# Patient Record
Sex: MALE | Race: WHITE | NOT HISPANIC OR LATINO | Employment: UNEMPLOYED
[De-identification: names, ages, dates, MRNs, and addresses within clinical notes are randomized per-mention and may not be internally consistent; named-entity substitution may affect disease eponyms.]

---

## 2019-05-24 ENCOUNTER — RECORDS - HEALTHEAST (OUTPATIENT)
Dept: ADMINISTRATIVE | Facility: OTHER | Age: 64
End: 2019-05-24

## 2019-07-23 ENCOUNTER — COMMUNICATION - HEALTHEAST (OUTPATIENT)
Dept: TELEHEALTH | Facility: CLINIC | Age: 64
End: 2019-07-23

## 2019-07-23 ENCOUNTER — OFFICE VISIT - HEALTHEAST (OUTPATIENT)
Dept: FAMILY MEDICINE | Facility: CLINIC | Age: 64
End: 2019-07-23

## 2019-07-23 DIAGNOSIS — Z72.0 TOBACCO ABUSE: ICD-10-CM

## 2019-07-23 DIAGNOSIS — Z12.11 SCREEN FOR COLON CANCER: ICD-10-CM

## 2019-07-23 DIAGNOSIS — Z76.89 ENCOUNTER TO ESTABLISH CARE: ICD-10-CM

## 2019-07-23 DIAGNOSIS — R03.0 ELEVATED BLOOD PRESSURE READING WITHOUT DIAGNOSIS OF HYPERTENSION: ICD-10-CM

## 2019-07-23 DIAGNOSIS — R73.9 ELEVATED BLOOD SUGAR: ICD-10-CM

## 2019-07-23 ASSESSMENT — MIFFLIN-ST. JEOR: SCORE: 1540.34

## 2019-08-12 ENCOUNTER — COMMUNICATION - HEALTHEAST (OUTPATIENT)
Dept: FAMILY MEDICINE | Facility: CLINIC | Age: 64
End: 2019-08-12

## 2019-10-17 ENCOUNTER — COMMUNICATION - HEALTHEAST (OUTPATIENT)
Dept: FAMILY MEDICINE | Facility: CLINIC | Age: 64
End: 2019-10-17

## 2021-05-18 ENCOUNTER — OFFICE VISIT - HEALTHEAST (OUTPATIENT)
Dept: GERIATRICS | Facility: CLINIC | Age: 66
End: 2021-05-18

## 2021-05-18 ENCOUNTER — RECORDS - HEALTHEAST (OUTPATIENT)
Dept: LAB | Facility: CLINIC | Age: 66
End: 2021-05-18

## 2021-05-18 DIAGNOSIS — N18.32 STAGE 3B CHRONIC KIDNEY DISEASE (H): ICD-10-CM

## 2021-05-18 DIAGNOSIS — L08.9 TYPE 2 DIABETES MELLITUS WITH RIGHT DIABETIC FOOT INFECTION (H): ICD-10-CM

## 2021-05-18 DIAGNOSIS — I15.1 HYPERTENSION SECONDARY TO OTHER RENAL DISORDERS: ICD-10-CM

## 2021-05-18 DIAGNOSIS — M86.171 ACUTE OSTEOMYELITIS OF RIGHT FOOT (H): ICD-10-CM

## 2021-05-18 DIAGNOSIS — Z91.199 NON COMPLIANCE WITH MEDICAL TREATMENT: ICD-10-CM

## 2021-05-18 DIAGNOSIS — E11.628 TYPE 2 DIABETES MELLITUS WITH RIGHT DIABETIC FOOT INFECTION (H): ICD-10-CM

## 2021-05-18 DIAGNOSIS — R53.81 PHYSICAL DECONDITIONING: ICD-10-CM

## 2021-05-18 DIAGNOSIS — Z72.0 TOBACCO ABUSE: ICD-10-CM

## 2021-05-18 DIAGNOSIS — E87.1 HYPONATREMIA: ICD-10-CM

## 2021-05-18 DIAGNOSIS — L03.115 CELLULITIS OF RIGHT FOOT: ICD-10-CM

## 2021-05-18 DIAGNOSIS — Z89.431 STATUS POST AMPUTATION OF RIGHT FOOT THROUGH METATARSAL BONE (H): ICD-10-CM

## 2021-05-18 RX ORDER — CEFTRIAXONE 1 G/1
1000 INJECTION, POWDER, FOR SOLUTION INTRAMUSCULAR; INTRAVENOUS DAILY
Status: SHIPPED | COMMUNITY
Start: 2021-05-18 | End: 2023-08-11

## 2021-05-18 RX ORDER — ACETAMINOPHEN 325 MG/1
2 TABLET ORAL EVERY 4 HOURS PRN
Status: SHIPPED | COMMUNITY
Start: 2021-05-18 | End: 2024-03-22

## 2021-05-18 RX ORDER — METRONIDAZOLE 500 MG/1
500 TABLET ORAL 2 TIMES DAILY
Status: SHIPPED | COMMUNITY
Start: 2021-05-17 | End: 2023-08-11

## 2021-05-18 RX ORDER — INSULIN GLARGINE 100 [IU]/ML
20 INJECTION, SOLUTION SUBCUTANEOUS 2 TIMES DAILY
Status: SHIPPED | COMMUNITY
Start: 2021-05-20 | End: 2023-08-11

## 2021-05-19 ENCOUNTER — COMMUNICATION - HEALTHEAST (OUTPATIENT)
Dept: GERIATRICS | Facility: CLINIC | Age: 66
End: 2021-05-19

## 2021-05-19 LAB
ALBUMIN SERPL-MCNC: 2.1 G/DL (ref 3.5–5)
ALP SERPL-CCNC: 555 U/L (ref 45–120)
ALT SERPL W P-5'-P-CCNC: 14 U/L (ref 0–45)
ANION GAP SERPL CALCULATED.3IONS-SCNC: 7 MMOL/L (ref 5–18)
AST SERPL W P-5'-P-CCNC: 15 U/L (ref 0–40)
BILIRUB SERPL-MCNC: 0.3 MG/DL (ref 0–1)
BUN SERPL-MCNC: 14 MG/DL (ref 8–22)
CALCIUM SERPL-MCNC: 9.5 MG/DL (ref 8.5–10.5)
CHLORIDE BLD-SCNC: 98 MMOL/L (ref 98–107)
CO2 SERPL-SCNC: 30 MMOL/L (ref 22–31)
CREAT SERPL-MCNC: 1 MG/DL (ref 0.7–1.3)
ERYTHROCYTE [DISTWIDTH] IN BLOOD BY AUTOMATED COUNT: 12.9 % (ref 11–14.5)
GFR SERPL CREATININE-BSD FRML MDRD: >60 ML/MIN/1.73M2
GLUCOSE BLD-MCNC: 220 MG/DL (ref 70–125)
HCT VFR BLD AUTO: 31.6 % (ref 40–54)
HGB BLD-MCNC: 10.5 G/DL (ref 14–18)
MCH RBC QN AUTO: 29.8 PG (ref 27–34)
MCHC RBC AUTO-ENTMCNC: 33.2 G/DL (ref 32–36)
MCV RBC AUTO: 90 FL (ref 80–100)
PLATELET # BLD AUTO: 783 THOU/UL (ref 140–440)
PMV BLD AUTO: 8.2 FL (ref 8.5–12.5)
POTASSIUM BLD-SCNC: 4.8 MMOL/L (ref 3.5–5)
PROT SERPL-MCNC: 7.1 G/DL (ref 6–8)
RBC # BLD AUTO: 3.52 MILL/UL (ref 4.4–6.2)
SODIUM SERPL-SCNC: 135 MMOL/L (ref 136–145)
WBC: 8.1 THOU/UL (ref 4–11)

## 2021-05-20 ENCOUNTER — COMMUNICATION - HEALTHEAST (OUTPATIENT)
Dept: GERIATRICS | Facility: CLINIC | Age: 66
End: 2021-05-20

## 2021-05-20 ENCOUNTER — RECORDS - HEALTHEAST (OUTPATIENT)
Dept: LAB | Facility: CLINIC | Age: 66
End: 2021-05-20

## 2021-05-20 LAB
SARS-COV-2 PCR COMMENT: NORMAL
SARS-COV-2 RNA SPEC QL NAA+PROBE: NEGATIVE
SARS-COV-2 VIRUS SPECIMEN SOURCE: NORMAL

## 2021-05-21 LAB — PLATELET # BLD AUTO: 722 THOU/UL (ref 140–440)

## 2021-05-24 ENCOUNTER — RECORDS - HEALTHEAST (OUTPATIENT)
Dept: LAB | Facility: CLINIC | Age: 66
End: 2021-05-24

## 2021-05-26 ENCOUNTER — OFFICE VISIT - HEALTHEAST (OUTPATIENT)
Dept: GERIATRICS | Facility: CLINIC | Age: 66
End: 2021-05-26

## 2021-05-26 DIAGNOSIS — N18.30 STAGE 3 CHRONIC KIDNEY DISEASE, UNSPECIFIED WHETHER STAGE 3A OR 3B CKD (H): ICD-10-CM

## 2021-05-26 DIAGNOSIS — Z47.89 AFTERCARE FOLLOWING SURGERY OF THE MUSCULOSKELETAL SYSTEM: ICD-10-CM

## 2021-05-26 DIAGNOSIS — M86.9 OSTEOMYELITIS OF RIGHT FOOT, UNSPECIFIED TYPE (H): ICD-10-CM

## 2021-05-26 DIAGNOSIS — E11.65 UNCONTROLLED TYPE 2 DIABETES MELLITUS WITH HYPERGLYCEMIA (H): ICD-10-CM

## 2021-05-26 DIAGNOSIS — R53.81 PHYSICAL DECONDITIONING: ICD-10-CM

## 2021-05-26 LAB
ALBUMIN SERPL-MCNC: 2.3 G/DL (ref 3.5–5)
ALP SERPL-CCNC: 468 U/L (ref 45–120)
ALT SERPL W P-5'-P-CCNC: 17 U/L (ref 0–45)
ANION GAP SERPL CALCULATED.3IONS-SCNC: 11 MMOL/L (ref 5–18)
AST SERPL W P-5'-P-CCNC: 22 U/L (ref 0–40)
BILIRUB SERPL-MCNC: 0.3 MG/DL (ref 0–1)
BUN SERPL-MCNC: 16 MG/DL (ref 8–22)
CALCIUM SERPL-MCNC: 9 MG/DL (ref 8.5–10.5)
CHLORIDE BLD-SCNC: 102 MMOL/L (ref 98–107)
CO2 SERPL-SCNC: 24 MMOL/L (ref 22–31)
CREAT SERPL-MCNC: 0.76 MG/DL (ref 0.7–1.3)
ERYTHROCYTE [DISTWIDTH] IN BLOOD BY AUTOMATED COUNT: 12.9 % (ref 11–14.5)
GFR SERPL CREATININE-BSD FRML MDRD: >60 ML/MIN/1.73M2
GLUCOSE BLD-MCNC: 127 MG/DL (ref 70–125)
HCT VFR BLD AUTO: 33.3 % (ref 40–54)
HGB BLD-MCNC: 10.7 G/DL (ref 14–18)
MCH RBC QN AUTO: 28.8 PG (ref 27–34)
MCHC RBC AUTO-ENTMCNC: 32.1 G/DL (ref 32–36)
MCV RBC AUTO: 90 FL (ref 80–100)
PLATELET # BLD AUTO: 586 THOU/UL (ref 140–440)
PMV BLD AUTO: 8.7 FL (ref 8.5–12.5)
POTASSIUM BLD-SCNC: 4.1 MMOL/L (ref 3.5–5)
PROT SERPL-MCNC: 6.7 G/DL (ref 6–8)
RBC # BLD AUTO: 3.72 MILL/UL (ref 4.4–6.2)
SODIUM SERPL-SCNC: 137 MMOL/L (ref 136–145)
WBC: 6.7 THOU/UL (ref 4–11)

## 2021-05-30 NOTE — PROGRESS NOTES
Office Visit - New Patient   Dario Benavides   63 y.o.  male    Date of visit: 7/23/2019  Physician: Evelyn Magallon CNP     Assessment and Plan   1. Encounter to establish care  2. Screen for colon cancer  Colonoscopy ordered  - Ambulatory referral for Colonoscopy    3. Tobacco abuse  Lungs are clear of acute disease recommend annual low-dose CT scan screening for high risk individuals (age 55 to 74 years) with 30 pack-year history of smoking and current smoker or quit within past 15 years; recommended by American society of clinical oncology.   - XR Chest 2 Views    4. Elevated blood sugar  Recommend getting new sets of lab and confirm diagnosis of diabetes mellitus. Recommend treatment after reviewing result.   - HM2(CBC w/o Differential); Future  - Comprehensive Metabolic Panel; Future  - Glycosylated Hemoglobin A1c; Future    5. Elevated blood pressure reading without diagnosis of hypertension  Recommend that patient check his blood pressure twice daily and follow up for a recheck in 7 days.   - HM2(CBC w/o Differential); Future  - Lipid Cascade; Future  - Comprehensive Metabolic Panel; Future      Return in about 1 week (around 7/30/2019) for Follow up as discussed, Blood pressure check, Diabetes follow up.     Chief Complaint   Chief Complaint   Patient presents with     Establish Care     Not fasting        Patient Profile   Social History     Social History Narrative     Not on file        Past Medical History   Patient Active Problem List   Diagnosis     Tobacco abuse       Past Surgical History  He has a past surgical history that includes Back surgery (1992) and Neck surgery (2011).     History of Present Illness   This 63 y.o. old male with history tobacco abuse and elevated blood pressure presents to the clinic with his partner to establish care. He reports that he has not seen a health care provider for about 15 years. He reports he has smoked for about 45 years. He also brought with his a  "wellness result that was conducted by his Mandaen. Writer reviewed the result and confirmed mild carotid stenosis but normal LDL and total cholesterol. Result also reviewed elevated blood sugar. Patient had no concerns today. He denied chest pain, shortness of breath fever and syncope.    Review of Systems: A comprehensive review of systems was negative except as noted.     Medications and Allergies   No current outpatient medications on file.     No current facility-administered medications for this visit.      No Known Allergies     Family and Social History   History reviewed. No pertinent family history.     Social History     Tobacco Use     Smoking status: Current Every Day Smoker     Packs/day: 1.00     Years: 45.00     Pack years: 45.00     Types: Cigarettes     Smokeless tobacco: Never Used     Tobacco comment: 8/3/2019   Substance Use Topics     Alcohol use: Not Currently     Frequency: Never     Drug use: Yes     Types: Marijuana        Physical Exam   General Appearance:   Well groomed    /87   Pulse (!) 103   Ht 5' 9\" (1.753 m)   Wt 166 lb 7 oz (75.5 kg)   SpO2 98%   BMI 24.58 kg/m      EYES: Eyelids, conjunctiva, and sclera were normal. Pupils were normal. Cornea, iris, and lens were normal bilaterally.  HEAD, EARS, NOSE, MOUTH, AND THROAT: Head and face were normal. Hearing was normal to voice and the ears were normal to external exam. Nose appearance was normal and there was no discharge. Oropharynx was normal.  NECK: Neck appearance was normal. There were no neck masses and the thyroid was not enlarged.  RESPIRATORY: Breathing pattern was normal and the chest moved symmetrically.  Percussion/auscultatory percussion was normal.  Lung sounds were normal and there were no abnormal sounds.  CARDIOVASCULAR: Heart rate and rhythm were normal.  S1 and S2 were normal and there were no extra sounds or murmurs. .   MUSCULOSKELETAL: Skeletal configuration was normal and muscle mass was normal for " age. Joint appearance was overall normal.  LYMPHATIC: There were no enlarged nodes.  SKIN/HAIR/NAILS: Skin color was normal.  There were no skin lesions.  Hair and nails were normal.  NEUROLOGIC: The patient was alert and oriented to person, place, time, and circumstance. Speech was normal.   PSYCHIATRIC:  Mood and affect were normal and the patient had normal recent and remote memory. The patient's judgment and insight were normal.       Additional Information     Reviewed recent community outreach la result and screening for cardiovascular disease.     Time: total time spent with the patient was 45 minutes of which >50% was spent in counseling and coordination of care     Evelyn Magallon CNP  Family Nurse Practitioner  [facility]  [phone]  [email or web address]

## 2021-06-01 ENCOUNTER — OFFICE VISIT - HEALTHEAST (OUTPATIENT)
Dept: GERIATRICS | Facility: CLINIC | Age: 66
End: 2021-06-01

## 2021-06-01 DIAGNOSIS — M86.171 ACUTE OSTEOMYELITIS OF RIGHT FOOT (H): ICD-10-CM

## 2021-06-01 DIAGNOSIS — L03.115 CELLULITIS OF RIGHT FOOT: ICD-10-CM

## 2021-06-01 DIAGNOSIS — E11.628 TYPE 2 DIABETES MELLITUS WITH RIGHT DIABETIC FOOT INFECTION (H): ICD-10-CM

## 2021-06-01 DIAGNOSIS — E87.1 HYPONATREMIA: ICD-10-CM

## 2021-06-01 DIAGNOSIS — Z72.0 TOBACCO ABUSE: ICD-10-CM

## 2021-06-01 DIAGNOSIS — N18.32 STAGE 3B CHRONIC KIDNEY DISEASE (H): ICD-10-CM

## 2021-06-01 DIAGNOSIS — L08.9 TYPE 2 DIABETES MELLITUS WITH RIGHT DIABETIC FOOT INFECTION (H): ICD-10-CM

## 2021-06-01 DIAGNOSIS — R53.81 PHYSICAL DECONDITIONING: ICD-10-CM

## 2021-06-01 DIAGNOSIS — Z89.431 STATUS POST AMPUTATION OF RIGHT FOOT THROUGH METATARSAL BONE (H): ICD-10-CM

## 2021-06-01 DIAGNOSIS — I15.1 HYPERTENSION SECONDARY TO OTHER RENAL DISORDERS: ICD-10-CM

## 2021-06-01 DIAGNOSIS — Z91.199 NON COMPLIANCE WITH MEDICAL TREATMENT: ICD-10-CM

## 2021-06-01 ASSESSMENT — MIFFLIN-ST. JEOR: SCORE: 1448.54

## 2021-06-03 ENCOUNTER — RECORDS - HEALTHEAST (OUTPATIENT)
Dept: LAB | Facility: CLINIC | Age: 66
End: 2021-06-03

## 2021-06-03 VITALS — HEIGHT: 69 IN | WEIGHT: 166.44 LBS | BODY MASS INDEX: 24.65 KG/M2

## 2021-06-04 LAB — C REACTIVE PROTEIN LHE: 0.6 MG/DL (ref 0–0.8)

## 2021-06-07 ENCOUNTER — AMBULATORY - HEALTHEAST (OUTPATIENT)
Dept: GERIATRICS | Facility: CLINIC | Age: 66
End: 2021-06-07

## 2021-06-09 ENCOUNTER — COMMUNICATION - HEALTHEAST (OUTPATIENT)
Dept: GERIATRICS | Facility: CLINIC | Age: 66
End: 2021-06-09

## 2021-06-16 PROBLEM — Z72.0 TOBACCO ABUSE: Status: ACTIVE | Noted: 2019-07-23

## 2021-06-16 PROBLEM — M86.171 ACUTE OSTEOMYELITIS OF RIGHT FOOT (H): Status: ACTIVE | Noted: 2021-05-04

## 2021-06-16 PROBLEM — L03.115 CELLULITIS OF RIGHT FOOT: Status: ACTIVE | Noted: 2021-05-04

## 2021-06-16 PROBLEM — Z89.431 STATUS POST AMPUTATION OF RIGHT FOOT THROUGH METATARSAL BONE (H): Status: ACTIVE | Noted: 2021-05-08

## 2021-06-17 NOTE — TELEPHONE ENCOUNTER
"[facility] Nurse Triage Telephone Note      Provider: Elham Almonte NP  Facility: [facility]    Facility Type: TCU    Caller: Heidi  Call Back Number:  [phone]    Allergies: Patient has no known allergies.    Reason for call: Nurse called to report that patient's blood glucose reading this morning was reading \"high\" on the meter.  Patient was given his scheduled Lantus 13 units and s/s Novolog 10 units.  Patient is asymptomatic.  According to staff patient had a fish sandwich and Big Mac from [employer] last evening for supper.        Verbal Order/Direction given by Provider: Give an extra 10 units of Novolog now, increase Lantus to 20 units two times a day, and call back in an hour if blood glucose is >350.      Provider giving order: Elham Almonte NP    Verbal order given to: Heidi De La Torre RN      "

## 2021-06-17 NOTE — TELEPHONE ENCOUNTER
Telephone Encounter by Sonia De La Torre RN at 5/19/2021  2:53 PM     Author: Sonia De La Torre, RN Service: -- Author Type: Registered Nurse    Filed: 5/19/2021  2:58 PM Encounter Date: 5/19/2021 Status: Signed    : Sonia De La Torre RN (Registered Nurse)       [facility] Nurse Triage Telephone Note      Provider: Elham Almonte NP  Facility: [facility]    Facility Type: TCU    Caller: Heidi  Call Back Number:  [phone]    Allergies: Patient has no known allergies.    Reason for call: Nurse called to report lab results.           Verbal Order/Direction given by Provider: Recheck platelet level on 5/21 and recheck CBC and CMP on 5/26/.      Provider giving order: Elham Almonte NP    Verbal order given to: Delmar De La Torre RN

## 2021-06-19 NOTE — LETTER
Letter by Evelyn Magallon FNP at      Author: Evelyn Magallon FNP Service: -- Author Type: --    Filed:  Encounter Date: 8/12/2019 Status: (Other)         Dario Benavides  [address] [place] [address]             August 12, 2019         Dear Mr. Benavides,    Our records show that you are due for a colonoscopy.  We do want to inform you that there are a couple of other options besides the traditional colonoscopy that we offer.  If you would like to do the traditional colonoscopy, please contact a [facility] near you according to the card enclosed.  If you would like to do one of the other options available to you, please let you primary doctor know and they will get that ordered.  Enclosed is some information on the other options for you to read over.  If you have had any of these done at another facility, please arrange for us to receive those records so we can update your chart.    Please call with questions or contact us using [employer].    Sincerely,        Electronically signed by WOO Berry

## 2021-06-19 NOTE — LETTER
Letter by vEelyn Magallon FNP at      Author: Evelyn Magallon FNP Service: -- Author Type: --    Filed:  Encounter Date: 10/17/2019 Status: Signed         Dario Benavides  [address] [place] [address]             October 17, 2019         Dear Mr. Benavides,    Our records show that you are due for a colonoscopy.  We do want to inform you that there are a couple of other options besides the traditional colonoscopy that we offer.  If you would like to do the traditional colonoscopy, please contact a [facility] near you according to the card enclosed.  If you would like to do one of the other options available to you, please let you primary doctor know and they will get that ordered.  Enclosed is some information on the other options for you to read over.  If you have had any of these done at another facility, please arrange for us to receive those records so we can update your chart.    Please call with questions or contact us using [employer].    Sincerely,        Electronically signed by WOO Berry        26-Feb-2019

## 2021-06-25 NOTE — PROGRESS NOTES
[facility]  Initial MD Visit  05/26/21      Facility:   ANSWER ROOMING ACTIVITY QUESTION     Code Status: FULL CODE    Chief Complaint   Patient presents with     H & P       HPI:   Dario Benavides is a 65 y.o. male who was seen at [facility] on 05/26/21 for an initial visit. Medical history is notable for DM and CKD. He was hospitalized at [facility] from 5/4/21 to 5/17/21 where he presented due to daughter's concerns for weigh loss, foot infection. He was not taking any medications as home other than a prostate supplement. He was found to have a R foot osteomyelitis and is s/p I&D with partial 5th ray amputation (5/6/21) followed by I&D with transmetatarsal amputation (5/8/21). ID was following and he was discharged to complete a course of ceftriaxone and metronidazole. Also with uncontrolled DM (Hgb A1c 15.1) and he is new on insulin. He was admitted to this facility for medical management and rehab.     Today, Mr. Benavides is seen in his room sitting in a wheelchair. He was headed outside to smoke. Brief conversation today. He did not have any questions or concerns. Wants to get home with his daughter as soon as antibiotics are done. Discussed blood sugars, insulin and needing to get better glucose control. No chest pain or dyspnea. No concerns today per discussion with nursing - labile sugars noted. He is working with therapies.     ALLERGIES:  No Known Allergies    PAST MEDICAL HISTORY:  Past Medical History:   Diagnosis Date     Hypertension                PAST SURGICAL HISTORY:  Past Surgical History:   Procedure Laterality Date     BACK SURGERY  1992     NECK SURGERY  2011       FAMILY HISTORY:  Reviewed and non contributory    SOCIAL HISTORY:  Lives alone     MEDICATIONS:  Post Discharge Medication Reconciliation Status: discharge medications reconciled and changed, per note/orders    Current Outpatient Medications   Medication Sig     acetaminophen (TYLENOL) 325 MG  tablet Take 650 mg by mouth every 4 (four) hours as needed for pain.     arginine/glutamine/calcium bmb (REHAN ORAL) Take 1 packet by mouth 2 (two) times a day.     cefTRIAXone 1 gram SolR Infuse 1,000 mg into a venous catheter daily. Last day 6/9/21     insulin aspart U-100 (NOVOLOG) 100 unit/mL (3 mL) injection pen Inject under the skin. Inject as per sliding scale: if 0 - 69 see hypoglycemia protocol; 70 - 149 = 0; 150 - 199 = 2 units; 200 - 249 = 4 units; 250 - 299 = 6 units; 300 - 349 = 8 units; 350 - 399 = 10 units;     insulin glargine (LANTUS) 100 unit/mL vial Inject 20 Units under the skin 2 (two) times a day.      metroNIDAZOLE (FLAGYL) 500 MG tablet Take 500 mg by mouth 2 (two) times a day.     senna (SENOKOT) 8.6 mg tablet Take 2 tablets by mouth 2 (two) times a day.        ROS:  4 point ROS neg other than the symptoms noted above in the HPI.    PHYSICAL EXAM:  /80   Pulse 94   Temp 96.8  F (36  C)   Resp 16   Wt 144 lb 11.2 oz (65.6 kg)   SpO2 95%   BMI 21.37 kg/m     Gen: sitting in wheelchair, alert, cooperative and in no acute distress  HEENT: normocephalic; oropharynx clear  Card: RRR, S1, S2, no murmurs  Resp: lungs clear to auscultation bilaterally  MSK: decreased muscle tone, no LLE edema; RLE in CAM boot  Neuro: CX II-XII grossly in tact; ROM in all four extremities grossly in tact  Psych: alert and oriented x3; normal affect    LABORATORY/IMAGING DATA:  Reviewed as per Epic    ASSESSMENT/PLAN:    Right Foot Osteomyelitis s/p Transmetatarsal Amputation (5/8/21)  Pain controlled. Tolerating antibiotics.   -- NWB w/ CAM boot  -- wound cares as ordered   -- APAP 650 mg q4h PRN  -- ceftriaxone 1g daily and metronidazole 500 mg two times a day (last day 6/9/21)  -- weeky labs  -- follow up with podiatry as scheduled    DM, Type II - Uncontrolled  Hgb A1c 15.1. Sugars are all over the board at the TCU. 200s-260s (am), 200-400 (noon) and 190s-310s (farhana).   -- glargine 20 units two times a  day plus aspart sliding scale insulin three times a day AC  -- challenging given uneven diet - he's eating non DM type foods  -- also challenge that he needs to be able to maintain regimen when at home  -- recent increase in glargine so going to let be today - considered adding a fixed dose pre-meal aspart, but again he needs to be able to manage at home    CKD, Stage III  By history. Baseline Cr not known. Cr 0.76 today.   -- avoid nephrotoxic meds  -- periodic BMP    Slow Transit Constipation  -- senna 2 tabs BID  -- adjust bowel regimen as needed    Physical Deconditioning  In setting of hospitalization and underlying medical conditions  -- ongoing PT/OT        Electronically signed by: Sophia Dumont MD

## 2021-06-26 NOTE — PROGRESS NOTES
[facility] Patient Outreach:     Discharge Date::  6/5/21      Reason for TCU stay (discharge diagnosis)::  Right foot osteomyelitis with uncontrolled DM      Are you feeling better, the same or worse since your discharge?:  Patient is feeling better          As part of your discharge plan, did they discuss home care with you?: Yes        Have your seen them yet, or are they scheduled to visit?: Yes                Do you have any follow up visits scheduled with your PCP or Specialist?:  Yes, with PCP      (RN) Is it scheduled soon enough (3-5 days)?: Yes

## 2021-07-04 NOTE — LETTER
Letter by Elham Almonte NP at      Author: Elham Almonte NP Service: -- Author Type: --    Filed:  Encounter Date: 6/1/2021 Status: (Other)         [facility]  [address] [place] [address]                                  June 14, 2021    Patient: Dario Benavides   MR Number: [id number]   YOB: 1955   Date of Visit: 6/1/2021     Dear Dr. Pablo:    Thank you for referring Dario Benavides to me for evaluation. Below are the relevant portions of my assessment and plan of care.    If you have questions, please do not hesitate to call me. I look forward to following Dario along with you.    Sincerely,        Elham Almonte NP          CC  No Recipients  Elham Almonte NP  6/14/2021 11:08 PM  Sign when Signing Visit  [facility]    Facility:   [facility] [[id number]]   Code Status: FULL CODE  PCP: Evelyn Magallon FNP   Phone: [phone]   Fax: [phone]      CHIEF COMPLAINT/REASON FOR VISIT:  Chief Complaint   Patient presents with   ? Discharge Summary     6/5 PT & OT.       HISTORY COURSE:  Per admission visit dated 5/18/2021:  Brief Summary of Hospital Course:   Patient hospitalized during above noted dates following presentation to the ED with an infection of a diabetic ulcer of his R foot. Following podiatry assessment her underwent an I&D with partial 5th ray amputation of his R foot on 5/6/21; followed by a repeat I&D with transmetatarsal amputation on 5/8/21. On 5/12/21 he returned to the OR for an additional transmetatarsal amputation with application of integra and a wound vac. ID was consulted and he was started on a regimen of Ceftriaxone, Flagyl and Diflucan with placement of PICC line on 5/17 with plans to discharge on regimen of Flagyl and IV Ceftriaxone for a total on 4 week with follow-up with Dr Valera. Hgb A1c upon admission noted 15.1 - started on regimen of Lantus insulin with labile  BG levels. Admission SHANITA resolved during admission      ICD-10-CM    1. Acute osteomyelitis of right foot (H)   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) M86.171  Z 89.431 Acute - unstable, improving. Continues on regimen of   2. Cellulitis of right foot  L03.115 IV Ceftriaxone and PO Flagyl per ID - plan to discharge home following completion of IV ceftriaxone on 6/4.  Follow-up with infectious disease further recommendations.   3. Type 2 diabetes mellitus with right diabetic foot infection (H)  E11.628 Chronic - unstable. Uncontrolled PTA - labile BG levels inpatient - currently on regimen of Lantus 20u two times a day + sliding scale insulin. Has had elevated BG levels since admission -blood glucose levels improved with increased to 20 units twice daily.  Plan to discharge on this regimen, will require close follow-up by PCP following discharge.    L08.9    4. Non compliance with medical treatment  Z91.19 Acute on chronic - unstable. Leading cause of noted diagnoses and recent surgical procedures - ongoing education required re:importance of adherence to prescribed regimens for patient safety.  Will discharge with home care nursing to promote adherence to regimen   5. Stage 3b chronic kidney disease  N18.32 Chronic - stable.  Last BMP noting BUN of 16, creatinine of 7.76 and a GFR greater than 60.  Ongoing monitoring per PCP   6. Hyponatremia  E87.1 Acute - resolved. Noted inpatient. Last Na+ level stable at 137.  Ongoing monitoring per PCP.   7. Hypertension secondary to other renal disorders  I15.1 Chronic - stable. No active tx regimen.  Variations noted.      N28.89  ongoing monitoring and future interventions per PCP   8. Tobacco abuse  Z72.0 Chronic - unstable. Current every day smoker - no desire for cessation at this time. Ongoing education.  Ongoing interventions per PCP   9. Physical deconditioning  R53.81 Acute -stable. 2/2 above noted diagnoses, recent hospitalization and noted  "procedures. PT/OT completed in TCU to safe baseline to discharge home.  We will continue in-home therapies.     ROS:  10 point ROS of systems including Constitutional, Eyes, Respiratory, Cardiovascular, Gastroenterology, Genitourinary, Integumentary, Musculoskeletal, Psychiatric were all negative except for pertinent positives noted in my HPI.    EXAM:  Vitals:    06/01/21 1309   BP: 122/72   Pulse: 98   Resp: 18   Temp: 98  F (36.7  C)   SpO2: 97%   Weight: 146 lb 3.2 oz (66.3 kg)   Height: 5' 9\" (1.753 m)     GENERAL APPEARANCE:  Alert, in no distress, oriented, thin, cooperative, middle-aged male resting in bed  EYES:  EOM, conjunctivae, lids, pupils and irises normal  RESP:  respiratory effort normal, no respiratory distress  CV:  no edema, +2 pedal pulse LLE  M/S:   Gait and station abnormal - IVONE 2/2 patient being in bed; Digits and nails abnormal - arthritic changes present  Examination of right lower extremity  Inspection, ROM, stability and muscle strength diminished 2/2 above noted diagnoses  SKIN:  Inspection of skin and subcutaneous tissue baseline, Palpation of skin and subcutaneous tissue baseline, wound appearance: IVONE 2/2 dressing in place - surround skin CDI with no s/sx of infection  PSYCH:  oriented X 3, normal insight, judgement and memory, affect and mood normal      MEDICATION LIST:  Current Outpatient Medications   Medication Sig   ? acetaminophen (TYLENOL) 325 MG tablet Take 650 mg by mouth every 4 (four) hours as needed for pain.   ? arginine/glutamine/calcium bmb (REHAN ORAL) Take 1 packet by mouth 2 (two) times a day.   ? cefTRIAXone 1 gram SolR Infuse 1,000 mg into a venous catheter daily. Last day 6/9/21   ? insulin aspart U-100 (NOVOLOG) 100 unit/mL (3 mL) injection pen Inject under the skin. Inject as per sliding scale: if 0 - 69 see hypoglycemia protocol; 70 - 149 = 0; 150 - 199 = 2 units; 200 - 249 = 4 units; 250 - 299 = 6 units; 300 - 349 = 8 units; 350 - 399 = 10 units;   ? insulin " glargine (LANTUS) 100 unit/mL vial Inject 20 Units under the skin 2 (two) times a day.    ? metroNIDAZOLE (FLAGYL) 500 MG tablet Take 500 mg by mouth 2 (two) times a day.   ? senna (SENOKOT) 8.6 mg tablet Take 2 tablets by mouth 2 (two) times a day.       DISCHARGE DIAGNOSIS:    ICD-10-CM    1. Acute osteomyelitis of right foot (H)  M86.171    2. 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 Cellulitis of right foot  L03.115    4. Type 2 diabetes mellitus with right diabetic foot infection (H)  E11.628     L08.9    5. Non compliance with medical treatment  Z91.19    6. Stage 3b chronic kidney disease  N18.32    7. Hyponatremia  E87.1    8. Hypertension secondary to other renal disorders  I15.1     N28.89    9. Tobacco abuse  Z72.0    10. Physical deconditioning  R53.81        MEDICAL EQUIPMENT NEEDS:  None    Documentation of Face-to-Face and Certification for Home Health Services   Patient: Dario Benavides  YOB: 1955  MR Number: [id number]  Today's Date: 06/01/2021    I certify that patient: Dario Benavides is under my care and that I, or a nurse practitioner or physician's assistant working with me, had a face-to-face encounter that meets the physician face-to-face encounter requirements with this patient on: 06/01/2021.    This encounter with the patient was in whole, or in part, for the following medical condition, which is the primary reason for home health care: The primary encounter diagnosis was Acute osteomyelitis of right foot (H). Diagnoses of 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), Cellulitis of right foot, Type 2 diabetes mellitus with right diabetic foot infection (H), Non compliance with medical treatment, Stage 3b chronic kidney disease, Hyponatremia, Hypertension secondary to other renal disorders, Tobacco abuse, and Physical deconditioning were also pertinent to this visit.    I certify that, based on my findings, the  following services are medically necessary home health services: Nursing, Occupational Therapy, and Physical Therapy.    My clinical findings support the need for the above services because: Nurse is needed: For complex aftercare of surgical procedures because the patient needs instruction and cannot perform care on their own due to noncompliance secondary to above diagnoses; to provide assessment and oversight required in the home to assure adherence to the medical plan due to: Medical complexity secondary to above-noted diagnoses; Occupational Therapy Services are needed to assess and treat cognitive ability and address ADL safety due to impairment in physical ability secondary to above-noted diagnoses; and physical Therapy Services are needed to assess and treat the following functional impairments: Physical limitations secondary to above-noted diagnoses.    Further, I certify that my clinical findings support that this patient is homebound (i.e. absences from home require considerable and taxing effort and are for medical reasons or [facility] services or infrequently or of short duration when for other reasons) because: Leaving home is medically contraindicated for the following reason(s): Pressure on open wounds during transport impairs healing process.    Based on the above findings. I certify that this patient is confined to the home and needs intermittent skilled nursing care, physical therapy and/or speech therapy.  The patient is under my care, and I have initiated the establishment of the plan of care.  This patient will be followed by a physician who will periodically review the plan of care.  Physician/Provider to provide follow up care: Evelyn Magallon, FNP    Responsible Medicare certified [place] Physician: Dr. Sophia Dumont MD  Physician Signature: See electronic signature associated with these discharge orders.  Date: 06/01/2021    Schedule follow up visit with primary care provider within 7 days to  reestablish care.      TOTAL DISCHARGE TIME:   Greater than 30 minutes    Electronically signed by:   KASEY Freeman, DNP  [facility]  [facility] Office: [address] [place] [address] [address] [place] [address]   [facility] Cell: [phone]  [facility] Fax: [phone]    [facility] Office: [address] [place] [address]  [facility] Phone: [phone]  [facility] Voicemail: [phone]

## 2021-07-04 NOTE — LETTER
Letter by Sophia Dumont MD at      Author: Sophia Dumont MD Service: -- Author Type: --    Filed:  Encounter Date: 5/26/2021 Status: (Other)         Patient: Dario Benavides   MR Number: [id number]   YOB: 1955   Date of Visit: 5/26/2021     [facility]  Initial MD Visit  05/26/21      Facility:   ANSWER ROOMING ACTIVITY QUESTION     Code Status: FULL CODE    Chief Complaint   Patient presents with   ? H & P       HPI:   Dario Benavides is a 65 y.o. male who was seen at [facility] on 05/26/21 for an initial visit. Medical history is notable for DM and CKD. He was hospitalized at [facility] from 5/4/21 to 5/17/21 where he presented due to daughter's concerns for weigh loss, foot infection. He was not taking any medications as home other than a prostate supplement. He was found to have a R foot osteomyelitis and is s/p I&D with partial 5th ray amputation (5/6/21) followed by I&D with transmetatarsal amputation (5/8/21). ID was following and he was discharged to complete a course of ceftriaxone and metronidazole. Also with uncontrolled DM (Hgb A1c 15.1) and he is new on insulin. He was admitted to this facility for medical management and rehab.     Today, Mr. Benavides is seen in his room sitting in a wheelchair. He was headed outside to smoke. Brief conversation today. He did not have any questions or concerns. Wants to get home with his daughter as soon as antibiotics are done. Discussed blood sugars, insulin and needing to get better glucose control. No chest pain or dyspnea. No concerns today per discussion with nursing - labile sugars noted. He is working with therapies.     ALLERGIES:  No Known Allergies    PAST MEDICAL HISTORY:  Past Medical History:   Diagnosis Date   ? Hypertension                PAST SURGICAL HISTORY:  Past Surgical History:   Procedure Laterality Date   ? BACK SURGERY  1992   ? NECK SURGERY  2011       FAMILY HISTORY:  Reviewed  and non contributory    SOCIAL HISTORY:  Lives alone     MEDICATIONS:  Post Discharge Medication Reconciliation Status: discharge medications reconciled and changed, per note/orders    Current Outpatient Medications   Medication Sig   ? acetaminophen (TYLENOL) 325 MG tablet Take 650 mg by mouth every 4 (four) hours as needed for pain.   ? arginine/glutamine/calcium bmb (REHAN ORAL) Take 1 packet by mouth 2 (two) times a day.   ? cefTRIAXone 1 gram SolR Infuse 1,000 mg into a venous catheter daily. Last day 6/9/21   ? insulin aspart U-100 (NOVOLOG) 100 unit/mL (3 mL) injection pen Inject under the skin. Inject as per sliding scale: if 0 - 69 see hypoglycemia protocol; 70 - 149 = 0; 150 - 199 = 2 units; 200 - 249 = 4 units; 250 - 299 = 6 units; 300 - 349 = 8 units; 350 - 399 = 10 units;   ? insulin glargine (LANTUS) 100 unit/mL vial Inject 20 Units under the skin 2 (two) times a day.    ? metroNIDAZOLE (FLAGYL) 500 MG tablet Take 500 mg by mouth 2 (two) times a day.   ? senna (SENOKOT) 8.6 mg tablet Take 2 tablets by mouth 2 (two) times a day.        ROS:  4 point ROS neg other than the symptoms noted above in the HPI.    PHYSICAL EXAM:  /80   Pulse 94   Temp 96.8  F (36  C)   Resp 16   Wt 144 lb 11.2 oz (65.6 kg)   SpO2 95%   BMI 21.37 kg/m     Gen: sitting in wheelchair, alert, cooperative and in no acute distress  HEENT: normocephalic; oropharynx clear  Card: RRR, S1, S2, no murmurs  Resp: lungs clear to auscultation bilaterally  MSK: decreased muscle tone, no LLE edema; RLE in CAM boot  Neuro: CX II-XII grossly in tact; ROM in all four extremities grossly in tact  Psych: alert and oriented x3; normal affect    LABORATORY/IMAGING DATA:  Reviewed as per Epic    ASSESSMENT/PLAN:    Right Foot Osteomyelitis s/p Transmetatarsal Amputation (5/8/21)  Pain controlled. Tolerating antibiotics.   -- NWB w/ CAM boot  -- wound cares as ordered   -- APAP 650 mg q4h PRN  -- ceftriaxone 1g daily and metronidazole 500  mg two times a day (last day 6/9/21)  -- weeky labs  -- follow up with podiatry as scheduled    DM, Type II - Uncontrolled  Hgb A1c 15.1. Sugars are all over the board at the TCU. 200s-260s (am), 200-400 (noon) and 190s-310s (farhana).   -- glargine 20 units two times a day plus aspart sliding scale insulin three times a day AC  -- challenging given uneven diet - he's eating non DM type foods  -- also challenge that he needs to be able to maintain regimen when at home  -- recent increase in glargine so going to let be today - considered adding a fixed dose pre-meal aspart, but again he needs to be able to manage at home    CKD, Stage III  By history. Baseline Cr not known. Cr 0.76 today.   -- avoid nephrotoxic meds  -- periodic BMP    Slow Transit Constipation  -- senna 2 tabs BID  -- adjust bowel regimen as needed    Physical Deconditioning  In setting of hospitalization and underlying medical conditions  -- ongoing PT/OT        Electronically signed by: Sophia Dumont MD

## 2021-07-04 NOTE — PROGRESS NOTES
Progress Notes by Elham Almonte NP at 6/1/2021 12:35 PM     Author: Elham Almonte NP Service: -- Author Type: Nurse Practitioner    Filed: 6/14/2021 11:08 PM Encounter Date: 6/1/2021 Status: Signed    : Elham Almonte NP (Nurse Practitioner)       [facility]    Facility:   [facility] [[id number]]   Code Status: FULL CODE  PCP: Evelyn Magallon FNP   Phone: [phone]   Fax: [phone]      CHIEF COMPLAINT/REASON FOR VISIT:  Chief Complaint   Patient presents with   ? Discharge Summary     6/5 PT & OT.       HISTORY COURSE:  Per admission visit dated 5/18/2021:  Brief Summary of Hospital Course:   Patient hospitalized during above noted dates following presentation to the ED with an infection of a diabetic ulcer of his R foot. Following podiatry assessment her underwent an I&D with partial 5th ray amputation of his R foot on 5/6/21; followed by a repeat I&D with transmetatarsal amputation on 5/8/21. On 5/12/21 he returned to the OR for an additional transmetatarsal amputation with application of integra and a wound vac. ID was consulted and he was started on a regimen of Ceftriaxone, Flagyl and Diflucan with placement of PICC line on 5/17 with plans to discharge on regimen of Flagyl and IV Ceftriaxone for a total on 4 week with follow-up with Dr Valera. Hgb A1c upon admission noted 15.1 - started on regimen of Lantus insulin with labile BG levels. Admission SHANITA resolved during admission      ICD-10-CM    1. Acute osteomyelitis of right foot (H)   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) M86.171  Z 89.431 Acute - unstable, improving. Continues on regimen of   2. Cellulitis of right foot  L03.115 IV Ceftriaxone and PO Flagyl per ID - plan to discharge home following completion of IV ceftriaxone on 6/4.  Follow-up with infectious disease further recommendations.   3. Type 2 diabetes mellitus with right diabetic foot infection (H)  E11.628 Chronic  "- unstable. Uncontrolled PTA - labile BG levels inpatient - currently on regimen of Lantus 20u two times a day + sliding scale insulin. Has had elevated BG levels since admission -blood glucose levels improved with increased to 20 units twice daily.  Plan to discharge on this regimen, will require close follow-up by PCP following discharge.    L08.9    4. Non compliance with medical treatment  Z91.19 Acute on chronic - unstable. Leading cause of noted diagnoses and recent surgical procedures - ongoing education required re:importance of adherence to prescribed regimens for patient safety.  Will discharge with home care nursing to promote adherence to regimen   5. Stage 3b chronic kidney disease  N18.32 Chronic - stable.  Last BMP noting BUN of 16, creatinine of 7.76 and a GFR greater than 60.  Ongoing monitoring per PCP   6. Hyponatremia  E87.1 Acute - resolved. Noted inpatient. Last Na+ level stable at 137.  Ongoing monitoring per PCP.   7. Hypertension secondary to other renal disorders  I15.1 Chronic - stable. No active tx regimen.  Variations noted.      N28.89  ongoing monitoring and future interventions per PCP   8. Tobacco abuse  Z72.0 Chronic - unstable. Current every day smoker - no desire for cessation at this time. Ongoing education.  Ongoing interventions per PCP   9. Physical deconditioning  R53.81 Acute -stable. 2/2 above noted diagnoses, recent hospitalization and noted procedures. PT/OT completed in TCU to safe baseline to discharge home.  We will continue in-home therapies.     ROS:  10 point ROS of systems including Constitutional, Eyes, Respiratory, Cardiovascular, Gastroenterology, Genitourinary, Integumentary, Musculoskeletal, Psychiatric were all negative except for pertinent positives noted in my HPI.    EXAM:  Vitals:    06/01/21 1309   BP: 122/72   Pulse: 98   Resp: 18   Temp: 98  F (36.7  C)   SpO2: 97%   Weight: 146 lb 3.2 oz (66.3 kg)   Height: 5' 9\" (1.753 m)     GENERAL APPEARANCE:  " Alert, in no distress, oriented, thin, cooperative, middle-aged male resting in bed  EYES:  EOM, conjunctivae, lids, pupils and irises normal  RESP:  respiratory effort normal, no respiratory distress  CV:  no edema, +2 pedal pulse LLE  M/S:   Gait and station abnormal - IVONE 2/2 patient being in bed; Digits and nails abnormal - arthritic changes present  Examination of right lower extremity  Inspection, ROM, stability and muscle strength diminished 2/2 above noted diagnoses  SKIN:  Inspection of skin and subcutaneous tissue baseline, Palpation of skin and subcutaneous tissue baseline, wound appearance: IVONE 2/2 dressing in place - surround skin CDI with no s/sx of infection  PSYCH:  oriented X 3, normal insight, judgement and memory, affect and mood normal      MEDICATION LIST:  Current Outpatient Medications   Medication Sig   ? acetaminophen (TYLENOL) 325 MG tablet Take 650 mg by mouth every 4 (four) hours as needed for pain.   ? arginine/glutamine/calcium bmb (REHAN ORAL) Take 1 packet by mouth 2 (two) times a day.   ? cefTRIAXone 1 gram SolR Infuse 1,000 mg into a venous catheter daily. Last day 6/9/21   ? insulin aspart U-100 (NOVOLOG) 100 unit/mL (3 mL) injection pen Inject under the skin. Inject as per sliding scale: if 0 - 69 see hypoglycemia protocol; 70 - 149 = 0; 150 - 199 = 2 units; 200 - 249 = 4 units; 250 - 299 = 6 units; 300 - 349 = 8 units; 350 - 399 = 10 units;   ? insulin glargine (LANTUS) 100 unit/mL vial Inject 20 Units under the skin 2 (two) times a day.    ? metroNIDAZOLE (FLAGYL) 500 MG tablet Take 500 mg by mouth 2 (two) times a day.   ? senna (SENOKOT) 8.6 mg tablet Take 2 tablets by mouth 2 (two) times a day.       DISCHARGE DIAGNOSIS:    ICD-10-CM    1. Acute osteomyelitis of right foot (H)  M86.171    2. 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 Cellulitis of right foot  L03.115    4. Type 2 diabetes mellitus with right diabetic foot infection (H)   E11.628     L08.9    5. Non compliance with medical treatment  Z91.19    6. Stage 3b chronic kidney disease  N18.32    7. Hyponatremia  E87.1    8. Hypertension secondary to other renal disorders  I15.1     N28.89    9. Tobacco abuse  Z72.0    10. Physical deconditioning  R53.81        MEDICAL EQUIPMENT NEEDS:  None    Documentation of Face-to-Face and Certification for Home Health Services   Patient: Dario Benavides  YOB: 1955  MR Number: [id number]  Today's Date: 06/01/2021    I certify that patient: Dario Benavides is under my care and that I, or a nurse practitioner or physician's assistant working with me, had a face-to-face encounter that meets the physician face-to-face encounter requirements with this patient on: 06/01/2021.    This encounter with the patient was in whole, or in part, for the following medical condition, which is the primary reason for home health care: The primary encounter diagnosis was Acute osteomyelitis of right foot (H). Diagnoses of 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), Cellulitis of right foot, Type 2 diabetes mellitus with right diabetic foot infection (H), Non compliance with medical treatment, Stage 3b chronic kidney disease, Hyponatremia, Hypertension secondary to other renal disorders, Tobacco abuse, and Physical deconditioning were also pertinent to this visit.    I certify that, based on my findings, the following services are medically necessary home health services: Nursing, Occupational Therapy, and Physical Therapy.    My clinical findings support the need for the above services because: Nurse is needed: For complex aftercare of surgical procedures because the patient needs instruction and cannot perform care on their own due to noncompliance secondary to above diagnoses; to provide assessment and oversight required in the home to assure adherence to the medical plan due to: Medical complexity secondary to above-noted  diagnoses; Occupational Therapy Services are needed to assess and treat cognitive ability and address ADL safety due to impairment in physical ability secondary to above-noted diagnoses; and physical Therapy Services are needed to assess and treat the following functional impairments: Physical limitations secondary to above-noted diagnoses.    Further, I certify that my clinical findings support that this patient is homebound (i.e. absences from home require considerable and taxing effort and are for medical reasons or [employer] services or infrequently or of short duration when for other reasons) because: Leaving home is medically contraindicated for the following reason(s): Pressure on open wounds during transport impairs healing process.    Based on the above findings. I certify that this patient is confined to the home and needs intermittent skilled nursing care, physical therapy and/or speech therapy.  The patient is under my care, and I have initiated the establishment of the plan of care.  This patient will be followed by a physician who will periodically review the plan of care.  Physician/Provider to provide follow up care: Evelyn Magallon, FNP    Responsible Medicare certified PECOS Physician: Dr. Sophia Dumont MD  Physician Signature: See electronic signature associated with these discharge orders.  Date: 06/01/2021    Schedule follow up visit with primary care provider within 7 days to reestablish care.      TOTAL DISCHARGE TIME:   Greater than 30 minutes    Electronically signed by:   KASEY Freeman, GUILLERMO  [place] [facility]  [place] Office: [address] [place] [address]   [place] Cell: [phone]  [place] Fax: [phone]    [facility] Office: [address] [place] [address]  [facility] Phone: [phone]  [facility] Voicemail: [phone]

## 2021-07-04 NOTE — ADDENDUM NOTE
Addendum Note by Geremias Martinez MD at 5/26/2021  4:03 PM     Author: Geremias Martinez MD Service: -- Author Type: Physician    Filed: 5/29/2021  6:48 PM Encounter Date: 5/26/2021 Status: Signed    : Geremias Martinez MD (Physician)    Addended by: GEREMIAS MARTINEZ on: 5/29/2021 06:48 PM        Modules accepted: Orders

## 2021-07-04 NOTE — PROGRESS NOTES
Progress Notes by Elham Almonte NP at 5/18/2021 10:49 AM     Author: Elham Almonte NP Service: -- Author Type: Nurse Practitioner    Filed: 6/8/2021 10:25 PM Encounter Date: 5/18/2021 Status: Signed    : Elham Almonte NP (Nurse Practitioner)       PRIMARY CARE PROVIDER AND CLINIC:  [facility], [address] [address]  No chief complaint on file.    [facility] [facility] Record Number:  [id number]  Dario Benavides  is a 65 y.o.  (1955), admitted to the above facility from  [facility]. Hospital stay 5/4/21 through 5/17/21.  Admitted to this facility for rehab, medical management, and nursing care.    HPI:    HPI information obtained from: facility chart records, facility staff, patient report, [facility] chart review, and Care Everywhere [facility] chart review  .   Brief Summary of Hospital Course:   Patient hospitalized during above noted dates following presentation to the ED with an infection of a diabetic ulcer of his R foot. Following podiatry assessment her underwent an I&D with partial 5th ray amputation of his R foot on 5/6/21; followed by a repeat I&D with transmetatarsal amputation on 5/8/21. On 5/12/21 he returned to the OR for an additional transmetatarsal amputation with application of integra and a wound vac. ID was consulted and he was started on a regimen of Ceftriaxone, Flagyl and Diflucan with placement of PICC line on 5/17 with plans to discharge on regimen of Flagyl and IV Ceftriaxone for a total on 4 week with follow-up with Dr Valera. Hgb A1c upon admission noted 15.1 - started on regimen of Lantus insulin with labile BG levels. Admission SHANITA resolved during admission    Updates on Status Since Skilled nursing Admission:   HPI: Dario is a 65 y.o. male  has a past medical history of Hypertension, Non compliance with medical treatment (5/4/2021), Stage 3b chronic kidney disease (5/4/2021), Tobacco abuse (7/23/2019), and Type 2 diabetes  "mellitus with right diabetic foot infection (H) (5/4/2021). Patient seen today for admission visit to TCU. Patient notes he is \"doing great\" - he has spent most of the afternoon in the garden and his roommate gave him a haircut (which is shows off proudly). He denies the presence of pain associated with his wound - notes to being NWB as recommended with use of w/c. Therapies are going well - have done minimal with him given recent admission. Appetite is GREAT. He is sleeping well. Denies CP, palpitations, fatigue, nausea, vomiting, increased SOB/ARREOLA, fever, chills, and/or b/b concerns today.    Past Medical History:   Diagnosis Date   ? Hypertension              No family history on file.  Social History     Socioeconomic History   ? Marital status:      Spouse name: Not on file   ? Number of children: Not on file   ? Years of education: Not on file   ? Highest education level: Not on file   Occupational History   ? Not on file   Social Needs   ? Financial resource strain: Not on file   ? Food insecurity     Worry: Not on file     Inability: Not on file   ? Transportation needs     Medical: Not on file     Non-medical: Not on file   Tobacco Use   ? Smoking status: Current Every Day Smoker     Packs/day: 1.00     Years: 45.00     Pack years: 45.00     Types: Cigarettes   ? Smokeless tobacco: Never Used   ? Tobacco comment: 8/3/2019   Substance and Sexual Activity   ? Alcohol use: Not Currently     Frequency: Never   ? Drug use: Yes     Types: Marijuana   ? Sexual activity: Not Currently   Lifestyle   ? Physical activity     Days per week: Not on file     Minutes per session: Not on file   ? Stress: Not on file   Relationships   ? Social connections     Talks on phone: Not on file     Gets together: Not on file     Attends [employer]odoxy service: Not on file     Active member of club or organization: Not on file     Attends meetings of clubs or organizations: Not on file     Relationship status: Not on file   ? " Intimate partner violence     Fear of current or ex partner: Not on file     Emotionally abused: Not on file     Physically abused: Not on file     Forced sexual activity: Not on file   Other Topics Concern   ? Not on file   Social History Narrative   ? Not on file       REVIEW OF SYSTEM:  A 12 point comprehensive review of systems was negative except as noted.    PHYSICAL EXAM:   GENERAL APPEARANCE:  Alert, in no distress, oriented, thin, cooperative, middle-aged male resting in bed  EYES:  EOM, conjunctivae, lids, pupils and irises normal  RESP:  respiratory effort and palpation of chest normal, lungs clear to auscultation , no respiratory distress  CV:  Palpation and auscultation of heart done , regular rate and rhythm, no murmur, rub, or gallop, no edema, +2 pedal pulses  ABDOMEN:  normal bowel sounds, soft, nontender, no hepatosplenomegaly or other masses  M/S:   Gait and station abnormal - IVONE 2/2 patient being in bed  Digits and nails abnormal - arthritic changes present  Examination of:   right lower extremity  Inspection, ROM, stability and muscle strength diminished 2/2 above noted diagnoses  SKIN:  Inspection of skin and subcutaneous tissue baseline, Palpation of skin and subcutaneous tissue baseline, wound appearance: IVONE 2/2 dressing in place - surround skin CDI with no s/sx of infection  NEURO:   Cranial nerves 2-12 are normal tested and grossly at patient's baseline  PSYCH:  oriented X 3, normal insight, judgement and memory, affect and mood normal      LABS:   Reviewed in EPIC    ASSESSMENT/PLAN:      ICD-10-CM    1. Acute osteomyelitis of right foot (H)  M86.171 Acute - unstable. Continues on regimen of   2. Cellulitis of right foot  L03.115 IV Ceftriaxone and PO Flagyl per ID - plan to follow-up with ID on 6/4/21. CBC and CMP on 5/19/21.   3. Type 2 diabetes mellitus with right diabetic foot infection (H)  E11.628 Chronic - unstable. Uncontrolled PTA - labile BG levels inpatient - currently on  regimen of Lantus 13u two times a day + sliding scale insulin. Has had elevated BG levels since admission - would prefer a better baseline prior to medication adjustments with recent admission. Continue medications as ordered with ongoing diabetic education.    L08.9    4. Non compliance with medical treatment  Z91.19 Acute on chronic - unstable. Leading cause of noted diagnoses and recent surgical procedures - ongoing education required re:importance of adherence to prescribed regimens for patient safety.   5. Stage 3b chronic kidney disease  N18.32 Chronic - stable. Crt PTD 0.81, GFR > 60 - avoid nephrotoxic agents - recheck CMP as noted above.    6. Hyponatremia  E87.1 Acute - resolved. Noted inpatient. Last Na+ level  - up from admission Na+ of 123. CMP as noted above.    7. Hypertension secondary to other renal disorders  I15.1 Chronic - stable. No active tx regimen. Continue VS as ordered; interventions as needed.    N28.89    8. Tobacco abuse  Z72.0 Chronic - unstable. Current every day smoker - no desire for cessation at this time. Ongoing education.    9. Physical deconditioning  R53.81 Acute - unstable. 2/2 above noted diagnoses, recent hospitalization and noted procedures. PT/OT eval/tx - adv per their recommendations. SW to assist with discharge planning.          Electronically signed by:  KASEY Freeman, DNP  [place] [facility]  [place] Office: [address] [place] [address]   [place] Cell: [phone]  [place] Fax: [phone]    [facility] Office: [address] [place] [address]  [facility] Phone: [phone]  [facility] Voicemail: [phone]

## 2021-07-04 NOTE — LETTER
Letter by Elham Almonte NP at      Author: Elham Almonte NP Service: -- Author Type: --    Filed:  Encounter Date: 5/18/2021 Status: (Other)         [facility]  [address] [place] [address]                                  June 8, 2021    Patient: Dario Benavides   MR Number: [id number]   YOB: 1955   Date of Visit: 5/18/2021     Dear Dr. Pablo:    Thank you for referring Dario Benavides to me for evaluation. Below are the relevant portions of my assessment and plan of care.    If you have questions, please do not hesitate to call me. I look forward to following Dario along with you.    Sincerely,        Elham Almonte NP          CC  No Recipients  Elham Almonte NP  6/8/2021 10:25 PM  Incomplete  PRIMARY CARE PROVIDER AND CLINIC:  [facility], [address] [address]  No chief complaint on file.    [facility] Medical Record Number:  [id number]  Dario Benavides  is a 65 y.o.  (1955), admitted to the above facility from  [facility]. Hospital stay 5/4/21 through 5/17/21.  Admitted to this facility for rehab, medical management, and nursing care.    HPI:    HPI information obtained from: facility chart records, facility staff, patient report, [facility] chart review, and Care Everywhere [facility] chart review  .   Brief Summary of Hospital Course:   Patient hospitalized during above noted dates following presentation to the ED with an infection of a diabetic ulcer of his R foot. Following podiatry assessment her underwent an I&D with partial 5th ray amputation of his R foot on 5/6/21; followed by a repeat I&D with transmetatarsal amputation on 5/8/21. On 5/12/21 he returned to the OR for an additional transmetatarsal amputation with application of integra and a wound vac. ID was consulted and he was started on a regimen of Ceftriaxone, Flagyl and Diflucan with placement of PICC line on 5/17 with  "plans to discharge on regimen of Flagyl and IV Ceftriaxone for a total on 4 week with follow-up with Dr Valera. Hgb A1c upon admission noted 15.1 - started on regimen of Lantus insulin with labile BG levels. Admission SHANITA resolved during admission    Updates on Status Since Skilled nursing Admission:   HPI: Dario is a 65 y.o. male  has a past medical history of Hypertension, Non compliance with medical treatment (5/4/2021), Stage 3b chronic kidney disease (5/4/2021), Tobacco abuse (7/23/2019), and Type 2 diabetes mellitus with right diabetic foot infection (H) (5/4/2021). Patient seen today for admission visit to TCU. Patient notes he is \"doing great\" - he has spent most of the afternoon in the garden and his roommate gave him a haircut (which is shows off proudly). He denies the presence of pain associated with his wound - notes to being NWB as recommended with use of w/c. Therapies are going well - have done minimal with him given recent admission. Appetite is GREAT. He is sleeping well. Denies CP, palpitations, fatigue, nausea, vomiting, increased SOB/ARREOLA, fever, chills, and/or b/b concerns today.    Past Medical History:   Diagnosis Date   ? Hypertension              No family history on file.  Social History     Socioeconomic History   ? Marital status:      Spouse name: Not on file   ? Number of children: Not on file   ? Years of education: Not on file   ? Highest education level: Not on file   Occupational History   ? Not on file   Social Needs   ? Financial resource strain: Not on file   ? Food insecurity     Worry: Not on file     Inability: Not on file   ? Transportation needs     Medical: Not on file     Non-medical: Not on file   Tobacco Use   ? Smoking status: Current Every Day Smoker     Packs/day: 1.00     Years: 45.00     Pack years: 45.00     Types: Cigarettes   ? Smokeless tobacco: Never Used   ? Tobacco comment: 8/3/2019   Substance and Sexual Activity   ? Alcohol use: Not Currently     " Frequency: Never   ? Drug use: Yes     Types: Marijuana   ? Sexual activity: Not Currently   Lifestyle   ? Physical activity     Days per week: Not on file     Minutes per session: Not on file   ? Stress: Not on file   Relationships   ? Social connections     Talks on phone: Not on file     Gets together: Not on file     Attends Muslim service: Not on file     Active member of club or organization: Not on file     Attends meetings of clubs or organizations: Not on file     Relationship status: Not on file   ? Intimate partner violence     Fear of current or ex partner: Not on file     Emotionally abused: Not on file     Physically abused: Not on file     Forced sexual activity: Not on file   Other Topics Concern   ? Not on file   Social History Narrative   ? Not on file       REVIEW OF SYSTEM:  A 12 point comprehensive review of systems was negative except as noted.    PHYSICAL EXAM:   GENERAL APPEARANCE:  Alert, in no distress, oriented, thin, cooperative, middle-aged male resting in bed  EYES:  EOM, conjunctivae, lids, pupils and irises normal  RESP:  respiratory effort and palpation of chest normal, lungs clear to auscultation , no respiratory distress  CV:  Palpation and auscultation of heart done , regular rate and rhythm, no murmur, rub, or gallop, no edema, +2 pedal pulses  ABDOMEN:  normal bowel sounds, soft, nontender, no hepatosplenomegaly or other masses  M/S:   Gait and station abnormal - IVONE 2/2 patient being in bed  Digits and nails abnormal - arthritic changes present  Examination of:   right lower extremity  Inspection, ROM, stability and muscle strength diminished 2/2 above noted diagnoses  SKIN:  Inspection of skin and subcutaneous tissue baseline, Palpation of skin and subcutaneous tissue baseline, wound appearance: IVONE 2/2 dressing in place - surround skin CDI with no s/sx of infection  NEURO:   Cranial nerves 2-12 are normal tested and grossly at patient's baseline  PSYCH:  oriented X 3,  normal insight, judgement and memory, affect and mood normal      LABS:   Reviewed in EPIC    ASSESSMENT/PLAN:      ICD-10-CM    1. Acute osteomyelitis of right foot (H)  M86.171 Acute - unstable. Continues on regimen of   2. Cellulitis of right foot  L03.115 IV Ceftriaxone and PO Flagyl per ID - plan to follow-up with ID on 6/4/21. CBC and CMP on 5/19/21.   3. Type 2 diabetes mellitus with right diabetic foot infection (H)  E11.628 Chronic - unstable. Uncontrolled PTA - labile BG levels inpatient - currently on regimen of Lantus 13u two times a day + sliding scale insulin. Has had elevated BG levels since admission - would prefer a better baseline prior to medication adjustments with recent admission. Continue medications as ordered with ongoing diabetic education.    L08.9    4. Non compliance with medical treatment  Z91.19 Acute on chronic - unstable. Leading cause of noted diagnoses and recent surgical procedures - ongoing education required re:importance of adherence to prescribed regimens for patient safety.   5. Stage 3b chronic kidney disease  N18.32 Chronic - stable. Crt PTD 0.81, GFR > 60 - avoid nephrotoxic agents - recheck CMP as noted above.    6. Hyponatremia  E87.1 Acute - resolved. Noted inpatient. Last Na+ level  - up from admission Na+ of 123. CMP as noted above.    7. Hypertension secondary to other renal disorders  I15.1 Chronic - stable. No active tx regimen. Continue VS as ordered; interventions as needed.    N28.89    8. Tobacco abuse  Z72.0 Chronic - unstable. Current every day smoker - no desire for cessation at this time. Ongoing education.    9. Physical deconditioning  R53.81 Acute - unstable. 2/2 above noted diagnoses, recent hospitalization and noted procedures. PT/OT eval/tx - adv per their recommendations. SW to assist with discharge planning.          Electronically signed by:  KASEY Freeman, GUILLERMO  [facility]  [place] Office:  [address] [place] [address]   [place] Cell: [phone]  [place] Fax: [phone]    [facility] Office: [address] [place] [address]  [facility] Phone: [phone]  [facility] Voicemail: [phone]

## 2021-07-06 VITALS
OXYGEN SATURATION: 95 % | HEART RATE: 94 BPM | SYSTOLIC BLOOD PRESSURE: 129 MMHG | BODY MASS INDEX: 21.37 KG/M2 | RESPIRATION RATE: 16 BRPM | TEMPERATURE: 96.8 F | WEIGHT: 144.7 LBS | DIASTOLIC BLOOD PRESSURE: 80 MMHG

## 2021-07-06 VITALS
HEIGHT: 69 IN | HEART RATE: 98 BPM | DIASTOLIC BLOOD PRESSURE: 72 MMHG | SYSTOLIC BLOOD PRESSURE: 122 MMHG | WEIGHT: 146.2 LBS | OXYGEN SATURATION: 97 % | RESPIRATION RATE: 18 BRPM | TEMPERATURE: 98 F | BODY MASS INDEX: 21.66 KG/M2

## 2023-08-11 ENCOUNTER — HOSPITAL ENCOUNTER (INPATIENT)
Facility: HOSPITAL | Age: 68
LOS: 11 days | Discharge: HOME | DRG: 854 | End: 2023-08-22
Attending: EMERGENCY MEDICINE | Admitting: STUDENT IN AN ORGANIZED HEALTH CARE EDUCATION/TRAINING PROGRAM
Payer: COMMERCIAL

## 2023-08-11 ENCOUNTER — APPOINTMENT (OUTPATIENT)
Dept: MRI IMAGING | Facility: HOSPITAL | Age: 68
DRG: 854 | End: 2023-08-11
Attending: EMERGENCY MEDICINE
Payer: COMMERCIAL

## 2023-08-11 DIAGNOSIS — E11.628 TYPE 2 DIABETES MELLITUS WITH RIGHT DIABETIC FOOT INFECTION (H): ICD-10-CM

## 2023-08-11 DIAGNOSIS — B37.2 CANDIDAL INTERTRIGO: ICD-10-CM

## 2023-08-11 DIAGNOSIS — R73.9 HYPERGLYCEMIA: ICD-10-CM

## 2023-08-11 DIAGNOSIS — L08.9 TYPE 2 DIABETES MELLITUS WITH RIGHT DIABETIC FOOT INFECTION (H): ICD-10-CM

## 2023-08-11 DIAGNOSIS — M86.171 ACUTE OSTEOMYELITIS OF RIGHT FOOT (H): Primary | ICD-10-CM

## 2023-08-11 DIAGNOSIS — I10 PRIMARY HYPERTENSION: ICD-10-CM

## 2023-08-11 DIAGNOSIS — L03.115 CELLULITIS OF RIGHT FOOT: ICD-10-CM

## 2023-08-11 DIAGNOSIS — L97.518 DIABETIC ULCER OF RIGHT FOOT ASSOCIATED WITH TYPE 2 DIABETES MELLITUS, WITH OTHER ULCER SEVERITY, UNSPECIFIED PART OF FOOT (H): ICD-10-CM

## 2023-08-11 DIAGNOSIS — E11.621 DIABETIC ULCER OF RIGHT FOOT ASSOCIATED WITH TYPE 2 DIABETES MELLITUS, WITH OTHER ULCER SEVERITY, UNSPECIFIED PART OF FOOT (H): ICD-10-CM

## 2023-08-11 LAB
ALBUMIN SERPL BCG-MCNC: 3.5 G/DL (ref 3.5–5.2)
ALBUMIN UR-MCNC: 100 MG/DL
ALP SERPL-CCNC: 261 U/L (ref 40–129)
ALT SERPL W P-5'-P-CCNC: 28 U/L (ref 0–70)
ANION GAP SERPL CALCULATED.3IONS-SCNC: 13 MMOL/L (ref 7–15)
APPEARANCE UR: ABNORMAL
AST SERPL W P-5'-P-CCNC: 40 U/L (ref 0–45)
BASOPHILS # BLD MANUAL: 0 10E3/UL (ref 0–0.2)
BASOPHILS NFR BLD MANUAL: 0 %
BILIRUB DIRECT SERPL-MCNC: 0.82 MG/DL (ref 0–0.3)
BILIRUB SERPL-MCNC: 1.4 MG/DL
BILIRUB UR QL STRIP: NEGATIVE
BUN SERPL-MCNC: 28.5 MG/DL (ref 8–23)
CALCIUM SERPL-MCNC: 10 MG/DL (ref 8.8–10.2)
CHLORIDE SERPL-SCNC: 90 MMOL/L (ref 98–107)
COLOR UR AUTO: YELLOW
CREAT SERPL-MCNC: 2.09 MG/DL (ref 0.67–1.17)
CRP SERPL-MCNC: 349.7 MG/L
DEPRECATED HCO3 PLAS-SCNC: 24 MMOL/L (ref 22–29)
EOSINOPHIL # BLD MANUAL: 0 10E3/UL (ref 0–0.7)
EOSINOPHIL NFR BLD MANUAL: 0 %
ERYTHROCYTE [DISTWIDTH] IN BLOOD BY AUTOMATED COUNT: 13.6 % (ref 10–15)
ERYTHROCYTE [SEDIMENTATION RATE] IN BLOOD BY WESTERGREN METHOD: 84 MM/HR (ref 0–20)
GFR SERPL CREATININE-BSD FRML MDRD: 34 ML/MIN/1.73M2
GLUCOSE BLDC GLUCOMTR-MCNC: 347 MG/DL (ref 70–99)
GLUCOSE BLDC GLUCOMTR-MCNC: 419 MG/DL (ref 70–99)
GLUCOSE SERPL-MCNC: 358 MG/DL (ref 70–99)
GLUCOSE UR STRIP-MCNC: >1000 MG/DL
HCT VFR BLD AUTO: 32.7 % (ref 40–53)
HGB BLD-MCNC: 11.1 G/DL (ref 13.3–17.7)
HGB UR QL STRIP: ABNORMAL
HYALINE CASTS: 1 /LPF
KETONES UR STRIP-MCNC: NEGATIVE MG/DL
LACTATE SERPL-SCNC: 1.3 MMOL/L (ref 0.7–2)
LACTATE SERPL-SCNC: 2.1 MMOL/L (ref 0.7–2)
LEUKOCYTE ESTERASE UR QL STRIP: NEGATIVE
LIPASE SERPL-CCNC: 18 U/L (ref 13–60)
LYMPHOCYTES # BLD MANUAL: 1.6 10E3/UL (ref 0.8–5.3)
LYMPHOCYTES NFR BLD MANUAL: 7 %
MCH RBC QN AUTO: 28.4 PG (ref 26.5–33)
MCHC RBC AUTO-ENTMCNC: 33.9 G/DL (ref 31.5–36.5)
MCV RBC AUTO: 84 FL (ref 78–100)
METAMYELOCYTES # BLD MANUAL: 0.2 10E3/UL
METAMYELOCYTES NFR BLD MANUAL: 1 %
MONOCYTES # BLD MANUAL: 1.2 10E3/UL (ref 0–1.3)
MONOCYTES NFR BLD MANUAL: 5 %
MUCOUS THREADS #/AREA URNS LPF: PRESENT /LPF
NEUTROPHILS # BLD MANUAL: 20.4 10E3/UL (ref 1.6–8.3)
NEUTROPHILS NFR BLD MANUAL: 87 %
NITRATE UR QL: NEGATIVE
PH UR STRIP: 6 [PH] (ref 5–7)
PLAT MORPH BLD: ABNORMAL
PLATELET # BLD AUTO: 444 10E3/UL (ref 150–450)
POTASSIUM SERPL-SCNC: 4.2 MMOL/L (ref 3.4–5.3)
PROT SERPL-MCNC: 8.4 G/DL (ref 6.4–8.3)
RBC # BLD AUTO: 3.91 10E6/UL (ref 4.4–5.9)
RBC MORPH BLD: ABNORMAL
RBC URINE: 5 /HPF
SODIUM SERPL-SCNC: 127 MMOL/L (ref 136–145)
SODIUM UR-SCNC: <20 MMOL/L
SP GR UR STRIP: 1.03 (ref 1–1.03)
UROBILINOGEN UR STRIP-MCNC: <2 MG/DL
WBC # BLD AUTO: 23.4 10E3/UL (ref 4–11)
WBC URINE: 6 /HPF

## 2023-08-11 PROCEDURE — 81001 URINALYSIS AUTO W/SCOPE: CPT | Performed by: EMERGENCY MEDICINE

## 2023-08-11 PROCEDURE — 96365 THER/PROPH/DIAG IV INF INIT: CPT

## 2023-08-11 PROCEDURE — 83605 ASSAY OF LACTIC ACID: CPT | Performed by: EMERGENCY MEDICINE

## 2023-08-11 PROCEDURE — 83935 ASSAY OF URINE OSMOLALITY: CPT | Performed by: STUDENT IN AN ORGANIZED HEALTH CARE EDUCATION/TRAINING PROGRAM

## 2023-08-11 PROCEDURE — 250N000013 HC RX MED GY IP 250 OP 250 PS 637: Performed by: EMERGENCY MEDICINE

## 2023-08-11 PROCEDURE — 85027 COMPLETE CBC AUTOMATED: CPT | Performed by: EMERGENCY MEDICINE

## 2023-08-11 PROCEDURE — 250N000011 HC RX IP 250 OP 636: Mod: JZ | Performed by: EMERGENCY MEDICINE

## 2023-08-11 PROCEDURE — 83930 ASSAY OF BLOOD OSMOLALITY: CPT | Performed by: STUDENT IN AN ORGANIZED HEALTH CARE EDUCATION/TRAINING PROGRAM

## 2023-08-11 PROCEDURE — 93005 ELECTROCARDIOGRAM TRACING: CPT | Performed by: EMERGENCY MEDICINE

## 2023-08-11 PROCEDURE — 87149 DNA/RNA DIRECT PROBE: CPT | Performed by: EMERGENCY MEDICINE

## 2023-08-11 PROCEDURE — 255N000002 HC RX 255 OP 636: Mod: JZ | Performed by: EMERGENCY MEDICINE

## 2023-08-11 PROCEDURE — 99291 CRITICAL CARE FIRST HOUR: CPT | Mod: 25

## 2023-08-11 PROCEDURE — 258N000003 HC RX IP 258 OP 636: Performed by: EMERGENCY MEDICINE

## 2023-08-11 PROCEDURE — 36415 COLL VENOUS BLD VENIPUNCTURE: CPT | Performed by: EMERGENCY MEDICINE

## 2023-08-11 PROCEDURE — 120N000001 HC R&B MED SURG/OB

## 2023-08-11 PROCEDURE — 83690 ASSAY OF LIPASE: CPT | Performed by: EMERGENCY MEDICINE

## 2023-08-11 PROCEDURE — 82962 GLUCOSE BLOOD TEST: CPT

## 2023-08-11 PROCEDURE — 85007 BL SMEAR W/DIFF WBC COUNT: CPT | Performed by: EMERGENCY MEDICINE

## 2023-08-11 PROCEDURE — 87077 CULTURE AEROBIC IDENTIFY: CPT | Performed by: EMERGENCY MEDICINE

## 2023-08-11 PROCEDURE — 80053 COMPREHEN METABOLIC PANEL: CPT | Performed by: EMERGENCY MEDICINE

## 2023-08-11 PROCEDURE — A9585 GADOBUTROL INJECTION: HCPCS | Mod: JZ | Performed by: EMERGENCY MEDICINE

## 2023-08-11 PROCEDURE — 86140 C-REACTIVE PROTEIN: CPT | Performed by: STUDENT IN AN ORGANIZED HEALTH CARE EDUCATION/TRAINING PROGRAM

## 2023-08-11 PROCEDURE — 73718 MRI LOWER EXTREMITY W/O DYE: CPT | Mod: RT

## 2023-08-11 PROCEDURE — 84300 ASSAY OF URINE SODIUM: CPT | Performed by: STUDENT IN AN ORGANIZED HEALTH CARE EDUCATION/TRAINING PROGRAM

## 2023-08-11 PROCEDURE — 85652 RBC SED RATE AUTOMATED: CPT | Performed by: STUDENT IN AN ORGANIZED HEALTH CARE EDUCATION/TRAINING PROGRAM

## 2023-08-11 PROCEDURE — 99223 1ST HOSP IP/OBS HIGH 75: CPT | Performed by: STUDENT IN AN ORGANIZED HEALTH CARE EDUCATION/TRAINING PROGRAM

## 2023-08-11 PROCEDURE — 82248 BILIRUBIN DIRECT: CPT | Performed by: EMERGENCY MEDICINE

## 2023-08-11 RX ORDER — LORAZEPAM 2 MG/ML
1 INJECTION INTRAMUSCULAR ONCE
Status: COMPLETED | OUTPATIENT
Start: 2023-08-11 | End: 2023-08-11

## 2023-08-11 RX ORDER — PIPERACILLIN SODIUM, TAZOBACTAM SODIUM 3; .375 G/15ML; G/15ML
3.38 INJECTION, POWDER, LYOPHILIZED, FOR SOLUTION INTRAVENOUS EVERY 8 HOURS
Status: DISCONTINUED | OUTPATIENT
Start: 2023-08-12 | End: 2023-08-19

## 2023-08-11 RX ORDER — LIDOCAINE 40 MG/G
CREAM TOPICAL
Status: DISCONTINUED | OUTPATIENT
Start: 2023-08-11 | End: 2023-08-15

## 2023-08-11 RX ORDER — PIPERACILLIN SODIUM, TAZOBACTAM SODIUM 3; .375 G/15ML; G/15ML
3.38 INJECTION, POWDER, LYOPHILIZED, FOR SOLUTION INTRAVENOUS ONCE
Status: DISCONTINUED | OUTPATIENT
Start: 2023-08-11 | End: 2023-08-11

## 2023-08-11 RX ORDER — HEPARIN SODIUM 5000 [USP'U]/.5ML
5000 INJECTION, SOLUTION INTRAVENOUS; SUBCUTANEOUS EVERY 12 HOURS
Status: DISCONTINUED | OUTPATIENT
Start: 2023-08-12 | End: 2023-08-22 | Stop reason: HOSPADM

## 2023-08-11 RX ORDER — PROCHLORPERAZINE MALEATE 5 MG
5 TABLET ORAL EVERY 6 HOURS PRN
Status: DISCONTINUED | OUTPATIENT
Start: 2023-08-11 | End: 2023-08-22 | Stop reason: HOSPADM

## 2023-08-11 RX ORDER — GADOBUTROL 604.72 MG/ML
7 INJECTION INTRAVENOUS ONCE
Status: COMPLETED | OUTPATIENT
Start: 2023-08-11 | End: 2023-08-11

## 2023-08-11 RX ORDER — ACETAMINOPHEN 325 MG/1
650 TABLET ORAL EVERY 4 HOURS PRN
Status: DISCONTINUED | OUTPATIENT
Start: 2023-08-11 | End: 2023-08-22 | Stop reason: HOSPADM

## 2023-08-11 RX ORDER — PIPERACILLIN SODIUM, TAZOBACTAM SODIUM 3; .375 G/15ML; G/15ML
3.38 INJECTION, POWDER, LYOPHILIZED, FOR SOLUTION INTRAVENOUS ONCE
Status: COMPLETED | OUTPATIENT
Start: 2023-08-11 | End: 2023-08-11

## 2023-08-11 RX ORDER — ONDANSETRON 4 MG/1
4 TABLET, ORALLY DISINTEGRATING ORAL EVERY 6 HOURS PRN
Status: DISCONTINUED | OUTPATIENT
Start: 2023-08-11 | End: 2023-08-22 | Stop reason: HOSPADM

## 2023-08-11 RX ORDER — DEXTROSE MONOHYDRATE 25 G/50ML
25-50 INJECTION, SOLUTION INTRAVENOUS
Status: DISCONTINUED | OUTPATIENT
Start: 2023-08-11 | End: 2023-08-22 | Stop reason: HOSPADM

## 2023-08-11 RX ORDER — POLYETHYLENE GLYCOL 3350 17 G/17G
17 POWDER, FOR SOLUTION ORAL DAILY PRN
Status: DISCONTINUED | OUTPATIENT
Start: 2023-08-11 | End: 2023-08-22 | Stop reason: HOSPADM

## 2023-08-11 RX ORDER — ONDANSETRON 2 MG/ML
4 INJECTION INTRAMUSCULAR; INTRAVENOUS EVERY 6 HOURS PRN
Status: DISCONTINUED | OUTPATIENT
Start: 2023-08-11 | End: 2023-08-22 | Stop reason: HOSPADM

## 2023-08-11 RX ORDER — PROCHLORPERAZINE 25 MG
12.5 SUPPOSITORY, RECTAL RECTAL EVERY 12 HOURS PRN
Status: DISCONTINUED | OUTPATIENT
Start: 2023-08-11 | End: 2023-08-22 | Stop reason: HOSPADM

## 2023-08-11 RX ORDER — LIDOCAINE 40 MG/G
CREAM TOPICAL
Status: DISCONTINUED | OUTPATIENT
Start: 2023-08-11 | End: 2023-08-22 | Stop reason: HOSPADM

## 2023-08-11 RX ORDER — HYDROMORPHONE HCL IN WATER/PF 6 MG/30 ML
0.2 PATIENT CONTROLLED ANALGESIA SYRINGE INTRAVENOUS
Status: DISCONTINUED | OUTPATIENT
Start: 2023-08-11 | End: 2023-08-22 | Stop reason: HOSPADM

## 2023-08-11 RX ORDER — ACETAMINOPHEN 325 MG/1
650 TABLET ORAL ONCE
Status: COMPLETED | OUTPATIENT
Start: 2023-08-11 | End: 2023-08-11

## 2023-08-11 RX ORDER — NICOTINE POLACRILEX 4 MG
15-30 LOZENGE BUCCAL
Status: DISCONTINUED | OUTPATIENT
Start: 2023-08-11 | End: 2023-08-22 | Stop reason: HOSPADM

## 2023-08-11 RX ADMIN — SODIUM CHLORIDE 2000 ML: 9 INJECTION, SOLUTION INTRAVENOUS at 18:29

## 2023-08-11 RX ADMIN — PIPERACILLIN AND TAZOBACTAM 3.38 G: 3; .375 INJECTION, POWDER, LYOPHILIZED, FOR SOLUTION INTRAVENOUS at 18:34

## 2023-08-11 RX ADMIN — ACETAMINOPHEN 650 MG: 325 TABLET ORAL at 18:35

## 2023-08-11 RX ADMIN — VANCOMYCIN HYDROCHLORIDE 1500 MG: 5 INJECTION, POWDER, LYOPHILIZED, FOR SOLUTION INTRAVENOUS at 19:34

## 2023-08-11 RX ADMIN — GADOBUTROL 7 ML: 604.72 INJECTION INTRAVENOUS at 20:04

## 2023-08-11 RX ADMIN — LORAZEPAM 1 MG: 2 INJECTION INTRAMUSCULAR; INTRAVENOUS at 21:04

## 2023-08-11 ASSESSMENT — ACTIVITIES OF DAILY LIVING (ADL)
ADLS_ACUITY_SCORE: 37
ADLS_ACUITY_SCORE: 35
ADLS_ACUITY_SCORE: 35
DEPENDENT_IADLS:: INDEPENDENT

## 2023-08-11 NOTE — ED PROVIDER NOTES
EMERGENCY DEPARTMENT ENCOUNTER      NAME: Dario Benavides  AGE: 68 year old male  YOB: 1955  MRN: [id number]  EVALUATION DATE & TIME: 8/11/2023  5:53 PM    PCP: No primary care provider on file.    ED PROVIDER: Sameer Evans M.D.      Chief Complaint   Patient presents with    Fatigue         FINAL IMPRESSION:  Right foot cellulitis/diabetic foot ulcer  SIRS  Hyperglycemia      ED COURSE & MEDICAL DECISION MAKING:    Pertinent Labs & Imaging studies reviewed. (See chart for details)  68 year old male presents to the Emergency Department for evaluation of malaise, elevated blood sugars and increased redness/sores to right foot.  Patient reports not feeling well for the last few days.  Sugar was near 400.  Gave himself additional insulin at home and presented here.  On arrival he is a ill-appearing male in moderate distress.  Temperature 101.6, pulse 121.  Respirations unlabored.  Right lower extremity with erythema starting just below the tibial plateau and extending distally.  Patient with prior transmetatarsal amputation with large 3 cm ulceration along the inferior surface and smaller ulceration with eschar on the superior surface.  Patient with cellulitis and likely osteomyelitis.  Patient initially markedly hyperglycemic also likely stress reaction.  We will proceed with 2 L normal saline bolus.  Antibiotics initiated with vancomycin and Zosyn.  Will obtain MRI of the foot to assess for osteomyelitis.  Baseline blood work and blood cultures being obtained.  Patient will require hospitalization..        6:10 PM  I met with the patient for the initial interview and physical examination. Discussed plan for treatment and workup in the ED.    7:48 PM.  Lipase is normal 18.  Lactic acid minimally elevated at 2.1.  Marked leukocytosis white cell count of 23.4.  Hemoglobin 11.1.  Patient with mild pseudohyponatremia with sodium 127 and glucose of 358.  However bicarb is normal at 24.  No evidence of  "DKA.  Patient with moderate renal insufficiency with creatinine of 2.09 and BUN of 28.  Creatinine on 5/26/2021 0.76.  With evidence of acute dehydration likely related to his infected state and hyperglycemia.  Awaiting MRI.  We will proceed with plans for admission.  8:05 PM Spoke with Dr. Newell, Hospitalist.  Patient will be full admit.  8:28 PM.  MRI being obtained.  An 8:49 PM.  MRI or reports patient is asleep but twitching and making films difficult.  Will provide Ativan to help alleviate hypnagogic twitches  At the conclusion of the encounter I discussed the results of all of the tests and the disposition. The questions were answered and return precautions provided. The patient or family acknowledged understanding and was agreeable with the care plan.         Patient represents a critical care situation approximately 45 minutes spent directly involved in patient's care independent of any procedures.      MEDICATIONS GIVEN IN THE EMERGENCY:  Medications   0.9% sodium chloride BOLUS (2,000 mLs Intravenous $New Bag 8/11/23 1829)   piperacillin-tazobactam (ZOSYN) 3.375 g vial to attach to  mL bag (3.375 g Intravenous $New Bag 8/11/23 1834)   vancomycin (VANCOCIN) 1,500 mg in sodium chloride 0.9 % 250 mL intermittent infusion (has no administration in time range)   acetaminophen (TYLENOL) tablet 650 mg (650 mg Oral $Given 8/11/23 1835)       NEW PRESCRIPTIONS STARTED AT TODAY'S ER VISIT  New Prescriptions    No medications on file          =================================================================    HPI          Dario Benavides is a 68 year old male with a pertient medical history of diabetes mellitus, right foot transmetatarsal resection secondary to diabetic ulcer.  Hypertension.  Who presents to the ED for evaluation of general fatigue, increased redness to right foot and leg with development of new sore to the bottom of the foot.  He attributes the sort of \"wearing that damn boot\".  " "Reports symptoms of been worsening for the last few days.  Did check his sugar at home and it was \"high\".  Gave himself extra insulin and sought evaluation here.      REVIEW OF SYSTEMS   Constitutional:  Denies fever, chills  Respiratory:  Denies productive cough or increased work of breathing  Cardiovascular:  Denies chest pain, palpitations  GI:  Denies abdominal pain, nausea, vomiting, or change in bowel or bladder habits   Musculoskeletal:  Denies any new muscle/joint swelling  Skin:  Denies rash   Neurologic:  Denies focal weakness  All systems negative except as marked.     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) 5/8/2021    Tobacco abuse 7/23/2019    Type 2 diabetes mellitus with right diabetic foot infection (H) 5/4/2021       PAST SURGICAL HISTORY:  Past Surgical History:   Procedure Laterality Date    BACK SURGERY  1992    NECK SURGERY  2011         CURRENT MEDICATIONS:      Current Facility-Administered Medications:     0.9% sodium chloride BOLUS, 2,000 mL, Intravenous, Once, Sameer Evans MD, Last Rate: 500 mL/hr at 08/11/23 1829, 2,000 mL at 08/11/23 1829    piperacillin-tazobactam (ZOSYN) 3.375 g vial to attach to  mL bag, 3.375 g, Intravenous, Once, Sameer Evans MD, 3.375 g at 08/11/23 1834    vancomycin (VANCOCIN) 1,500 mg in sodium chloride 0.9 % 250 mL intermittent infusion, 1,500 mg, Intravenous, Once, Sameer Evans MD    Current Outpatient Medications:     acetaminophen (TYLENOL) 325 MG tablet, [ACETAMINOPHEN (TYLENOL) 325 MG TABLET] Take 650 mg by mouth every 4 (four) hours as needed for pain., Disp: , Rfl:     arginine/glutamine/calcium bmb (REHAN ORAL), [ARGININE/GLUTAMINE/CALCIUM BMB (REHAN ORAL)] Take 1 packet by mouth 2 (two) times a day., Disp: , Rfl:     cefTRIAXone 1 gram " "SolR, [CEFTRIAXONE 1 GRAM SOLR] Infuse 1,000 mg into a venous catheter daily. Last day 6/9/21, Disp: , Rfl:     insulin aspart U-100 (NOVOLOG) 100 unit/mL (3 mL) injection pen, [INSULIN ASPART U-100 (NOVOLOG) 100 UNIT/ML (3 ML) INJECTION PEN] Inject under the skin. Inject as per sliding scale: if 0 - 69 see hypoglycemia protocol; 70 - 149 = 0; 150 - 199 = 2 units; 200 - 249 = 4 units; 250 - 299 = 6 units; 300 - 349 = 8 units; 350 - 399 = 10 units;, Disp: , Rfl:     insulin glargine (LANTUS) 100 unit/mL vial, [INSULIN GLARGINE (LANTUS) 100 UNIT/ML VIAL] Inject 20 Units under the skin 2 (two) times a day. , Disp: , Rfl:     metroNIDAZOLE (FLAGYL) 500 MG tablet, [METRONIDAZOLE (FLAGYL) 500 MG TABLET] Take 500 mg by mouth 2 (two) times a day., Disp: , Rfl:     senna (SENOKOT) 8.6 mg tablet, [SENNA (SENOKOT) 8.6 MG TABLET] Take 2 tablets by mouth 2 (two) times a day., Disp: , Rfl:     ALLERGIES:  No Known Allergies    FAMILY HISTORY:  No family history on file.    SOCIAL HISTORY:   Social History     Socioeconomic History    Marital status:    Tobacco Use    Smoking status: Every Day     Packs/day: 1.00     Years: 45.00     Pack years: 45.00     Types: Cigarettes    Smokeless tobacco: Never    Tobacco comments:     8/3/2019   Substance and Sexual Activity    Alcohol use: Not Currently    Drug use: Yes     Types: Marijuana    Sexual activity: Not Currently       VITALS:  Patient Vitals for the past 24 hrs:   BP Temp Temp src Pulse Resp SpO2 Height Weight   08/11/23 1830 (!) 166/79 -- -- 119 26 98 % -- --   08/11/23 1809 (!) 168/81 -- -- (!) 121 18 98 % 1.803 m (5' 11\") --   08/11/23 1747 124/73 (!) 101.6  F (38.7  C) Temporal (!) 127 16 97 % -- 67 kg (147 lb 11.3 oz)        PHYSICAL EXAM    Constitutional:  Awake, alert, in no apparent distress  HENT:  Normocephalic, Atraumatic. Bilateral external ears normal. Oropharynx moist. Nose normal. Neck- Normal range of motion with no guarding,, Supple, No stridor.   Eyes: "  PERRL, EOMI with no signs of entrapment, Conjunctiva normal, No discharge.   Respiratory:  Normal breath sounds, No respiratory distress, No wheezing.    Cardiovascular: Tachycardia, Normal rhythm, No appreciable rubs or gallops.   GI:  Soft, No tenderness, No distension, No palpable masses  Musculoskeletal:  No edema. Good range of motion in all major joints.   Integument:  Warm, Dry, erythema in stocking distribution on right with significant scale o overlying dorsum of the foot.  Approximately 3 cm deep ulceration to the plantar surface along with more shallow ulcerations superiorly and distally.    Neurologic:  Alert & oriented, Normal motor function, Normal sensory function, No focal deficits noted.  Moderately tender.  Psychiatric:  Affect normal, Judgment normal, Mood normal.       LAB:  All pertinent labs reviewed and interpreted.  Results for orders placed or performed during the hospital encounter of 08/11/23   Comprehensive metabolic panel   Result Value Ref Range    Sodium 127 (L) 136 - 145 mmol/L    Potassium 4.2 3.4 - 5.3 mmol/L    Chloride 90 (L) 98 - 107 mmol/L    Carbon Dioxide (CO2) 24 22 - 29 mmol/L    Anion Gap 13 7 - 15 mmol/L    Urea Nitrogen 28.5 (H) 8.0 - 23.0 mg/dL    Creatinine 2.09 (H) 0.67 - 1.17 mg/dL    Calcium 10.0 8.8 - 10.2 mg/dL    Glucose 358 (H) 70 - 99 mg/dL    Alkaline Phosphatase 261 (H) 40 - 129 U/L    AST 40 0 - 45 U/L    ALT 28 0 - 70 U/L    Protein Total 8.4 (H) 6.4 - 8.3 g/dL    Albumin 3.5 3.5 - 5.2 g/dL    Bilirubin Total 1.4 (H) <=1.2 mg/dL    GFR Estimate 34 (L) >60 mL/min/1.73m2   Result Value Ref Range    Lipase 18 13 - 60 U/L   Result Value Ref Range    Bilirubin Direct 0.82 (H) 0.00 - 0.30 mg/dL   CBC with platelets and differential   Result Value Ref Range    WBC Count 23.4 (H) 4.0 - 11.0 10e3/uL    RBC Count 3.91 (L) 4.40 - 5.90 10e6/uL    Hemoglobin 11.1 (L) 13.3 - 17.7 g/dL    Hematocrit 32.7 (L) 40.0 - 53.0 %    MCV 84 78 - 100 fL    MCH 28.4 26.5 - 33.0 pg     MCHC 33.9 31.5 - 36.5 g/dL    RDW 13.6 10.0 - 15.0 %    Platelet Count 444 150 - 450 10e3/uL   Glucose by meter   Result Value Ref Range    GLUCOSE BY METER POCT 419 (H) 70 - 99 mg/dL   Lactic acid whole blood   Result Value Ref Range    Lactic Acid 2.1 (H) 0.7 - 2.0 mmol/L       RADIOLOGY:  Reviewed all pertinent imaging. Please see official radiology report.  MR Foot Right w/o & w Contrast    (Results Pending)       EKG:    Tachycardia with occasional PAC.  Normal QRS.  Normal ST segments.  No prior tracing.  I have independently reviewed and interpreted the EKG(s) documented above.      I, Tramaine Samayoa, am serving as a scribe to document services personally performed by Sameer Evans MD, based on my observation and the provider's statements to me. I, Sameer Evans MD attest that Tramaine Samayoa is acting in a scribe capacity, has observed my performance of the services and has documented them in accordance with my direction.    Saemer Evans M.D.  Emergency Medicine  [facility] EMERGENCY DEPARTMENT       Sameer Evans MD  08/11/23 2029       Sameer Evans MD  08/11/23 2049

## 2023-08-11 NOTE — ED TRIAGE NOTES
"He is a diabetic and his is more fatigued and not eating. His BS at home read \"high.\" He was given 8units insulin and that brought it down to mid 300. He denies pain at this time. His BG in triage 419        "

## 2023-08-11 NOTE — PHARMACY-ADMISSION MEDICATION HISTORY
Pharmacy Vancomycin Initial Note  Date of Service 2023  Patient's  1955  68 year old, male    Indication: Skin and Soft Tissue Infection    Current estimated CrCl = Estimated Creatinine Clearance: 32.9 mL/min (A) (based on SCr of 2.09 mg/dL (H)).    Creatinine for last 3 days  2023:  6:06 PM Creatinine 2.09 mg/dL    Recent Vancomycin Level(s) for last 3 days  No results found for requested labs within last 3 days.      Vancomycin IV Administrations (past 72 hours)        No vancomycin orders with administrations in past 72 hours.                    Nephrotoxins and other renal medications (From now, onward)      Start     Dose/Rate Route Frequency Ordered Stop    23 1900  vancomycin (VANCOCIN) 1,500 mg in sodium chloride 0.9 % 250 mL intermittent infusion         1,500 mg  over 90 Minutes Intravenous ONCE 23 1827      23 1830  piperacillin-tazobactam (ZOSYN) 3.375 g vial to attach to  mL bag         3.375 g  over 30 Minutes Intravenous ONCE 23 1817              Contrast Orders - past 72 hours (72h ago, onward)      None                Plan:  Start vancomycin 1500 mg IV x 1  Please reconsult pharmacy is vancomycin therapy is continued.     YAMILE PELAEZ [facility]

## 2023-08-12 LAB
ALBUMIN SERPL BCG-MCNC: 2.8 G/DL (ref 3.5–5.2)
ALP SERPL-CCNC: 203 U/L (ref 40–129)
ALT SERPL W P-5'-P-CCNC: 21 U/L (ref 0–70)
ANION GAP SERPL CALCULATED.3IONS-SCNC: 14 MMOL/L (ref 7–15)
AST SERPL W P-5'-P-CCNC: 39 U/L (ref 0–45)
BILIRUB SERPL-MCNC: 1.5 MG/DL
BUN SERPL-MCNC: 30.3 MG/DL (ref 8–23)
CALCIUM SERPL-MCNC: 8.8 MG/DL (ref 8.8–10.2)
CHLORIDE SERPL-SCNC: 96 MMOL/L (ref 98–107)
CREAT SERPL-MCNC: 2.23 MG/DL (ref 0.67–1.17)
CRP SERPL-MCNC: 344.6 MG/L
DEPRECATED HCO3 PLAS-SCNC: 21 MMOL/L (ref 22–29)
ENTEROCOCCUS FAECALIS: NOT DETECTED
ENTEROCOCCUS FAECIUM: NOT DETECTED
ERYTHROCYTE [DISTWIDTH] IN BLOOD BY AUTOMATED COUNT: 13.6 % (ref 10–15)
GFR SERPL CREATININE-BSD FRML MDRD: 32 ML/MIN/1.73M2
GLUCOSE BLDC GLUCOMTR-MCNC: 219 MG/DL (ref 70–99)
GLUCOSE BLDC GLUCOMTR-MCNC: 239 MG/DL (ref 70–99)
GLUCOSE BLDC GLUCOMTR-MCNC: 240 MG/DL (ref 70–99)
GLUCOSE BLDC GLUCOMTR-MCNC: 262 MG/DL (ref 70–99)
GLUCOSE BLDC GLUCOMTR-MCNC: 270 MG/DL (ref 70–99)
GLUCOSE BLDC GLUCOMTR-MCNC: 300 MG/DL (ref 70–99)
GLUCOSE BLDC GLUCOMTR-MCNC: 304 MG/DL (ref 70–99)
GLUCOSE SERPL-MCNC: 292 MG/DL (ref 70–99)
HBA1C MFR BLD: 11.3 %
HCT VFR BLD AUTO: 28.5 % (ref 40–53)
HGB BLD-MCNC: 9.5 G/DL (ref 13.3–17.7)
LISTERIA SPECIES (DETECTED/NOT DETECTED): NOT DETECTED
MCH RBC QN AUTO: 28.2 PG (ref 26.5–33)
MCHC RBC AUTO-ENTMCNC: 33.3 G/DL (ref 31.5–36.5)
MCV RBC AUTO: 85 FL (ref 78–100)
OSMOLALITY SERPL: 285 MMOL/KG (ref 280–301)
OSMOLALITY UR: 549 MMOL/KG (ref 100–1200)
PLATELET # BLD AUTO: 363 10E3/UL (ref 150–450)
POTASSIUM SERPL-SCNC: 3.9 MMOL/L (ref 3.4–5.3)
PROT SERPL-MCNC: 6.8 G/DL (ref 6.4–8.3)
RBC # BLD AUTO: 3.37 10E6/UL (ref 4.4–5.9)
SODIUM SERPL-SCNC: 131 MMOL/L (ref 136–145)
STAPHYLOCOCCUS AUREUS: NOT DETECTED
STAPHYLOCOCCUS EPIDERMIDIS: NOT DETECTED
STAPHYLOCOCCUS LUGDUNENSIS: NOT DETECTED
STAPHYLOCOCCUS SPECIES: NOT DETECTED
STREPTOCOCCUS AGALACTIAE: NOT DETECTED
STREPTOCOCCUS ANGINOSUS GROUP: NOT DETECTED
STREPTOCOCCUS PNEUMONIAE: NOT DETECTED
STREPTOCOCCUS PYOGENES: DETECTED
WBC # BLD AUTO: 20.7 10E3/UL (ref 4–11)

## 2023-08-12 PROCEDURE — 36415 COLL VENOUS BLD VENIPUNCTURE: CPT | Performed by: INTERNAL MEDICINE

## 2023-08-12 PROCEDURE — 80053 COMPREHEN METABOLIC PANEL: CPT | Performed by: STUDENT IN AN ORGANIZED HEALTH CARE EDUCATION/TRAINING PROGRAM

## 2023-08-12 PROCEDURE — 250N000011 HC RX IP 250 OP 636: Mod: JZ | Performed by: INTERNAL MEDICINE

## 2023-08-12 PROCEDURE — 85027 COMPLETE CBC AUTOMATED: CPT | Performed by: STUDENT IN AN ORGANIZED HEALTH CARE EDUCATION/TRAINING PROGRAM

## 2023-08-12 PROCEDURE — 36415 COLL VENOUS BLD VENIPUNCTURE: CPT | Performed by: STUDENT IN AN ORGANIZED HEALTH CARE EDUCATION/TRAINING PROGRAM

## 2023-08-12 PROCEDURE — 258N000003 HC RX IP 258 OP 636: Performed by: INTERNAL MEDICINE

## 2023-08-12 PROCEDURE — 250N000012 HC RX MED GY IP 250 OP 636 PS 637: Performed by: STUDENT IN AN ORGANIZED HEALTH CARE EDUCATION/TRAINING PROGRAM

## 2023-08-12 PROCEDURE — 99233 SBSQ HOSP IP/OBS HIGH 50: CPT | Performed by: INTERNAL MEDICINE

## 2023-08-12 PROCEDURE — 99223 1ST HOSP IP/OBS HIGH 75: CPT | Performed by: INTERNAL MEDICINE

## 2023-08-12 PROCEDURE — 250N000013 HC RX MED GY IP 250 OP 250 PS 637: Performed by: STUDENT IN AN ORGANIZED HEALTH CARE EDUCATION/TRAINING PROGRAM

## 2023-08-12 PROCEDURE — 85048 AUTOMATED LEUKOCYTE COUNT: CPT | Performed by: STUDENT IN AN ORGANIZED HEALTH CARE EDUCATION/TRAINING PROGRAM

## 2023-08-12 PROCEDURE — 86140 C-REACTIVE PROTEIN: CPT | Performed by: INTERNAL MEDICINE

## 2023-08-12 PROCEDURE — 250N000012 HC RX MED GY IP 250 OP 636 PS 637: Performed by: INTERNAL MEDICINE

## 2023-08-12 PROCEDURE — 83036 HEMOGLOBIN GLYCOSYLATED A1C: CPT | Performed by: INTERNAL MEDICINE

## 2023-08-12 PROCEDURE — 250N000011 HC RX IP 250 OP 636: Mod: JZ | Performed by: STUDENT IN AN ORGANIZED HEALTH CARE EDUCATION/TRAINING PROGRAM

## 2023-08-12 PROCEDURE — 87040 BLOOD CULTURE FOR BACTERIA: CPT | Performed by: INTERNAL MEDICINE

## 2023-08-12 PROCEDURE — 120N000001 HC R&B MED SURG/OB

## 2023-08-12 RX ORDER — NALOXONE HYDROCHLORIDE 0.4 MG/ML
0.4 INJECTION, SOLUTION INTRAMUSCULAR; INTRAVENOUS; SUBCUTANEOUS
Status: DISCONTINUED | OUTPATIENT
Start: 2023-08-12 | End: 2023-08-22 | Stop reason: HOSPADM

## 2023-08-12 RX ORDER — SODIUM CHLORIDE 9 MG/ML
INJECTION, SOLUTION INTRAVENOUS CONTINUOUS
Status: DISCONTINUED | OUTPATIENT
Start: 2023-08-12 | End: 2023-08-15

## 2023-08-12 RX ORDER — NALOXONE HYDROCHLORIDE 0.4 MG/ML
0.2 INJECTION, SOLUTION INTRAMUSCULAR; INTRAVENOUS; SUBCUTANEOUS
Status: DISCONTINUED | OUTPATIENT
Start: 2023-08-12 | End: 2023-08-22 | Stop reason: HOSPADM

## 2023-08-12 RX ADMIN — INSULIN ASPART 2 UNITS: 100 INJECTION, SOLUTION INTRAVENOUS; SUBCUTANEOUS at 11:57

## 2023-08-12 RX ADMIN — INSULIN ASPART 2 UNITS: 100 INJECTION, SOLUTION INTRAVENOUS; SUBCUTANEOUS at 08:35

## 2023-08-12 RX ADMIN — DEXTROSE MONOHYDRATE 900 MG: 50 INJECTION, SOLUTION INTRAVENOUS at 16:55

## 2023-08-12 RX ADMIN — INSULIN ASPART 1 UNITS: 100 INJECTION, SOLUTION INTRAVENOUS; SUBCUTANEOUS at 19:22

## 2023-08-12 RX ADMIN — SODIUM CHLORIDE: 9 INJECTION, SOLUTION INTRAVENOUS at 09:33

## 2023-08-12 RX ADMIN — ACETAMINOPHEN 650 MG: 325 TABLET ORAL at 08:44

## 2023-08-12 RX ADMIN — INSULIN ASPART 2 UNITS: 100 INJECTION, SOLUTION INTRAVENOUS; SUBCUTANEOUS at 05:03

## 2023-08-12 RX ADMIN — INSULIN ASPART 2 UNITS: 100 INJECTION, SOLUTION INTRAVENOUS; SUBCUTANEOUS at 00:25

## 2023-08-12 RX ADMIN — SODIUM CHLORIDE: 9 INJECTION, SOLUTION INTRAVENOUS at 20:21

## 2023-08-12 RX ADMIN — INSULIN ASPART 2 UNITS: 100 INJECTION, SOLUTION INTRAVENOUS; SUBCUTANEOUS at 16:31

## 2023-08-12 RX ADMIN — INSULIN GLARGINE 15 UNITS: 100 INJECTION, SOLUTION SUBCUTANEOUS at 11:57

## 2023-08-12 RX ADMIN — PIPERACILLIN AND TAZOBACTAM 3.38 G: 3; .375 INJECTION, POWDER, LYOPHILIZED, FOR SOLUTION INTRAVENOUS at 08:35

## 2023-08-12 RX ADMIN — PIPERACILLIN AND TAZOBACTAM 3.38 G: 3; .375 INJECTION, POWDER, LYOPHILIZED, FOR SOLUTION INTRAVENOUS at 16:18

## 2023-08-12 RX ADMIN — PIPERACILLIN AND TAZOBACTAM 3.38 G: 3; .375 INJECTION, POWDER, LYOPHILIZED, FOR SOLUTION INTRAVENOUS at 00:42

## 2023-08-12 RX ADMIN — ACETAMINOPHEN 650 MG: 325 TABLET ORAL at 19:31

## 2023-08-12 RX ADMIN — ACETAMINOPHEN 650 MG: 325 TABLET ORAL at 00:46

## 2023-08-12 RX ADMIN — HEPARIN SODIUM 5000 UNITS: 10000 INJECTION, SOLUTION INTRAVENOUS; SUBCUTANEOUS at 19:23

## 2023-08-12 ASSESSMENT — ACTIVITIES OF DAILY LIVING (ADL)
ADLS_ACUITY_SCORE: 35
ADLS_ACUITY_SCORE: 35
ADLS_ACUITY_SCORE: 43
DRESSING/BATHING_DIFFICULTY: NO
WEAR_GLASSES_OR_BLIND: NO
WALKING_OR_CLIMBING_STAIRS_DIFFICULTY: NO
ADLS_ACUITY_SCORE: 35
TOILETING_ISSUES: NO
CONCENTRATING,_REMEMBERING_OR_MAKING_DECISIONS_DIFFICULTY: YES
ADLS_ACUITY_SCORE: 31
ADLS_ACUITY_SCORE: 43
DOING_ERRANDS_INDEPENDENTLY_DIFFICULTY: YES
ADLS_ACUITY_SCORE: 35
CHANGE_IN_FUNCTIONAL_STATUS_SINCE_ONSET_OF_CURRENT_ILLNESS/INJURY: YES
FALL_HISTORY_WITHIN_LAST_SIX_MONTHS: NO
DIFFICULTY_EATING/SWALLOWING: NO
ADLS_ACUITY_SCORE: 43
ADLS_ACUITY_SCORE: 31
ADLS_ACUITY_SCORE: 43
ADLS_ACUITY_SCORE: 37
ADLS_ACUITY_SCORE: 29

## 2023-08-12 NOTE — PHARMACY-VANCOMYCIN DOSING SERVICE
"Pharmacy Vancomycin Initial Note  Date of Service 2023  Patient's  1955  68 year old, male  Indication: Osteomyelitis and Sepsis  Current estimated CrCl = Estimated Creatinine Clearance: 32.9 mL/min (A) (based on SCr of 2.09 mg/dL (H)).  Creatinine for last 3 days  2023:  6:06 PM Creatinine 2.09 mg/dL    Vancomycin IV Administrations (past 72 hours)                     vancomycin (VANCOCIN) 1,500 mg in sodium chloride 0.9 % 250 mL intermittent infusion (mg) 1,500 mg New Bag 23                    Nephrotoxins and other renal medications (From now, onward)      Start     Dose/Rate Route Frequency Ordered Stop    23 0030  piperacillin-tazobactam (ZOSYN) 3.375 g vial to attach to  mL bag        Note to Pharmacy: For [facility], [facility] and [facility]: For Zosyn-naive patients, use the \"Zosyn initial dose + extended infusion\" order panel.    3.375 g  over 240 Minutes Intravenous EVERY 8 HOURS 23  vancomycin place barragan - receiving intermittent dosing         1 each Intravenous SEE ADMIN INSTRUCTIONS 23              Contrast Orders - past 72 hours (72h ago, onward)      Start     Dose/Rate Route Frequency Stop    23  gadobutrol (GADAVIST) injection 7 mL         7 mL Intravenous ONCE 23          Plan:  Start intermittent vancomycin dosing due to worsening renal function. Scr is trending up despite 2L NS bolus  pm. Received loading dose  around 1930.   Vancomycin monitoring method: Trough (Method 2 = manual dose calculation)  Vancomycin therapeutic monitoring goal: 15-20 mg/L  Pharmacy will check vancomycin levels as appropriate in 1-3 Days. Recommend checking  morning at latest.  Serum creatinine levels will be ordered daily for the first week of therapy and at least twice weekly for subsequent weeks.      Ramona Rogel, [facility]    " Detail Level: Detailed Quality 110: Preventive Care And Screening: Influenza Immunization: Influenza Immunization not Administered for Documented Reasons.

## 2023-08-12 NOTE — CONSULTS
"Care Management Initial Consult    General Information  Assessment completed with: Dario Vega  Type of CM/SW Visit: Initial Assessment    Primary Care Provider verified and updated as needed: Yes   Readmission within the last 30 days: no previous admission in last 30 days      Reason for Consult: discharge planning  Advance Care Planning: Advance Care Planning Reviewed: no concerns identified          Communication Assessment  Patient's communication style: spoken language (English or Bilingual)                           Living Environment:   People in home: significant other  Dario and girlfriensarath Hogue  Current living Arrangements: house      Able to return to prior arrangements: yes       Family/Social Support:  Care provided by: self  Provides care for: no one  Marital Status: Lives with Significant Other  Significant Other       vivi Hogue  Description of Support System: Supportive, Involved    Support Assessment: Adequate family and caregiver support, Adequate social supports, Patient communicates needs well met    Current Resources:   Patient receiving home care services: No     Community Resources: None  Equipment currently used at home: cane, straight, walker, standard, glucometer  Supplies currently used at home: Diabetic Supplies    Employment/Financial:  Employment Status: retired, , previous service     Employment/ Comments: \"I don't use any  benefits\"  Financial Concerns:     Referral to Financial Worker: No       Does the patient's insurance plan have a 3 day qualifying hospital stay waiver?  No    Lifestyle & Psychosocial Needs:  Social Determinants of Health     Tobacco Use: High Risk (7/11/2021)    Patient History     Smoking Tobacco Use: Every Day     Smokeless Tobacco Use: Never     Passive Exposure: Not on file   Alcohol Use: Not on file   Financial Resource Strain: Not on file   Food Insecurity: Not on file   Transportation Needs: Not on file   Physical " "Activity: Not on file   Stress: Not on file   Social Connections: Not on file   Intimate Partner Violence: Not on file   Depression: Not on file   Housing Stability: Not on file       Functional Status:  Prior to admission patient needed assistance:   Dependent ADLs:: Ambulation-cane, Ambulation-walker, Independent  Dependent IADLs:: Independent       Mental Health Status:          Chemical Dependency Status:                Values/Beliefs:  Spiritual, Cultural Beliefs, Congregational Practices, Values that affect care:                 Additional Information:  Dario lives in a house with his girlfriend Lennie. He is independent with ADLs and IADLs. He does still drive.    He \"mostly uses his cane for mobility, but also has a walker he can use if needed.\"    Unknown discharge needs at this time.     Family to transport at discharge.    CM to follow for medical progression of care, discharge recommendations, and final discharge plan.    Maine Felix RN    "

## 2023-08-12 NOTE — H&P
[facility]    History and Physical - Hospitalist Service       Date of Admission:  8/11/2023    Assessment & Plan      Dario Benavides is a 68M presenting for generalised weakness confusion; pmhx includes DM2 with charcot foot (s/p amputation R foot), CKD3b, osteomyelitis of foot; admitted with cellulitis, high suspicion for osteomyelitis of R foot, diabetic foot ulcer.    #R foot cellulitis  #R foot diabetic foot ulcer  #R foot osteomyelitis, suspected  #sepsis  Recent past hospitalisation of 6/17/23 at [place] for osteomyelitis, with prescribed course of augmentin. XR foot from that time is suggestive of periosteal elevation. Foot wound not purulent draining, but given changes in baseline mentation, highly suspect for unresolved osteomyelitis in setting of sepsis. Sepsis by source (R foot osteomyelitis, SIRS 4/4), no overt end organ damage otherwise to suggest septic shock.  -telemetry  -ESR/CRP pending  -MRI foot unable to complete due to patient movement despite ativan administration  -lactic acid trend  -Blood cultures trend  -wound culture trend  -IVF 30mL/kg completed in ED  -zosyn IV q8h  -Vancomycin  -podiatry referral placed for anticipated surgical evaluation  -NPO midnight  -Heparin subcutaneous dvt PPX, hold in AM, pending podiatry evaluation    #hyponatremia  Corrected for hyperglycemia 133.  -NS bolus given per sepsis protocols  -BMP recheck  -Serum/urine osm  -Urine Na (not on diuretics)    #DM2, with arthropathy  -sliding scale insulin    #SHANITA  BUn/Cr baseline estimated 24/1. Likely related to sepsis, suspected recent reduced oral intake.  -NS IVF given  -BMP recheck/trend daily       Diet: Combination Diet Regular Diet Adult; Moderate Consistent Carb (60 g CHO per Meal) Diet  NPO per Anesthesia Guidelines for Procedure/Surgery Except for: Meds, Ice Chips  DVT Prophylaxis: Heparin SQ  Odell Catheter: Not present  Lines: None     Cardiac Monitoring: ACTIVE order.  Indication: sepsis  Code Status: No CPR- Do NOT Intubate    Clinically Significant Risk Factors Present on Admission         # Hyponatremia: Lowest Na = 127 mmol/L in last 2 days, will monitor as appropriate            # Hypertension: Noted on problem list               Disposition Plan      Expected Discharge Date: 08/13/2023      Destination: home with family            Wade Greenberg MD  Hospitalist Service  [facility]  Securely message with [employer] (more info)  Text page via [employer] Paging/Directory     ______________________________________________________________________    Chief Complaint   Weakness, confusion    History is obtained from the patient, electronic health record, and emergency department physician    History of Present Illness   Dario Benavides is a 68M presenting for generalised weakness confusion; pmhx includes DM2 with charcot foot (s/p amputation R foot), CKD3b, osteomyelitis of foot; admitted with cellulitis, high suspicion for osteomyelitis of R foot, diabetic foot ulcer.    Recent hospitalization in June at [facility] for right foot osteomyelitis, was given a trial of outpatient Augmentin for 7 days, unclear follow-up after this regimen.  Prior to presentation had been in his general state of health, but yesterday was noticed to have increased inattentiveness and distraction, progressed to confusion, and generalized weakness.  Sister recommended he be evaluated again in the emergency room for his new confusion which is a change for him.  Upon initial evaluation in the emergency room he was septic with 4-4 SIRS and specifically source of cellulitis/osteomyelitis.  He was unable to tolerate MRI of the foot due to movement of the leg despite Ativan administration.      Past Medical History    Past Medical History:   Diagnosis Date    Hypertension     Non compliance with medical treatment 5/4/2021    Stage 3b chronic kidney disease 5/4/2021    Status post amputation  of right foot through metatarsal bone (H) 5/8/2021    Tobacco abuse 7/23/2019    Type 2 diabetes mellitus with right diabetic foot infection (H) 5/4/2021       Past Surgical History   Past Surgical History:   Procedure Laterality Date    BACK SURGERY  1992    NECK SURGERY  2011       Prior to Admission Medications   Prior to Admission Medications   Prescriptions Last Dose Informant Patient Reported? Taking?   acetaminophen (TYLENOL) 325 MG tablet More than a month at PRN  Yes Yes   Sig: Take 2 tablets by mouth every 4 hours as needed for mild pain   insulin aspart U-100 (NOVOLOG) 100 unit/mL (3 mL) injection pen 8/11/2023 at PM  Yes Yes   Sig: Inject 0-6 Units Subcutaneous 3 times daily (with meals)      Facility-Administered Medications: None        Review of Systems    The 10 point Review of Systems is negative other than noted in the HPI or here.      Physical Exam   Vital Signs: Temp: 100  F (37.8  C) Temp src: Oral BP: 125/59 Pulse: 116   Resp: 25 SpO2: 97 % O2 Device: None (Room air)    Weight: 147 lbs 11.33 oz    Constitutional: dozes off during questioning, AOx4 when active  Respiratory: CTAB  Cardiovascular: tachycardic, no m/g/r  Skin: R foot erythema, warmth, tenderness to palpation of the right foot extending to mid shin, heel with non-bleeding, non-purulent ulcer estimated 2x2cm.  Musculoskeletal: shoulder/elbow flexion/extension 5/5 bilaterally and symmetic.  Neurologic: AOx4, distractible during conversation. Sensation intact of all extremities.    Medical Decision Making       60 MINUTES SPENT BY ME on the date of service doing chart review, history, exam, documentation & further activities per the note.  MANAGEMENT DISCUSSED with the following over the past 24 hours: patient, EDP   NOTE(S)/MEDICAL RECORDS REVIEWED over the past 24 hours: past admissions, emergency visits  Tests ORDERED & REVIEWED in the past 24 hours:  - See lab/imaging results included in the data section of the note  Tests  personally interpreted in the past 24 hours:  - XR foot 3v (6/17) showing periosteal elevation of the residual metatarsals, possible periosteal elevation of the superior-posterior aspect of calcaneus       Data     I have personally reviewed the following data over the past 24 hrs:    23.4 (H)  \   11.1 (L)   / 444     127 (L) 90 (L) 28.5 (H) /  304 (H)   4.2 24 2.09 (H) \     ALT: 28 AST: 40 AP: 261 (H) TBILI: 1.4 (H)   ALB: 3.5 TOT PROTEIN: 8.4 (H) LIPASE: 18     Procal: N/A CRP: 349.70 (H) Lactic Acid: 1.3         Imaging results reviewed over the past 24 hrs:   Recent Results (from the past 24 hour(s))   MR Foot Right w/o Contrast    Narrative    EXAM: MR FOOT RIGHT W/O CONTRAST  LOCATION: [facility]  DATE: 8/11/2023    INDICATION: Pain. Prior surgery. Possible osteomyelitis.  COMPARISON: 06/14/2023 radiographs.  TECHNIQUE: Unenhanced.    FINDINGS: The exam is moderately degraded by patient motion which persisted throughout the entire study.    JOINTS AND BONES:   -Postop changes of a transmetatarsal amputation of the first through fifth rays at the level of the proximal portions of the metatarsal shafts. There is a soft tissue ulcer overlying the amputation site with deep extension to the bone. There is marrow   edema involving all of the metatarsal stumps as well as edema in the cuboid and medial cuneiform. Findings very worrisome for multifocal osteomyelitis. No fracture or contusion. No marrow replacing lesion. Mild degenerative changes at the tibiotalar   joint.     TENDONS:   -Moderate tenosynovitis in the tibialis anterior tendon beginning just above the level of the ankle joint. Mild tenosynovitis in the peroneal tendons without tearing.    LIGAMENTS:   -The ankle ligaments are grossly intact.    MUSCLES AND SOFT TISSUES:   -Marked atrophy of the intrinsic musculature of the foot. No discrete fluid collection to suggest an abscess. Moderate edema or cellulitis along the lateral  aspect of the ankle extending into the hindfoot.      Impression    IMPRESSION:  1.  Postop changes of a transmetatarsal amputation of the first through fifth rays at the level of the proximal portions of the metatarsal shafts with findings worrisome for multifocal osteomyelitis involving the metatarsal stumps as well as likely the   cuboid and medial cuneiform.    2.  Moderate tenosynovitis of the tibialis anterior tendon.

## 2023-08-12 NOTE — PLAN OF CARE
"  Problem: Plan of Care - These are the overarching goals to be used throughout the patient stay.    Goal: Plan of Care Review  Description: The Plan of Care Review/Shift note should be completed every shift.  The Outcome Evaluation is a brief statement about your assessment that the patient is improving, declining, or no change.  This information will be displayed automatically on your shift note.  Outcome: Progressing  Goal: Patient-Specific Goal (Individualized)  Description: You can add care plan individualizations to a care plan. Examples of Individualization might be:  \"Parent requests to be called daily at 9am for status\", \"I have a hard time hearing out of my right ear\", or \"Do not touch me to wake me up as it startles me\".  Outcome: Progressing  Goal: Absence of Hospital-Acquired Illness or Injury  Outcome: Progressing  Intervention: Identify and Manage Fall Risk  Recent Flowsheet Documentation  Taken 8/12/2023 0400 by Aide Dias RN  Safety Promotion/Fall Prevention:   activity supervised   assistive device/personal items within reach   increase visualization of patient   lighting adjusted   mobility aid in reach  Taken 8/12/2023 0000 by Aide Dias RN  Safety Promotion/Fall Prevention:   activity supervised   assistive device/personal items within reach   increase visualization of patient   lighting adjusted   mobility aid in reach  Intervention: Prevent Skin Injury  Recent Flowsheet Documentation  Taken 8/12/2023 0400 by Aide Dias RN  Body Position:   position changed independently   left   turned  Taken 8/12/2023 0000 by Aide Dias RN  Body Position:   position changed independently   left   turned  Goal: Optimal Comfort and Wellbeing  Outcome: Progressing  Goal: Readiness for Transition of Care  Outcome: Progressing   Goal Outcome Evaluation:  Pt cont to be somewhat restless in bed. Pt received prn tylenol overnight for an increase in temp and general discomfort. Pt denied pain. Foot " cont to be wrapped in kerlix.

## 2023-08-12 NOTE — PLAN OF CARE
"    Problem: Plan of Care - These are the overarching goals to be used throughout the patient stay.    Goal: Plan of Care Review  Description: The Plan of Care Review/Shift note should be completed every shift.  The Outcome Evaluation is a brief statement about your assessment that the patient is improving, declining, or no change.  This information will be displayed automatically on your shift note.  Outcome: Progressing  Flowsheets (Taken 8/11/2023 2302)  Plan of Care Reviewed With: patient  Goal: Patient-Specific Goal (Individualized)  Description: You can add care plan individualizations to a care plan. Examples of Individualization might be:  \"Parent requests to be called daily at 9am for status\", \"I have a hard time hearing out of my right ear\", or \"Do not touch me to wake me up as it startles me\".  Outcome: Progressing  Goal: Absence of Hospital-Acquired Illness or Injury  Outcome: Progressing  Intervention: Identify and Manage Fall Risk  Recent Flowsheet Documentation  Taken 8/11/2023 2200 by Ravi Ashley RN  Safety Promotion/Fall Prevention:   activity supervised   assistive device/personal items within reach   increase visualization of patient   lighting adjusted   mobility aid in reach  Goal: Optimal Comfort and Wellbeing  Outcome: Progressing  Goal: Readiness for Transition of Care  Outcome: Progressing    Goal Outcome Evaluation:      Plan of Care Reviewed With: patient    Pt arrived on unit around 2200 hours. Pt is oriented x 4 but extremely lethargic & currently rouses to voice. Pt is febrile with temp of 100.5, tachycardic, & presents with an ulcer to his RLE. Ulcer was uncovered on arrival, RN irrigated with normal saline & wrapped with kerlix until WOC is consulted. Pt denies the presence of pain or dyspnea. Currently has IV antibiotics & fluid bolus infusing. Lactic acid rechecked at 1.3 - currently trending down. Will continue to monitor.     Ravi Ashley RN on 8/11/2023 at 11:05 PM         "

## 2023-08-12 NOTE — PROGRESS NOTES
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     Right foot diabetic ulceration/osteomyelitis with bacteremia  - Recent past hospitalisation of 6/17/23 at [place] for osteomyelitis, with prescribed course of augmentin. XR foot from that time is suggestive of periosteal elevation. Foot wound not purulent draining, but given changes in baseline mentation, highly suspect for unresolved osteomyelitis in setting of sepsis. Sepsis by source (R foot osteomyelitis, SIRS 4/4), no overt end organ damage otherwise to suggest septic shock.  -telemetry  - MRI foot shows Postop changes of a transmetatarsal amputation of the first through fifth rays at the level of the proximal portions of the metatarsal shafts with findings worrisome for multifocal osteomyelitis involving the metatarsal stumps as well as likely the   cuboid and medial cuneiform.  - Positive blood culture for gram-positive cocci in pairs and clusters  - Repeat blood culture  - wound culture trend  - ID and podiatry consult, appreciate input  -Continue vancomycin and Zosyn till evaluated by ID  -podiatry referral placed for anticipated surgical evaluation     Hyponatremia  - Corrected for hyperglycemia 133.  - NS bolus given per sepsis protocols  -Continue IV fluid  -Continue to monitor sodium level     Obesity/type II with arthropathy  -sliding scale insulin  -Checking globin A1c  -Continue to monitor blood glucose     Acute kidney injury  - Likely related to sepsis, suspected recent reduced oral intake.  - NS IVF given  -BMP recheck/trend daily  -Avoid nephrotoxic drugs    50 MINUTES SPENT BY ME on the date of service doing chart review, history, exam, documentation & further activities per the note    Principal  Problem:    Cellulitis of right foot  Active Problems:    Hyperglycemia    Diabetic ulcer of right foot associated with type 2 diabetes mellitus, with other ulcer severity, unspecified part of foot (H)      VTE prophylaxis:  Heparin SQ  DIET: Orders Placed This Encounter      Moderate Consistent Carb (60 g CHO per Meal) Diet      Disposition/Barriers to discharge: IV antibiotic, pending culture, bacteremia  Code Status: No CPR- Do NOT Intubate    Subjective:  Dario is feeling about the same today, denies any chest pain shortness of breath.  Still spikes fever    PHYSICAL EXAM  Vitals:    08/11/23 1747   Weight: 67 kg (147 lb 11.3 oz)     B/P:119/57 T:99.8 P:104 R:18     Intake/Output Summary (Last 24 hours) at 8/12/2023 1558  Last data filed at 8/12/2023 1551  Gross per 24 hour   Intake 626 ml   Output 800 ml   Net -174 ml      Body mass index is 20.6 kg/m .    Constitutional: awake, alert, cooperative, no apparent distress, and appears stated age  Respiratory: No increased work of breathing, good air exchange, clear to auscultation bilaterally, no crackles or wheezing  Cardiovascular: Normal apical impulse, regular rate and rhythm, normal S1 and S2, no S3 or S4, and no murmur noted  GI: No scars, normal bowel sounds, soft, non-distended, non-tender, no masses palpated, no hepatosplenomegally  Skin: no bruising or bleeding and normal skin color, texture, turgor  Musculoskeletal: Diffuse erythema of the right midfoot with forefoot with amputation  Neurologic: Awake, alert, oriented to name, place and time.  Cranial nerves II-XII are grossly intact.  Motor is 5 out of 5 bilaterally.   Sensory is intact.    Neuropsychiatric: Appropriate with examiner      PERTINENT LABS/IMAGING:    I have personally reviewed the following data over the past 24 hrs:    20.7 (H)  \   9.5 (L)   / 363     131 (L) 96 (L) 30.3 (H) /  219 (H)   3.9 21 (L) 2.23 (H) \     ALT: 21 AST: 39 AP: 203 (H) TBILI: 1.5 (H)   ALB: 2.8 (L) TOT PROTEIN:  6.8 LIPASE: 18     Procal: N/A CRP: 344.60 (H) Lactic Acid: 1.3         Imaging results reviewed over the past 24 hrs:   Recent Results (from the past 24 hour(s))   MR Foot Right w/o Contrast    Narrative    EXAM: MR FOOT RIGHT W/O CONTRAST  LOCATION: [facility]  DATE: 8/11/2023    INDICATION: Pain. Prior surgery. Possible osteomyelitis.  COMPARISON: 06/14/2023 radiographs.  TECHNIQUE: Unenhanced.    FINDINGS: The exam is moderately degraded by patient motion which persisted throughout the entire study.    JOINTS AND BONES:   -Postop changes of a transmetatarsal amputation of the first through fifth rays at the level of the proximal portions of the metatarsal shafts. There is a soft tissue ulcer overlying the amputation site with deep extension to the bone. There is marrow   edema involving all of the metatarsal stumps as well as edema in the cuboid and medial cuneiform. Findings very worrisome for multifocal osteomyelitis. No fracture or contusion. No marrow replacing lesion. Mild degenerative changes at the tibiotalar   joint.     TENDONS:   -Moderate tenosynovitis in the tibialis anterior tendon beginning just above the level of the ankle joint. Mild tenosynovitis in the peroneal tendons without tearing.    LIGAMENTS:   -The ankle ligaments are grossly intact.    MUSCLES AND SOFT TISSUES:   -Marked atrophy of the intrinsic musculature of the foot. No discrete fluid collection to suggest an abscess. Moderate edema or cellulitis along the lateral aspect of the ankle extending into the hindfoot.      Impression    IMPRESSION:  1.  Postop changes of a transmetatarsal amputation of the first through fifth rays at the level of the proximal portions of the metatarsal shafts with findings worrisome for multifocal osteomyelitis involving the metatarsal stumps as well as likely the   cuboid and medial cuneiform.    2.  Moderate tenosynovitis of the tibialis anterior tendon.       Discussed with  patient, family, ID, nursing staff and discharge planner    Jack Mooney MD  [facility] Medicine Service  [phone]

## 2023-08-12 NOTE — PLAN OF CARE
"  Problem: Plan of Care - These are the overarching goals to be used throughout the patient stay.    Goal: Plan of Care Review  Description: The Plan of Care Review/Shift note should be completed every shift.  The Outcome Evaluation is a brief statement about your assessment that the patient is improving, declining, or no change.  This information will be displayed automatically on your shift note.  8/12/2023 1430 by Denisse Culver RN  Outcome: Progressing  Flowsheets (Taken 8/12/2023 1430)  Plan of Care Reviewed With: patient  8/12/2023 1059 by Denisse Culver RN  Outcome: Progressing  Flowsheets (Taken 8/12/2023 1059)  Plan of Care Reviewed With: patient  Goal: Patient-Specific Goal (Individualized)  Description: You can add care plan individualizations to a care plan. Examples of Individualization might be:  \"Parent requests to be called daily at 9am for status\", \"I have a hard time hearing out of my right ear\", or \"Do not touch me to wake me up as it startles me\".  8/12/2023 1430 by Denisse Culver RN  Outcome: Progressing  8/12/2023 1059 by Denisse Culver RN  Outcome: Progressing  Goal: Absence of Hospital-Acquired Illness or Injury  8/12/2023 1430 by Denisse Culver RN  Outcome: Progressing  8/12/2023 1059 by Denisse Culver RN  Outcome: Progressing  Intervention: Identify and Manage Fall Risk  Recent Flowsheet Documentation  Taken 8/12/2023 1230 by Denisse Culver RN  Safety Promotion/Fall Prevention:   activity supervised   assistive device/personal items within reach   increase visualization of patient   lighting adjusted   mobility aid in reach  Taken 8/12/2023 0830 by Denisse Culver RN  Safety Promotion/Fall Prevention:   activity supervised   assistive device/personal items within reach   increase visualization of patient   lighting adjusted   mobility aid in reach  Intervention: Prevent Skin Injury  Recent Flowsheet Documentation  Taken 8/12/2023 1230 by Denisse Culver RN  Body " Position:   position changed independently   side-lying 90 degrees  Taken 8/12/2023 0830 by Denisse Culver RN  Body Position:   position changed independently   side-lying 90 degrees  Goal: Optimal Comfort and Wellbeing  8/12/2023 1430 by Denisse Culver RN  Outcome: Progressing  8/12/2023 1059 by Denisse Culver RN  Outcome: Progressing  Goal: Readiness for Transition of Care  8/12/2023 1430 by Denisse Culver RN  Outcome: Not Progressing  Flowsheets (Taken 8/12/2023 1430)  Anticipated Changes Related to Illness: inability to care for someone else  8/12/2023 1059 by Denisse Culver RN  Outcome: Not Progressing  Flowsheets (Taken 8/12/2023 1059)  Anticipated Changes Related to Illness: inability to care for self  Barriers to Discharge: fevers, positive blood cultures, Hyperglycemia.  Intervention: Mutually Develop Transition Plan  Recent Flowsheet Documentation  Taken 8/12/2023 1430 by Denisse Culver RN  Anticipated Changes Related to Illness: inability to care for someone else  Taken 8/12/2023 1059 by Denisse Culver RN  Anticipated Changes Related to Illness: inability to care for self  Taken 8/12/2023 0935 by Denisse Culver RN  Equipment Currently Used at Home:   cane, straight   walker, standard   glucometer   Goal Outcome Evaluation:      Plan of Care Reviewed With: patient      Patient has had 3 loose brown incontinent stools today.  He also does not seem to have an urge to urinate.  He was bladder scanned for 421cc and then he was stood up with staff and walker to try the urinal and was able to void 275cc while also having a BM at the same time.

## 2023-08-12 NOTE — CONSULTS
Consultation - Infectious Disease  [facility]  Dario Benavides,  1955, MRN [id number]    Admitting Dx: Hyperglycemia [R73.9]  Cellulitis of right foot [L03.115]  Diabetic ulcer of right foot associated with type 2 diabetes mellitus, with other ulcer severity, unspecified part of foot (H) [E11.621, L97.518]    PCP: No Ref-Primary, Physician, None       ASSESSMENT   68-year-old man with a history of diabetes, hypertension.  Known to me from previous clinic visits for nonhealing wounds following right-sided TMA.  Lost to follow-up.    Right foot infection.  History of right TMA, poorly healed wounds, with poor follow-up with podiatry.  Now with surrounding cellulitis in the foot and lower leg.  MRI showing multifocal osteomyelitis.  Signs of systemic illness with fevers and bacteremia   Sepsis. Presenting with fever, leukocytosis, elevated creatinine, elevated lactic acid and CRP.  Positive blood cultures  Gram-positive bacteremia.  1 of 2 blood cultures on admission with gram-positive cocci in pairs and chains.  Strep pyogenes identified on [employer] platform.  Poor medical adherence.  Lost to follow-up following several visits for poorly healing TMA wounds      Principal Problem:    Cellulitis of right foot  Active Problems:    Hyperglycemia    Diabetic ulcer of right foot associated with type 2 diabetes mellitus, with other ulcer severity, unspecified part of foot (H)       PLAN   -Discontinue vancomycin  -Start clindamycin IV  -Continue Zosyn  -Podiatry consult  -Anticipate patient will need further amputation, likely BKA  -Follow-up on repeat blood cultures    Thank you for this consult. Will follow.    Roberto Pederson MD  [facility]  Direct messaging: [employer] Paging  On-Call ID provider: [phone], option: 9      ===========================================      Chief Complaint   Cellulitis of right foot       HPI     We have been requested by Jack Mooney MD  to evaluate Dario Benavides for the above.    History obtained by patient    Dario Benavides is a 68 year old man with a history of diabetes and hypertension.  The patient presents with weakness for about 4 days and worsening right leg infection.  The patient was previously seen by me back in 2021 in the outpatient ID clinic for nonhealing right TMA surgical site.  At that time follow-up with his podiatrist was encouraged, but he did not.  He was admitted to [facility] in June with diarrhea.  ID and podiatry saw the patient for his right wounds, which are considered superficial at the time.  On this admission there is erythema around the foot and leg.  MRI was done showing multifocal osteomyelitis.  Blood cultures are now positive.  He is on broad-spectrum antibiotics.          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     Social History  Reviewed, and he  reports that he has been smoking cigarettes. He has a 45.00 pack-year smoking history. He has never used smokeless tobacco. He reports that he does not currently use alcohol. He reports current drug use. Drug: Marijuana.  Social History     Social History Narrative    Not on file     Family History  family history is not on file.  family history reviewed and is not pertinent to the presenting problem.            Allergies   No Known Allergies      Antibiotics   Zosyn 8/11-  Vancomycin 8/11-    Previous:  None      Physical Exam     Temp:  [97.6  " F (36.4  C)-102.4  F (39.1  C)] 99.8  F (37.7  C)  Pulse:  [102-127] 104  Resp:  [16-28] 18  BP: ()/(55-81) 119/57  SpO2:  [94 %-99 %] 99 %    /57 (BP Location: Right arm)   Pulse 104   Temp 99.8  F (37.7  C) (Oral)   Resp 18   Ht 1.803 m (5' 11\")   Wt 67 kg (147 lb 11.3 oz)   SpO2 99%   BMI 20.60 kg/m      GENERAL: Thin man, lying in bed in no acute distress.   HENT:  Head is normocephalic, atraumatic. Oropharynx is moist without exudates or ulcers.  Many missing teeth  EYES:  Eyes have anicteric sclerae without conjunctival injection or stigmata of endocarditis.   NECK:  Supple.  LUNGS:  Clear to auscultation.  CARDIOVASCULAR:  Regular rate and rhythm with no murmurs, gallops or rubs.  ABDOMEN:  Normal bowel sounds, soft, nontender. No appreciable hepatosplenomegaly  EXT: Extremities warm and without edema.  SKIN:  No acute rashes.  Right foot ulcers with surrounding erythema extending up the lower leg, see pictures below from the ER  NEUROLOGIC:  Grossly nonfocal.  Psych: General indifference to conversation        Cultures   8/11 blood culture: 1 of 2 with gram-positive cocci's in pairs and chains  8/12 blood cultures x2: Pending      Laboratory results     Recent Labs   Lab 08/12/23  0517 08/11/23  1806   WBC 20.7* 23.4*   HGB 9.5* 11.1*    444       Recent Labs   Lab 08/12/23  0517 08/11/23  1806   * 127*   CO2 21* 24   BUN 30.3* 28.5*   ALBUMIN 2.8* 3.5   ALKPHOS 203* 261*   ALT 21 28   AST 39 40       Recent Labs   Lab 08/11/23  1806   SED 84*           Imaging   Radiology results reviewed    MR Foot Right w/o Contrast    Result Date: 8/11/2023  EXAM: MR FOOT RIGHT W/O CONTRAST LOCATION: [facility] DATE: 8/11/2023 INDICATION: Pain. Prior surgery. Possible osteomyelitis. COMPARISON: 06/14/2023 radiographs. TECHNIQUE: Unenhanced. FINDINGS: The exam is moderately degraded by patient motion which persisted throughout the entire study. JOINTS AND BONES: " -Postop changes of a transmetatarsal amputation of the first through fifth rays at the level of the proximal portions of the metatarsal shafts. There is a soft tissue ulcer overlying the amputation site with deep extension to the bone. There is marrow edema involving all of the metatarsal stumps as well as edema in the cuboid and medial cuneiform. Findings very worrisome for multifocal osteomyelitis. No fracture or contusion. No marrow replacing lesion. Mild degenerative changes at the tibiotalar joint. TENDONS: -Moderate tenosynovitis in the tibialis anterior tendon beginning just above the level of the ankle joint. Mild tenosynovitis in the peroneal tendons without tearing. LIGAMENTS: -The ankle ligaments are grossly intact. MUSCLES AND SOFT TISSUES: -Marked atrophy of the intrinsic musculature of the foot. No discrete fluid collection to suggest an abscess. Moderate edema or cellulitis along the lateral aspect of the ankle extending into the hindfoot.     IMPRESSION: 1.  Postop changes of a transmetatarsal amputation of the first through fifth rays at the level of the proximal portions of the metatarsal shafts with findings worrisome for multifocal osteomyelitis involving the metatarsal stumps as well as likely the cuboid and medial cuneiform. 2.  Moderate tenosynovitis of the tibialis anterior tendon.      Data reviewed today: I reviewed all medications, new labs and imaging results over the last 24 hours. I personally reviewed the MR image(s) showing right foot osteomyelitis . Cultures and previous notes were reviewed and summarized above.   The patient's care was discussed with the Patient and Primary team.

## 2023-08-12 NOTE — SIGNIFICANT EVENT
Critical lab: gram + cocci in pairs and chains.  Dr. Mooney notified via [employer] messaging and ID has been consulted.  They have not seen the patient yet.  Patient has been receiving IV Zosyn and Vancomycin.  He has been febrile with temps 102.4 and 100.3 orally.  Tylenol given.

## 2023-08-12 NOTE — PLAN OF CARE
"  Problem: Plan of Care - These are the overarching goals to be used throughout the patient stay.    Goal: Plan of Care Review  Description: The Plan of Care Review/Shift note should be completed every shift.  The Outcome Evaluation is a brief statement about your assessment that the patient is improving, declining, or no change.  This information will be displayed automatically on your shift note.  Outcome: Progressing  Flowsheets (Taken 8/12/2023 1059)  Plan of Care Reviewed With: patient  Goal: Patient-Specific Goal (Individualized)  Description: You can add care plan individualizations to a care plan. Examples of Individualization might be:  \"Parent requests to be called daily at 9am for status\", \"I have a hard time hearing out of my right ear\", or \"Do not touch me to wake me up as it startles me\".  Outcome: Progressing  Goal: Absence of Hospital-Acquired Illness or Injury  Outcome: Progressing  Intervention: Identify and Manage Fall Risk  Recent Flowsheet Documentation  Taken 8/12/2023 0830 by Denisse Cuvler RN  Safety Promotion/Fall Prevention:   activity supervised   assistive device/personal items within reach   increase visualization of patient   lighting adjusted   mobility aid in reach  Intervention: Prevent Skin Injury  Recent Flowsheet Documentation  Taken 8/12/2023 0830 by Denisse Culver RN  Body Position:   position changed independently   side-lying 90 degrees  Goal: Optimal Comfort and Wellbeing  Outcome: Progressing  Goal: Readiness for Transition of Care  Outcome: Not Progressing  Flowsheets (Taken 8/12/2023 1059)  Anticipated Changes Related to Illness: inability to care for self  Barriers to Discharge: fevers, positive blood cultures, Hyperglycemia.  Intervention: Mutually Develop Transition Plan  Recent Flowsheet Documentation  Taken 8/12/2023 1059 by Denisse Culver RN  Anticipated Changes Related to Illness: inability to care for self  Taken 8/12/2023 0935 by Denisse Culver, " RN  Equipment Currently Used at Home:   cane, straight   walker, standard   glucometer   Goal Outcome Evaluation:      Plan of Care Reviewed With: patient    Patient is very lethargic but arouses easily.  Per his niece's report, he has not slept more than about 20-30 minutes at a time for the past several days up to a week.  He also hasn't been eating at home and she brought him here because he was very confused.  Per her report he has been lying around watching television all day for most of the summer.  She thinks he may be severely depressed.  She states he checks his blood sugars every morning and every night and that his baseline is to converse but lately he has been answering in 1 word answers.    This morning his temp was 100 orally with 0700 vitals.  At 08:39 his temp was 102.4 so writer gave him 650mg of Tylenol.  Temp one hour later was 100.3.  His am blood glucose was 300 and he was given 2 units of Novolog per sliding scale orders.  Dr. Mooney is adding in Lantus.      Patient remains NPO until he is seen by ID (new consult ordered today) and Podiatry.  He was also started on NS at 100cc/hr.  He has not yet voided on this shift but he did stand with assistance and void 300cc into urinal.

## 2023-08-12 NOTE — ED NOTES
EMERGENCY DEPARTMENT SIGN OUT NOTE        ED COURSE AND MEDICAL DECISION MAKING  Patient was signed out to me by Dr Sameer Evans at 9:23 PM    In brief, Dario Benavides is a 68 year old male who initially presented for evaluation of right lower leg and foot erythema with new sore development and fatigue.    At time of sign out, patient is accepted to hospitalist service. I will be following up on MRI results.  MRI demonstrates findings worrisome for osteomyelitis.    FINAL IMPRESSION    1. Diabetic ulcer of right foot associated with type 2 diabetes mellitus, with other ulcer severity, unspecified part of foot (H)    2. Hyperglycemia    3. Cellulitis of right foot        ED MEDS  Medications   lidocaine 1 % 0.1-1 mL (has no administration in time range)   lidocaine (LMX4) cream (has no administration in time range)   sodium chloride (PF) 0.9% PF flush 3 mL (3 mLs Intracatheter $Given 8/12/23 0042)   sodium chloride (PF) 0.9% PF flush 3 mL (has no administration in time range)   melatonin tablet 1 mg (has no administration in time range)   heparin ANTICOAGULANT injection 5,000 Units (has no administration in time range)   polyethylene glycol (MIRALAX) Packet 17 g (has no administration in time range)   ondansetron (ZOFRAN ODT) ODT tab 4 mg (has no administration in time range)     Or   ondansetron (ZOFRAN) injection 4 mg (has no administration in time range)   prochlorperazine (COMPAZINE) injection 5 mg (has no administration in time range)     Or   prochlorperazine (COMPAZINE) tablet 5 mg (has no administration in time range)     Or   prochlorperazine (COMPAZINE) suppository 12.5 mg (has no administration in time range)   oxyCODONE IR (ROXICODONE) half-tab 2.5 mg (has no administration in time range)   HYDROmorphone (DILAUDID) injection 0.2 mg (has no administration in time range)   lidocaine 1 % 0.1-1 mL (has no administration in time range)   lidocaine (LMX4) cream (has no administration in time range)   sodium  chloride (PF) 0.9% PF flush 3 mL (3 mLs Intracatheter Not Given 8/12/23 0043)   sodium chloride (PF) 0.9% PF flush 3 mL (has no administration in time range)   piperacillin-tazobactam (ZOSYN) 3.375 g vial to attach to  mL bag (3.375 g Intravenous $Given 8/12/23 0042)   vancomycin place barragan - receiving intermittent dosing (has no administration in time range)   glucose gel 15-30 g (has no administration in time range)     Or   dextrose 50 % injection 25-50 mL (has no administration in time range)     Or   glucagon injection 1 mg (has no administration in time range)   insulin aspart (NovoLOG) injection (RAPID ACTING) (2 Units Subcutaneous $Given 8/12/23 0025)   acetaminophen (TYLENOL) tablet 650 mg (650 mg Oral $Given 8/12/23 0046)   0.9% sodium chloride BOLUS ( Intravenous Rate/Dose Verify 8/11/23 2105)   piperacillin-tazobactam (ZOSYN) 3.375 g vial to attach to  mL bag (0 g Intravenous Stopped 8/11/23 1932)   acetaminophen (TYLENOL) tablet 650 mg (650 mg Oral $Given 8/11/23 1835)   vancomycin (VANCOCIN) 1,500 mg in sodium chloride 0.9 % 250 mL intermittent infusion (1,500 mg Intravenous $New Bag 8/11/23 1934)   gadobutrol (GADAVIST) injection 7 mL (7 mLs Intravenous $Given 8/11/23 2004)   LORazepam (ATIVAN) injection 1 mg (1 mg Intravenous $Given 8/11/23 2104)       LAB  Labs Ordered and Resulted from Time of ED Arrival to Time of ED Departure   COMPREHENSIVE METABOLIC PANEL - Abnormal       Result Value    Sodium 127 (*)     Potassium 4.2      Chloride 90 (*)     Carbon Dioxide (CO2) 24      Anion Gap 13      Urea Nitrogen 28.5 (*)     Creatinine 2.09 (*)     Calcium 10.0      Glucose 358 (*)     Alkaline Phosphatase 261 (*)     AST 40      ALT 28      Protein Total 8.4 (*)     Albumin 3.5      Bilirubin Total 1.4 (*)     GFR Estimate 34 (*)    ROUTINE UA WITH MICROSCOPIC REFLEX TO CULTURE - Abnormal    Color Urine Yellow      Appearance Urine Turbid (*)     Glucose Urine >1000 (*)     Bilirubin  Urine Negative      Ketones Urine Negative      Specific Gravity Urine 1.028      Blood Urine 0.5 mg/dL (*)     pH Urine 6.0      Protein Albumin Urine 100 (*)     Urobilinogen Urine <2.0      Nitrite Urine Negative      Leukocyte Esterase Urine Negative      Mucus Urine Present (*)     RBC Urine 5 (*)     WBC Urine 6 (*)     Hyaline Casts Urine 1     BILIRUBIN DIRECT - Abnormal    Bilirubin Direct 0.82 (*)    CBC WITH PLATELETS AND DIFFERENTIAL - Abnormal    WBC Count 23.4 (*)     RBC Count 3.91 (*)     Hemoglobin 11.1 (*)     Hematocrit 32.7 (*)     MCV 84      MCH 28.4      MCHC 33.9      RDW 13.6      Platelet Count 444     GLUCOSE BY METER - Abnormal    GLUCOSE BY METER POCT 419 (*)    LACTIC ACID WHOLE BLOOD - Abnormal    Lactic Acid 2.1 (*)    DIFFERENTIAL - Abnormal    % Neutrophils 87      % Lymphocytes 7      % Monocytes 5      % Eosinophils 0      % Basophils 0      % Metamyelocytes 1      Absolute Neutrophils 20.4 (*)     Absolute Lymphocytes 1.6      Absolute Monocytes 1.2      Absolute Eosinophils 0.0      Absolute Basophils 0.0      Absolute Metamyelocytes 0.2 (*)     RBC Morphology Confirmed RBC Indices      Platelet Assessment        Value: Automated Count Confirmed. Platelet morphology is normal.   ERYTHROCYTE SEDIMENTATION RATE AUTO - Abnormal    Erythrocyte Sedimentation Rate 84 (*)    CRP INFLAMMATION - Abnormal    CRP Inflammation 349.70 (*)    LIPASE - Normal    Lipase 18     LACTIC ACID WHOLE BLOOD - Normal    Lactic Acid 1.3     BLOOD CULTURE   BLOOD CULTURE   AEROBIC BACTERIAL CULTURE ROUTINE         RADIOLOGY    MR Foot Right w/o Contrast   Final Result   IMPRESSION:   1.  Postop changes of a transmetatarsal amputation of the first through fifth rays at the level of the proximal portions of the metatarsal shafts with findings worrisome for multifocal osteomyelitis involving the metatarsal stumps as well as likely the    cuboid and medial cuneiform.      2.  Moderate tenosynovitis of the  tibialis anterior tendon.            Daniela Ledezma MD  [facility] EMERGENCY DEPARTMENT  [address] [place] [id number] [phone]       Daniela Ledezma MD  08/12/23 0255

## 2023-08-12 NOTE — PHARMACY-ADMISSION MEDICATION HISTORY
Pharmacist Admission Medication History    Admission medication history is complete. The information provided in this note is only as accurate as the sources available at the time of the update.    Medication reconciliation/reorder completed by provider prior to medication history? No    Information Source(s): Patient, Clinic records, and CareEverywhere/SureScripts via in-person    Pertinent Information: Patient states he injects insulin when he feels like he needs it, states he injects insulin about 3 times per week, does not check blood sugar regularly. States he does not take any other medications besides tylenol as needed.     Changes made to PTA medication list:  Added: None  Deleted: arginine/glutamine/calcium, ceftriaxone, lantus, metronidazole, senna  Changed: insulin changed instructions to 0-6 units subQ 3 times daily with meals.     Medication Affordability:  Not including over the counter (OTC) medications, was there a time in the past 3 months when you did not take your medications as prescribed because of cost?: No    Allergies reviewed with patient and updates made in EHR: yes    Medication History Completed By: YAMILE PELAEZ RPH 8/11/2023 7:23 PM    Prior to Admission medications    Medication Sig Last Dose Taking? Auth Provider Long Term End Date   acetaminophen (TYLENOL) 325 MG tablet Take 2 tablets by mouth every 4 hours as needed for mild pain More than a month at PRN Yes Provider, Historical     insulin aspart U-100 (NOVOLOG) 100 unit/mL (3 mL) injection pen Inject 0-6 Units Subcutaneous 3 times daily (with meals) 8/11/2023 at PM Yes Provider, Historical Yes

## 2023-08-13 ENCOUNTER — ANESTHESIA EVENT (OUTPATIENT)
Dept: SURGERY | Facility: HOSPITAL | Age: 68
DRG: 854 | End: 2023-08-13
Payer: COMMERCIAL

## 2023-08-13 ENCOUNTER — APPOINTMENT (OUTPATIENT)
Dept: ULTRASOUND IMAGING | Facility: HOSPITAL | Age: 68
DRG: 854 | End: 2023-08-13
Attending: INTERNAL MEDICINE
Payer: COMMERCIAL

## 2023-08-13 LAB
ALBUMIN SERPL BCG-MCNC: 2.3 G/DL (ref 3.5–5.2)
ALP SERPL-CCNC: 172 U/L (ref 40–129)
ALT SERPL W P-5'-P-CCNC: 18 U/L (ref 0–70)
ANION GAP SERPL CALCULATED.3IONS-SCNC: 11 MMOL/L (ref 7–15)
AST SERPL W P-5'-P-CCNC: 30 U/L (ref 0–45)
BILIRUB SERPL-MCNC: 1.4 MG/DL
BUN SERPL-MCNC: 36.3 MG/DL (ref 8–23)
CALCIUM SERPL-MCNC: 8.1 MG/DL (ref 8.8–10.2)
CHLORIDE SERPL-SCNC: 100 MMOL/L (ref 98–107)
CREAT SERPL-MCNC: 2.47 MG/DL (ref 0.67–1.17)
CRP SERPL-MCNC: 344.6 MG/L
DEPRECATED HCO3 PLAS-SCNC: 21 MMOL/L (ref 22–29)
ERYTHROCYTE [DISTWIDTH] IN BLOOD BY AUTOMATED COUNT: 14 % (ref 10–15)
GFR SERPL CREATININE-BSD FRML MDRD: 28 ML/MIN/1.73M2
GLUCOSE BLDC GLUCOMTR-MCNC: 159 MG/DL (ref 70–99)
GLUCOSE BLDC GLUCOMTR-MCNC: 177 MG/DL (ref 70–99)
GLUCOSE BLDC GLUCOMTR-MCNC: 177 MG/DL (ref 70–99)
GLUCOSE BLDC GLUCOMTR-MCNC: 183 MG/DL (ref 70–99)
GLUCOSE BLDC GLUCOMTR-MCNC: 254 MG/DL (ref 70–99)
GLUCOSE BLDC GLUCOMTR-MCNC: 263 MG/DL (ref 70–99)
GLUCOSE SERPL-MCNC: 208 MG/DL (ref 70–99)
HCT VFR BLD AUTO: 26.4 % (ref 40–53)
HGB BLD-MCNC: 8.6 G/DL (ref 13.3–17.7)
LACTATE SERPL-SCNC: 0.9 MMOL/L (ref 0.7–2)
MCH RBC QN AUTO: 27.6 PG (ref 26.5–33)
MCHC RBC AUTO-ENTMCNC: 32.6 G/DL (ref 31.5–36.5)
MCV RBC AUTO: 85 FL (ref 78–100)
PLATELET # BLD AUTO: 375 10E3/UL (ref 150–450)
POTASSIUM SERPL-SCNC: 3.5 MMOL/L (ref 3.4–5.3)
PROT SERPL-MCNC: 6.4 G/DL (ref 6.4–8.3)
RBC # BLD AUTO: 3.12 10E6/UL (ref 4.4–5.9)
SODIUM SERPL-SCNC: 132 MMOL/L (ref 136–145)
WBC # BLD AUTO: 17.5 10E3/UL (ref 4–11)

## 2023-08-13 PROCEDURE — 250N000013 HC RX MED GY IP 250 OP 250 PS 637: Performed by: STUDENT IN AN ORGANIZED HEALTH CARE EDUCATION/TRAINING PROGRAM

## 2023-08-13 PROCEDURE — 36415 COLL VENOUS BLD VENIPUNCTURE: CPT | Performed by: INTERNAL MEDICINE

## 2023-08-13 PROCEDURE — 93005 ELECTROCARDIOGRAM TRACING: CPT

## 2023-08-13 PROCEDURE — 87070 CULTURE OTHR SPECIMN AEROBIC: CPT | Performed by: STUDENT IN AN ORGANIZED HEALTH CARE EDUCATION/TRAINING PROGRAM

## 2023-08-13 PROCEDURE — 258N000003 HC RX IP 258 OP 636: Performed by: INTERNAL MEDICINE

## 2023-08-13 PROCEDURE — 76770 US EXAM ABDO BACK WALL COMP: CPT

## 2023-08-13 PROCEDURE — 250N000013 HC RX MED GY IP 250 OP 250 PS 637: Performed by: INTERNAL MEDICINE

## 2023-08-13 PROCEDURE — 99232 SBSQ HOSP IP/OBS MODERATE 35: CPT | Performed by: INTERNAL MEDICINE

## 2023-08-13 PROCEDURE — 85027 COMPLETE CBC AUTOMATED: CPT | Performed by: INTERNAL MEDICINE

## 2023-08-13 PROCEDURE — 93010 ELECTROCARDIOGRAM REPORT: CPT | Performed by: INTERNAL MEDICINE

## 2023-08-13 PROCEDURE — 83605 ASSAY OF LACTIC ACID: CPT | Performed by: INTERNAL MEDICINE

## 2023-08-13 PROCEDURE — 120N000001 HC R&B MED SURG/OB

## 2023-08-13 PROCEDURE — 99223 1ST HOSP IP/OBS HIGH 75: CPT | Mod: 57 | Performed by: PODIATRIST

## 2023-08-13 PROCEDURE — 250N000011 HC RX IP 250 OP 636: Mod: JZ | Performed by: INTERNAL MEDICINE

## 2023-08-13 PROCEDURE — 80051 ELECTROLYTE PANEL: CPT | Performed by: INTERNAL MEDICINE

## 2023-08-13 PROCEDURE — 250N000011 HC RX IP 250 OP 636: Mod: JZ | Performed by: STUDENT IN AN ORGANIZED HEALTH CARE EDUCATION/TRAINING PROGRAM

## 2023-08-13 PROCEDURE — 93005 ELECTROCARDIOGRAM TRACING: CPT | Performed by: PODIATRIST

## 2023-08-13 PROCEDURE — 86140 C-REACTIVE PROTEIN: CPT | Performed by: INTERNAL MEDICINE

## 2023-08-13 RX ORDER — LACTOBACILLUS RHAMNOSUS GG 10B CELL
1 CAPSULE ORAL 2 TIMES DAILY WITH MEALS
Status: DISCONTINUED | OUTPATIENT
Start: 2023-08-13 | End: 2023-08-15

## 2023-08-13 RX ADMIN — HEPARIN SODIUM 5000 UNITS: 10000 INJECTION, SOLUTION INTRAVENOUS; SUBCUTANEOUS at 07:50

## 2023-08-13 RX ADMIN — INSULIN ASPART 2 UNITS: 100 INJECTION, SOLUTION INTRAVENOUS; SUBCUTANEOUS at 20:27

## 2023-08-13 RX ADMIN — HEPARIN SODIUM 5000 UNITS: 10000 INJECTION, SOLUTION INTRAVENOUS; SUBCUTANEOUS at 20:28

## 2023-08-13 RX ADMIN — SODIUM CHLORIDE: 9 INJECTION, SOLUTION INTRAVENOUS at 20:31

## 2023-08-13 RX ADMIN — INSULIN GLARGINE 15 UNITS: 100 INJECTION, SOLUTION SUBCUTANEOUS at 07:49

## 2023-08-13 RX ADMIN — Medication 1 CAPSULE: at 16:36

## 2023-08-13 RX ADMIN — PIPERACILLIN AND TAZOBACTAM 3.38 G: 3; .375 INJECTION, POWDER, LYOPHILIZED, FOR SOLUTION INTRAVENOUS at 00:59

## 2023-08-13 RX ADMIN — DEXTROSE MONOHYDRATE 900 MG: 50 INJECTION, SOLUTION INTRAVENOUS at 00:46

## 2023-08-13 RX ADMIN — DEXTROSE MONOHYDRATE 900 MG: 50 INJECTION, SOLUTION INTRAVENOUS at 16:31

## 2023-08-13 RX ADMIN — DEXTROSE MONOHYDRATE 900 MG: 50 INJECTION, SOLUTION INTRAVENOUS at 23:50

## 2023-08-13 RX ADMIN — PIPERACILLIN AND TAZOBACTAM 3.38 G: 3; .375 INJECTION, POWDER, LYOPHILIZED, FOR SOLUTION INTRAVENOUS at 07:49

## 2023-08-13 RX ADMIN — Medication 1 CAPSULE: at 10:25

## 2023-08-13 RX ADMIN — INSULIN ASPART 1 UNITS: 100 INJECTION, SOLUTION INTRAVENOUS; SUBCUTANEOUS at 04:13

## 2023-08-13 RX ADMIN — DEXTROSE MONOHYDRATE 900 MG: 50 INJECTION, SOLUTION INTRAVENOUS at 07:50

## 2023-08-13 RX ADMIN — SODIUM CHLORIDE: 9 INJECTION, SOLUTION INTRAVENOUS at 09:16

## 2023-08-13 RX ADMIN — ACETAMINOPHEN 650 MG: 325 TABLET ORAL at 03:59

## 2023-08-13 RX ADMIN — PIPERACILLIN AND TAZOBACTAM 3.38 G: 3; .375 INJECTION, POWDER, LYOPHILIZED, FOR SOLUTION INTRAVENOUS at 16:36

## 2023-08-13 RX ADMIN — INSULIN ASPART 1 UNITS: 100 INJECTION, SOLUTION INTRAVENOUS; SUBCUTANEOUS at 00:38

## 2023-08-13 RX ADMIN — INSULIN ASPART 1 UNITS: 100 INJECTION, SOLUTION INTRAVENOUS; SUBCUTANEOUS at 12:04

## 2023-08-13 RX ADMIN — INSULIN ASPART 1 UNITS: 100 INJECTION, SOLUTION INTRAVENOUS; SUBCUTANEOUS at 07:49

## 2023-08-13 RX ADMIN — INSULIN ASPART 2 UNITS: 100 INJECTION, SOLUTION INTRAVENOUS; SUBCUTANEOUS at 16:48

## 2023-08-13 ASSESSMENT — ACTIVITIES OF DAILY LIVING (ADL)
ADLS_ACUITY_SCORE: 31
ADLS_ACUITY_SCORE: 35
ADLS_ACUITY_SCORE: 31

## 2023-08-13 NOTE — PROGRESS NOTES
"Infectious Diseases Progress Note  [facility]    Date of visit: 08/13/2023     ASSESSMENT   68-year-old man with a history of diabetes, hypertension.  Known to me from previous clinic visits for nonhealing wounds following right-sided TMA.  Lost to follow-up.    Right foot infection.  History of right TMA, poorly healed wounds, with poor follow-up with podiatry.  Now with surrounding cellulitis in the foot and lower leg.  MRI showing multifocal osteomyelitis.  Signs of systemic illness with fevers and bacteremia   Sepsis. Presenting with fever, leukocytosis, elevated creatinine, elevated lactic acid and CRP.  Positive blood cultures  Strep pyogenes bacteremia.  2 of 2 blood cultures on admission with gram-positive cocci in pairs and chains.  Growing Strep pyogenes   Poor medical adherence.  Lost to follow-up following several visits for poorly healing TMA wounds      Principal Problem:    Cellulitis of right foot  Active Problems:    Hyperglycemia    Diabetic ulcer of right foot associated with type 2 diabetes mellitus, with other ulcer severity, unspecified part of foot (H)       PLAN   -continue clindamycin IV  -Continue Zosyn  -Podiatry consult - plan for surgery tomorrow; may ultimately need BKA      Roberto Pederson MD  [facility]  Direct messaging: [employer] Paging  On-Call ID provider: [phone], option: [phone]      ===========================================    SUBJECTIVE / INTERVAL HISTORY:     No events. Plan for surgery tomorrow. No complaints today        Antibiotics   Zosyn 8/11-  Clindamycin 8/12-    Previous:  Vancomycin 8/11      Physical Exam     Temp:  [98  F (36.7  C)-102.9  F (39.4  C)] 98.6  F (37  C)  Pulse:  [] 93  Resp:  [17-18] 17  BP: (117-164)/(57-72) 146/72  SpO2:  [94 %-98 %] 98 %    BP (!) 146/72 (BP Location: Right arm)   Pulse 93   Temp 98.6  F (37  C) (Oral)   Resp 17   Ht 1.803 m (5' 11\")   Wt 67 kg (147 lb 11.3 oz)   SpO2 98%   BMI 20.60 " kg/m      GENERAL: Thin man, lying in bed in no acute distress.   HENT:  Head is normocephalic, atraumatic. Oropharynx is moist without exudates or ulcers.  Many missing teeth  EYES:  Eyes have anicteric sclerae without conjunctival injection.   LUNGS:  normal resp pattern  CARDIOVASCULAR:  Regular rate  EXT: Extremities warm and without edema.  SKIN:  No acute rashes.  Right foot ulcers with surrounding erythema extending up the lower leg, see pictures below from the ER. No change today   NEUROLOGIC:  Grossly nonfocal.  Psych: General indifference to conversation        Cultures   8/11 blood culture: 2 of 2 with strep pyogenes  8/12 blood cultures x2: no growth to date         Pertinent Labs:     Recent Labs   Lab 08/13/23  0520 08/12/23  0517 08/11/23  1806   WBC 17.5* 20.7* 23.4*   HGB 8.6* 9.5* 11.1*    363 444       Recent Labs   Lab 08/13/23  0520 08/12/23  0517 08/11/23  1806   * 131* 127*   CO2 21* 21* 24   BUN 36.3* 30.3* 28.5*   ALBUMIN 2.3* 2.8* 3.5   ALKPHOS 172* 203* 261*   ALT 18 21 28   AST 30 39 40       Recent Labs   Lab 08/11/23  1806   SED 84*           Imaging:     US Renal Complete Non-Vascular    Result Date: 8/13/2023  EXAM: US RENAL COMPLETE NON-VASCULAR LOCATION: [facility] DATE: 8/13/2023 INDICATION: renal failure COMPARISON: None. TECHNIQUE: Routine Bilateral Renal and Bladder Ultrasound. FINDINGS: RIGHT KIDNEY: 10.0 cm. Normal without hydronephrosis or masses. LEFT KIDNEY: 8.9 cm. No hydronephrosis. 1.9 cm simple cyst. No follow-up needed for this. BLADDER: Moderately distended bladder.     IMPRESSION: 1.  No significant findings in the kidneys. 2.  Moderately distended bladder.    MR Foot Right w/o Contrast    Result Date: 8/11/2023  EXAM: MR FOOT RIGHT W/O CONTRAST LOCATION: [facility] DATE: 8/11/2023 INDICATION: Pain. Prior surgery. Possible osteomyelitis. COMPARISON: 06/14/2023 radiographs. TECHNIQUE: Unenhanced. FINDINGS:  The exam is moderately degraded by patient motion which persisted throughout the entire study. JOINTS AND BONES: -Postop changes of a transmetatarsal amputation of the first through fifth rays at the level of the proximal portions of the metatarsal shafts. There is a soft tissue ulcer overlying the amputation site with deep extension to the bone. There is marrow edema involving all of the metatarsal stumps as well as edema in the cuboid and medial cuneiform. Findings very worrisome for multifocal osteomyelitis. No fracture or contusion. No marrow replacing lesion. Mild degenerative changes at the tibiotalar joint. TENDONS: -Moderate tenosynovitis in the tibialis anterior tendon beginning just above the level of the ankle joint. Mild tenosynovitis in the peroneal tendons without tearing. LIGAMENTS: -The ankle ligaments are grossly intact. MUSCLES AND SOFT TISSUES: -Marked atrophy of the intrinsic musculature of the foot. No discrete fluid collection to suggest an abscess. Moderate edema or cellulitis along the lateral aspect of the ankle extending into the hindfoot.     IMPRESSION: 1.  Postop changes of a transmetatarsal amputation of the first through fifth rays at the level of the proximal portions of the metatarsal shafts with findings worrisome for multifocal osteomyelitis involving the metatarsal stumps as well as likely the cuboid and medial cuneiform. 2.  Moderate tenosynovitis of the tibialis anterior tendon.        Data reviewed today: I reviewed all medications, new labs and imaging results over the last 24 hours. I personally reviewed no images or EKG's today.  The patient's care was discussed with the Patient.

## 2023-08-13 NOTE — PLAN OF CARE
Problem: Plan of Care - These are the overarching goals to be used throughout the patient stay.    Goal: Optimal Comfort and Wellbeing  Outcome: Progressing     Problem: Fever  Goal: Fever: Plan of Care  Outcome: Progressing     Problem: Infection  Goal: Absence of Infection Signs and Symptoms  Outcome: Progressing   Goal Outcome Evaluation:       BPA for sepsis; Lactic was 0.9. Blood Glucose 183 and 177; covered with I unit each time. Temp 102.9; 98.7 after Tylenol. Patient receiving antibiotics per ID. MD was updated.

## 2023-08-13 NOTE — CONSULTS
Subjective:    Pt is seen today in consult for right lower extremity infection.  Had a TMA approximately 2 to 3 years ago.  Has had a wound on the plantar distal portion of his right foot for about 2 years now.  Recently hospitalized at [facility] for osteomyelitis on 6/17/2023 he was prescribed Augmentin.  Patient states over the last few days right foot slowly got worse.  Had more swelling discomfort and redness.  He was not feeling good.  He has diabetes mellitus which is uncontrolled.  His chronic kidney disease stage III.  Patient states he smokes 1 pack of cigarettes every week or 2.    ROS:  A 10-point review of systems was performed and is positive for that noted in the HPI and as seen below.  All other areas are negative.        No Known Allergies    No current outpatient medications on file.       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)    Hyperglycemia    Diabetic ulcer of right foot associated with type 2 diabetes mellitus, with other ulcer severity, unspecified part of foot (H)       Past Medical History:   Diagnosis Date    Hypertension     Non compliance with medical treatment 5/4/2021    Stage 3b chronic kidney disease 5/4/2021    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    Social History     Tobacco Use    Smoking status: Every Day     Packs/day: 1.00     Years: 45.00     Pack years: 45.00     Types:  "Cigarettes    Smokeless tobacco: Never    Tobacco comments:     8/3/2019   Substance Use Topics    Alcohol use: Not Currently         Exam:    Vitals: BP (!) 164/63 (BP Location: Left arm)   Pulse 102   Temp 98  F (36.7  C) (Oral)   Resp 18   Ht 1.803 m (5' 11\")   Wt 67 kg (147 lb 11.3 oz)   SpO2 98%   BMI 20.60 kg/m    BMI: Body mass index is 20.6 kg/m .  Height: 5' 11\"    Constitutional/ general:  Pt is in no apparent distress, appears well-nourished.  Cooperative with history and physical exam.     Psych:  The patient answered questions appropriately.  Normal affect.  Seems to have reasonable expectations, in terms of treatment.     Eyes:  Visual scanning/ tracking without deficit.     Ears:  Response to auditory stimuli is normal.  negative hearing aid devices.  Auricles in proper alignment.     Lymphatic:  Popliteal lymph nodes not enlarged.     Lungs:  Non labored breathing, non labored speech. No cough.  No audible wheezing. Even, quiet breathing.       Vascular:  Right posterior tibial artery palpable.  Left dorsalis pedis pulse palpable.  Hair growth noted left foot.  No ankle edema on left.    Neuro:  Alert and oriented x 3.  Muscle compartments intact.  Monofilament diminished on left foot    Derm: Normal texture and turgor.  No erythema, ecchymosis, or cyanosis.      Musculoskeletal:    Left foot no obvious forefoot or rear foot deformities noted.  No breakdown in the skin.  Right TMA noted.  There is a large wound on the plantar distal portion of the right foot which easily probes down to bone.  There is a wound on the dorsal distal medial right foot.  With compression around this there is purulence.  There is no odor at this time.  There is erythema to the mid tibial region on the right.  No calf pain.    Radiographic Exam:    Narrative & Impression   EXAM: MR FOOT RIGHT W/O CONTRAST  LOCATION: [facility]  DATE: 8/11/2023     INDICATION: Pain. Prior surgery. Possible " osteomyelitis.  COMPARISON: 06/14/2023 radiographs.  TECHNIQUE: Unenhanced.     FINDINGS: The exam is moderately degraded by patient motion which persisted throughout the entire study.     JOINTS AND BONES:   -Postop changes of a transmetatarsal amputation of the first through fifth rays at the level of the proximal portions of the metatarsal shafts. There is a soft tissue ulcer overlying the amputation site with deep extension to the bone. There is marrow   edema involving all of the metatarsal stumps as well as edema in the cuboid and medial cuneiform. Findings very worrisome for multifocal osteomyelitis. No fracture or contusion. No marrow replacing lesion. Mild degenerative changes at the tibiotalar   joint.      TENDONS:   -Moderate tenosynovitis in the tibialis anterior tendon beginning just above the level of the ankle joint. Mild tenosynovitis in the peroneal tendons without tearing.     LIGAMENTS:   -The ankle ligaments are grossly intact.     MUSCLES AND SOFT TISSUES:   -Marked atrophy of the intrinsic musculature of the foot. No discrete fluid collection to suggest an abscess. Moderate edema or cellulitis along the lateral aspect of the ankle extending into the hindfoot.                                                                      IMPRESSION:  1.  Postop changes of a transmetatarsal amputation of the first through fifth rays at the level of the proximal portions of the metatarsal shafts with findings worrisome for multifocal osteomyelitis involving the metatarsal stumps as well as likely the   cuboid and medial cuneiform.     2.  Moderate tenosynovitis of the tibialis anterior tendon.      Latest Reference Range & Units Most Recent   Albumin 3.5 - 5.0 g/dL 2.3 (L)  5/26/21 06:24   (L): Data is abnormally low     Latest Reference Range & Units Most Recent   CRP Inflammation <5.00 mg/L 344.60 (H)  8/13/23 05:20   (H): Data is abnormally high     Latest Reference Range & Units Most Recent   Sed Rate  0 - 20 mm/hr 84 (H)  8/11/23 18:06   (H): Data is abnormally high     Latest Reference Range & Units Most Recent   WBC 4.0 - 11.0 10e3/uL 17.5 (H)  8/13/23 05:20   (H): Data is abnormally high         Latest Reference Range & Units Most Recent   Hemoglobin A1C <5.7 % 11.3 (H)  8/12/23 05:17   (H): Data is abnormally high     Latest Reference Range & Units Most Recent   Streptococcus pyogenes Not Detected  Detected !  8/11/23 18:28   !: Data is abnormal      A:  Diabetes mellitus uncontrolled  Right lower extremity cellulitis, osteomyelitis    P: Discussed with patient that there is purulence coming from the dorsal wound.  Discussed MRI consistent with osteomyelitis of numerous bones as well as plantar wound probing directly to bone.  Discussed the need surgery to remove all infected bone and soft tissue to resolve infection.  This would be same-day surgery with MAC anesthesia and a popliteal block.  Discussed remove all infected bone and soft tissue and leave this open.  Once infection resolves discussed with patient we will have to see if foot is salvageable or if he will need a below the knee amputation.  Discussed midtarsal joint amputation much more prone to breakdown and he would have to wear a custom brace on this foot.  Also discussed that healing open foot wounds very difficult with patient smoking and blood sugars uncontrolled.  He also has low albumin also making it difficult to heal wounds.  Encourage smoking cessation, good diet, good control of blood sugars going forward to help his healing potential.  Patient understands this is high risk surgery and complications include continued infection and need for amputation.  He is in agreement with removing all infected bone and soft tissue.  We will schedule surgery for tomorrow morning at 7:30 AM.  N.p.o. past midnight.  Thank you for allowing me participate in the care of this patient.      Jun Goyal, PARK, FACFAS

## 2023-08-13 NOTE — H&P (VIEW-ONLY)
Subjective:    Pt is seen today in consult for right lower extremity infection.  Had a TMA approximately 2 to 3 years ago.  Has had a wound on the plantar distal portion of his right foot for about 2 years now.  Recently hospitalized at [facility] for osteomyelitis on 6/17/2023 he was prescribed Augmentin.  Patient states over the last few days right foot slowly got worse.  Had more swelling discomfort and redness.  He was not feeling good.  He has diabetes mellitus which is uncontrolled.  His chronic kidney disease stage III.  Patient states he smokes 1 pack of cigarettes every week or 2.    ROS:  A 10-point review of systems was performed and is positive for that noted in the HPI and as seen below.  All other areas are negative.        No Known Allergies    No current outpatient medications on file.       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)    Hyperglycemia    Diabetic ulcer of right foot associated with type 2 diabetes mellitus, with other ulcer severity, unspecified part of foot (H)       Past Medical History:   Diagnosis Date    Hypertension     Non compliance with medical treatment 5/4/2021    Stage 3b chronic kidney disease 5/4/2021    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    Social History     Tobacco Use    Smoking status: Every Day     Packs/day: 1.00     Years: 45.00     Pack years: 45.00     Types:  "Cigarettes    Smokeless tobacco: Never    Tobacco comments:     8/3/2019   Substance Use Topics    Alcohol use: Not Currently         Exam:    Vitals: BP (!) 164/63 (BP Location: Left arm)   Pulse 102   Temp 98  F (36.7  C) (Oral)   Resp 18   Ht 1.803 m (5' 11\")   Wt 67 kg (147 lb 11.3 oz)   SpO2 98%   BMI 20.60 kg/m    BMI: Body mass index is 20.6 kg/m .  Height: 5' 11\"    Constitutional/ general:  Pt is in no apparent distress, appears well-nourished.  Cooperative with history and physical exam.     Psych:  The patient answered questions appropriately.  Normal affect.  Seems to have reasonable expectations, in terms of treatment.     Eyes:  Visual scanning/ tracking without deficit.     Ears:  Response to auditory stimuli is normal.  negative hearing aid devices.  Auricles in proper alignment.     Lymphatic:  Popliteal lymph nodes not enlarged.     Lungs:  Non labored breathing, non labored speech. No cough.  No audible wheezing. Even, quiet breathing.       Vascular:  Right posterior tibial artery palpable.  Left dorsalis pedis pulse palpable.  Hair growth noted left foot.  No ankle edema on left.    Neuro:  Alert and oriented x 3.  Muscle compartments intact.  Monofilament diminished on left foot    Derm: Normal texture and turgor.  No erythema, ecchymosis, or cyanosis.      Musculoskeletal:    Left foot no obvious forefoot or rear foot deformities noted.  No breakdown in the skin.  Right TMA noted.  There is a large wound on the plantar distal portion of the right foot which easily probes down to bone.  There is a wound on the dorsal distal medial right foot.  With compression around this there is purulence.  There is no odor at this time.  There is erythema to the mid tibial region on the right.  No calf pain.    Radiographic Exam:    Narrative & Impression   EXAM: MR FOOT RIGHT W/O CONTRAST  LOCATION: [facility]  DATE: 8/11/2023     INDICATION: Pain. Prior surgery. Possible " osteomyelitis.  COMPARISON: 06/14/2023 radiographs.  TECHNIQUE: Unenhanced.     FINDINGS: The exam is moderately degraded by patient motion which persisted throughout the entire study.     JOINTS AND BONES:   -Postop changes of a transmetatarsal amputation of the first through fifth rays at the level of the proximal portions of the metatarsal shafts. There is a soft tissue ulcer overlying the amputation site with deep extension to the bone. There is marrow   edema involving all of the metatarsal stumps as well as edema in the cuboid and medial cuneiform. Findings very worrisome for multifocal osteomyelitis. No fracture or contusion. No marrow replacing lesion. Mild degenerative changes at the tibiotalar   joint.      TENDONS:   -Moderate tenosynovitis in the tibialis anterior tendon beginning just above the level of the ankle joint. Mild tenosynovitis in the peroneal tendons without tearing.     LIGAMENTS:   -The ankle ligaments are grossly intact.     MUSCLES AND SOFT TISSUES:   -Marked atrophy of the intrinsic musculature of the foot. No discrete fluid collection to suggest an abscess. Moderate edema or cellulitis along the lateral aspect of the ankle extending into the hindfoot.                                                                      IMPRESSION:  1.  Postop changes of a transmetatarsal amputation of the first through fifth rays at the level of the proximal portions of the metatarsal shafts with findings worrisome for multifocal osteomyelitis involving the metatarsal stumps as well as likely the   cuboid and medial cuneiform.     2.  Moderate tenosynovitis of the tibialis anterior tendon.      Latest Reference Range & Units Most Recent   Albumin 3.5 - 5.0 g/dL 2.3 (L)  5/26/21 06:24   (L): Data is abnormally low     Latest Reference Range & Units Most Recent   CRP Inflammation <5.00 mg/L 344.60 (H)  8/13/23 05:20   (H): Data is abnormally high     Latest Reference Range & Units Most Recent   Sed Rate  0 - 20 mm/hr 84 (H)  8/11/23 18:06   (H): Data is abnormally high     Latest Reference Range & Units Most Recent   WBC 4.0 - 11.0 10e3/uL 17.5 (H)  8/13/23 05:20   (H): Data is abnormally high         Latest Reference Range & Units Most Recent   Hemoglobin A1C <5.7 % 11.3 (H)  8/12/23 05:17   (H): Data is abnormally high     Latest Reference Range & Units Most Recent   Streptococcus pyogenes Not Detected  Detected !  8/11/23 18:28   !: Data is abnormal      A:  Diabetes mellitus uncontrolled  Right lower extremity cellulitis, osteomyelitis    P: Discussed with patient that there is purulence coming from the dorsal wound.  Discussed MRI consistent with osteomyelitis of numerous bones as well as plantar wound probing directly to bone.  Discussed the need surgery to remove all infected bone and soft tissue to resolve infection.  This would be same-day surgery with MAC anesthesia and a popliteal block.  Discussed remove all infected bone and soft tissue and leave this open.  Once infection resolves discussed with patient we will have to see if foot is salvageable or if he will need a below the knee amputation.  Discussed midtarsal joint amputation much more prone to breakdown and he would have to wear a custom brace on this foot.  Also discussed that healing open foot wounds very difficult with patient smoking and blood sugars uncontrolled.  He also has low albumin also making it difficult to heal wounds.  Encourage smoking cessation, good diet, good control of blood sugars going forward to help his healing potential.  Patient understands this is high risk surgery and complications include continued infection and need for amputation.  He is in agreement with removing all infected bone and soft tissue.  We will schedule surgery for tomorrow morning at 7:30 AM.  N.p.o. past midnight.  Thank you for allowing me participate in the care of this patient.      Jun Goyal, PARK, FACFAS

## 2023-08-13 NOTE — PLAN OF CARE
"  Problem: Plan of Care - These are the overarching goals to be used throughout the patient stay.    Goal: Plan of Care Review  Description: The Plan of Care Review/Shift note should be completed every shift.  The Outcome Evaluation is a brief statement about your assessment that the patient is improving, declining, or no change.  This information will be displayed automatically on your shift note.  Outcome: Progressing  Flowsheets (Taken 8/13/2023 1036)  Plan of Care Reviewed With: patient  Goal: Patient-Specific Goal (Individualized)  Description: You can add care plan individualizations to a care plan. Examples of Individualization might be:  \"Parent requests to be called daily at 9am for status\", \"I have a hard time hearing out of my right ear\", or \"Do not touch me to wake me up as it startles me\".  Outcome: Progressing  Goal: Absence of Hospital-Acquired Illness or Injury  Outcome: Progressing  Goal: Optimal Comfort and Wellbeing  Outcome: Progressing  Goal: Readiness for Transition of Care  Outcome: Progressing   Goal Outcome Evaluation:      Plan of Care Reviewed With: patient    Patient continues to deny pain.  He has had one loose incontinent brown stool this morning.  He reports no appetite and did not want to order or eat any food.  Dr. Mooney ordered Lactobacillus to help with the loose stools and dietary supplements to try to encourage more caloric intake to help with his infection.  Podiatry did not see the patient yesterday because they were unaware of the consult that was ordered Friday evening but they plan to see him today.    IVF rate has changed from NS @100ml/hr to 75ml/hr.  Patient remains quiet and sleepy but arouses easily to voice and answers all questions appropriately and used the call light for the first time to alert staff he needed to urinate.  His amputation site on the right was re-dressed by night shift nurse and remains in place for podiatry to see.  Blood sugars are improving with " the addition of Lantus.  Blood sugar this am was 177.

## 2023-08-13 NOTE — PLAN OF CARE
Problem: Plan of Care - These are the overarching goals to be used throughout the patient stay.    Goal: Absence of Hospital-Acquired Illness or Injury  Intervention: Prevent Skin Injury  Recent Flowsheet Documentation  Taken 8/13/2023 1600 by Chavo Carlson RN  Body Position:   position changed independently   supine, head elevated     Problem: Fever  Goal: Fever: Plan of Care  Outcome: Progressing   Goal Outcome Evaluation:    Major Shift Events:  Pt continues to drowsy and sleeping, but easily arouseable. A/Ox4, follows commands, and calls appropriately. Denies pain.    Plan: Surgery tomorrow at 0740, NPO at MN.     For vital signs and complete assessments, please see documentation flowsheets.

## 2023-08-13 NOTE — PROGRESS NOTES
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     Right foot diabetic ulceration/osteomyelitis with bacteremia  - Recent past hospitalisation of 6/17/23 at [facility] for osteomyelitis, with prescribed course of augmentin. XR foot from that time is suggestive of periosteal elevation. Foot wound not purulent draining, but given changes in baseline mentation, highly suspect for unresolved osteomyelitis in setting of sepsis. Sepsis by source (R foot osteomyelitis, SIRS 4/4), no overt end organ damage otherwise to suggest septic shock.  -telemetry  - MRI foot shows Postop changes of a transmetatarsal amputation of the first through fifth rays at the level of the proximal portions of the metatarsal shafts with findings worrisome for multifocal osteomyelitis involving the metatarsal stumps as well as likely the   cuboid and medial cuneiform.  - Positive blood culture for gram-positive cocci in pairs and clusters  - Repeat blood culture  - wound culture trend  - ID consult, appreciate input  - Pending podiatry consult    - Still spiking fever   - Started on vancomycin and Zosyn initially   - Switch vanco to clindamycin as per ID, continue zosyn      Hyponatremia  - Corrected for hyperglycemia 133.  - NS bolus given per sepsis protocols  - Improving   - Continue IV fluid  - Continue to monitor sodium level     type II DM with arthropathy  - poorly controled   - sliding scale insulin  - Elevated hemoglobin A1c at 11.3  - Start lantus   - Diabetic educator consult   -Continue to monitor blood glucose     Acute kidney injury  - Likely related to sepsis, suspected recent reduced oral intake.  - suspect base line CKD  - NS IVF given  - BMP recheck/trend daily  - Avoid nephrotoxic  drugs  - check renal US    Leucocytosis   - Secondary to the above   - Improving but still elevated   - Continue to monitor CBC     Diarrhea   - probably secondary to antibiotics   - No abdominal pain   - Add lactobacillus      40 MINUTES SPENT BY ME on the date of service doing chart review, history, exam, documentation & further activities per the note    Principal Problem:    Cellulitis of right foot  Active Problems:    Hyperglycemia    Diabetic ulcer of right foot associated with type 2 diabetes mellitus, with other ulcer severity, unspecified part of foot (H)      VTE prophylaxis:  Heparin SQ  DIET: Orders Placed This Encounter      Moderate Consistent Carb (60 g CHO per Meal) Diet      Disposition/Barriers to discharge: IV antibiotics , podiatry consult   Code Status: No CPR- Do NOT Intubate    Subjective:  Dario is feeling slightly better , had loose stools overnight , still spiking fever     PHYSICAL EXAM  Vitals:    08/11/23 1747   Weight: 67 kg (147 lb 11.3 oz)     B/P:117/57 T:98.8 P:93 R:18     Intake/Output Summary (Last 24 hours) at 8/13/2023 0953  Last data filed at 8/13/2023 0950  Gross per 24 hour   Intake 2711 ml   Output 750 ml   Net 1961 ml      Body mass index is 20.6 kg/m .    Constitutional: awake, alert, cooperative, no apparent distress, and appears stated age  Respiratory: No increased work of breathing, good air exchange, clear to auscultation bilaterally, no crackles or wheezing  Cardiovascular: Normal apical impulse, regular rate and rhythm, normal S1 and S2, no S3 or S4, and no murmur noted  GI: No scars, normal bowel sounds, soft, non-distended, non-tender, no masses palpated, no hepatosplenomegally  Skin: no bruising or bleeding and normal skin color, texture, turgor  Musculoskeletal: Diffuse erythema of the right midfoot with forefoot with amputation   Neurologic: Awake, alert, oriented to name, place and time.  Cranial nerves II-XII are grossly intact.  Motor is 5 out of 5  bilaterally.   Sensory is intact.    Neuropsychiatric: Appropriate with examiner      PERTINENT LABS/IMAGING:    I have personally reviewed the following data over the past 24 hrs:    17.5 (H)  \   8.6 (L)   / 375     132 (L) 100 36.3 (H) /  177 (H)   3.5 21 (L) 2.47 (H) \     ALT: 18 AST: 30 AP: 172 (H) TBILI: 1.4 (H)   ALB: 2.3 (L) TOT PROTEIN: 6.4 LIPASE: N/A     TSH: N/A T4: N/A A1C: N/A     Procal: N/A CRP: 344.60 (H) Lactic Acid: 0.9         Imaging results reviewed over the past 24 hrs:   No results found for this or any previous visit (from the past 24 hour(s)).    Discussed with patient, family, nursing staff and discharge planner    Jack Mooney MD  [facility] Medicine Service  [phone]

## 2023-08-13 NOTE — PLAN OF CARE
"  Problem: Plan of Care - These are the overarching goals to be used throughout the patient stay.    Goal: Plan of Care Review  Description: The Plan of Care Review/Shift note should be completed every shift.  The Outcome Evaluation is a brief statement about your assessment that the patient is improving, declining, or no change.  This information will be displayed automatically on your shift note.  Outcome: Progressing  Goal: Patient-Specific Goal (Individualized)  Description: You can add care plan individualizations to a care plan. Examples of Individualization might be:  \"Parent requests to be called daily at 9am for status\", \"I have a hard time hearing out of my right ear\", or \"Do not touch me to wake me up as it startles me\".  Outcome: Progressing  Goal: Absence of Hospital-Acquired Illness or Injury  Outcome: Progressing  Intervention: Identify and Manage Fall Risk  Recent Flowsheet Documentation  Taken 8/12/2023 1600 by Ravi Ashley RN  Safety Promotion/Fall Prevention:   activity supervised   assistive device/personal items within reach   increase visualization of patient   lighting adjusted   mobility aid in reach  Goal: Optimal Comfort and Wellbeing  Outcome: Progressing  Goal: Readiness for Transition of Care  Outcome: Progressing     Problem: Hyperglycemia  Goal: Blood Glucose Level Within Targeted Range  Outcome: Progressing     Problem: Fever  Goal: Fever: Plan of Care  Outcome: Progressing     Problem: Infection  Goal: Absence of Infection Signs and Symptoms  Outcome: Progressing     Goal Outcome Evaluation:      Plan of Care Reviewed With: patient    Pt is oriented x 4 but continues to be extremely lethargic, rousing to voice. Pt has been febrile & tachycardic this shift with rates consistently between 100-120. Currently on RA and saturating well. Pressures stable. Pt continues to be incontinent of large loose stools with significant lack of appetite - only ate a few bites of dinner. Pt denies " presence of any pain or dyspnea, maintained on bedrest due to NWB status & repositioned independently. NS continues infusing at 100 ml/hr. Potassium scheduled for recheck tomorrow AM. Family updated regarding pt status by RN. Will continue to monitor.     Ravi Ashley RN on 8/12/2023 at 10:45 PM

## 2023-08-14 ENCOUNTER — ANESTHESIA (OUTPATIENT)
Dept: SURGERY | Facility: HOSPITAL | Age: 68
DRG: 854 | End: 2023-08-14
Payer: COMMERCIAL

## 2023-08-14 ENCOUNTER — APPOINTMENT (OUTPATIENT)
Dept: CARDIOLOGY | Facility: HOSPITAL | Age: 68
DRG: 854 | End: 2023-08-14
Attending: INTERNAL MEDICINE
Payer: COMMERCIAL

## 2023-08-14 LAB
ALBUMIN SERPL BCG-MCNC: 2.4 G/DL (ref 3.5–5.2)
ALP SERPL-CCNC: 196 U/L (ref 40–129)
ALT SERPL W P-5'-P-CCNC: 19 U/L (ref 0–70)
ANION GAP SERPL CALCULATED.3IONS-SCNC: 11 MMOL/L (ref 7–15)
AST SERPL W P-5'-P-CCNC: 32 U/L (ref 0–45)
ATRIAL RATE - MUSE: 97 BPM
BACTERIA BLD CULT: ABNORMAL
BACTERIA BLD CULT: ABNORMAL
BILIRUB SERPL-MCNC: 1.3 MG/DL
BUN SERPL-MCNC: 32.7 MG/DL (ref 8–23)
CALCIUM SERPL-MCNC: 8.6 MG/DL (ref 8.8–10.2)
CHLORIDE SERPL-SCNC: 102 MMOL/L (ref 98–107)
CREAT SERPL-MCNC: 2.22 MG/DL (ref 0.67–1.17)
CRP SERPL-MCNC: 238.2 MG/L
DEPRECATED HCO3 PLAS-SCNC: 21 MMOL/L (ref 22–29)
DIASTOLIC BLOOD PRESSURE - MUSE: NORMAL MMHG
ERYTHROCYTE [DISTWIDTH] IN BLOOD BY AUTOMATED COUNT: 14.1 % (ref 10–15)
GFR SERPL CREATININE-BSD FRML MDRD: 32 ML/MIN/1.73M2
GLUCOSE BLDC GLUCOMTR-MCNC: 132 MG/DL (ref 70–99)
GLUCOSE BLDC GLUCOMTR-MCNC: 144 MG/DL (ref 70–99)
GLUCOSE BLDC GLUCOMTR-MCNC: 169 MG/DL (ref 70–99)
GLUCOSE BLDC GLUCOMTR-MCNC: 207 MG/DL (ref 70–99)
GLUCOSE BLDC GLUCOMTR-MCNC: 237 MG/DL (ref 70–99)
GLUCOSE BLDC GLUCOMTR-MCNC: 266 MG/DL (ref 70–99)
GLUCOSE BLDC GLUCOMTR-MCNC: 335 MG/DL (ref 70–99)
GLUCOSE SERPL-MCNC: 149 MG/DL (ref 70–99)
HCT VFR BLD AUTO: 27.1 % (ref 40–53)
HGB BLD-MCNC: 8.9 G/DL (ref 13.3–17.7)
INTERPRETATION ECG - MUSE: NORMAL
LVEF ECHO: NORMAL
MCH RBC QN AUTO: 27.6 PG (ref 26.5–33)
MCHC RBC AUTO-ENTMCNC: 32.8 G/DL (ref 31.5–36.5)
MCV RBC AUTO: 84 FL (ref 78–100)
P AXIS - MUSE: 58 DEGREES
PLATELET # BLD AUTO: 408 10E3/UL (ref 150–450)
POTASSIUM SERPL-SCNC: 3.4 MMOL/L (ref 3.4–5.3)
PR INTERVAL - MUSE: 122 MS
PROT SERPL-MCNC: 6.6 G/DL (ref 6.4–8.3)
QRS DURATION - MUSE: 94 MS
QT - MUSE: 346 MS
QTC - MUSE: 439 MS
R AXIS - MUSE: 60 DEGREES
RBC # BLD AUTO: 3.22 10E6/UL (ref 4.4–5.9)
SODIUM SERPL-SCNC: 134 MMOL/L (ref 136–145)
SYSTOLIC BLOOD PRESSURE - MUSE: NORMAL MMHG
T AXIS - MUSE: 59 DEGREES
VENTRICULAR RATE- MUSE: 97 BPM
WBC # BLD AUTO: 13.3 10E3/UL (ref 4–11)

## 2023-08-14 PROCEDURE — 250N000011 HC RX IP 250 OP 636: Mod: JZ | Performed by: INTERNAL MEDICINE

## 2023-08-14 PROCEDURE — 80053 COMPREHEN METABOLIC PANEL: CPT | Performed by: INTERNAL MEDICINE

## 2023-08-14 PROCEDURE — 28120 PART REMOVAL OF ANKLE/HEEL: CPT | Mod: RT | Performed by: PODIATRIST

## 2023-08-14 PROCEDURE — 87077 CULTURE AEROBIC IDENTIFY: CPT | Performed by: PODIATRIST

## 2023-08-14 PROCEDURE — 28122 PARTIAL REMOVAL OF FOOT BONE: CPT | Mod: 51 | Performed by: PODIATRIST

## 2023-08-14 PROCEDURE — 250N000011 HC RX IP 250 OP 636: Performed by: NURSE ANESTHETIST, CERTIFIED REGISTERED

## 2023-08-14 PROCEDURE — 120N000001 HC R&B MED SURG/OB

## 2023-08-14 PROCEDURE — 258N000003 HC RX IP 258 OP 636: Performed by: INTERNAL MEDICINE

## 2023-08-14 PROCEDURE — 258N000003 HC RX IP 258 OP 636: Performed by: NURSE ANESTHETIST, CERTIFIED REGISTERED

## 2023-08-14 PROCEDURE — 272N000001 HC OR GENERAL SUPPLY STERILE: Performed by: PODIATRIST

## 2023-08-14 PROCEDURE — 250N000011 HC RX IP 250 OP 636: Mod: JW | Performed by: ANESTHESIOLOGY

## 2023-08-14 PROCEDURE — 88311 DECALCIFY TISSUE: CPT | Mod: 26 | Performed by: PATHOLOGY

## 2023-08-14 PROCEDURE — 87102 FUNGUS ISOLATION CULTURE: CPT | Performed by: PODIATRIST

## 2023-08-14 PROCEDURE — 99232 SBSQ HOSP IP/OBS MODERATE 35: CPT | Performed by: INTERNAL MEDICINE

## 2023-08-14 PROCEDURE — 87075 CULTR BACTERIA EXCEPT BLOOD: CPT | Performed by: PODIATRIST

## 2023-08-14 PROCEDURE — 86140 C-REACTIVE PROTEIN: CPT | Performed by: INTERNAL MEDICINE

## 2023-08-14 PROCEDURE — 258N000003 HC RX IP 258 OP 636: Performed by: ANESTHESIOLOGY

## 2023-08-14 PROCEDURE — 85027 COMPLETE CBC AUTOMATED: CPT | Performed by: INTERNAL MEDICINE

## 2023-08-14 PROCEDURE — 28002 TREATMENT OF FOOT INFECTION: CPT | Mod: 51 | Performed by: PODIATRIST

## 2023-08-14 PROCEDURE — 999N000141 HC STATISTIC PRE-PROCEDURE NURSING ASSESSMENT: Performed by: PODIATRIST

## 2023-08-14 PROCEDURE — 93306 TTE W/DOPPLER COMPLETE: CPT | Mod: 26 | Performed by: INTERNAL MEDICINE

## 2023-08-14 PROCEDURE — 88311 DECALCIFY TISSUE: CPT | Mod: TC | Performed by: PODIATRIST

## 2023-08-14 PROCEDURE — 250N000011 HC RX IP 250 OP 636: Performed by: ANESTHESIOLOGY

## 2023-08-14 PROCEDURE — 999N000208 ECHOCARDIOGRAM COMPLETE

## 2023-08-14 PROCEDURE — 36415 COLL VENOUS BLD VENIPUNCTURE: CPT | Performed by: INTERNAL MEDICINE

## 2023-08-14 PROCEDURE — 255N000002 HC RX 255 OP 636: Performed by: INTERNAL MEDICINE

## 2023-08-14 PROCEDURE — 88305 TISSUE EXAM BY PATHOLOGIST: CPT | Mod: 26 | Performed by: PATHOLOGY

## 2023-08-14 PROCEDURE — 250N000013 HC RX MED GY IP 250 OP 250 PS 637: Performed by: INTERNAL MEDICINE

## 2023-08-14 PROCEDURE — 370N000017 HC ANESTHESIA TECHNICAL FEE, PER MIN: Performed by: PODIATRIST

## 2023-08-14 PROCEDURE — 87070 CULTURE OTHR SPECIMN AEROBIC: CPT | Performed by: PODIATRIST

## 2023-08-14 PROCEDURE — 0Y6M0Z0 DETACHMENT AT RIGHT FOOT, COMPLETE, OPEN APPROACH: ICD-10-PCS | Performed by: PODIATRIST

## 2023-08-14 PROCEDURE — 88305 TISSUE EXAM BY PATHOLOGIST: CPT | Mod: TC | Performed by: PODIATRIST

## 2023-08-14 PROCEDURE — 360N000075 HC SURGERY LEVEL 2, PER MIN: Performed by: PODIATRIST

## 2023-08-14 PROCEDURE — 250N000011 HC RX IP 250 OP 636: Mod: JZ | Performed by: STUDENT IN AN ORGANIZED HEALTH CARE EDUCATION/TRAINING PROGRAM

## 2023-08-14 RX ORDER — LIDOCAINE 40 MG/G
CREAM TOPICAL
Status: DISCONTINUED | OUTPATIENT
Start: 2023-08-14 | End: 2023-08-14 | Stop reason: HOSPADM

## 2023-08-14 RX ORDER — HALOPERIDOL 5 MG/ML
1 INJECTION INTRAMUSCULAR
Status: DISCONTINUED | OUTPATIENT
Start: 2023-08-14 | End: 2023-08-14 | Stop reason: HOSPADM

## 2023-08-14 RX ORDER — LACTOBACILLUS RHAMNOSUS GG 10B CELL
1 CAPSULE ORAL
Status: DISCONTINUED | OUTPATIENT
Start: 2023-08-14 | End: 2023-08-22 | Stop reason: HOSPADM

## 2023-08-14 RX ORDER — SODIUM CHLORIDE, SODIUM LACTATE, POTASSIUM CHLORIDE, CALCIUM CHLORIDE 600; 310; 30; 20 MG/100ML; MG/100ML; MG/100ML; MG/100ML
INJECTION, SOLUTION INTRAVENOUS CONTINUOUS
Status: DISCONTINUED | OUTPATIENT
Start: 2023-08-14 | End: 2023-08-14 | Stop reason: HOSPADM

## 2023-08-14 RX ORDER — FENTANYL CITRATE 50 UG/ML
25 INJECTION, SOLUTION INTRAMUSCULAR; INTRAVENOUS EVERY 5 MIN PRN
Status: DISCONTINUED | OUTPATIENT
Start: 2023-08-14 | End: 2023-08-14 | Stop reason: HOSPADM

## 2023-08-14 RX ORDER — BUPIVACAINE HYDROCHLORIDE 5 MG/ML
INJECTION, SOLUTION EPIDURAL; INTRACAUDAL
Status: COMPLETED | OUTPATIENT
Start: 2023-08-14 | End: 2023-08-14

## 2023-08-14 RX ORDER — FENTANYL CITRATE 50 UG/ML
100 INJECTION, SOLUTION INTRAMUSCULAR; INTRAVENOUS
Status: DISCONTINUED | OUTPATIENT
Start: 2023-08-14 | End: 2023-08-14 | Stop reason: HOSPADM

## 2023-08-14 RX ORDER — ONDANSETRON 2 MG/ML
4 INJECTION INTRAMUSCULAR; INTRAVENOUS EVERY 30 MIN PRN
Status: DISCONTINUED | OUTPATIENT
Start: 2023-08-14 | End: 2023-08-14 | Stop reason: HOSPADM

## 2023-08-14 RX ORDER — SODIUM CHLORIDE, SODIUM LACTATE, POTASSIUM CHLORIDE, CALCIUM CHLORIDE 600; 310; 30; 20 MG/100ML; MG/100ML; MG/100ML; MG/100ML
INJECTION, SOLUTION INTRAVENOUS CONTINUOUS PRN
Status: DISCONTINUED | OUTPATIENT
Start: 2023-08-14 | End: 2023-08-14

## 2023-08-14 RX ORDER — HYDRALAZINE HYDROCHLORIDE 20 MG/ML
10 INJECTION INTRAMUSCULAR; INTRAVENOUS EVERY 6 HOURS PRN
Status: DISCONTINUED | OUTPATIENT
Start: 2023-08-14 | End: 2023-08-22 | Stop reason: HOSPADM

## 2023-08-14 RX ORDER — FENTANYL CITRATE 50 UG/ML
50 INJECTION, SOLUTION INTRAMUSCULAR; INTRAVENOUS EVERY 5 MIN PRN
Status: DISCONTINUED | OUTPATIENT
Start: 2023-08-14 | End: 2023-08-14 | Stop reason: HOSPADM

## 2023-08-14 RX ORDER — PROPOFOL 10 MG/ML
INJECTION, EMULSION INTRAVENOUS CONTINUOUS PRN
Status: DISCONTINUED | OUTPATIENT
Start: 2023-08-14 | End: 2023-08-14

## 2023-08-14 RX ORDER — AMLODIPINE BESYLATE 2.5 MG/1
2.5 TABLET ORAL DAILY
Status: DISCONTINUED | OUTPATIENT
Start: 2023-08-14 | End: 2023-08-22 | Stop reason: HOSPADM

## 2023-08-14 RX ORDER — ONDANSETRON 4 MG/1
4 TABLET, ORALLY DISINTEGRATING ORAL EVERY 30 MIN PRN
Status: DISCONTINUED | OUTPATIENT
Start: 2023-08-14 | End: 2023-08-14 | Stop reason: HOSPADM

## 2023-08-14 RX ADMIN — BUPIVACAINE HYDROCHLORIDE 10 ML: 5 INJECTION, SOLUTION EPIDURAL; INTRACAUDAL; PERINEURAL at 06:56

## 2023-08-14 RX ADMIN — PROPOFOL 80 MCG/KG/MIN: 10 INJECTION, EMULSION INTRAVENOUS at 07:44

## 2023-08-14 RX ADMIN — PERFLUTREN 2 ML: 6.52 INJECTION, SUSPENSION INTRAVENOUS at 13:20

## 2023-08-14 RX ADMIN — DEXTROSE MONOHYDRATE 900 MG: 50 INJECTION, SOLUTION INTRAVENOUS at 16:36

## 2023-08-14 RX ADMIN — Medication 1 CAPSULE: at 13:32

## 2023-08-14 RX ADMIN — INSULIN ASPART 1 UNITS: 100 INJECTION, SOLUTION INTRAVENOUS; SUBCUTANEOUS at 04:10

## 2023-08-14 RX ADMIN — BUPIVACAINE HYDROCHLORIDE 20 ML: 5 INJECTION, SOLUTION EPIDURAL; INTRACAUDAL at 06:58

## 2023-08-14 RX ADMIN — SODIUM CHLORIDE: 9 INJECTION, SOLUTION INTRAVENOUS at 21:21

## 2023-08-14 RX ADMIN — HEPARIN SODIUM 5000 UNITS: 10000 INJECTION, SOLUTION INTRAVENOUS; SUBCUTANEOUS at 21:22

## 2023-08-14 RX ADMIN — MIDAZOLAM HYDROCHLORIDE 1 MG: 1 INJECTION, SOLUTION INTRAMUSCULAR; INTRAVENOUS at 06:55

## 2023-08-14 RX ADMIN — SODIUM CHLORIDE, POTASSIUM CHLORIDE, SODIUM LACTATE AND CALCIUM CHLORIDE: 600; 310; 30; 20 INJECTION, SOLUTION INTRAVENOUS at 06:24

## 2023-08-14 RX ADMIN — PIPERACILLIN AND TAZOBACTAM 3.38 G: 3; .375 INJECTION, POWDER, LYOPHILIZED, FOR SOLUTION INTRAVENOUS at 00:08

## 2023-08-14 RX ADMIN — SODIUM CHLORIDE, POTASSIUM CHLORIDE, SODIUM LACTATE AND CALCIUM CHLORIDE: 600; 310; 30; 20 INJECTION, SOLUTION INTRAVENOUS at 07:33

## 2023-08-14 RX ADMIN — Medication 1 CAPSULE: at 16:42

## 2023-08-14 RX ADMIN — FENTANYL CITRATE 50 MCG: 50 INJECTION, SOLUTION INTRAMUSCULAR; INTRAVENOUS at 06:55

## 2023-08-14 RX ADMIN — AMLODIPINE BESYLATE 2.5 MG: 2.5 TABLET ORAL at 13:32

## 2023-08-14 RX ADMIN — INSULIN ASPART 1 UNITS: 100 INJECTION, SOLUTION INTRAVENOUS; SUBCUTANEOUS at 00:07

## 2023-08-14 RX ADMIN — DEXTROSE MONOHYDRATE 900 MG: 50 INJECTION, SOLUTION INTRAVENOUS at 23:58

## 2023-08-14 RX ADMIN — PIPERACILLIN AND TAZOBACTAM 3.38 G: 3; .375 INJECTION, POWDER, LYOPHILIZED, FOR SOLUTION INTRAVENOUS at 16:37

## 2023-08-14 ASSESSMENT — ACTIVITIES OF DAILY LIVING (ADL)
ADLS_ACUITY_SCORE: 31

## 2023-08-14 ASSESSMENT — LIFESTYLE VARIABLES: TOBACCO_USE: 1

## 2023-08-14 NOTE — PROGRESS NOTES
"Infectious Diseases Progress Note  [facility]    Date of visit: 08/14/2023     ASSESSMENT   68-year-old man with a history of diabetes, hypertension.  Had been seen in ID clinic for nonhealing wounds following right-sided TMA.  Lost to follow-up.    Right foot infection.  History of right TMA, poorly healed wounds, with poor follow-up with podiatry.  Now with surrounding cellulitis in the foot and lower leg.  MRI showing multifocal osteomyelitis.  Signs of systemic illness with fevers and bacteremia improved. Now s/p debridement 8/14/23  Sepsis. Presenting with fever, leukocytosis, elevated creatinine, elevated lactic acid and CRP.  Improved.   Strep pyogenes bacteremia.  2 of 2 blood cultures on admission with gram-positive cocci in pairs and chains.  Growing Strep pyogenes.  Poor medical adherence.  Lost to follow-up following several visits for poorly healing TMA wounds      Principal Problem:    Cellulitis of right foot  Active Problems:    Hyperglycemia    Diabetic ulcer of right foot associated with type 2 diabetes mellitus, with other ulcer severity, unspecified part of foot (H)       PLAN   -continue clindamycin IV  -Continue Zosyn  Follow cultures and path from surgery; if failing treatment would likely require BKA.       Yoshi Jurado MD   [facility]  Direct messaging: Turn Paging  On-Call ID provider: [phone], option: 9      ===========================================    SUBJECTIVE / INTERVAL HISTORY:     Just back from surgery. Pain controlled. Tolerating antibiotics.          Antibiotics   Zosyn 8/11-  Clindamycin 8/12-    Previous:  Vancomycin 8/11      Physical Exam     Temp:  [97.6  F (36.4  C)-99.9  F (37.7  C)] 98.2  F (36.8  C)  Pulse:  [67-97] 81  Resp:  [17-22] 20  BP: (121-180)/(59-85) 180/85  SpO2:  [94 %-100 %] 98 %    BP (!) 180/85 (BP Location: Right arm)   Pulse 81   Temp 98.2  F (36.8  C) (Oral)   Resp 20   Ht 1.803 m (5' 11\")   Wt 67 kg " (147 lb 11.3 oz)   SpO2 98%   BMI 20.60 kg/m      GENERAL: Thin man, lying in bed in no acute distress.   HENT:  Head is normocephalic, atraumatic. Oropharynx is moist without exudates or ulcers.  Many missing teeth  EYES:  Eyes have anicteric sclerae without conjunctival injection.   LUNGS:  normal resp pattern  CARDIOVASCULAR:  Regular rate  EXT: Extremities warm and without edema.  SKIN:  No acute rashes.  Right foot wrapped  NEUROLOGIC:  Grossly nonfocal.  Psych: General indifference to conversation        Cultures   8/11 blood culture: 2 of 2 with strep pyogenes  8/12 blood cultures x2: no growth to date   8/14 OR gram stain pending      Pertinent Labs:     Recent Labs   Lab 08/14/23  0525 08/13/23  0520 08/12/23  0517   WBC 13.3* 17.5* 20.7*   HGB 8.9* 8.6* 9.5*    375 363       Recent Labs   Lab 08/14/23  0525 08/13/23  0520 08/12/23  0517   * 132* 131*   CO2 21* 21* 21*   BUN 32.7* 36.3* 30.3*   ALBUMIN 2.4* 2.3* 2.8*   ALKPHOS 196* 172* 203*   ALT 19 18 21   AST 32 30 39       Recent Labs   Lab 08/11/23  1806   SED 84*           Imaging:     US Renal Complete Non-Vascular    Result Date: 8/13/2023  EXAM: US RENAL COMPLETE NON-VASCULAR LOCATION: [facility] DATE: 8/13/2023 INDICATION: renal failure COMPARISON: None. TECHNIQUE: Routine Bilateral Renal and Bladder Ultrasound. FINDINGS: RIGHT KIDNEY: 10.0 cm. Normal without hydronephrosis or masses. LEFT KIDNEY: 8.9 cm. No hydronephrosis. 1.9 cm simple cyst. No follow-up needed for this. BLADDER: Moderately distended bladder.     IMPRESSION: 1.  No significant findings in the kidneys. 2.  Moderately distended bladder.    MR Foot Right w/o Contrast    Result Date: 8/11/2023  EXAM: MR FOOT RIGHT W/O CONTRAST LOCATION: [facility] DATE: 8/11/2023 INDICATION: Pain. Prior surgery. Possible osteomyelitis. COMPARISON: 06/14/2023 radiographs. TECHNIQUE: Unenhanced. FINDINGS: The exam is moderately degraded  by patient motion which persisted throughout the entire study. JOINTS AND BONES: -Postop changes of a transmetatarsal amputation of the first through fifth rays at the level of the proximal portions of the metatarsal shafts. There is a soft tissue ulcer overlying the amputation site with deep extension to the bone. There is marrow edema involving all of the metatarsal stumps as well as edema in the cuboid and medial cuneiform. Findings very worrisome for multifocal osteomyelitis. No fracture or contusion. No marrow replacing lesion. Mild degenerative changes at the tibiotalar joint. TENDONS: -Moderate tenosynovitis in the tibialis anterior tendon beginning just above the level of the ankle joint. Mild tenosynovitis in the peroneal tendons without tearing. LIGAMENTS: -The ankle ligaments are grossly intact. MUSCLES AND SOFT TISSUES: -Marked atrophy of the intrinsic musculature of the foot. No discrete fluid collection to suggest an abscess. Moderate edema or cellulitis along the lateral aspect of the ankle extending into the hindfoot.     IMPRESSION: 1.  Postop changes of a transmetatarsal amputation of the first through fifth rays at the level of the proximal portions of the metatarsal shafts with findings worrisome for multifocal osteomyelitis involving the metatarsal stumps as well as likely the cuboid and medial cuneiform. 2.  Moderate tenosynovitis of the tibialis anterior tendon.        Data reviewed today: I reviewed all medications, new labs and imaging results over the last 24 hours. I personally reviewed no images or EKG's today.  The patient's care was discussed with the Patient.

## 2023-08-14 NOTE — PLAN OF CARE
Problem: Plan of Care - These are the overarching goals to be used throughout the patient stay.    Goal: Absence of Hospital-Acquired Illness or Injury  Intervention: Identify and Manage Fall Risk  Recent Flowsheet Documentation  Taken 8/14/2023 1119 by Radha Gill RN  Safety Promotion/Fall Prevention:   activity supervised   assistive device/personal items within reach   increase visualization of patient   lighting adjusted   mobility aid in reach   Goal Outcome Evaluation:Pt alert and oriented, reported no pain, blood pressure 180/85, Dr. Mooney notified, ordered hydrazine for SBP above 180  or bp above 110, Pt pulled his iv line, picc notified for a new IV line, had a loosed bowel movement during the shift.

## 2023-08-14 NOTE — PROGRESS NOTES
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     Right foot diabetic ulceration/osteomyelitis with bacteremia  - Recent past hospitalisation of 6/17/23 at [place] for osteomyelitis, with prescribed course of augmentin. XR foot from that time is suggestive of periosteal elevation. Foot wound not purulent draining, but given changes in baseline mentation, highly suspect for unresolved osteomyelitis in setting of sepsis. Sepsis by source (R foot osteomyelitis, SIRS 4/4), no overt end organ damage otherwise to suggest septic shock.  -telemetry  - MRI foot shows Postop changes of a transmetatarsal amputation of the first through fifth rays at the level of the proximal portions of the metatarsal shafts with findings worrisome for multifocal osteomyelitis involving the metatarsal stumps as well as likely the   cuboid and medial cuneiform.  - Positive blood culture for gram-positive cocci in pairs and clusters  - Repeat blood culture  - wound culture trend  - ID consult, appreciate input  - podiatry consult, S/P right foot incision and drainage with right foot removal of metatarsal base cuneiform and cuboid on 8/14/2020  -Postoperative orders as per podiatry, nonweightbearing on right foot  - Improving fever  - Started on vancomycin and Zosyn initially   - Switch vanco to clindamycin as per ID, continue zosyn      Hyponatremia  - Corrected for hyperglycemia 133.  - NS bolus given per sepsis protocols  - Improving   - Continue IV fluid, decrease rate  - Continue to monitor sodium level     type II DM with arthropathy  - poorly controled   - sliding scale insulin  - Elevated hemoglobin A1c at 11.3  - Start lantus   - Diabetic educator consult   -Continue to monitor blood  glucose     Acute kidney injury  - Likely related to sepsis, poorly controlled diabetes, suspected recent reduced oral intake.  - suspect base line CKD  - NS IVF given  - BMP recheck/trend daily  - Avoid nephrotoxic drugs  - renal US is negative for obstruction     Leucocytosis   - Secondary to the above   - Improving but still elevated   - Continue to monitor CBC     Hypertension  -Significant elevation of blood pressure today  - Add hydralazine IV as needed  - Start Norvasc  - Check echo     Diarrhea   - probably secondary to antibiotics   - No abdominal pain   - Add lactobacillus     35 MINUTES SPENT BY ME on the date of service doing chart review, history, exam, documentation & further activities per the note    Principal Problem:    Cellulitis of right foot  Active Problems:    Hyperglycemia    Diabetic ulcer of right foot associated with type 2 diabetes mellitus, with other ulcer severity, unspecified part of foot (H)      VTE prophylaxis:  Heparin SQ  DIET: Orders Placed This Encounter      Regular Diet Adult (Please stop n.p.o. and advance previous diet as tolerated)      Disposition/Barriers to discharge: IV antibiotic, pending culture  Code Status: No CPR- Do NOT Intubate    Subjective:  Dario is feeling about the same today, no chest pain or shortness of breath.    PHYSICAL EXAM  Vitals:    08/11/23 1747   Weight: 67 kg (147 lb 11.3 oz)     B/P:180/85 T:98.2 P:81 R:20     Intake/Output Summary (Last 24 hours) at 8/14/2023 1241  Last data filed at 8/14/2023 1223  Gross per 24 hour   Intake 1508.5 ml   Output 1495 ml   Net 13.5 ml      Body mass index is 20.6 kg/m .    Constitutional: awake, alert, cooperative, no apparent distress, and appears stated age  Respiratory: No increased work of breathing, good air exchange, clear to auscultation bilaterally, no crackles or wheezing  Cardiovascular: Normal apical impulse, regular rate and rhythm, normal S1 and S2, no S3 or S4, and no murmur noted  GI: No scars,  "normal bowel sounds, soft, non-distended, non-tender, no masses palpated, no hepatosplenomegally  Skin: no bruising or bleeding and normal skin color, texture, turgor  Musculoskeletal: Diffuse erythema of the right midfoot with forefoot with amputation, surgical incision covered with dressing.  Neurologic: Awake, alert, oriented to name, place and time.  Cranial nerves II-XII are grossly intact.  Motor is 5 out of 5 bilaterally.   Sensory is intact.    Neuropsychiatric: Appropriate with examiner      PERTINENT LABS/IMAGING:    I have personally reviewed the following data over the past 24 hrs:    13.3 (H)  \   8.9 (L)   / 408     134 (L) 102 32.7 (H) /  132 (H)   3.4 21 (L) 2.22 (H) \     ALT: 19 AST: 32 AP: 196 (H) TBILI: 1.3 (H)   ALB: 2.4 (L) TOT PROTEIN: 6.6 LIPASE: N/A     Procal: N/A CRP: 238.20 (H) Lactic Acid: N/A         Imaging results reviewed over the past 24 hrs:   Recent Results (from the past 24 hour(s))   POC US Guidance Needle Placement    Narrative    Ultrasound was performed as guidance to an anesthesia procedure.  Click   \"PACS images\" hyperlink below to view any stored images.  For specific   procedure details, view procedure note authored by anesthesia.       Discussed with patient, family, podiatry, nursing staff and discharge planner    Jack Mooney MD  [facility] Medicine Service  [phone]  "

## 2023-08-14 NOTE — ANESTHESIA CARE TRANSFER NOTE
Patient: Dario Benavides    Procedure: Procedure(s):  IRRIGATION AND DEBRIDEMENT right foot with removing all infected bones and soft tissue       Diagnosis: Diabetic ulcer of right foot associated with type 2 diabetes mellitus, with other ulcer severity, unspecified part of foot (H) [E11.621, L97.518]  Diagnosis Additional Information: No value filed.    Anesthesia Type:   General     Note:    Oropharynx: oropharynx clear of all foreign objects and spontaneously breathing  Level of Consciousness: awake  Oxygen Supplementation: room air    Independent Airway: airway patency satisfactory and stable  Dentition: dentition changed  Vital Signs Stable: post-procedure vital signs reviewed and stable  Report to RN Given: handoff report given  Patient transferred to: Medical/Surgical Unit    Handoff Report: Identifed the Patient, Identified the Reponsible Provider, Reviewed the pertinent medical history, Discussed the surgical course, Reviewed Intra-OP anesthesia mangement and issues during anesthesia, Set expectations for post-procedure period and Allowed opportunity for questions and acknowledgement of understanding      Vitals:  Vitals Value Taken Time   BP     Temp     Pulse     Resp     SpO2         Electronically Signed By: KASEY Soto CRNA  August 14, 2023  9:17 AM

## 2023-08-14 NOTE — PLAN OF CARE
Problem: Plan of Care - These are the overarching goals to be used throughout the patient stay.    Goal: Optimal Comfort and Wellbeing  Outcome: Progressing     Problem: Infection  Goal: Absence of Infection Signs and Symptoms  Outcome: Progressing   Goal Outcome Evaluation:       Patient had an uneventful night. Denied pain or any discomfort. No nonverbals indicating pain. NS infusing at 75ml/hr. Clindamycin and Zosyn administered as ordered. Blood Glucose checks 237, 169 and 144.  Voided in urinal x2, Incontinent large amount soft stool. Patient has been NPO since midnight for surgery this morning.

## 2023-08-14 NOTE — ANESTHESIA PROCEDURE NOTES
Sciatic Procedure Note    Pre-Procedure   Staff -        Anesthesiologist:  Anatoly Gaffney MD       Performed By: anesthesiologist       Location: pre-op       Procedure Start/Stop Times: 8/14/2023 6:58 AM and 8/14/2023 7:00 AM       Pre-Anesthestic Checklist: patient identified, IV checked, site marked, risks and benefits discussed, informed consent, monitors and equipment checked, pre-op evaluation, at physician/surgeon's request and post-op pain management  Timeout:       Correct Patient: Yes        Correct Procedure: Yes        Correct Site: Yes        Correct Position: Yes        Correct Laterality: Yes        Site Marked: Yes  Procedure Documentation  Procedure: Sciatic       Diagnosis: POSTOP PAIN CONTROL PER SURGEON REQUEST       Laterality: right       Patient Position: supine       Patient Prep/Sterile Barriers: sterile gloves, mask       Skin prep: Chloraprep (popliteal approach).       Needle Type: insulated and short bevel       Needle Gauge: 20.        Needle Length (Inches): 4        Ultrasound guided       1. Ultrasound was used to identify targeted nerve, plexus, vascular marker, or fascial plane and place a needle adjacent to it in real-time.       2. Ultrasound was used to visualize the spread of anesthetic in close proximity to the above referenced structure.       3. A permanent image is entered into the patient's record.       4. The visualized anatomic structures appeared normal.       5. There were no apparent abnormal pathologic findings.    Assessment/Narrative         The placement was negative for: blood aspirated, painful injection and site bleeding       Paresthesias: No.       Bolus given via needle..        Secured via.        Insertion/Infusion Method: Single Shot       Complications: none       Injection made incrementally with aspirations every 2 mL.    Medication(s) Administered   Bupivacaine 0.5% PF (Infiltration) - Infiltration   20 mL - 8/14/2023 6:58:00 AM  Medication  "Administration Time: 8/14/2023 6:58 AM      FOR [facility] ONLY:   Pain Team Contact information: please page the Pain Team Via [employer]. Search \"Pain\". During daytime hours, please page the attending first. At night please page the resident first.      "

## 2023-08-14 NOTE — ANESTHESIA PROCEDURE NOTES
Adductor canal Procedure Note    Pre-Procedure   Staff -        Anesthesiologist:  Anatoly Gaffney MD       Performed By: anesthesiologist       Location: pre-op       Procedure Start/Stop Times: 8/14/2023 6:56 AM and 8/14/2023 6:58 AM       Pre-Anesthestic Checklist: patient identified, IV checked, site marked, risks and benefits discussed, informed consent, monitors and equipment checked, pre-op evaluation, at physician/surgeon's request and post-op pain management  Timeout:       Correct Patient: Yes        Correct Procedure: Yes        Correct Site: Yes        Correct Position: Yes        Correct Laterality: Yes        Site Marked: Yes  Procedure Documentation  Procedure: Adductor canal       Diagnosis: POSTOP PAIN CONTROL PER SURGEON REQUEST       Laterality: right       Patient Position: supine       Patient Prep/Sterile Barriers: sterile gloves, mask       Skin prep: Chloraprep       Needle Type: short bevel       Needle Gauge: 20.        Needle Length (Inches): 4        Ultrasound guided       1. Ultrasound was used to identify targeted nerve, plexus, vascular marker, or fascial plane and place a needle adjacent to it in real-time.       2. Ultrasound was used to visualize the spread of anesthetic in close proximity to the above referenced structure.       3. A permanent image is entered into the patient's record.       4. The visualized anatomic structures appeared normal.       5. There were no apparent abnormal pathologic findings.    Assessment/Narrative         The placement was negative for: blood aspirated, painful injection and site bleeding       Paresthesias: No.       Bolus given via needle..        Secured via.        Insertion/Infusion Method: Single Shot       Complications: none       Injection made incrementally with aspirations every 2 mL.    Medication(s) Administered   Bupivacaine 0.5% PF (Infiltration) - Infiltration   10 mL - 8/14/2023 6:56:00 AM  Medication Administration Time:  "8/14/2023 6:56 AM      FOR [facility] ONLY:   Pain Team Contact information: please page the Pain Team Via [facility]. Search \"Pain\". During daytime hours, please page the attending first. At night please page the resident first.      "

## 2023-08-14 NOTE — ANESTHESIA POSTPROCEDURE EVALUATION
Patient: Dario Benavides    Procedure: Procedure(s):  IRRIGATION AND DEBRIDEMENT right foot with removing all infected bones and soft tissue       Anesthesia Type:  General    Note:  Disposition: Inpatient   Postop Pain Control: Uneventful            Sign Out: Well controlled pain   PONV: No   Neuro/Psych: Uneventful            Sign Out: Acceptable/Baseline neuro status   Airway/Respiratory: Uneventful            Sign Out: Acceptable/Baseline resp. status   CV/Hemodynamics: Uneventful            Sign Out: Acceptable CV status; No obvious hypovolemia; No obvious fluid overload   Other NRE: NONE   DID A NON-ROUTINE EVENT OCCUR? No           Last vitals:  Vitals:    08/14/23 0715 08/14/23 0720 08/14/23 0730   BP: 134/75 129/68 137/73   Pulse: 77 77 73   Resp: 22 21 20   Temp:      SpO2: 100% 100% 100%       Electronically Signed By: Anatoly Gaffney MD  August 14, 2023  9:31 AM

## 2023-08-14 NOTE — CONSULTS
"DIABETES CARE    Situation:  Consulted by Provider for Diabetes Education.  Dario is a 68 year old male with type 2 Diabetes. Patient was admitted for DM right foot ulcer with cellulitis/osteomyelitis.     Background:  Related comorbidities include: CKD3b, HTN, hx tobacco use, HLD  PCP: none listed --> Jorgito Drake DO filling insulin rx with [facility]  Social: lives at home with significant other    Diabetes History:   Not new to insulin - has seen Barak Ayers MD (Endo) in the past. In July 2021 patient was seen and on 20 units Lantus q PM, 6 units Novolog with meals. Started insulin in May 2021 with A1c 15.1%.     Meds for BG Management PTA:  0-6 units Novolog with meals --> \"injects when he feels like he needs it, ~3x/wk\"    Current Inpatient Meds for BG Management:  15 units Lantus q AM  1/100 low insulin resistance correction scale >140 mg/dL q 4 hours    Labs:  Hemoglobin A1C: 11.3%  (estimated average  mg/dL)   Hgb: 9.5 g/dL  SCr: 2.22 mg/dL   GFR: 32 mL/min/1.73m^2    Blood Glucose POC:    08/13/23 20:13 08/13/23 23:52 08/14/23 03:45 08/14/23 06:04   GLUCOSE BY METER POCT 263 (H) 237 (H) 169 (H) 144 (H)     Diet Order: NPO   Intake: Poor   Weight: 67 kg    BMI: 20.6 kg/m^2    DM EDUCATION/COUNSELING:  Barriers to Learning and/or DM Self-Management: Drowsy  Previous DM Education: Yes - saw Benoit July 2021  Current education and/or visit with patient:  Deferred today as patient drowsy after procedure this AM. Will attempt visit with patient again tomorrow.     Assessment:  Patient with irregular use of fast acting insulin at baseline. Has been on basal/bolus insulin therapy before. Unclear why this was discontinued. A1c down from highest, however, still reflects need for consistent insulin use.   Needs further education and reinforcement. Should consider use of CGM as has coverage 100% for Freestyle Andrés and Dexcom.     Recommendations:  1. Continue to titrate basal insulin as needed to " "achieve fasting BG  mg/dL.  2. Consider meal insulin at 0.2 units/kg split evenly between 3 meals (50/50 Rule; 4 units/meal) vs I:C ratio at 1:19 grams based on Rule of 500. Titrate as needed based on BG trends. Would anticipate a need for set dose upon discharge if meal insulin is part of the plan.   3. Based on Rule of 1700, medium resistance correction scale would be appropriate.   4. Consider consultation with dietitian for further diet education on CHO counting.     Will attempt visit with patient again tomorrow.     Refer to \"Guidelines for Insulin Initiation and Care in Hospitalized Adults\" link in Diabetes Management Order set for dosing guidelines.  Hospital goals for blood glucose levels are < 180 mg/dL for improved health outcomes.    F/U Plans:  Patient should see PCP within 1-2 weeks of discharge to adjust insulin as needed based on BG records. Can get referral to OP Diabetes Educator at that time for ongoing education and support.    DISCHARGE NEEDS:  RX needed at [place] (preferred by patient's insurance):  1. Lantus SoloStar pens, box of 5  2. Pen needles 32G x 4MM, box of 100  Insulin requiring E11.65; To test 4x daily.    *Just filled rx for Novolog and Accu-Chek meter supplies    Thank you,   Kelly Bailey, JOHN, RDN, CSPCC, LD, [facility]  [address] [place] [address]  [email or web address]   Office: [phone]  Pager: [phone]  "

## 2023-08-14 NOTE — DISCHARGE INSTRUCTIONS
Wound Care  Wound location: R foot  Change Days: M/W/F  Supplies: Small granufoam - black  Cleanse with: Saline, pat dry.  Suction: Continuous at -125 mmHg pressure.    Back up plan: If VAC (or facility NPWT) malfunctions or unable to maintain seal: VAC (or facility NPWT) dressing must be removed and reapplied within 2 hours of the incident. If not able to reapply, place NS moistened gauze in wound bed and cover with appropriate dressing to keep wound bed moist.   Change wet-to-dry dressing daily until reimplementation of VAC (or facility NPWT) therapy when available.      DIABETES REMINDERS:  1) Check your blood sugar 4x times per day (fasting, before eating, before bedtime) or utilize continuous glucose monitor (Freestyle Andrés/Dexcom).  Always bring your blood sugar log and meter to your diabetes-related appointments.  2) Your blood sugar goals:  mg/dL before eating and  mg/dL 2 hours after eating (or per your doctor).  3) Always be prepared to treat a low blood sugar should it happen. Keep a sugar-containing beverage or food nearby.  4) When to call your clinic:   Blood sugar over 400 mg/dL.   If you have 2 to 3 low blood sugars (under 70 mg/dL) in a row,   Low reading the same time of day several days in a row,  Blood sugars elevated and you can not get them down with your usual diabetes regimen,  You are ill and can't keep blood sugars controlled.   5) When to call 911: If your blood glucose does not get better with treatment, or if you/someone else is unable to give you treatment.  6) Talk to your primary care doctor about an appointment with a Certified Diabetes Educator to assist you with your blood sugar management.  7) Follow insulin regimen on discharge orders until able to see provider where doses may be adjusted based on blood sugar patterns.

## 2023-08-14 NOTE — OP NOTE
Surgeon:  Dr. Jun Goyal    Date of Surgery: 8/14/23    Preopp diagnosis:   Right foot infection and cellulitis dorsal lateral and plantar  Right foot tarsal osteomyelitis    Postopp diagnosis: Same    Procedure:   Right foot I&D dorsal lateral and plantar  Right foot resection of all metatarsal bases, cuneiforms, and cuboid    Anesthesia: MAC with popliteal block    Specimens:   Cuboid and medial cuneiform sent to pathology to evaluate for osteomyelitis  Tissue cultures aerobic anaerobic and fungal    Findings:   Necrotic tissue dorsal lateral and somewhat plantar.  No signs of tracking proximally at this time  Removed bone soft and discolored    EBL: 20cc    Jun Goyal, DPM DPM, FACFAS

## 2023-08-14 NOTE — ANESTHESIA PREPROCEDURE EVALUATION
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         ROS/MED HX  ENT/Pulmonary:  - neg pulmonary ROS   (+)                tobacco use, Current use,                      Neurologic:  - neg neurologic ROS     Cardiovascular:  - neg cardiovascular ROS   (+)  hypertension- -   -  - -                                      METS/Exercise Tolerance: >4 METS    Hematologic:  - neg hematologic  ROS     Musculoskeletal:  - neg musculoskeletal ROS     GI/Hepatic:  - neg GI/hepatic ROS     Renal/Genitourinary:  - neg Renal ROS   (+) renal disease, type: CRI and ARF, Pt does not require dialysis,           Endo:  - neg endo ROS   (+)  type II DM, Last HgA1c: 11,  Using insulin,                 Psychiatric/Substance Use:  - neg psychiatric ROS     Infectious Disease: Comment: Osteomyelitis of foot - neg infectious disease ROS   (+) Recent Fever,           Malignancy:  - neg malignancy ROS     Other:  - neg  other ROS          Physical Exam    Airway        Mallampati: II   TM distance: > 3 FB   Neck ROM: full   Mouth opening: > 3 cm    Respiratory Devices and Support         Dental       (+) Edentulous      Cardiovascular   cardiovascular exam normal          Pulmonary   pulmonary exam normal                OUTSIDE LABS:  CBC:   Lab Results   Component Value Date    WBC 13.3 (H) 08/14/2023    WBC 17.5 (H) 08/13/2023    HGB 8.9 (L) 08/14/2023    HGB 8.6 (L) 08/13/2023    HCT 27.1 (L) 08/14/2023    HCT 26.4 (L) 08/13/2023     08/14/2023     08/13/2023     BMP:   Lab Results   Component Value Date     (L) 08/14/2023     (L) 08/13/2023    POTASSIUM 3.4 08/14/2023    POTASSIUM 3.5 08/13/2023    CHLORIDE 102 08/14/2023    CHLORIDE 100 08/13/2023    CO2 21 (L) 08/14/2023    CO2 21 (L) 08/13/2023    BUN 32.7 (H) 08/14/2023    BUN 36.3 (H) 08/13/2023    CR 2.22 (H) 08/14/2023    CR 2.47 (H) 08/13/2023     (H) 08/14/2023     (H) 08/14/2023     COAGS: No results found for: PTT, INR, FIBR  POC: No results found for: BGM, HCG, HCGS  HEPATIC:   Lab Results   Component Value Date    ALBUMIN 2.4 (L) 08/14/2023    PROTTOTAL 6.6 08/14/2023    ALT 19 08/14/2023    AST 32 08/14/2023    ALKPHOS 196 (H) 08/14/2023    BILITOTAL 1.3 (H) 08/14/2023     OTHER:   Lab Results   Component Value Date    LACT 0.9 08/13/2023    A1C 11.3 (H) 08/12/2023    JOSHUA 8.6 (L) 08/14/2023    LIPASE 18 08/11/2023    CRP 0.6 06/04/2021    SED 84 (H) 08/11/2023       Anesthesia Plan    ASA Status:  3    NPO Status:  NPO Appropriate    Anesthesia Type: General.     - Airway: Mask Only   Induction: Intravenous.   Maintenance: TIVA.        Consents    Anesthesia Plan(s) and associated risks, benefits, and realistic alternatives discussed. Questions answered and patient/representative(s) expressed understanding.     - Discussed: Risks, Benefits and Alternatives for BOTH SEDATION and the PROCEDURE were discussed     - Discussed  with:  Patient      - Extended Intubation/Ventilatory Support Discussed: No.      - Patient is DNR/DNI Status: Yes             Suspend during perioperative period? Yes.             Agree to: chest compression/defibrillation, chemical resuscitation.   Use of blood products discussed: No .     Postoperative Care    Pain management: Peripheral nerve block (Single Shot), Oral pain medications, Multi-modal analgesia.   PONV prophylaxis: Ondansetron (or other 5HT-3), Background Propofol Infusion     Comments:    Other Comments: GA with mask/TIVA  Pop/saph blocks for postop pain control per surgeon request              Anatoly Gaffney MD

## 2023-08-14 NOTE — PLAN OF CARE
Problem: Plan of Care - These are the overarching goals to be used throughout the patient stay.    Goal: Plan of Care Review  Description: The Plan of Care Review/Shift note should be completed every shift.  The Outcome Evaluation is a brief statement about your assessment that the patient is improving, declining, or no change.  This information will be displayed automatically on your shift note.  Outcome: Progressing   Goal Outcome Evaluation:       VSS, IV fluids infusing as ordered. Denied pain. Patient reported numbness to right foot. He will be NPO after min for surgery. Patient alert and orientated x4. Dressing to right foot chanted.

## 2023-08-14 NOTE — OP NOTE
Procedure Date: 08/14/2023    PREOPERATIVE DIAGNOSIS:  Right foot osteomyelitis with cellulitis and necrotic tissue.    POSTOPERATIVE DIAGNOSIS:  Right foot osteomyelitis with cellulitis and necrotic tissue    PROCEDURES:     1.  Right foot incision and drainage dorsal, lateral and plantar.  2.  Right foot removal of metatarsal bases, cuneiforms, cuboid.    ANESTHESIA:  MAC with popliteal block.    HEMOSTASIS:  Pneumatic ankle tourniquet at 250 mmHg.    INDICATIONS FOR PROCEDURE:  This patient has diabetes, which is uncontrolled.  He is a smoker.  His albumin is low.  He presents to the hospital with obvious signs of osteomyelitis and infection in his right foot, which has subsequently undergone a TMA three years ago.  He has a longstanding wound, plantar.  We will remove all infected bone and soft tissue today.  The patient understands this is high risk surgery.  Discussed mid tarsal joint amputation with the patient yesterday and that this could be difficult to heal with his risk factors plus would be prone to breakdown.  Discussed ultimately he may need a below-the-knee amputation.  Discussed this would heal faster and may give him a more functional limb in the future with the prosthesis.  He elects to be brought to the operating room today to remove infected bone and soft tissue to resolve his lower extremity infection.    DESCRIPTION OF PROCEDURE:  The patient was brought to the operating table and laid in a supine position.  He had already been given a popliteal block by Anesthesia preoperatively.  A tourniquet was placed mid calf area with copious amounts of Webril padding.  Foot was then elevated for complete exsanguination.  The tourniquet was inflated.  The foot was brought on the operating table where the following procedure was performed.      At this time, we noted a large wound, plantar central distal.  He also had a wound dorsal medial distal.  Another wound was also noted distal lateral.  An incision  was made over the TMA.  We ellipsed all these wounds and resected these.  We encountered purulent material dorsally, plantarly and laterally, but especially dorsal.  This was removed at this time.  We then removed all soft tissue off the bases of the five metatarsals and we resected these.  We then evaluated the cuneiforms and these were discolored.  All soft tissue was removed off all three cuneiforms and we resected these.  The medial cuneiform was sent to Pathology to evaluate for osteomyelitis.  The cuboid was also discolored and the surrounding tissue was necrotic.  All soft tissue was removed off the cuboid.  We resected this in toto.  This was sent to Pathology for evaluation.  We then noted necrotic tissue lateral, plantar and dorsal.  We resected all this necrotic tissue, and we noted the dorsal was the most affected.  We then probed for any more proximal abscess and tried to milk any purulence down the tendons and we were not able to.  We then flushed the wound.  Any remaining questionable material was removed.  We located the dorsalis pedis artery and we tied this off.  The tourniquet was deflated and we bovied any obvious bleeders.  There were no large pumping vessels.  The tourniquet was reinflated.  We took a sample of the necrotic tissue and sent this off for tissue cultures, aerobic, anaerobic, and fungal.  We flushed the wound.  The wound was packed with iodoform gauze.  We then dressed with Adaptic gauze, Kerlix, and Coban.  The tourniquet was then deflated.    The patient tolerated the procedure and anesthesia well.  He was then wheeled from the operating room to the recovery room with vital signs stable and an intact sterile dressing.    Jun Goyal DPM        D: 2023   T: 2023   MT: SANTOS    Name:     STAN SCOTT  MRN:      [id number]        Account:        [id number]   :      1955           Procedure Date: 2023     Document: [id number]

## 2023-08-15 LAB
ALBUMIN SERPL BCG-MCNC: 2.4 G/DL (ref 3.5–5.2)
ALP SERPL-CCNC: 285 U/L (ref 40–129)
ALT SERPL W P-5'-P-CCNC: 20 U/L (ref 0–70)
ANION GAP SERPL CALCULATED.3IONS-SCNC: 12 MMOL/L (ref 7–15)
AST SERPL W P-5'-P-CCNC: 33 U/L (ref 0–45)
ATRIAL RATE - MUSE: 121 BPM
BACTERIA BLD CULT: ABNORMAL
BACTERIA BLD CULT: ABNORMAL
BILIRUB SERPL-MCNC: 1 MG/DL
BUN SERPL-MCNC: 29.9 MG/DL (ref 8–23)
CALCIUM SERPL-MCNC: 8.2 MG/DL (ref 8.8–10.2)
CHLORIDE SERPL-SCNC: 100 MMOL/L (ref 98–107)
CREAT SERPL-MCNC: 1.94 MG/DL (ref 0.67–1.17)
CRP SERPL-MCNC: 126.6 MG/L
DEPRECATED HCO3 PLAS-SCNC: 21 MMOL/L (ref 22–29)
DIASTOLIC BLOOD PRESSURE - MUSE: 81 MMHG
ERYTHROCYTE [DISTWIDTH] IN BLOOD BY AUTOMATED COUNT: 14.5 % (ref 10–15)
GFR SERPL CREATININE-BSD FRML MDRD: 37 ML/MIN/1.73M2
GLUCOSE BLDC GLUCOMTR-MCNC: 271 MG/DL (ref 70–99)
GLUCOSE BLDC GLUCOMTR-MCNC: 288 MG/DL (ref 70–99)
GLUCOSE BLDC GLUCOMTR-MCNC: 338 MG/DL (ref 70–99)
GLUCOSE BLDC GLUCOMTR-MCNC: 345 MG/DL (ref 70–99)
GLUCOSE BLDC GLUCOMTR-MCNC: 373 MG/DL (ref 70–99)
GLUCOSE SERPL-MCNC: 303 MG/DL (ref 70–99)
HCT VFR BLD AUTO: 26.9 % (ref 40–53)
HGB BLD-MCNC: 8.8 G/DL (ref 13.3–17.7)
INTERPRETATION ECG - MUSE: NORMAL
MCH RBC QN AUTO: 27.4 PG (ref 26.5–33)
MCHC RBC AUTO-ENTMCNC: 32.7 G/DL (ref 31.5–36.5)
MCV RBC AUTO: 84 FL (ref 78–100)
P AXIS - MUSE: 85 DEGREES
PATH REPORT.COMMENTS IMP SPEC: NORMAL
PATH REPORT.COMMENTS IMP SPEC: NORMAL
PATH REPORT.FINAL DX SPEC: NORMAL
PATH REPORT.GROSS SPEC: NORMAL
PATH REPORT.MICROSCOPIC SPEC OTHER STN: NORMAL
PATH REPORT.RELEVANT HX SPEC: NORMAL
PHOTO IMAGE: NORMAL
PLATELET # BLD AUTO: 453 10E3/UL (ref 150–450)
POTASSIUM SERPL-SCNC: 3.6 MMOL/L (ref 3.4–5.3)
PR INTERVAL - MUSE: 122 MS
PROT SERPL-MCNC: 6.5 G/DL (ref 6.4–8.3)
QRS DURATION - MUSE: 78 MS
QT - MUSE: 306 MS
QTC - MUSE: 434 MS
R AXIS - MUSE: 86 DEGREES
RBC # BLD AUTO: 3.21 10E6/UL (ref 4.4–5.9)
SODIUM SERPL-SCNC: 133 MMOL/L (ref 136–145)
SYSTOLIC BLOOD PRESSURE - MUSE: 168 MMHG
T AXIS - MUSE: 80 DEGREES
VENTRICULAR RATE- MUSE: 121 BPM
WBC # BLD AUTO: 8.8 10E3/UL (ref 4–11)

## 2023-08-15 PROCEDURE — 80053 COMPREHEN METABOLIC PANEL: CPT | Performed by: INTERNAL MEDICINE

## 2023-08-15 PROCEDURE — 86140 C-REACTIVE PROTEIN: CPT | Performed by: INTERNAL MEDICINE

## 2023-08-15 PROCEDURE — 99232 SBSQ HOSP IP/OBS MODERATE 35: CPT | Performed by: INTERNAL MEDICINE

## 2023-08-15 PROCEDURE — 258N000003 HC RX IP 258 OP 636: Performed by: INTERNAL MEDICINE

## 2023-08-15 PROCEDURE — 250N000011 HC RX IP 250 OP 636: Mod: JZ | Performed by: INTERNAL MEDICINE

## 2023-08-15 PROCEDURE — 36415 COLL VENOUS BLD VENIPUNCTURE: CPT | Performed by: INTERNAL MEDICINE

## 2023-08-15 PROCEDURE — 85027 COMPLETE CBC AUTOMATED: CPT | Performed by: INTERNAL MEDICINE

## 2023-08-15 PROCEDURE — 120N000001 HC R&B MED SURG/OB

## 2023-08-15 PROCEDURE — 250N000013 HC RX MED GY IP 250 OP 250 PS 637: Performed by: INTERNAL MEDICINE

## 2023-08-15 PROCEDURE — 250N000011 HC RX IP 250 OP 636: Mod: JZ | Performed by: STUDENT IN AN ORGANIZED HEALTH CARE EDUCATION/TRAINING PROGRAM

## 2023-08-15 PROCEDURE — 999N000033 HC STATISTIC CHRONIC PULMONARY DISEASE SPECIALIST

## 2023-08-15 RX ADMIN — PIPERACILLIN AND TAZOBACTAM 3.38 G: 3; .375 INJECTION, POWDER, LYOPHILIZED, FOR SOLUTION INTRAVENOUS at 00:51

## 2023-08-15 RX ADMIN — Medication 1 CAPSULE: at 11:57

## 2023-08-15 RX ADMIN — PIPERACILLIN AND TAZOBACTAM 3.38 G: 3; .375 INJECTION, POWDER, LYOPHILIZED, FOR SOLUTION INTRAVENOUS at 16:04

## 2023-08-15 RX ADMIN — AMLODIPINE BESYLATE 2.5 MG: 2.5 TABLET ORAL at 08:48

## 2023-08-15 RX ADMIN — Medication 1 CAPSULE: at 08:46

## 2023-08-15 RX ADMIN — DEXTROSE MONOHYDRATE 900 MG: 50 INJECTION, SOLUTION INTRAVENOUS at 08:42

## 2023-08-15 RX ADMIN — Medication 1 CAPSULE: at 16:04

## 2023-08-15 RX ADMIN — INSULIN GLARGINE 15 UNITS: 100 INJECTION, SOLUTION SUBCUTANEOUS at 08:47

## 2023-08-15 RX ADMIN — HEPARIN SODIUM 5000 UNITS: 10000 INJECTION, SOLUTION INTRAVENOUS; SUBCUTANEOUS at 08:48

## 2023-08-15 RX ADMIN — HEPARIN SODIUM 5000 UNITS: 10000 INJECTION, SOLUTION INTRAVENOUS; SUBCUTANEOUS at 20:58

## 2023-08-15 RX ADMIN — PIPERACILLIN AND TAZOBACTAM 3.38 G: 3; .375 INJECTION, POWDER, LYOPHILIZED, FOR SOLUTION INTRAVENOUS at 08:43

## 2023-08-15 ASSESSMENT — ACTIVITIES OF DAILY LIVING (ADL)
ADLS_ACUITY_SCORE: 31

## 2023-08-15 NOTE — SMOKING CESSATION
Tobacco Treatment Consult  8/15/2023, 1:52 PM    Patient admitted on: 8/11/2023   Patient admitted for: Hyperglycemia [R73.9]  Cellulitis of right foot [L03.115]  Diabetic ulcer of right foot associated with type 2 diabetes mellitus, with other ulcer severity, unspecified part of foot (H) [E11.621, L97.518]   Patient seen on: 8/15/2023    Dario states that he has cut down to 1 pack/week and intends on quitting. He declined tobacco treatment counseling and resource materials, stating that he can quit on his own and does not need help.  Updated patient smoking status in chart history.    Total 2 minutes spent in smoking cessation, and 20 minutes spent in chart review, care coordination, and documentation.    Mira Harvey RT, Chronic Pulmonary Disease Specialist & Certified Tobacco Treatment Specialist  Phone [phone]

## 2023-08-15 NOTE — PROGRESS NOTES
Diabetes Care Follow-Up:    Reviewed with patient regarding previous education provided.  Reviewed the treatment plan for BG monitoring, insulin use, meal plan. Gave patient a pictured chart of the management plan.  Reviewed the goals, frequency of testing  Reviewed the types of insulin, action, storage, injection sites and use of the pen. Patient has been using insulin at home, unsure why he quit the Lantus, takes the Novolog inconsistently. Reviewed imitating normal pancreas function with both insulins. .  Reviewed hypoglycemia: sx, causes, treatment.  Reviewed the meal plan, set dose at meals with the fast acting insulin. RD to see to review.      Patient encouraged to Follow-up with an OP diabetes educator, asking for referral from PCP.    DISCHARGE NEEDS:  RX needed at [facility] (preferred by patient's insurance):  1. Lantus SoloStar pens, box of 5  2. Pen needles 32G x 4MM, box of 100  Insulin requiring E11.65; To test 4x daily.     *Just filled rx for Novolog and Accu-Chek meter supplies    Thank you,    Eliana Vail RN, Certified Diabetes Care and     [address] [address]  [email or web address]  [email or web address]   Office: [phone]  Pager: [phone]

## 2023-08-15 NOTE — PROGRESS NOTES
"Care Management Follow Up    Length of Stay (days): 4    Expected Discharge Date: 08/17/2023     Concerns to be Addressed:   discharge planning     Patient plan of care discussed at interdisciplinary rounds: Yes    Anticipated Discharge Disposition:       Anticipated Discharge Services:        Education Provided on the Discharge Plan:  Per Care Team   Patient/Family in Agreement with the Plan:  Yes    Referrals Placed by CM/SW:    Private pay costs discussed: Not applicable    Additional Information:  Chart reviewed.    Cm updates:  No therapy consults placed at this time, pt needing A-1 for transfers.    Podiatry following      Per ID note/plan yesterday 8/14:  \"-continue clindamycin IV  -Continue Zosyn  Follow cultures and path from surgery; if failing treatment would likely require BKA. \"  Final ID plan pending.     Social Hx:   \"Pt lives in a house with his girlfriend Lennie. He is independent with ADLs and IADLs. He does still drive.  He \"mostly uses his cane for mobility, but also has a walker he can use if needed.\"  Family to transport at discharge.\"         CM to follow for medical progression of care, discharge recommendations, and final discharge plan.        Bee Laguerre RN    "

## 2023-08-15 NOTE — PLAN OF CARE
Problem: Plan of Care - These are the overarching goals to be used throughout the patient stay.    Goal: Plan of Care Review  Description: The Plan of Care Review/Shift note should be completed every shift.  The Outcome Evaluation is a brief statement about your assessment that the patient is improving, declining, or no change.  This information will be displayed automatically on your shift note.  Outcome: Progressing   Goal Outcome Evaluation:  Reviewed plan of care with pt-he has stable VS-he is on strict bedrest per order with foot elevated. He denies pain-he is able to turn side to side.Blood sugars over 200 -sliding scale and coverage of carbs being done.Dressing is clean and dry. He is voiding.

## 2023-08-15 NOTE — PLAN OF CARE
"  Problem: Plan of Care - These are the overarching goals to be used throughout the patient stay.    Goal: Plan of Care Review  Description: The Plan of Care Review/Shift note should be completed every shift.  The Outcome Evaluation is a brief statement about your assessment that the patient is improving, declining, or no change.  This information will be displayed automatically on your shift note.  Outcome: Progressing  Flowsheets (Taken 8/14/2023 2300)  Plan of Care Reviewed With: patient  Overall Patient Progress: improving  Goal: Patient-Specific Goal (Individualized)  Description: You can add care plan individualizations to a care plan. Examples of Individualization might be:  \"Parent requests to be called daily at 9am for status\", \"I have a hard time hearing out of my right ear\", or \"Do not touch me to wake me up as it startles me\".  Outcome: Progressing  Goal: Absence of Hospital-Acquired Illness or Injury  Outcome: Progressing  Intervention: Identify and Manage Fall Risk  Recent Flowsheet Documentation  Taken 8/14/2023 1600 by Denisse Culver RN  Safety Promotion/Fall Prevention:   activity supervised   assistive device/personal items within reach   increase visualization of patient   lighting adjusted   mobility aid in reach  Intervention: Prevent Skin Injury  Recent Flowsheet Documentation  Taken 8/14/2023 1600 by Denisse Culver RN  Body Position:   position changed independently   supine, head elevated   supine, legs elevated   legs elevated  Goal: Optimal Comfort and Wellbeing  Outcome: Progressing  Goal: Readiness for Transition of Care  Outcome: Progressing   Goal Outcome Evaluation:      Plan of Care Reviewed With: patient    Overall Patient Progress: improvingOverall Patient Progress: improving    Patient is much more alert today and more talkative than he was all weekend.  He denies pain due to neuropathy in his right foot.  He had surgery today and his vitals have been stable.  He is eating " about 40% of all of his meals now and drinking all of his Glucerna supplements in between meals.  He had one loose stool and has used the urinal 2 or 3 times tonight.  He still has NS @50ml/hr.  His blood sugars tonight were 266 and 335 and he received his sliding scale correction Novolog.

## 2023-08-15 NOTE — PROGRESS NOTES
Infectious Diseases Progress Note  [facility]    Date of visit: 08/15/2023     ASSESSMENT   68-year-old man with a history of diabetes, hypertension.  Had been seen in ID clinic for nonhealing wounds following right-sided TMA.  Lost to follow-up.    Right foot infection.  History of right TMA, poorly healed wounds, with poor follow-up with podiatry.  Now with surrounding cellulitis in the foot and lower leg.  MRI showing multifocal osteomyelitis.  Signs of systemic illness with fevers and bacteremia improved. Now s/p debridement 8/14/23, pus noted at surgery, had resection of cuneiforms and cuboid. Surgical cultures with staph aureus so far.   Sepsis. Presenting with fever, leukocytosis, elevated creatinine, elevated lactic acid and CRP.  Sepsis resolved.   Strep pyogenes bacteremia.  2 of 2 blood cultures on admission with gram-positive cocci in pairs and chains.  Growing Strep pyogenes.  Poor medical adherence.  Lost to follow-up following several visits for poorly healing TMA wounds  Heel wound growing staph aureus, group A strep  CKD.       Principal Problem:    Cellulitis of right foot  Active Problems:    Hyperglycemia    Diabetic ulcer of right foot associated with type 2 diabetes mellitus, with other ulcer severity, unspecified part of foot (H)       PLAN   Can stop clindamycin  -Continue Zosyn  Follow up pathology result.    May require IV antibiotics at discharge.   Follow cultures and path from surgery; if failing treatment would likely require BKA.       Yoshi Jurado MD   [facility]  Direct messaging: [employer] Paging  On-Call ID provider: [phone], option: 9      ===========================================    SUBJECTIVE / INTERVAL HISTORY:     Minimal pain. Temps ok. Tolerating antibiotics other than some loose stools. Appetite is poor. Reviewed OR findings with him.         Antibiotics   Zosyn 8/11-  Clindamycin 8/12-    Previous:  Vancomycin 8/11      Physical  "Exam     Temp:  [98.4  F (36.9  C)-99.4  F (37.4  C)] 98.5  F (36.9  C)  Pulse:  [85-95] 87  Resp:  [16-18] 17  BP: (129-167)/(63-78) 129/63  SpO2:  [93 %-96 %] 96 %    /63 (BP Location: Right arm)   Pulse 87   Temp 98.5  F (36.9  C) (Oral)   Resp 17   Ht 1.803 m (5' 11\")   Wt 67 kg (147 lb 11.3 oz)   SpO2 96%   BMI 20.60 kg/m      GENERAL: Thin man, lying in bed in no acute distress.   HENT:  Head is normocephalic, atraumatic. Oropharynx is moist without exudates or ulcers.  Many missing teeth  EYES:  Eyes have anicteric sclerae without conjunctival injection.   LUNGS:  normal resp pattern  CARDIOVASCULAR:  Regular rate  EXT: Extremities warm and without edema.  SKIN:  No acute rashes.  Right foot wrapped. Erythema extends into lower leg above dressing, not tender.   NEUROLOGIC:  Grossly nonfocal.  Psych: General indifference to conversation        Cultures   8/11 blood culture: 2 of 2 with strep pyogenes  8/12 blood cultures x2: no growth to date   8/13 heel culture -- staph aureus, group A strep.   8/14 OR culture -- staph aureus      Pertinent Labs:     Recent Labs   Lab 08/15/23  0534 08/14/23  0525 08/13/23  0520   WBC 8.8 13.3* 17.5*   HGB 8.8* 8.9* 8.6*   * 408 375       Recent Labs   Lab 08/15/23  0534 08/14/23  0525 08/13/23  0520   * 134* 132*   CO2 21* 21* 21*   BUN 29.9* 32.7* 36.3*   ALBUMIN 2.4* 2.4* 2.3*   ALKPHOS 285* 196* 172*   ALT 20 19 18   AST 33 32 30       Recent Labs   Lab 08/11/23  1806   SED 84*           Imaging:     US Renal Complete Non-Vascular    Result Date: 8/13/2023  EXAM: US RENAL COMPLETE NON-VASCULAR LOCATION: [facility] DATE: 8/13/2023 INDICATION: renal failure COMPARISON: None. TECHNIQUE: Routine Bilateral Renal and Bladder Ultrasound. FINDINGS: RIGHT KIDNEY: 10.0 cm. Normal without hydronephrosis or masses. LEFT KIDNEY: 8.9 cm. No hydronephrosis. 1.9 cm simple cyst. No follow-up needed for this. BLADDER: Moderately distended " bladder.     IMPRESSION: 1.  No significant findings in the kidneys. 2.  Moderately distended bladder.    MR Foot Right w/o Contrast    Result Date: 8/11/2023  EXAM: MR FOOT RIGHT W/O CONTRAST LOCATION: [facility] DATE: 8/11/2023 INDICATION: Pain. Prior surgery. Possible osteomyelitis. COMPARISON: 06/14/2023 radiographs. TECHNIQUE: Unenhanced. FINDINGS: The exam is moderately degraded by patient motion which persisted throughout the entire study. JOINTS AND BONES: -Postop changes of a transmetatarsal amputation of the first through fifth rays at the level of the proximal portions of the metatarsal shafts. There is a soft tissue ulcer overlying the amputation site with deep extension to the bone. There is marrow edema involving all of the metatarsal stumps as well as edema in the cuboid and medial cuneiform. Findings very worrisome for multifocal osteomyelitis. No fracture or contusion. No marrow replacing lesion. Mild degenerative changes at the tibiotalar joint. TENDONS: -Moderate tenosynovitis in the tibialis anterior tendon beginning just above the level of the ankle joint. Mild tenosynovitis in the peroneal tendons without tearing. LIGAMENTS: -The ankle ligaments are grossly intact. MUSCLES AND SOFT TISSUES: -Marked atrophy of the intrinsic musculature of the foot. No discrete fluid collection to suggest an abscess. Moderate edema or cellulitis along the lateral aspect of the ankle extending into the hindfoot.     IMPRESSION: 1.  Postop changes of a transmetatarsal amputation of the first through fifth rays at the level of the proximal portions of the metatarsal shafts with findings worrisome for multifocal osteomyelitis involving the metatarsal stumps as well as likely the cuboid and medial cuneiform. 2.  Moderate tenosynovitis of the tibialis anterior tendon.        Data reviewed today: I reviewed all medications, new labs and imaging results over the last 24 hours. I personally  reviewed no images or EKG's today.  The patient's care was discussed with the Patient.

## 2023-08-15 NOTE — PROGRESS NOTES
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     Right foot diabetic ulceration/osteomyelitis with bacteremia  - Recent past hospitalisation of 6/17/23 at [place] for osteomyelitis, with prescribed course of augmentin. XR foot from that time is suggestive of periosteal elevation. Foot wound not purulent draining, but given changes in baseline mentation, highly suspect for unresolved osteomyelitis in setting of sepsis. Sepsis by source (R foot osteomyelitis, SIRS 4/4), no overt end organ damage otherwise to suggest septic shock.  -telemetry  - MRI foot shows Postop changes of a transmetatarsal amputation of the first through fifth rays at the level of the proximal portions of the metatarsal shafts with findings worrisome for multifocal osteomyelitis involving the metatarsal stumps as well as likely the   cuboid and medial cuneiform.  - Positive blood culture for gram-positive cocci in pairs and clusters  - Repeat blood culture  - wound culture trend  - ID consult, appreciate input  - podiatry consult, S/P right foot incision and drainage with right foot removal of metatarsal base cuneiform and cuboid on 8/14/2020  - POD#1  -Postoperative orders as per podiatry, nonweightbearing on right foot  - Improving fever  - Started on vancomycin and Zosyn initially   - Switch vanco to clindamycin as per ID, continue zosyn   -Discontinue clindamycin continue on Zosyn as per ID     Hyponatremia  - Sodium of 127 on admission  - NS bolus given per sepsis protocols  - Improving   - Continue IV fluid, decrease rate  - Continue to monitor sodium level     type II DM with arthropathy  - poorly controled   - sliding scale insulin  - Elevated hemoglobin A1c at 11.3  - Start lantus, patient  did not receive it yesterday because of procedure  - Diabetic educator consult   -Add carb counting insulin coverage  -Continue to monitor blood glucose     Acute kidney injury  - Likely related to sepsis, poorly controlled diabetes, suspected recent reduced oral intake.  - suspect base line CKD  - NS IVF given  - BMP recheck/trend daily  - Avoid nephrotoxic drugs  - renal US is negative for obstruction     Leucocytosis   - Secondary to the above   - Improving  - Continue to monitor CBC      Hypertension  -Significant elevation of blood pressure on admission, probably secondary to pain  - Add hydralazine IV as needed  - Start Norvasc 2.5 mg, and titrate up as needed  -Echo shows EF of 55 6% with normal LV wall thickness     Diarrhea   - probably secondary to antibiotics   - No abdominal pain   - Add lactobacillus   -Improving     Weakness and deconditioning  -PT/OT evaluation    35 MINUTES SPENT BY ME on the date of service doing chart review, history, exam, documentation & further activities per the note    Principal Problem:    Cellulitis of right foot  Active Problems:    Hyperglycemia    Diabetic ulcer of right foot associated with type 2 diabetes mellitus, with other ulcer severity, unspecified part of foot (H)      VTE prophylaxis:  Heparin SQ  DIET: Orders Placed This Encounter      Regular Diet Adult (Please stop n.p.o. and advance previous diet as tolerated)      Disposition/Barriers to discharge: IV antibiotic, pending culture  Code Status: No CPR- Do NOT Intubate    Subjective:  Dario is feeling better today, improving pain, tolerating oral diet.  No fever chills overnight    PHYSICAL EXAM  Vitals:    08/11/23 1747   Weight: 67 kg (147 lb 11.3 oz)     B/P:129/63 T:98.5 P:87 R:17     Intake/Output Summary (Last 24 hours) at 8/15/2023 0938  Last data filed at 8/15/2023 0900  Gross per 24 hour   Intake 680 ml   Output 1850 ml   Net -1170 ml      Body mass index is 20.6 kg/m .    Constitutional: awake,  alert, cooperative, no apparent distress, and appears stated age  Respiratory: No increased work of breathing, good air exchange, clear to auscultation bilaterally, no crackles or wheezing  Cardiovascular: Normal apical impulse, regular rate and rhythm, normal S1 and S2, no S3 or S4, and no murmur noted  GI: No scars, normal bowel sounds, soft, non-distended, non-tender, no masses palpated, no hepatosplenomegally  Skin: no bruising or bleeding and normal skin color, texture, turgor  Musculoskeletal: Diffuse erythema of the right midfoot with forefoot with amputation, surgical incision covered with dressing.   Neurologic: Awake, alert, oriented to name, place and time.  Cranial nerves II-XII are grossly intact.  Motor is 5 out of 5 bilaterally.   Sensory is intact.    Neuropsychiatric: Appropriate with examiner      PERTINENT LABS/IMAGING:    I have personally reviewed the following data over the past 24 hrs:    8.8  \   8.8 (L)   / 453 (H)     133 (L) 100 29.9 (H) /  288 (H)   3.6 21 (L) 1.94 (H) \     ALT: 20 AST: 33 AP: 285 (H) TBILI: 1.0   ALB: 2.4 (L) TOT PROTEIN: 6.5 LIPASE: N/A     Procal: N/A CRP: 126.60 (H) Lactic Acid: N/A         Imaging results reviewed over the past 24 hrs:   No results found for this or any previous visit (from the past 24 hour(s)).    Discussed with patient, nursing staff and discharge planner    Jack Mooney MD  [facility] Medicine Service  [phone]

## 2023-08-16 LAB
ANION GAP SERPL CALCULATED.3IONS-SCNC: 12 MMOL/L (ref 7–15)
BUN SERPL-MCNC: 28 MG/DL (ref 8–23)
CALCIUM SERPL-MCNC: 8.4 MG/DL (ref 8.8–10.2)
CHLORIDE SERPL-SCNC: 102 MMOL/L (ref 98–107)
CREAT SERPL-MCNC: 1.93 MG/DL (ref 0.67–1.17)
CRP SERPL-MCNC: 91.7 MG/L
DEPRECATED HCO3 PLAS-SCNC: 21 MMOL/L (ref 22–29)
ERYTHROCYTE [DISTWIDTH] IN BLOOD BY AUTOMATED COUNT: 14.7 % (ref 10–15)
GFR SERPL CREATININE-BSD FRML MDRD: 38 ML/MIN/1.73M2
GLUCOSE BLDC GLUCOMTR-MCNC: 226 MG/DL (ref 70–99)
GLUCOSE BLDC GLUCOMTR-MCNC: 259 MG/DL (ref 70–99)
GLUCOSE BLDC GLUCOMTR-MCNC: 260 MG/DL (ref 70–99)
GLUCOSE BLDC GLUCOMTR-MCNC: 283 MG/DL (ref 70–99)
GLUCOSE BLDC GLUCOMTR-MCNC: 352 MG/DL (ref 70–99)
GLUCOSE SERPL-MCNC: 234 MG/DL (ref 70–99)
HCT VFR BLD AUTO: 27.9 % (ref 40–53)
HGB BLD-MCNC: 9.3 G/DL (ref 13.3–17.7)
MCH RBC QN AUTO: 28.2 PG (ref 26.5–33)
MCHC RBC AUTO-ENTMCNC: 33.3 G/DL (ref 31.5–36.5)
MCV RBC AUTO: 85 FL (ref 78–100)
PLATELET # BLD AUTO: 555 10E3/UL (ref 150–450)
POTASSIUM SERPL-SCNC: 3.8 MMOL/L (ref 3.4–5.3)
RBC # BLD AUTO: 3.3 10E6/UL (ref 4.4–5.9)
SODIUM SERPL-SCNC: 135 MMOL/L (ref 136–145)
WBC # BLD AUTO: 9.6 10E3/UL (ref 4–11)

## 2023-08-16 PROCEDURE — 99233 SBSQ HOSP IP/OBS HIGH 50: CPT | Performed by: HOSPITALIST

## 2023-08-16 PROCEDURE — 250N000013 HC RX MED GY IP 250 OP 250 PS 637: Performed by: INTERNAL MEDICINE

## 2023-08-16 PROCEDURE — 85014 HEMATOCRIT: CPT | Performed by: INTERNAL MEDICINE

## 2023-08-16 PROCEDURE — 120N000001 HC R&B MED SURG/OB

## 2023-08-16 PROCEDURE — 86140 C-REACTIVE PROTEIN: CPT | Performed by: INTERNAL MEDICINE

## 2023-08-16 PROCEDURE — 80048 BASIC METABOLIC PNL TOTAL CA: CPT | Performed by: INTERNAL MEDICINE

## 2023-08-16 PROCEDURE — 36415 COLL VENOUS BLD VENIPUNCTURE: CPT | Performed by: INTERNAL MEDICINE

## 2023-08-16 PROCEDURE — 99233 SBSQ HOSP IP/OBS HIGH 50: CPT | Mod: 57 | Performed by: PODIATRIST

## 2023-08-16 PROCEDURE — 250N000011 HC RX IP 250 OP 636: Mod: JZ | Performed by: STUDENT IN AN ORGANIZED HEALTH CARE EDUCATION/TRAINING PROGRAM

## 2023-08-16 RX ADMIN — PIPERACILLIN AND TAZOBACTAM 3.38 G: 3; .375 INJECTION, POWDER, LYOPHILIZED, FOR SOLUTION INTRAVENOUS at 00:16

## 2023-08-16 RX ADMIN — HEPARIN SODIUM 5000 UNITS: 10000 INJECTION, SOLUTION INTRAVENOUS; SUBCUTANEOUS at 19:26

## 2023-08-16 RX ADMIN — PIPERACILLIN AND TAZOBACTAM 3.38 G: 3; .375 INJECTION, POWDER, LYOPHILIZED, FOR SOLUTION INTRAVENOUS at 19:22

## 2023-08-16 RX ADMIN — AMLODIPINE BESYLATE 2.5 MG: 2.5 TABLET ORAL at 08:40

## 2023-08-16 RX ADMIN — PIPERACILLIN AND TAZOBACTAM 3.38 G: 3; .375 INJECTION, POWDER, LYOPHILIZED, FOR SOLUTION INTRAVENOUS at 11:00

## 2023-08-16 RX ADMIN — Medication 1 CAPSULE: at 12:20

## 2023-08-16 RX ADMIN — Medication 1 CAPSULE: at 06:34

## 2023-08-16 RX ADMIN — HEPARIN SODIUM 5000 UNITS: 10000 INJECTION, SOLUTION INTRAVENOUS; SUBCUTANEOUS at 08:40

## 2023-08-16 RX ADMIN — Medication 1 CAPSULE: at 18:01

## 2023-08-16 RX ADMIN — INSULIN GLARGINE 15 UNITS: 100 INJECTION, SOLUTION SUBCUTANEOUS at 08:38

## 2023-08-16 ASSESSMENT — ACTIVITIES OF DAILY LIVING (ADL)
ADLS_ACUITY_SCORE: 32
ADLS_ACUITY_SCORE: 32
ADLS_ACUITY_SCORE: 31
ADLS_ACUITY_SCORE: 32
ADLS_ACUITY_SCORE: 31
ADLS_ACUITY_SCORE: 32
ADLS_ACUITY_SCORE: 31

## 2023-08-16 NOTE — PROGRESS NOTES
Spoke face to face with Dr. Jurado on lab calling tissue R Foot aerobic cx results of 1+ staphaureus & 1+ group A strep.

## 2023-08-16 NOTE — PROGRESS NOTES
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     Right foot diabetic ulceration/osteomyelitis with bacteremia  - Recent past hospitalisation of 6/17/23 at [place] for osteomyelitis, with prescribed course of augmentin. XR foot from that time is suggestive of periosteal elevation. Foot wound not purulent draining, but given changes in baseline mentation, highly suspect for unresolved osteomyelitis in setting of sepsis. Sepsis by source (R foot osteomyelitis, SIRS 4/4), no overt end organ damage otherwise to suggest septic shock.  -telemetry  - MRI foot shows Postop changes of a transmetatarsal amputation of the first through fifth rays at the level of the proximal portions of the metatarsal shafts with findings worrisome for multifocal osteomyelitis involving the metatarsal stumps as well as likely the cuboid and medial cuneiform.  - Positive blood culture for gram-positive cocci in pairs and clusters, strep pyogenes  - Repeat blood culture negative  -- ID consult, appreciate input  - podiatry consult, S/P right foot incision and drainage with right foot removal of metatarsal base cuneiform and cuboid on 8/14/2020  -Postoperative orders as per podiatry, nonweightbearing on right foot  - Started on vancomycin and Zosyn initially , switched to clindamycin and Zosyn. Stopped clindamycin and is only on Zosyn now.   - Follow up path and cx from surgery     Hyponatremia - mild  - Sodium of 127 on admission, 135 today  - Improving     type II DM with arthropathy  - poorly controled   - sliding scale insulin  - Elevated hemoglobin A1c at 11.3  - Started lantus 15U, sliding scale insulin, add meal time insulin today.   - Diabetic educator consult   -Add carb counting insulin  coverage  -Continue to monitor blood glucose     Acute kidney injury  - Likely related to sepsis, poorly controlled diabetes, suspected recent reduced oral intake.  - suspect base line CKD  - NS IVF given  - Crat improving slowly  - Avoid nephrotoxic drugs  - renal US is negative for obstruction     Leucocytosis - resolved     Hypertension  -Significant elevation of blood pressure on admission, probably secondary to pain  - Add hydralazine IV as needed  - Started Norvasc 2.5 mg, and titrate up as needed  -Echo shows EF of 55 6% with normal LV wall thickness     Diarrhea - improving  - probably secondary to antibiotics   - No abdominal pain   - Add lactobacillus      Weakness and deconditioning  -PT/OT evaluation    >40 MINUTES SPENT BY ME on the date of service doing chart review, history, exam, documentation & further activities per the note    Principal Problem:    Cellulitis of right foot  Active Problems:    Hyperglycemia    Diabetic ulcer of right foot associated with type 2 diabetes mellitus, with other ulcer severity, unspecified part of foot (H)      VTE prophylaxis:  Heparin SQ  DIET: Orders Placed This Encounter      Moderate Consistent Carb (60 g CHO per Meal) Diet      Disposition/Barriers to discharge: IV antibiotic, pending culture  Code Status: No CPR- Do NOT Intubate    Subjective:  Patient is new to me. Chart reviewed and events noted.  Patient seen and examined.   Denies any new symptoms today. Laying in bed, answering appropriately with eyes closed. No eye contact.       PHYSICAL EXAM  Vitals:    08/11/23 1747   Weight: 67 kg (147 lb 11.3 oz)     B/P:129/63 T:98.5 P:87 R:17     Intake/Output Summary (Last 24 hours) at 8/15/2023 0938  Last data filed at 8/15/2023 0900  Gross per 24 hour   Intake 680 ml   Output 1850 ml   Net -1170 ml      Body mass index is 20.6 kg/m .    Constitutional: Awake, alert, cooperative, no apparent distress, and appears stated age  Respiratory:CTAB  Cardiovascular:  RRR  Musculoskeletal: R midfoot foot in clean dressing, s/p forefoot amputation.    Neurologic: Awake, alert, oriented to name, place and time.     Neuropsychiatric: Appropriate with examiner      PERTINENT LABS/IMAGING:    I have personally reviewed the following data over the past 24 hrs:    9.6  \   9.3 (L)   / 555 (H)     135 (L) 102 28.0 (H) /  260 (H)   3.8 21 (L) 1.93 (H) \     Procal: N/A CRP: 91.70 (H) Lactic Acid: N/A         Imaging results reviewed over the past 24 hrs:   No results found for this or any previous visit (from the past 24 hour(s)).    Discussed with patient, SW/CM    Kaity Bowman MD  Hospitalist

## 2023-08-16 NOTE — PLAN OF CARE
Problem: Plan of Care - These are the overarching goals to be used throughout the patient stay.    Goal: Plan of Care Review  Description: The Plan of Care Review/Shift note should be completed every shift.  The Outcome Evaluation is a brief statement about your assessment that the patient is improving, declining, or no change.  This information will be displayed automatically on your shift note.  Outcome: Progressing   Goal Outcome Evaluation:       Educated pt on treatment plan, pt voiced understanding.       Problem: Plan of Care - These are the overarching goals to be used throughout the patient stay.    Goal: Optimal Comfort and Wellbeing  Outcome: Progressing         Denies pain to RLE. Dressing removed by surgeon, MD gave writer orders to re-dress & change PRN. Plan to take to OR tomorrow. Pt remains strict bedrest. Dressing changed. Cont. With ABX. Podiatrist updated on tissue cx results to RLE. Per lab pt aerobic + cx for 1+staphaureus & 1+ group emmanuel TAY

## 2023-08-16 NOTE — PLAN OF CARE
"Goal Outcome Evaluation:      Plan of Care Reviewed With: patient    Overall Patient Progress: improvingOverall Patient Progress: improving    Outcome Evaluation: Pt alert and oriented. Denies pain. Dressing CDI. Flat affect. , 2 units of insulin given. VSS.    BP (!) 140/69 (BP Location: Right arm)   Pulse 84   Temp 98.2  F (36.8  C) (Oral)   Resp 17   Ht 1.803 m (5' 11\")   Wt 67 kg (147 lb 11.3 oz)   SpO2 98%   BMI 20.60 kg/m      Jerry Gmoez RN    "

## 2023-08-16 NOTE — PROGRESS NOTES
ID chart check    Group A strep and staph aureus from heel and operative cultures. Continuing pip/tazo.     Yoshi Jurado MD

## 2023-08-16 NOTE — PROGRESS NOTES
FOOT AND ANKLE SURGERY/PODIATRY Progress Note        ASSESSMENT/PLAN:   Right foot infection and cellulitis dorsal lateral and plantar  Right foot tarsal osteomyelitis  S/P Right foot I&D dorsal lateral and plantar  Right foot resection of all metatarsal bases, cuneiforms, and cuboid    I informed the patient that the wounds appear to be healing well without complication.  I recommend delayed primary closure of the right foot.  The patient was informed that there is still possibility that he may require a BKA.  The patient stated he wanted to attempt to salvage the remaining portion of his right foot.  We will plan to take the patient back to surgery for delayed primary closure on 8/17/2023 under general anesthesia with popliteal block.      Objective findings: The dressings were removed.  The large open wound has good clear margins.  There is no active drainage or bleeding noted.  The wound is packed with gauze.  There is no odor noted.  Neurovascular status is intact.      HPI: Dario Benavides was seen again today at bedside resting comfortably.  The patient had no complaints.      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) 05/08/2021    Tobacco abuse 07/23/2019    Type 2 diabetes mellitus with right diabetic foot infection (H) 05/04/2021        Past Surgical History:   Procedure Laterality Date    BACK SURGERY  1992    IRRIGATION AND DEBRIDEMENT FOOT, COMBINED Right 8/14/2023    Procedure: IRRIGATION AND DEBRIDEMENT right foot with removing all infected bones and soft tissue;  Surgeon: Jun Goyal DPM;  Location: [facility] [place] [facility]    NECK SURGERY  2011       No Known Allergies      Current Facility-Administered Medications:     acetaminophen (TYLENOL) tablet 650 mg, 650 mg, Oral, Q4H PRN, Wade Greenberg MD, 650 mg at  08/13/23 0359    amLODIPine (NORVASC) tablet 2.5 mg, 2.5 mg, Oral, Daily, Jack Mooney MD, 2.5 mg at 08/16/23 0840    glucose gel 15-30 g, 15-30 g, Oral, Q15 Min PRN **OR** dextrose 50 % injection 25-50 mL, 25-50 mL, Intravenous, Q15 Min PRN **OR** glucagon injection 1 mg, 1 mg, Subcutaneous, Q15 Min PRN, Wade Greenberg MD    heparin ANTICOAGULANT injection 5,000 Units, 5,000 Units, Subcutaneous, Q12H, Wade Greenberg MD, 5,000 Units at 08/16/23 0840    hydrALAZINE (APRESOLINE) injection 10 mg, 10 mg, Intravenous, Q6H PRN, Jack Mooney MD    HYDROmorphone (DILAUDID) injection 0.2 mg, 0.2 mg, Intravenous, Q2H PRN, Wade Greenberg MD    insulin aspart (NovoLOG) injection (RAPID ACTING), 4 Units, Subcutaneous, TID w/meals, Kaity Bowman MD    insulin aspart (NovoLOG) injection (RAPID ACTING), 1-4 Units, Subcutaneous, 4x Daily w/meals, Jack Mooney MD, 2 Units at 08/16/23 1220    insulin glargine (LANTUS PEN) injection 15 Units, 15 Units, Subcutaneous, QAM ACJesica Maged A, MD, 15 Units at 08/16/23 0838    lactobacillus rhamnosus (GG) (CULTURELL) capsule 1 capsule, 1 capsule, Oral, TID ACJesica Maged A, MD, 1 capsule at 08/16/23 1220    lidocaine (LMX4) cream, , Topical, Q1H PRN, Wade Greenberg MD    lidocaine 1 % 0.1-1 mL, 0.1-1 mL, Other, Q1H PRN, Wade Greenberg MD    melatonin tablet 1 mg, 1 mg, Oral, At Bedtime PRN, Wade Greenberg MD    naloxone (NARCAN) injection 0.2 mg, 0.2 mg, Intravenous, Q2 Min PRN **OR** naloxone (NARCAN) injection 0.4 mg, 0.4 mg, Intravenous, Q2 Min PRN **OR** naloxone (NARCAN) injection 0.2 mg, 0.2 mg, Intramuscular, Q2 Min PRN **OR** naloxone (NARCAN) injection 0.4 mg, 0.4 mg, Intramuscular, Q2 Min PRN, Jack Mooney MD    ondansetron (ZOFRAN ODT) ODT tab 4 mg, 4 mg, Oral, Q6H PRN **OR** ondansetron (ZOFRAN) injection 4 mg, 4 mg, Intravenous, Q6H PRN, Wade Greenberg MD    oxyCODONE IR (ROXICODONE) half-tab 2.5 mg, 2.5 mg, Oral, Q4H  PRN, Wade Greenberg MD    piperacillin-tazobactam (ZOSYN) 3.375 g vial to attach to  mL bag, 3.375 g, Intravenous, Q8H, Wade Greenberg MD, 3.375 g at 08/16/23 1100    polyethylene glycol (MIRALAX) Packet 17 g, 17 g, Oral, Daily PRN, Wade Greenberg MD    prochlorperazine (COMPAZINE) injection 5 mg, 5 mg, Intravenous, Q6H PRN **OR** prochlorperazine (COMPAZINE) tablet 5 mg, 5 mg, Oral, Q6H PRN **OR** prochlorperazine (COMPAZINE) suppository 12.5 mg, 12.5 mg, Rectal, Q12H PRN, Wade Greenberg MD    sodium chloride (PF) 0.9% PF flush 3 mL, 3 mL, Intracatheter, Q8H, Wade Greenberg MD, 3 mL at 08/16/23 1100    sodium chloride (PF) 0.9% PF flush 3 mL, 3 mL, Intracatheter, q1 min prn, Wade Greenberg MD    History reviewed. No pertinent family history.    Social History     Socioeconomic History    Marital status:      Spouse name: Not on file    Number of children: Not on file    Years of education: Not on file    Highest education level: Not on file   Occupational History    Not on file   Tobacco Use    Smoking status: Every Day     Packs/day: 1.00     Years: 45.00     Pack years: 45.00     Types: Cigarettes    Smokeless tobacco: Never    Tobacco comments:     Seen IP by [facility] on 8/15/23 - down to 1 pack/week, declined cessation services and materials   Substance and Sexual Activity    Alcohol use: Not Currently    Drug use: Yes     Types: Marijuana    Sexual activity: Not Currently   Other Topics Concern    Not on file   Social History Narrative    Not on file     Social Determinants of Health     Financial Resource Strain: Not on file   Food Insecurity: Not on file   Transportation Needs: Not on file   Physical Activity: Not on file   Stress: Not on file   Social Connections: Not on file   Intimate Partner Violence: Not on file   Housing Stability: Not on file          /58 (BP Location: Right arm)   Pulse 76   Temp 98  F (36.7  C) (Oral)   Resp 18   Ht 1.803 m (5'  "11\")   Wt 67 kg (147 lb 11.3 oz)   SpO2 95%   BMI 20.60 kg/m      BMI= Body mass index is 20.6 kg/m .    OBJECTIVE:  General appearance: Patient is alert and fully cooperative with history & exam.  No sign of distress is noted during the visit.      Imaging:         Echocardiogram Complete    Result Date: 2023  [id number]^[email or web address]^[place] [address]  Name: STAN SCOTT MRN: [id number] : 1955 Study Date: 2023 01:03 PM Age: 68 yrs Gender: Male Patient Location: [facility] Reason For Study: Hypertension (HTN) Ordering Physician: CAITLIN VILLALOBOS Referring Physician: CAITLIN VILLALOBOS Performed By:   BSA: 1.9 m2 Height: 71 in Weight: 147 lb HR: 83 ______________________________________________________________________________ Procedure Complete Echo Adult. Definity (NDC #41886-752) given intravenously. ______________________________________________________________________________ Interpretation Summary  Normal LV wall thickness. The visual ejection fraction is 55-60%. The right ventricle is normal in size and function. No significant valve disease. ______________________________________________________________________________ Left Ventricle The left ventricle is normal in size. There is normal left ventricular wall thickness. The visual ejection fraction is 55-60%. Left ventricular diastolic function is normal. No regional wall motion abnormalities noted.  Right Ventricle The right ventricle is normal in size and function.  Atria The left atrium is mildly dilated. Right atrial size is normal. Intact atrial septum.  Mitral Valve Mitral valve leaflets appear normal. There is trace mitral regurgitation.  Tricuspid Valve Tricuspid valve leaflets appear normal. There is mild (1+) tricuspid regurgitation.  Aortic Valve Aortic valve leaflets appear normal. There is no evidence of aortic stenosis or clinically significant aortic " "regurgitation.  Pulmonic Valve The pulmonic valve is not well visualized. There is no pulmonic valvular regurgitation.  Vessels The aorta root is normal. Normal size ascending aorta. IVC diameter <2.1 cm collapsing >50% with sniff suggests a normal RA pressure of 3 mmHg.  Pericardium There is no pericardial effusion.  ______________________________________________________________________________ MMode/2D Measurements & Calculations IVSd: 1.0 cm LVIDd: 4.9 cm LVIDs: 3.4 cm LVPWd: 0.95 cm FS: 30.1 %  LV mass(C)d: 172.2 grams LV mass(C)dI: 93.1 grams/m2 Ao root diam: 2.8 cm LA dimension: 3.9 cm asc Aorta Diam: 2.9 cm LA/Ao: 1.4 LVOT diam: 2.0 cm LVOT area: 3.1 cm2 LA Volume Indexed (AL/bp): 35.7 ml/m2 RV Base: 4.3 cm RWT: 0.39 TAPSE: 2.0 cm  Time Measurements MM HR: 81.0 BPM  Doppler Measurements & Calculations MV E max mick: 72.0 cm/sec MV A max mick: 66.4 cm/sec MV E/A: 1.1 MV dec slope: 275.0 cm/sec2 MV dec time: 0.26 sec Ao V2 max: 133.0 cm/sec Ao max P.0 mmHg Ao V2 mean: 98.0 cm/sec Ao mean P.0 mmHg Ao V2 VTI: 26.4 cm PEYTON(I,D): 2.2 cm2 PEYTON(V,D): 2.2 cm2 LV V1 max PG: 3.5 mmHg LV V1 max: 94.0 cm/sec LV V1 VTI: 18.3 cm SV(LVOT): 57.5 ml SI(LVOT): 31.1 ml/m2 PA acc time: 0.11 sec AV Mick Ratio (DI): 0.71 PEYTON Index (cm2/m2): 1.2 E/E' av.8 Lateral E/e': 5.3 Medial E/e': 8.3 RV S Mick: 12.3 cm/sec  ______________________________________________________________________________ Report approved by: Romel Foley 2023 02:07 PM       POC US Guidance Needle Placement    Result Date: 2023  Ultrasound was performed as guidance to an anesthesia procedure.  Click \"PACS images\" hyperlink below to view any stored images.  For specific procedure details, view procedure note authored by anesthesia.    US Renal Complete Non-Vascular    Result Date: 2023  EXAM: US RENAL COMPLETE NON-VASCULAR LOCATION: [facility] DATE: 2023 INDICATION: renal failure COMPARISON: None. TECHNIQUE: " Routine Bilateral Renal and Bladder Ultrasound. FINDINGS: RIGHT KIDNEY: 10.0 cm. Normal without hydronephrosis or masses. LEFT KIDNEY: 8.9 cm. No hydronephrosis. 1.9 cm simple cyst. No follow-up needed for this. BLADDER: Moderately distended bladder.     IMPRESSION: 1.  No significant findings in the kidneys. 2.  Moderately distended bladder.    MR Foot Right w/o Contrast    Result Date: 8/11/2023  EXAM: MR FOOT RIGHT W/O CONTRAST LOCATION: [facility] DATE: 8/11/2023 INDICATION: Pain. Prior surgery. Possible osteomyelitis. COMPARISON: 06/14/2023 radiographs. TECHNIQUE: Unenhanced. FINDINGS: The exam is moderately degraded by patient motion which persisted throughout the entire study. JOINTS AND BONES: -Postop changes of a transmetatarsal amputation of the first through fifth rays at the level of the proximal portions of the metatarsal shafts. There is a soft tissue ulcer overlying the amputation site with deep extension to the bone. There is marrow edema involving all of the metatarsal stumps as well as edema in the cuboid and medial cuneiform. Findings very worrisome for multifocal osteomyelitis. No fracture or contusion. No marrow replacing lesion. Mild degenerative changes at the tibiotalar joint. TENDONS: -Moderate tenosynovitis in the tibialis anterior tendon beginning just above the level of the ankle joint. Mild tenosynovitis in the peroneal tendons without tearing. LIGAMENTS: -The ankle ligaments are grossly intact. MUSCLES AND SOFT TISSUES: -Marked atrophy of the intrinsic musculature of the foot. No discrete fluid collection to suggest an abscess. Moderate edema or cellulitis along the lateral aspect of the ankle extending into the hindfoot.     IMPRESSION: 1.  Postop changes of a transmetatarsal amputation of the first through fifth rays at the level of the proximal portions of the metatarsal shafts with findings worrisome for multifocal osteomyelitis involving the metatarsal  stumps as well as likely the cuboid and medial cuneiform. 2.  Moderate tenosynovitis of the tibialis anterior tendon.             Brandon Nieto; PARK  [facility]

## 2023-08-16 NOTE — CONSULTS
"CLINICAL NUTRITION SERVICES - ASSESSMENT NOTE     Nutrition Prescription    RECOMMENDATIONS FOR MDs/PROVIDERS TO ORDER:  Multivitamin, Vitamin C, zinc sulfate x 10 days for wound healing    Malnutrition Status:    Does not meet criteria. At risk for malnutrition    Recommendations already ordered by Registered Dietitian (RD):  Continue Glucerna as ordered    Future/Additional Recommendations:  Monitor intake, weight, labs  Provide carb counting education if pt wanting information (declined/refused today)     REASON FOR ASSESSMENT  Dario Benavides is a/an 68 year old male assessed by the dietitian for Admission Nutrition Risk Screen for positive weight loss \"unsure\" and poor appetite, and Provider Order - Nutrition Education - consistent carb grams for consistent meal Novolog     NUTRITION HISTORY  Met with pt, not very engaged in conversation. States appetite is \"horsesh*t\" and has been for years. Does not give any specifics on eating patterns besides stating does not eat at all some days. Difficulties chewing d/t missing teeth but states is able to chew regular textures. States does drinking Ensure and is receiving here (noted Glucerna between meals ordered). UBW ~ 150#. Asked if familiar with carb counting and states he is, but does not do it. Told him was asked to provide additional information on carb counting and declined, stating he has no interest in doing it and is not going to do it. Did provide handouts which were left on bedside table.    68M presenting for generalised weakness confusion; pmhx includes DM2 with charcot foot (s/p amputation R foot), CKD3b, osteomyelitis of foot; admitted with cellulitis, high suspicion for osteomyelitis of R foot, diabetic foot ulcer.     CURRENT NUTRITION ORDERS  Diet: Regular  Intake/Tolerance: Variable, has refused a meal, % of others    LABS  Glucose 226-345  A1C 11.3    MEDICATIONS  Medications reviewed, include insulin, Culturell    ANTHROPOMETRICS  Height: " "180.3 cm (5' 11\")  Most Recent Weight: 67 kg (147 lb 11.3 oz)    IBW: 78.2 kg  BMI: Normal BMI  Weight History:   Wt Readings from Last 20 Encounters:   08/11/23 67 kg (147 lb 11.3 oz)   06/01/21 66.3 kg (146 lb 3.2 oz)   05/26/21 65.6 kg (144 lb 11.2 oz)   07/23/19 75.5 kg (166 lb 7 oz)   From Care Everywhere:    71.7 kg (158 lb) 06/17/2023   Weight loss of 11# (7%) x 2 months    Dosing Weight: 67 kg    ASSESSED NUTRITION NEEDS  Estimated Energy Needs: 2923-9919 kcals/day (30 - 35 kcals/kg )  Justification: Increased needs  Estimated Protein Needs: 67-80 grams protein/day (1 - 1.2 grams of pro/kg)  Justification: Wound healing  Estimated Fluid Needs: 1700 mL/day (25 mL/kg)   Justification: Maintenance    PHYSICAL FINDINGS  See malnutrition section below.  Incisional wound R foot, s/p I&D, removal of metatarsal bases, cuneiforms, cuboid.   Missing teeth  Loose stools 8/15    MALNUTRITION:  % Weight Loss:  > 5% in 1 month (severe malnutrition)  % Intake:  unable to fully assess-pt's intake variable, does not give hx  Subcutaneous Fat Loss:  None observed  Muscle Loss:  None observed  Fluid Retention:  None noted    Malnutrition Diagnosis: Patient does not meet two of the above criteria necessary for diagnosing malnutrition  At risk for malnutrition    NUTRITION DIAGNOSIS  Unintended weight loss related to poor appetite and variable intake as evidenced by weight loss of 7% x 2 months.      INTERVENTIONS  Implementation  Nutrition Education: Patient declined carb counting diet education, handouts were provided  Continue Glucerna as ordered    Goals  Patient to consume % of nutritionally adequate meals three times per day, or the equivalent with supplements/snacks.  Maintain weight  Glucose < 180     Monitoring/Evaluation  Progress toward goals will be monitored and evaluated per protocol.    "

## 2023-08-16 NOTE — PLAN OF CARE
"  Problem: Plan of Care - These are the overarching goals to be used throughout the patient stay.    Goal: Plan of Care Review  Description: The Plan of Care Review/Shift note should be completed every shift.  The Outcome Evaluation is a brief statement about your assessment that the patient is improving, declining, or no change.  This information will be displayed automatically on your shift note.  Outcome: Progressing  Goal: Patient-Specific Goal (Individualized)  Description: You can add care plan individualizations to a care plan. Examples of Individualization might be:  \"Parent requests to be called daily at 9am for status\", \"I have a hard time hearing out of my right ear\", or \"Do not touch me to wake me up as it startles me\".  Outcome: Progressing  Goal: Absence of Hospital-Acquired Illness or Injury  Outcome: Progressing  Intervention: Identify and Manage Fall Risk  Recent Flowsheet Documentation  Taken 8/16/2023 0016 by Ann Soto, RN  Safety Promotion/Fall Prevention:   activity supervised   assistive device/personal items within reach   lighting adjusted  Intervention: Prevent Skin Injury  Recent Flowsheet Documentation  Taken 8/16/2023 0016 by Ann Soto, RN  Body Position:   position changed independently   legs elevated   heels elevated  Goal: Optimal Comfort and Wellbeing  Outcome: Progressing  Goal: Readiness for Transition of Care  Outcome: Progressing   Goal Outcome Evaluation:       Pt a/o without c/o pain. Pt quiet with c/o being board. Iv antibiotics given as ordered. Pt slept between cares. Will monitor.                  "

## 2023-08-16 NOTE — PROGRESS NOTES
"Care Management Follow Up    Length of Stay (days): 5    Expected Discharge Date: 08/17/2023     Concerns to be Addressed:    discharge planning    Patient plan of care discussed at interdisciplinary rounds: Yes    Anticipated Discharge Disposition:       Anticipated Discharge Services:       Education Provided on the Discharge Plan:  Per Care Team   Patient/Family in Agreement with the Plan:  Yes    Referrals Placed by CM/SW:    Private pay costs discussed: Not applicable    Additional Information:  Chart reviewed.    Cm updates:  Therapy consult placed, awaiting recs. They plan on  seeing pt this afternoon.      Per ID note 8/15:  Can stop clindamycin  -Continue Zosyn  Follow up pathology result.               May require IV antibiotics at discharge.   Follow cultures and path from surgery; if failing treatment would likely require BKA. \"        RNCM will see how pt does with therapy to determine if pt will need to contact [facility] home infusion at discharge if pt needs IV abx. Not sure if TCU vs Home at this time.       Social Hx:   \"Pt lives in a house with his girlfriend Lennie. He is independent with ADLs and IADLs. He does still drive.  He \"mostly uses his cane for mobility, but also has a walker he can use if needed.\"  Family to transport at discharge.\"           CM to follow for medical progression of care, discharge recommendations, and final discharge plan.     Bee Laguerre RN      "

## 2023-08-17 ENCOUNTER — ANCILLARY PROCEDURE (OUTPATIENT)
Dept: ULTRASOUND IMAGING | Facility: HOSPITAL | Age: 68
End: 2023-08-17
Attending: ANESTHESIOLOGY
Payer: MEDICAID

## 2023-08-17 ENCOUNTER — ANESTHESIA EVENT (OUTPATIENT)
Dept: SURGERY | Facility: HOSPITAL | Age: 68
DRG: 854 | End: 2023-08-17
Payer: COMMERCIAL

## 2023-08-17 ENCOUNTER — ANESTHESIA (OUTPATIENT)
Dept: SURGERY | Facility: HOSPITAL | Age: 68
DRG: 854 | End: 2023-08-17
Payer: COMMERCIAL

## 2023-08-17 LAB
BACTERIA BLD CULT: NO GROWTH
BACTERIA BLD CULT: NO GROWTH
BACTERIA TISS BX CULT: ABNORMAL
BACTERIA WND CULT: ABNORMAL
GLUCOSE BLDC GLUCOMTR-MCNC: 174 MG/DL (ref 70–99)
GLUCOSE BLDC GLUCOMTR-MCNC: 176 MG/DL (ref 70–99)
GLUCOSE BLDC GLUCOMTR-MCNC: 179 MG/DL (ref 70–99)
GLUCOSE BLDC GLUCOMTR-MCNC: 195 MG/DL (ref 70–99)
GLUCOSE BLDC GLUCOMTR-MCNC: 203 MG/DL (ref 70–99)
GLUCOSE BLDC GLUCOMTR-MCNC: 317 MG/DL (ref 70–99)
PLATELET # BLD AUTO: 673 10E3/UL (ref 150–450)
POTASSIUM SERPL-SCNC: 3.8 MMOL/L (ref 3.4–5.3)

## 2023-08-17 PROCEDURE — 0HQMXZZ REPAIR RIGHT FOOT SKIN, EXTERNAL APPROACH: ICD-10-PCS | Performed by: PODIATRIST

## 2023-08-17 PROCEDURE — 84132 ASSAY OF SERUM POTASSIUM: CPT | Performed by: HOSPITALIST

## 2023-08-17 PROCEDURE — 14040 TIS TRNFR F/C/C/M/N/A/G/H/F: CPT | Mod: 78 | Performed by: PODIATRIST

## 2023-08-17 PROCEDURE — 250N000013 HC RX MED GY IP 250 OP 250 PS 637: Performed by: INTERNAL MEDICINE

## 2023-08-17 PROCEDURE — 36415 COLL VENOUS BLD VENIPUNCTURE: CPT | Performed by: STUDENT IN AN ORGANIZED HEALTH CARE EDUCATION/TRAINING PROGRAM

## 2023-08-17 PROCEDURE — 120N000001 HC R&B MED SURG/OB

## 2023-08-17 PROCEDURE — 250N000011 HC RX IP 250 OP 636: Performed by: ANESTHESIOLOGY

## 2023-08-17 PROCEDURE — 99232 SBSQ HOSP IP/OBS MODERATE 35: CPT | Performed by: HOSPITALIST

## 2023-08-17 PROCEDURE — 250N000011 HC RX IP 250 OP 636: Mod: JZ | Performed by: STUDENT IN AN ORGANIZED HEALTH CARE EDUCATION/TRAINING PROGRAM

## 2023-08-17 PROCEDURE — 85049 AUTOMATED PLATELET COUNT: CPT | Performed by: STUDENT IN AN ORGANIZED HEALTH CARE EDUCATION/TRAINING PROGRAM

## 2023-08-17 PROCEDURE — 250N000013 HC RX MED GY IP 250 OP 250 PS 637: Performed by: PODIATRIST

## 2023-08-17 PROCEDURE — 250N000011 HC RX IP 250 OP 636: Mod: JZ

## 2023-08-17 PROCEDURE — 272N000001 HC OR GENERAL SUPPLY STERILE: Performed by: PODIATRIST

## 2023-08-17 PROCEDURE — 87070 CULTURE OTHR SPECIMN AEROBIC: CPT | Performed by: PODIATRIST

## 2023-08-17 PROCEDURE — 99232 SBSQ HOSP IP/OBS MODERATE 35: CPT | Performed by: INTERNAL MEDICINE

## 2023-08-17 PROCEDURE — 360N000074 HC SURGERY LEVEL 1, PER MIN: Performed by: PODIATRIST

## 2023-08-17 PROCEDURE — 87075 CULTR BACTERIA EXCEPT BLOOD: CPT | Performed by: PODIATRIST

## 2023-08-17 PROCEDURE — 370N000017 HC ANESTHESIA TECHNICAL FEE, PER MIN: Performed by: PODIATRIST

## 2023-08-17 PROCEDURE — 250N000009 HC RX 250

## 2023-08-17 PROCEDURE — 999N000141 HC STATISTIC PRE-PROCEDURE NURSING ASSESSMENT: Performed by: PODIATRIST

## 2023-08-17 PROCEDURE — 258N000003 HC RX IP 258 OP 636

## 2023-08-17 PROCEDURE — 250N000011 HC RX IP 250 OP 636: Mod: JZ | Performed by: PODIATRIST

## 2023-08-17 RX ORDER — OXYCODONE HYDROCHLORIDE 5 MG/1
5 TABLET ORAL
Status: CANCELLED | OUTPATIENT
Start: 2023-08-17

## 2023-08-17 RX ORDER — HYDROMORPHONE HYDROCHLORIDE 1 MG/ML
0.4 INJECTION, SOLUTION INTRAMUSCULAR; INTRAVENOUS; SUBCUTANEOUS EVERY 5 MIN PRN
Status: CANCELLED | OUTPATIENT
Start: 2023-08-17

## 2023-08-17 RX ORDER — ONDANSETRON 2 MG/ML
4 INJECTION INTRAMUSCULAR; INTRAVENOUS EVERY 30 MIN PRN
Status: CANCELLED | OUTPATIENT
Start: 2023-08-17

## 2023-08-17 RX ORDER — DEXAMETHASONE SODIUM PHOSPHATE 10 MG/ML
INJECTION, SOLUTION INTRAMUSCULAR; INTRAVENOUS PRN
Status: DISCONTINUED | OUTPATIENT
Start: 2023-08-17 | End: 2023-08-17

## 2023-08-17 RX ORDER — SODIUM CHLORIDE, SODIUM LACTATE, POTASSIUM CHLORIDE, CALCIUM CHLORIDE 600; 310; 30; 20 MG/100ML; MG/100ML; MG/100ML; MG/100ML
INJECTION, SOLUTION INTRAVENOUS CONTINUOUS
Status: DISCONTINUED | OUTPATIENT
Start: 2023-08-17 | End: 2023-08-19

## 2023-08-17 RX ORDER — ONDANSETRON 2 MG/ML
INJECTION INTRAMUSCULAR; INTRAVENOUS PRN
Status: DISCONTINUED | OUTPATIENT
Start: 2023-08-17 | End: 2023-08-17

## 2023-08-17 RX ORDER — HYDROMORPHONE HYDROCHLORIDE 1 MG/ML
0.2 INJECTION, SOLUTION INTRAMUSCULAR; INTRAVENOUS; SUBCUTANEOUS EVERY 5 MIN PRN
Status: CANCELLED | OUTPATIENT
Start: 2023-08-17

## 2023-08-17 RX ORDER — PROPOFOL 10 MG/ML
INJECTION, EMULSION INTRAVENOUS CONTINUOUS PRN
Status: DISCONTINUED | OUTPATIENT
Start: 2023-08-17 | End: 2023-08-17

## 2023-08-17 RX ORDER — LIDOCAINE 40 MG/G
CREAM TOPICAL
Status: DISCONTINUED | OUTPATIENT
Start: 2023-08-17 | End: 2023-08-20

## 2023-08-17 RX ORDER — ONDANSETRON 4 MG/1
4 TABLET, ORALLY DISINTEGRATING ORAL EVERY 30 MIN PRN
Status: CANCELLED | OUTPATIENT
Start: 2023-08-17

## 2023-08-17 RX ORDER — FENTANYL CITRATE 50 UG/ML
25-100 INJECTION, SOLUTION INTRAMUSCULAR; INTRAVENOUS
Status: DISCONTINUED | OUTPATIENT
Start: 2023-08-17 | End: 2023-08-22 | Stop reason: HOSPADM

## 2023-08-17 RX ORDER — LABETALOL HYDROCHLORIDE 5 MG/ML
10 INJECTION, SOLUTION INTRAVENOUS
Status: CANCELLED | OUTPATIENT
Start: 2023-08-17

## 2023-08-17 RX ORDER — SODIUM CHLORIDE 9 MG/ML
INJECTION, SOLUTION INTRAVENOUS CONTINUOUS PRN
Status: DISCONTINUED | OUTPATIENT
Start: 2023-08-17 | End: 2023-08-17

## 2023-08-17 RX ORDER — FENTANYL CITRATE 50 UG/ML
25 INJECTION, SOLUTION INTRAMUSCULAR; INTRAVENOUS EVERY 5 MIN PRN
Status: CANCELLED | OUTPATIENT
Start: 2023-08-17

## 2023-08-17 RX ORDER — DEXAMETHASONE SODIUM PHOSPHATE 4 MG/ML
INJECTION, SOLUTION INTRA-ARTICULAR; INTRALESIONAL; INTRAMUSCULAR; INTRAVENOUS; SOFT TISSUE PRN
Status: DISCONTINUED | OUTPATIENT
Start: 2023-08-17 | End: 2023-08-17

## 2023-08-17 RX ORDER — FENTANYL CITRATE 50 UG/ML
50 INJECTION, SOLUTION INTRAMUSCULAR; INTRAVENOUS EVERY 5 MIN PRN
Status: CANCELLED | OUTPATIENT
Start: 2023-08-17

## 2023-08-17 RX ORDER — LIDOCAINE HYDROCHLORIDE 10 MG/ML
INJECTION, SOLUTION INFILTRATION; PERINEURAL PRN
Status: DISCONTINUED | OUTPATIENT
Start: 2023-08-17 | End: 2023-08-17

## 2023-08-17 RX ADMIN — ROPIVACAINE HYDROCHLORIDE 20 ML: 5 INJECTION, SOLUTION EPIDURAL; INFILTRATION; PERINEURAL at 11:12

## 2023-08-17 RX ADMIN — PIPERACILLIN AND TAZOBACTAM 3.38 G: 3; .375 INJECTION, POWDER, LYOPHILIZED, FOR SOLUTION INTRAVENOUS at 02:18

## 2023-08-17 RX ADMIN — Medication 1 CAPSULE: at 06:35

## 2023-08-17 RX ADMIN — DEXAMETHASONE SODIUM PHOSPHATE 4 MG: 10 INJECTION, SOLUTION INTRAMUSCULAR; INTRAVENOUS at 12:02

## 2023-08-17 RX ADMIN — Medication 1 CAPSULE: at 17:05

## 2023-08-17 RX ADMIN — MIDAZOLAM HYDROCHLORIDE 1 MG: 1 INJECTION, SOLUTION INTRAMUSCULAR; INTRAVENOUS at 10:54

## 2023-08-17 RX ADMIN — ONDANSETRON 4 MG: 2 INJECTION INTRAMUSCULAR; INTRAVENOUS at 12:02

## 2023-08-17 RX ADMIN — SODIUM CHLORIDE: 9 INJECTION, SOLUTION INTRAVENOUS at 11:47

## 2023-08-17 RX ADMIN — INSULIN GLARGINE 15 UNITS: 100 INJECTION, SOLUTION SUBCUTANEOUS at 08:57

## 2023-08-17 RX ADMIN — LIDOCAINE HYDROCHLORIDE 5 ML: 10 INJECTION, SOLUTION INFILTRATION; PERINEURAL at 11:50

## 2023-08-17 RX ADMIN — HEPARIN SODIUM 5000 UNITS: 10000 INJECTION, SOLUTION INTRAVENOUS; SUBCUTANEOUS at 21:34

## 2023-08-17 RX ADMIN — FENTANYL CITRATE 50 MCG: 50 INJECTION, SOLUTION INTRAMUSCULAR; INTRAVENOUS at 10:54

## 2023-08-17 RX ADMIN — PROPOFOL 100 MCG/KG/MIN: 10 INJECTION, EMULSION INTRAVENOUS at 11:50

## 2023-08-17 RX ADMIN — ROPIVACAINE HYDROCHLORIDE 15 ML: 5 INJECTION, SOLUTION EPIDURAL; INFILTRATION; PERINEURAL at 11:15

## 2023-08-17 RX ADMIN — PIPERACILLIN AND TAZOBACTAM 3.38 G: 3; .375 INJECTION, POWDER, LYOPHILIZED, FOR SOLUTION INTRAVENOUS at 18:55

## 2023-08-17 RX ADMIN — PIPERACILLIN AND TAZOBACTAM 3.38 G: 3; .375 INJECTION, POWDER, LYOPHILIZED, FOR SOLUTION INTRAVENOUS at 10:30

## 2023-08-17 ASSESSMENT — LIFESTYLE VARIABLES: TOBACCO_USE: 1

## 2023-08-17 ASSESSMENT — ACTIVITIES OF DAILY LIVING (ADL)
ADLS_ACUITY_SCORE: 31
ADLS_ACUITY_SCORE: 31
ADLS_ACUITY_SCORE: 30
ADLS_ACUITY_SCORE: 31
ADLS_ACUITY_SCORE: 32
ADLS_ACUITY_SCORE: 30
ADLS_ACUITY_SCORE: 30
ADLS_ACUITY_SCORE: 32

## 2023-08-17 NOTE — ANESTHESIA PROCEDURE NOTES
Adductor canal Procedure Note    Pre-Procedure   Staff -        Anesthesiologist:  Comfort Her MD       Performed By: anesthesiologist       Location: pre-op       Procedure Start/Stop Times: 8/17/2023 11:15 AM and 8/17/2023 11:17 AM       Pre-Anesthestic Checklist: patient identified, IV checked, site marked, risks and benefits discussed, informed consent, monitors and equipment checked, pre-op evaluation, at physician/surgeon's request and post-op pain management  Timeout:       Correct Patient: Yes        Correct Procedure: Yes        Correct Site: Yes        Correct Position: Yes        Correct Laterality: Yes        Site Marked: Yes  Procedure Documentation  Procedure: Adductor canal       Laterality: right       Patient Position: supine       Patient Prep/Sterile Barriers: sterile gloves, mask       Skin prep: Chloraprep       Needle type: stimuplex.       Needle Gauge: 21.        Needle Length (Inches): 6        Ultrasound guided       1. Ultrasound was used to identify targeted nerve, plexus, vascular marker, or fascial plane and place a needle adjacent to it in real-time.       2. Ultrasound was used to visualize the spread of anesthetic in close proximity to the above referenced structure.       3. A permanent image is entered into the patient's record.       4. The visualized anatomic structures appeared normal.       5. There were no apparent abnormal pathologic findings.    Assessment/Narrative         The placement was negative for: blood aspirated, painful injection and site bleeding       Paresthesias: No.       Bolus given via needle..        Secured via.        Insertion/Infusion Method: Single Shot       Injection made incrementally with aspirations every 5 mL.    Medication(s) Administered   Ropivacaine 0.5% w/ 1:200K Epi (Injection) (Mixture components: ROPivacaine 5 MG/ML Soln, 50 mL; EPINEPHrine PF 1 MG/ML Soln, 0.25 mL) - Injection   15 mL - 8/17/2023 11:15:00 AM  Medication  "Administration Time: 8/17/2023 11:15 AM      FOR [facility] ONLY:   Pain Team Contact information: please page the Pain Team Via [employer]. Search \"Pain\". During daytime hours, please page the attending first. At night please page the resident first.      "

## 2023-08-17 NOTE — OP NOTE
Surgery: 8/17/2023     Surgeon: Brandon Nieto D.P.M.     Preoperative diagnosis: 1.S/P midfoot amputation right foot 2.  Open wound right foot      Postop diagnosis: Same     Procedure: Delayed primary closure of wound right foot      Anesthesia: General with popliteal block      Hemostasis: Pneumatic ankle tourniquet 250 mmHg     Blood loss: None     Specimens: Aerobic and anaerobic wound cultures     Complication: None     Description: The patient was taken to the operating room and placed on table in the supine position.  Under general  anesthesia with a popliteal block the right foot was prepped and draped in usual aseptic manner.  Following exsanguination of the right foot with a Duke's bandage the tourniquet was inflated and the following procedure was performed.  Attention was directed to the right foot  where a large open wound from previous amputation was noted.  The amputation had been  carried down to the level of the midfoot and packed open.  Utilizing a 10 blade the wound edges was skived and debrided down to good bleeding tissue.  Intraoperative wound cultures were then obtained.  The wound was then irrigated with copious amounts sterile saline solution.  Deep closure was accomplished utilizing 2-0 Monocryl suture material.  Skin closure was accomplished utilizing 3-0 nylon suture material.  A small area of the wound remained open.  The size of the open wound was approximately 3.5 cm x 3 point 5 sutures by 3 cm.  A wound VAC will be applied to assist with wound closure.  A sterile dressing was then applied to the right foot comprising of Betadine ointment, Adaptic, 4 x 4 gauze, Kerlix and an Ace bandage.  The tourniquet was then deflated and normal color returned to the right foot.  The patient appeared to tolerate the procedure and anesthesia well and was transported from the operating room to the recovery room with vital signs stable and neurovascular status intact.

## 2023-08-17 NOTE — PLAN OF CARE
"  Problem: Plan of Care - These are the overarching goals to be used throughout the patient stay.    Goal: Plan of Care Review  Description: The Plan of Care Review/Shift note should be completed every shift.  The Outcome Evaluation is a brief statement about your assessment that the patient is improving, declining, or no change.  This information will be displayed automatically on your shift note.  Outcome: Progressing  Goal: Patient-Specific Goal (Individualized)  Description: You can add care plan individualizations to a care plan. Examples of Individualization might be:  \"Parent requests to be called daily at 9am for status\", \"I have a hard time hearing out of my right ear\", or \"Do not touch me to wake me up as it startles me\".  Outcome: Progressing  Goal: Absence of Hospital-Acquired Illness or Injury  Outcome: Progressing  Intervention: Identify and Manage Fall Risk  Recent Flowsheet Documentation  Taken 8/17/2023 0016 by Ann Soto, RN  Safety Promotion/Fall Prevention:   activity supervised   lighting adjusted   safety round/check completed  Intervention: Prevent Skin Injury  Recent Flowsheet Documentation  Taken 8/17/2023 0016 by Ann Soto, RN  Body Position: position changed independently  Goal: Optimal Comfort and Wellbeing  Outcome: Progressing  Goal: Readiness for Transition of Care  Outcome: Progressing   Goal Outcome Evaluation:         Pt a/o with VSS. Pt without c/o pain and sleeping most of shift. NPO since midnight for surgery today. Will monitor.                "

## 2023-08-17 NOTE — ANESTHESIA PROCEDURE NOTES
Popliteal Procedure Note    Pre-Procedure   Staff -        Anesthesiologist:  Comfort Her MD       Performed By: anesthesiologist       Location: pre-op       Procedure Start/Stop Times: 8/17/2023 11:12 AM and 8/17/2023 11:15 AM       Pre-Anesthestic Checklist: patient identified, IV checked, site marked, risks and benefits discussed, informed consent, monitors and equipment checked, pre-op evaluation, at physician/surgeon's request and post-op pain management  Timeout:       Correct Patient: Yes        Correct Procedure: Yes        Correct Site: Yes        Correct Position: Yes        Correct Laterality: Yes        Site Marked: Yes  Procedure Documentation  Procedure: Popliteal       Laterality: right       Patient Position: supine       Patient Prep/Sterile Barriers: sterile gloves, mask       Skin prep: Chloraprep       Needle Gauge: 21.        Needle Length (Inches): 4        Ultrasound guided       1. Ultrasound was used to identify targeted nerve, plexus, vascular marker, or fascial plane and place a needle adjacent to it in real-time.       2. Ultrasound was used to visualize the spread of anesthetic in close proximity to the above referenced structure.       3. A permanent image is entered into the patient's record.       4. The visualized anatomic structures appeared normal.       5. There were no apparent abnormal pathologic findings.    Assessment/Narrative         The placement was negative for: blood aspirated, painful injection and site bleeding       Paresthesias: No.       Insertion/Infusion Method: Single Shot       Complications: none       Injection made incrementally with aspirations every 5 mL.    Medication(s) Administered   Ropivacaine 0.5% w/ 1:200K Epi (Injection) (Mixture components: ROPivacaine 5 MG/ML Soln, 50 mL; EPINEPHrine PF 1 MG/ML Soln, 0.25 mL) - Injection   20 mL - 8/17/2023 11:12:00 AM  Medication Administration Time: 8/17/2023 11:12 AM      FOR [facility] ([facility])  "ONLY:   Pain Team Contact information: please page the Pain Team Via [facility]. Search \"Pain\". During daytime hours, please page the attending first. At night please page the resident first.      "

## 2023-08-17 NOTE — PROGRESS NOTES
"Care Management Follow Up    Length of Stay (days): 6    Expected Discharge Date: 08/19/2023     Concerns to be Addressed:   discharge planning     Patient plan of care discussed at interdisciplinary rounds: Yes    Anticipated Discharge Disposition:       Anticipated Discharge Services:      Education Provided on the Discharge Plan:  Per Care Team   Patient/Family in Agreement with the Plan:  Yes    Referrals Placed by CM/SW:    Private pay costs discussed: Not applicable    Additional Information:  Chart reviewed    Cm updates:  Pt went for MRI today, podiatry plans on more surgery to try and salvageable pts right foot, pt agreeable see podiatry note from today for more details. Plan for more surgery tomorrow.     Therapy will follow pt when appropriate.       ID final plan pending.     Social Hx:  \"Pt lives in a house with his girlfriend Lennie. He is independent with ADLs and IADLs. He does still drive.  He \"mostly uses his cane for mobility, but also has a walker he can use if needed.\"  Family to transport at discharge.\"           CM to follow for medical progression of care, discharge recommendations, and final discharge plan.      Bee Laguerre RN      "

## 2023-08-17 NOTE — ANESTHESIA CARE TRANSFER NOTE
Patient: Dario Benavides    Procedure: Procedure(s):  Delayed primary closure of wound right foot       Diagnosis: Acute osteomyelitis of right foot (H) [M86.171]  Diagnosis Additional Information: No value filed.    Anesthesia Type:   General     Note:    Oropharynx: oropharynx clear of all foreign objects and spontaneously breathing  Level of Consciousness: awake  Oxygen Supplementation: room air    Independent Airway: airway patency satisfactory and stable  Dentition: dentition unchanged  Vital Signs Stable: post-procedure vital signs reviewed and stable  Report to RN Given: handoff report given  Patient transferred to: Medical/Surgical Unit  Comments: Report called to P4 RN, all questions answered. Pt verbalizing needs, vitals stable when pt sent with transport.  Handoff Report: Identifed the Patient, Identified the Reponsible Provider, Reviewed the pertinent medical history, Discussed the surgical course, Reviewed Intra-OP anesthesia mangement and issues during anesthesia, Set expectations for post-procedure period and Allowed opportunity for questions and acknowledgement of understanding      Vitals:  Vitals Value Taken Time   /74 08/17/23 1227   Temp 36.5 C 08/17/23 1227   Pulse 90 08/17/23 1227   Resp 14 08/17/23 1227   SpO2 100 08/17/23 1227       Electronically Signed By: KASEY Upton CRNA  August 17, 2023  12:37 PM

## 2023-08-17 NOTE — PLAN OF CARE
Problem: Plan of Care - These are the overarching goals to be used throughout the patient stay.    Goal: Absence of Hospital-Acquired Illness or Injury  Outcome: Progressing  Intervention: Identify and Manage Fall Risk  Recent Flowsheet Documentation  Taken 8/16/2023 1627 by Erin Solorzano RN  Safety Promotion/Fall Prevention:   activity supervised   lighting adjusted   safety round/check completed     Problem: Plan of Care - These are the overarching goals to be used throughout the patient stay.    Goal: Optimal Comfort and Wellbeing  Outcome: Progressing   Goal Outcome Evaluation:    Pt is A+Ox4, on room air. Pt is afebrile, denies any pain or discomfort. RLE dressing clean, dry, intact.   Pt refused dinner, drank apple juice only. Pt is strict bedrest. NPO at midnight for procedure tomorrow. Pt uses urinal at bedside, able to make his needs known, bed alarm on.     Erin Solorzano RN.

## 2023-08-17 NOTE — PLAN OF CARE
Problem: Plan of Care - These are the overarching goals to be used throughout the patient stay.    Goal: Plan of Care Review  Description: The Plan of Care Review/Shift note should be completed every shift.  The Outcome Evaluation is a brief statement about your assessment that the patient is improving, declining, or no change.  This information will be displayed automatically on your shift note.  Outcome: Progressing   Goal Outcome Evaluation:       Educated pt on treatment plan, pt voiced understanding.      Problem: Plan of Care - These are the overarching goals to be used throughout the patient stay.    Goal: Optimal Comfort and Wellbeing  Outcome: Progressing   Denies pain. Dressing c/d/I to RLE.

## 2023-08-17 NOTE — ANESTHESIA PREPROCEDURE EVALUATION
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    Tobacco abuse 2019    Type 2 diabetes mellitus with right diabetic foot infection (H) 2021      Past Surgical History:   Procedure Laterality Date    BACK SURGERY      IRRIGATION AND DEBRIDEMENT FOOT, COMBINED Right 2023    Procedure: IRRIGATION AND DEBRIDEMENT right foot with removing all infected bones and soft tissue;  Surgeon: Jun Goyal DPM;  Location: [facility] Main OR    NECK SURGERY        No Known Allergies   Social History     Tobacco Use    Smoking status: Every Day     Packs/day: 1.00     Years: 45.00     Pack years: 45.00     Types: Cigarettes    Smokeless tobacco: Never    Tobacco comments:     Seen IP by CTTS on 8/15/23 - down to 1 pack/week, declined cessation services and materials   Substance Use Topics    Alcohol use: Not Currently      Wt Readings from Last 1 Encounters:   23 67 kg (147 lb 11.3 oz)        Anesthesia Evaluation   Pt has had prior anesthetic.         ROS/MED HX  ENT/Pulmonary:  - neg pulmonary ROS   (+)                tobacco use, Current use,                      Neurologic:  - neg neurologic ROS     Cardiovascular:  - neg cardiovascular ROS   (+)  hypertension- -   -  - -                                      METS/Exercise Tolerance: >4 METS    Hematologic:  - neg hematologic  ROS     Musculoskeletal:  - neg musculoskeletal ROS     GI/Hepatic:  - neg GI/hepatic ROS     Renal/Genitourinary:  - neg Renal ROS   (+) renal disease, type: CRI and ARF, Pt does not require dialysis,           Endo:  - neg endo ROS    (+)  type II DM, Last HgA1c: 11,  Using insulin,                 Psychiatric/Substance Use:  - neg psychiatric ROS     Infectious Disease: Comment: Osteomyelitis of foot - neg infectious disease ROS   (+) Recent Fever,           Malignancy:  - neg malignancy ROS     Other:  - neg other ROS          Physical Exam    Airway        Mallampati: II   TM distance: > 3 FB   Neck ROM: full   Mouth opening: > 3 cm    Respiratory Devices and Support         Dental       (+) Edentulous      Cardiovascular   cardiovascular exam normal          Pulmonary   pulmonary exam normal                OUTSIDE LABS:  CBC:   Lab Results   Component Value Date    WBC 9.6 08/16/2023    WBC 8.8 08/15/2023    HGB 9.3 (L) 08/16/2023    HGB 8.8 (L) 08/15/2023    HCT 27.9 (L) 08/16/2023    HCT 26.9 (L) 08/15/2023     (H) 08/17/2023     (H) 08/16/2023     BMP:   Lab Results   Component Value Date     (L) 08/16/2023     (L) 08/15/2023    POTASSIUM 3.8 08/17/2023    POTASSIUM 3.8 08/16/2023    CHLORIDE 102 08/16/2023    CHLORIDE 100 08/15/2023    CO2 21 (L) 08/16/2023    CO2 21 (L) 08/15/2023    BUN 28.0 (H) 08/16/2023    BUN 29.9 (H) 08/15/2023    CR 1.93 (H) 08/16/2023    CR 1.94 (H) 08/15/2023     (H) 08/17/2023     (H) 08/17/2023     COAGS: No results found for: PTT, INR, FIBR  POC: No results found for: BGM, HCG, HCGS  HEPATIC:   Lab Results   Component Value Date    ALBUMIN 2.4 (L) 08/15/2023    PROTTOTAL 6.5 08/15/2023    ALT 20 08/15/2023    AST 33 08/15/2023    ALKPHOS 285 (H) 08/15/2023    BILITOTAL 1.0 08/15/2023     OTHER:   Lab Results   Component Value Date    LACT 0.9 08/13/2023    A1C 11.3 (H) 08/12/2023    JOSHUA 8.4 (L) 08/16/2023    LIPASE 18 08/11/2023    CRP 0.6 06/04/2021    SED 84 (H) 08/11/2023       Anesthesia Plan    ASA Status:  3    NPO Status:  NPO Appropriate    Anesthesia Type: General.     - Airway: Mask Only   Induction: Intravenous.   Maintenance: TIVA.         Consents    Anesthesia Plan(s) and associated risks, benefits, and realistic alternatives discussed. Questions answered and patient/representative(s) expressed understanding.     - Discussed: Risks, Benefits and Alternatives for BOTH SEDATION and the PROCEDURE were discussed     - Discussed with:  Patient      - Extended Intubation/Ventilatory Support Discussed: No.      - Patient is DNR/DNI Status: Yes             Suspend during perioperative period? No (Patient is okay with intubation as part of anesthetic, but would not like defibrillation/chest compressions as part of any required resuscitation. He is okay with chemical resuscitation PRN.).    Use of blood products discussed: No .     Postoperative Care    Pain management: Peripheral nerve block (Single Shot), Oral pain medications, Multi-modal analgesia.   PONV prophylaxis: Ondansetron (or other 5HT-3), Background Propofol Infusion     Comments:    Other Comments: GA with mask/TIVA  Pop/saph blocks for postop pain control per surgeon request              Comfort Thurman MD

## 2023-08-17 NOTE — ANESTHESIA POSTPROCEDURE EVALUATION
Patient: Dario Benavides    Procedure: Procedure(s):  Delayed primary closure of wound right foot       Anesthesia Type:  General    Note:  Disposition: Inpatient   Postop Pain Control: Uneventful            Sign Out: Well controlled pain   PONV: No   Neuro/Psych: Uneventful            Sign Out: Acceptable/Baseline neuro status   Airway/Respiratory: Uneventful            Sign Out: Acceptable/Baseline resp. status   CV/Hemodynamics: Uneventful            Sign Out: Acceptable CV status; No obvious hypovolemia; No obvious fluid overload   Other NRE: NONE   DID A NON-ROUTINE EVENT OCCUR? No           Last vitals:  Vitals:    08/17/23 1110 08/17/23 1120 08/17/23 1130   BP:  (!) 176/80 (!) 173/78   Pulse: 86 85 85   Resp: 14 19 16   Temp:      SpO2: 99% 98% 97%       Electronically Signed By: Janelle Hyde MD  August 17, 2023  12:44 PM

## 2023-08-17 NOTE — PROGRESS NOTES
[facility]    PROGRESS NOTE - Hospitalist Service    Assessment and Plan  68 years old male with a past medical history of DM2 with charcot foot (s/p amputation R foot), CKD3b, osteomyelitis of foot; admitted with cellulitis, /osteomyelitis of R foot, diabetic foot ulcer.     Right foot diabetic ulceration/osteomyelitis with bacteremia  - Recent past hospitalisation of 6/17/23 at [place] for osteomyelitis, with prescribed course of augmentin. XR foot from that time is suggestive of periosteal elevation. Foot wound not purulent draining, but given changes in baseline mentation, highly suspect for unresolved osteomyelitis in setting of sepsis. Sepsis by source (R foot osteomyelitis, SIRS 4/4), no overt end organ damage otherwise to suggest septic shock.  -telemetry  - MRI foot shows Postop changes of a transmetatarsal amputation of the first through fifth rays at the level of the proximal portions of the metatarsal shafts with findings worrisome for multifocal osteomyelitis involving the metatarsal stumps as well as likely the cuboid and medial cuneiform.  - Positive blood culture for gram-positive cocci in pairs and clusters, strep pyogenes  - Repeat blood culture negative  -- ID consult, appreciate input  - Oodiatry consult, S/P right foot incision and drainage with right foot removal of metatarsal base cuneiform and cuboid on 8/14/2020, underwent delayed primary closure 8/17 with plans of wound vac ?  -Postoperative orders as per podiatry, nonweightbearing on right foot  - Started on vancomycin and Zosyn initially , switched to clindamycin and Zosyn. Stopped clindamycin and is only on Zosyn now.   - Follow up path and cx from surgery. If fails treatment, pt may need BKA.      Hyponatremia - mild, improved     type II DM with arthropathy  - poorly controled   - sliding scale insulin  - Elevated hemoglobin A1c at 11.3  - Started lantus 15U, sliding scale insulin, add meal time insulin today.    - Diabetic educator consult   -Continue to monitor blood glucose     Acute kidney injury  - Likely related to sepsis, poorly controlled diabetes, suspected recent reduced oral intake.  - suspect base line CKD  - Crat improving slowly  - Avoid nephrotoxic drugs  - renal US is negative for obstruction     Leucocytosis - resolved     Hypertension  -Significant elevation of blood pressure on admission, probably secondary to pain  - Add hydralazine IV as needed  - Started Norvasc 2.5 mg, and titrate up as needed  -Echo shows EF of 55 6% with normal LV wall thickness     Diarrhea - improving  - probably secondary to antibiotics   - No abdominal pain   - Add lactobacillus      Weakness and deconditioning  -PT/OT evaluation    >40 MINUTES SPENT BY ME on the date of service doing chart review, history, exam, documentation & further activities per the note    Principal Problem:    Cellulitis of right foot  Active Problems:    Hyperglycemia    Diabetic ulcer of right foot associated with type 2 diabetes mellitus, with other ulcer severity, unspecified part of foot (H)      VTE prophylaxis:  Heparin SQ  DIET: Orders Placed This Encounter      NPO per Anesthesia Guidelines for Procedure/Surgery Except for: Meds      Disposition/Barriers to discharge: IV antibiotic, pending culture  Code Status: No CPR- Do NOT Intubate    Subjective:  Chart reviewed and events noted.  Patient seen and examined.   Denies any new symptoms today. No fever, chest pain, SOB. LE pain tolerable.       PHYSICAL EXAM  Vitals:    08/11/23 1747   Weight: 67 kg (147 lb 11.3 oz)     B/P:129/63 T:98.5 P:87 R:17     Intake/Output Summary (Last 24 hours) at 8/15/2023 0938  Last data filed at 8/15/2023 0900  Gross per 24 hour   Intake 680 ml   Output 1850 ml   Net -1170 ml      Body mass index is 20.6 kg/m .    Constitutional: Awake, alert, cooperative, no apparent distress, and appears stated age  Respiratory:CTAB  Cardiovascular: RRR  Musculoskeletal: R  "midfoot foot in clean dressing, s/p forefoot amputation.    Neurologic: Awake, alert, oriented to name, place and time.     Neuropsychiatric: Appropriate with examiner      PERTINENT LABS/IMAGING:    I have personally reviewed the following data over the past 24 hrs:    N/A  \   N/A   / 673 (H)     N/A N/A N/A /  176 (H)   3.8 N/A N/A \       Imaging results reviewed over the past 24 hrs:   Recent Results (from the past 24 hour(s))   POC US Guidance Needle Placement    Narrative    Ultrasound was performed as guidance to an anesthesia procedure.  Click   \"PACS images\" hyperlink below to view any stored images.  For specific   procedure details, view procedure note authored by anesthesia.   POC US Guidance Needle Placement    Narrative    Ultrasound was performed as guidance to an anesthesia procedure.  Click   \"PACS images\" hyperlink below to view any stored images.  For specific   procedure details, view procedure note authored by anesthesia.       Discussed with patient, SW/DB Bowman MD  Hospitalist  "

## 2023-08-17 NOTE — PROGRESS NOTES
Infectious Diseases Progress Note  [facility]    Date of visit: 08/17/2023     ASSESSMENT   68-year-old man with a history of diabetes, hypertension.  Had been seen in ID clinic for nonhealing wounds following right-sided TMA.  Lost to follow-up.    Right foot infection.  History of right TMA, poorly healed wounds, with poor follow-up with podiatry.  Now with surrounding cellulitis in the foot and lower leg.  MRI showing multifocal osteomyelitis.  Signs of systemic illness with fevers and bacteremia improved. Now s/p debridement 8/14/23, pus noted at surgery, had resection of cuneiforms and cuboid. Surgical cultures with MSSA, group A strep. Pathology shows osteomyelitis at cuneiforms and cuboid. Now s/p closure 8/17, with plans for wound vac.   Sepsis. Presenting with fever, leukocytosis, elevated creatinine, elevated lactic acid and CRP.  Sepsis resolved.   Strep pyogenes bacteremia.  2 of 2 blood cultures on admission with gram-positive cocci in pairs and chains.  Growing Strep pyogenes.  Poor medical adherence.  Lost to follow-up following several visits for poorly healing TMA wounds  Heel wound growing staph aureus, group A strep  CKD.       Principal Problem:    Cellulitis of right foot  Active Problems:    Hyperglycemia    Diabetic ulcer of right foot associated with type 2 diabetes mellitus, with other ulcer severity, unspecified part of foot (H)       PLAN   -Continue Zosyn  Follow up pathology result.    May require IV antibiotics at discharge. Likely plan ceftriaxone IV and flagyl PO for about a month.   If failing treatment would likely require BKA.       Yoshi Jurado MD   [facility]  Direct messaging: [employer]aging  On-Call ID provider: [phone], option: 9      ===========================================    SUBJECTIVE / INTERVAL HISTORY:     Seen prior to surgery. Denies pain or other concerns. Discussed results with him.         Antibiotics   Zosyn  "8/11-  Clindamycin 8/12-15    Previous:  Vancomycin 8/11      Physical Exam     Temp:  [97.9  F (36.6  C)-98.8  F (37.1  C)] 98.2  F (36.8  C)  Pulse:  [79-91] 91  Resp:  [14-20] 18  BP: (139-176)/(67-87) 139/67  SpO2:  [93 %-100 %] 95 %    /67 (BP Location: Left arm)   Pulse 91   Temp 98.2  F (36.8  C) (Oral)   Resp 18   Ht 1.803 m (5' 11\")   Wt 67 kg (147 lb 11.3 oz)   SpO2 95%   BMI 20.60 kg/m      GENERAL: Thin man, lying in bed in no acute distress.   HENT:  Head is normocephalic, atraumatic. Oropharynx is moist without exudates or ulcers.  Many missing teeth  EYES:  Eyes have anicteric sclerae without conjunctival injection.   LUNGS:  normal resp pattern  CARDIOVASCULAR:  Regular rate  EXT: Extremities warm and without edema.  SKIN:  No acute rashes.  Right foot wrapped. Erythema extends into lower leg above dressing, not tender.   NEUROLOGIC:  Grossly nonfocal.  Psych: General indifference to conversation        Cultures   8/11 blood culture: 2 of 2 with strep pyogenes  8/12 blood cultures x2: no growth to date   8/13 heel culture -- staph aureus, group A strep.   8/14 OR culture -- MSSA, group A strep      Pertinent Labs:     Recent Labs   Lab 08/17/23  0531 08/16/23  0536 08/15/23  0534 08/14/23  0525   WBC  --  9.6 8.8 13.3*   HGB  --  9.3* 8.8* 8.9*   * 555* 453* 408       Recent Labs   Lab 08/16/23  0536 08/15/23  0534 08/14/23  0525 08/13/23  0520   * 133* 134* 132*   CO2 21* 21* 21* 21*   BUN 28.0* 29.9* 32.7* 36.3*   ALBUMIN  --  2.4* 2.4* 2.3*   ALKPHOS  --  285* 196* 172*   ALT  --  20 19 18   AST  --  33 32 30       Recent Labs   Lab 08/11/23  1806   SED 84*           Imaging:     US Renal Complete Non-Vascular    Result Date: 8/13/2023  EXAM: US RENAL COMPLETE NON-VASCULAR LOCATION: [facility] DATE: 8/13/2023 INDICATION: renal failure COMPARISON: None. TECHNIQUE: Routine Bilateral Renal and Bladder Ultrasound. FINDINGS: RIGHT KIDNEY: 10.0 cm. " Normal without hydronephrosis or masses. LEFT KIDNEY: 8.9 cm. No hydronephrosis. 1.9 cm simple cyst. No follow-up needed for this. BLADDER: Moderately distended bladder.     IMPRESSION: 1.  No significant findings in the kidneys. 2.  Moderately distended bladder.    MR Foot Right w/o Contrast    Result Date: 8/11/2023  EXAM: MR FOOT RIGHT W/O CONTRAST LOCATION: [facility] DATE: 8/11/2023 INDICATION: Pain. Prior surgery. Possible osteomyelitis. COMPARISON: 06/14/2023 radiographs. TECHNIQUE: Unenhanced. FINDINGS: The exam is moderately degraded by patient motion which persisted throughout the entire study. JOINTS AND BONES: -Postop changes of a transmetatarsal amputation of the first through fifth rays at the level of the proximal portions of the metatarsal shafts. There is a soft tissue ulcer overlying the amputation site with deep extension to the bone. There is marrow edema involving all of the metatarsal stumps as well as edema in the cuboid and medial cuneiform. Findings very worrisome for multifocal osteomyelitis. No fracture or contusion. No marrow replacing lesion. Mild degenerative changes at the tibiotalar joint. TENDONS: -Moderate tenosynovitis in the tibialis anterior tendon beginning just above the level of the ankle joint. Mild tenosynovitis in the peroneal tendons without tearing. LIGAMENTS: -The ankle ligaments are grossly intact. MUSCLES AND SOFT TISSUES: -Marked atrophy of the intrinsic musculature of the foot. No discrete fluid collection to suggest an abscess. Moderate edema or cellulitis along the lateral aspect of the ankle extending into the hindfoot.     IMPRESSION: 1.  Postop changes of a transmetatarsal amputation of the first through fifth rays at the level of the proximal portions of the metatarsal shafts with findings worrisome for multifocal osteomyelitis involving the metatarsal stumps as well as likely the cuboid and medial cuneiform. 2.  Moderate tenosynovitis  of the tibialis anterior tendon.        Data reviewed today: I reviewed all medications, new labs and imaging results over the last 24 hours. I personally reviewed no images or EKG's today.  The patient's care was discussed with the Patient.

## 2023-08-18 ENCOUNTER — APPOINTMENT (OUTPATIENT)
Dept: OCCUPATIONAL THERAPY | Facility: HOSPITAL | Age: 68
DRG: 854 | End: 2023-08-18
Attending: INTERNAL MEDICINE
Payer: COMMERCIAL

## 2023-08-18 ENCOUNTER — APPOINTMENT (OUTPATIENT)
Dept: PHYSICAL THERAPY | Facility: HOSPITAL | Age: 68
DRG: 854 | End: 2023-08-18
Attending: INTERNAL MEDICINE
Payer: COMMERCIAL

## 2023-08-18 ENCOUNTER — HOME INFUSION (PRE-WILLOW HOME INFUSION) (OUTPATIENT)
Dept: PHARMACY | Facility: CLINIC | Age: 68
End: 2023-08-18
Payer: MEDICAID

## 2023-08-18 LAB
ANION GAP SERPL CALCULATED.3IONS-SCNC: 12 MMOL/L (ref 7–15)
BUN SERPL-MCNC: 20.6 MG/DL (ref 8–23)
CALCIUM SERPL-MCNC: 8.3 MG/DL (ref 8.8–10.2)
CHLORIDE SERPL-SCNC: 103 MMOL/L (ref 98–107)
CREAT SERPL-MCNC: 1.65 MG/DL (ref 0.67–1.17)
DEPRECATED HCO3 PLAS-SCNC: 23 MMOL/L (ref 22–29)
ERYTHROCYTE [DISTWIDTH] IN BLOOD BY AUTOMATED COUNT: 15.5 % (ref 10–15)
GFR SERPL CREATININE-BSD FRML MDRD: 46 ML/MIN/1.73M2
GLUCOSE BLDC GLUCOMTR-MCNC: 202 MG/DL (ref 70–99)
GLUCOSE BLDC GLUCOMTR-MCNC: 258 MG/DL (ref 70–99)
GLUCOSE BLDC GLUCOMTR-MCNC: 279 MG/DL (ref 70–99)
GLUCOSE BLDC GLUCOMTR-MCNC: 299 MG/DL (ref 70–99)
GLUCOSE BLDC GLUCOMTR-MCNC: 307 MG/DL (ref 70–99)
GLUCOSE SERPL-MCNC: 223 MG/DL (ref 70–99)
HCT VFR BLD AUTO: 29.8 % (ref 40–53)
HGB BLD-MCNC: 9.9 G/DL (ref 13.3–17.7)
MCH RBC QN AUTO: 28.2 PG (ref 26.5–33)
MCHC RBC AUTO-ENTMCNC: 33.2 G/DL (ref 31.5–36.5)
MCV RBC AUTO: 85 FL (ref 78–100)
PLATELET # BLD AUTO: 789 10E3/UL (ref 150–450)
POTASSIUM SERPL-SCNC: 3.9 MMOL/L (ref 3.4–5.3)
RBC # BLD AUTO: 3.51 10E6/UL (ref 4.4–5.9)
SODIUM SERPL-SCNC: 138 MMOL/L (ref 136–145)
WBC # BLD AUTO: 14.6 10E3/UL (ref 4–11)

## 2023-08-18 PROCEDURE — 97165 OT EVAL LOW COMPLEX 30 MIN: CPT | Mod: GO

## 2023-08-18 PROCEDURE — 99232 SBSQ HOSP IP/OBS MODERATE 35: CPT | Performed by: HOSPITALIST

## 2023-08-18 PROCEDURE — 97535 SELF CARE MNGMENT TRAINING: CPT | Mod: GO

## 2023-08-18 PROCEDURE — 99232 SBSQ HOSP IP/OBS MODERATE 35: CPT | Performed by: INTERNAL MEDICINE

## 2023-08-18 PROCEDURE — 85027 COMPLETE CBC AUTOMATED: CPT | Performed by: PODIATRIST

## 2023-08-18 PROCEDURE — 250N000011 HC RX IP 250 OP 636: Mod: JZ | Performed by: PODIATRIST

## 2023-08-18 PROCEDURE — 80048 BASIC METABOLIC PNL TOTAL CA: CPT | Performed by: PODIATRIST

## 2023-08-18 PROCEDURE — 250N000013 HC RX MED GY IP 250 OP 250 PS 637: Performed by: PODIATRIST

## 2023-08-18 PROCEDURE — G0463 HOSPITAL OUTPT CLINIC VISIT: HCPCS | Mod: 25

## 2023-08-18 PROCEDURE — 97530 THERAPEUTIC ACTIVITIES: CPT | Mod: GP | Performed by: PHYSICAL THERAPIST

## 2023-08-18 PROCEDURE — 36415 COLL VENOUS BLD VENIPUNCTURE: CPT | Performed by: PODIATRIST

## 2023-08-18 PROCEDURE — 97605 NEG PRS WND THER DME<=50SQCM: CPT

## 2023-08-18 PROCEDURE — 120N000001 HC R&B MED SURG/OB

## 2023-08-18 PROCEDURE — 97162 PT EVAL MOD COMPLEX 30 MIN: CPT | Mod: GP | Performed by: PHYSICAL THERAPIST

## 2023-08-18 RX ADMIN — HEPARIN SODIUM 5000 UNITS: 10000 INJECTION, SOLUTION INTRAVENOUS; SUBCUTANEOUS at 21:38

## 2023-08-18 RX ADMIN — AMLODIPINE BESYLATE 2.5 MG: 2.5 TABLET ORAL at 08:47

## 2023-08-18 RX ADMIN — Medication 1 CAPSULE: at 12:24

## 2023-08-18 RX ADMIN — HEPARIN SODIUM 5000 UNITS: 10000 INJECTION, SOLUTION INTRAVENOUS; SUBCUTANEOUS at 08:47

## 2023-08-18 RX ADMIN — PIPERACILLIN AND TAZOBACTAM 3.38 G: 3; .375 INJECTION, POWDER, LYOPHILIZED, FOR SOLUTION INTRAVENOUS at 18:02

## 2023-08-18 RX ADMIN — Medication 1 CAPSULE: at 17:57

## 2023-08-18 RX ADMIN — PIPERACILLIN AND TAZOBACTAM 3.38 G: 3; .375 INJECTION, POWDER, LYOPHILIZED, FOR SOLUTION INTRAVENOUS at 12:05

## 2023-08-18 RX ADMIN — PIPERACILLIN AND TAZOBACTAM 3.38 G: 3; .375 INJECTION, POWDER, LYOPHILIZED, FOR SOLUTION INTRAVENOUS at 02:43

## 2023-08-18 RX ADMIN — Medication 1 CAPSULE: at 06:52

## 2023-08-18 RX ADMIN — INSULIN GLARGINE 15 UNITS: 100 INJECTION, SOLUTION SUBCUTANEOUS at 08:48

## 2023-08-18 ASSESSMENT — ACTIVITIES OF DAILY LIVING (ADL)
ADLS_ACUITY_SCORE: 37
ADLS_ACUITY_SCORE: 33
ADLS_ACUITY_SCORE: 37
ADLS_ACUITY_SCORE: 31
ADLS_ACUITY_SCORE: 31
ADLS_ACUITY_SCORE: 37
IADL_COMMENTS: INDEPENDENT WITH IADLS
ADLS_ACUITY_SCORE: 37
ADLS_ACUITY_SCORE: 31
ADLS_ACUITY_SCORE: 33
ADLS_ACUITY_SCORE: 33

## 2023-08-18 NOTE — PROGRESS NOTES
08/18/23 1000   Appointment Info   Signing Clinician's Name / Credentials (PT) Nneka Ho, PT, DPT   Living Environment   People in Home other (see comments)  (roommate)   Current Living Arrangements house   Home Accessibility stairs to enter home   Number of Stairs, Main Entrance 3   Stair Railings, Main Entrance railings safe and in good condition   Self-Care   Equipment Currently Used at Home cane, straight;walker, standard;glucometer   Fall history within last six months no   Activity/Exercise/Self-Care Comment Independent with ADLs/IADLs at baseline. Reports roommate can assist as needed. Pt uses cane vs walker depending on the day.   General Information   Onset of Illness/Injury or Date of Surgery 08/11/23   Referring Physician Jack Mooney MD   Patient/Family Therapy Goals Statement (PT) None stated.   Pertinent History of Current Problem (include personal factors and/or comorbidities that impact the POC) s/p delayed primary closure   Existing Precautions/Restrictions weight bearing;fall   Weight-Bearing Status - RLE nonweight-bearing   Pain Assessment   Patient Currently in Pain No   Range of Motion (ROM)   Range of Motion ROM is WFL   Strength (Manual Muscle Testing)   Strength (Manual Muscle Testing) Deficits observed during functional mobility   Bed Mobility   Comment, (Bed Mobility) Pt seated in chair at start of session.   Transfers   Transfers sit-stand transfer   Transfer Safety Concerns Noted decreased weight-shifting ability   Impairments Contributing to Impaired Transfers impaired balance;decreased strength   Sit-Stand Transfer   Sit-Stand [place] (Transfers) contact guard;verbal cues   Assistive Device (Sit-Stand Transfers) walker, front-wheeled   Clinical Impression   Criteria for Skilled Therapeutic Intervention Yes, treatment indicated   PT Diagnosis (PT) impaired functional mobility   Influenced by the following impairments decreased strength, impaired balance, NWB restriction    Functional limitations due to impairments transfers, ambulation, stairs   Clinical Presentation (PT Evaluation Complexity) Evolving/Changing   Clinical Presentation Rationale Pt presents as medically diagnosed.   Clinical Decision Making (Complexity) moderate complexity   Planned Therapy Interventions (PT) balance training;bed mobility training;gait training;home exercise program;neuromuscular re-education;patient/family education;strengthening;stair training;transfer training   Risk & Benefits of therapy have been explained evaluation/treatment results reviewed;participants voiced agreement with care plan;participants included;patient   PT Total Evaluation Time   PT Eval, Moderate Complexity Minutes (65686) 10   Plan of Care Review   Plan of Care Reviewed With patient   Physical Therapy Goals   PT Frequency Daily   PT Predicted Duration/Target Date for Goal Attainment 08/25/23   PT Goals Bed Mobility;Transfers;Gait;Stairs   PT: Bed Mobility Supervision/stand-by assist;Supine to/from sit;Within precautions   PT: Transfers Supervision/stand-by assist;Sit to/from stand;Assistive device;Within precautions   PT: Gait Supervision/stand-by assist;Assistive device;150 feet  (knee scooter)   PT: Stairs Minimal assist;3 stairs;Rail on both sides   Interventions   Interventions Quick Adds Therapeutic Activity   Therapeutic Activity   Therapeutic Activities: dynamic activities to improve functional performance Minutes (16729) 8   Symptoms Noted During/After Treatment None   Treatment Detail/Skilled Intervention Pt seated in chair at start of session. Pt transfers sit <> stand with FWW on first rep. Observed to put weight through R foot. Demo'd NWB technique for sit <> stand. Pt performs additional sit <> stand repetition with FWW, CGA. Improved ability to maintain NWB, however continues to have difficulty maintaining with sit > stand portion of transfer. Patient is hoping to discharge home.   PT Discharge Planning   PT Plan  transfers NWB R LE, bring knee scooter if okay'd by podiatry, stairs when ready   PT Discharge Recommendation (DC Rec) Transitional Care Facility   PT Rationale for DC Rec Patient having difficulty maintaining NWB during session, likely to have dificulty adhering to NWB restriction. Lives with roommate who can assist as needed. Pt has 3 stairs to enter home. Recommend TCU at discharge. Pt is hoping to discharge home. If patient declines TCU, recommend wheelchair for longer distances, FWW for transfers, A of 1-2 for stair management, and home PT.   PT Brief overview of current status Sit <> stand, CGA.   Total Session Time   Timed Code Treatment Minutes 8   Total Session Time (sum of timed and untimed services) 18

## 2023-08-18 NOTE — PROGRESS NOTES
Infectious Diseases Progress Note  [facility]    Date of visit: 08/18/2023     ASSESSMENT   68-year-old man with a history of diabetes, hypertension.  Had been seen in ID clinic for nonhealing wounds following right-sided TMA.  Lost to follow-up.    Right foot infection.  History of right TMA, poorly healed wounds, with poor follow-up with podiatry.  Now with surrounding cellulitis in the foot and lower leg.  MRI showing multifocal osteomyelitis.  Signs of systemic illness with fevers and bacteremia improved. Now s/p debridement 8/14/23, pus noted at surgery, had resection of cuneiforms and cuboid. Surgical cultures with MSSA, group A strep. Pathology shows osteomyelitis at cuneiforms and cuboid. Now s/p closure 8/17, with plans for wound vac.   Sepsis. Presenting with fever, leukocytosis, elevated creatinine, elevated lactic acid and CRP.  Sepsis resolved.   Strep pyogenes bacteremia.  2 of 2 blood cultures on admission with gram-positive cocci in pairs and chains.  Growing Strep pyogenes.  Poor medical adherence.  Lost to follow-up following several visits for poorly healing TMA wounds  Heel wound growing staph aureus, group A strep  CKD.       Principal Problem:    Cellulitis of right foot  Active Problems:    Hyperglycemia    Diabetic ulcer of right foot associated with type 2 diabetes mellitus, with other ulcer severity, unspecified part of foot (H)       PLAN   Tentatively plan ceftriaxone 2gm IV daily at discharge -- he has event planned at end of month in [place]. Could then switch to oral cephalosporin.   =/- PO flagyl for possible anaerobes -- note have cultured.       Yoshi Jurado MD   [facility]  Direct messaging: [employer] Paging  On-Call ID provider: [phone], option: 9      ===========================================    SUBJECTIVE / INTERVAL HISTORY:     Pain controlled. Reviewed results. He notes that he has annual family event at the end of the month that he does  "not want to miss again.         Antibiotics   Zosyn 8/11-  Clindamycin 8/12-15    Previous:  Vancomycin 8/11      Physical Exam     Temp:  [97.8  F (36.6  C)-98.3  F (36.8  C)] 97.8  F (36.6  C)  Pulse:  [72-83] 83  Resp:  [17-20] 17  BP: (138-159)/(65-77) 138/65  SpO2:  [91 %-99 %] 92 %    /65 (BP Location: Right arm)   Pulse 83   Temp 97.8  F (36.6  C) (Oral)   Resp 17   Ht 1.803 m (5' 11\")   Wt 67 kg (147 lb 11.3 oz)   SpO2 92%   BMI 20.60 kg/m      GENERAL: up in chair, no distress  HENT:  Head is normocephalic, atraumatic. Oropharynx is moist without exudates or ulcers.  Many missing teeth  EYES:  Eyes have anicteric sclerae without conjunctival injection.   LUNGS:  normal resp pattern  CARDIOVASCULAR:  Regular rate  EXT: Extremities warm and without edema.  SKIN:  No acute rashes.  Right foot wrapped. Erythema extends into lower leg above dressing,, decreased.   NEUROLOGIC:  Grossly nonfocal.  Psych: General indifference to conversation        Cultures   8/11 blood culture: 2 of 2 with strep pyogenes  8/12 blood cultures x2: no growth to date   8/13 heel culture -- MSSA, group A strep.   8/14 OR culture -- MSSA, group A strep      Pertinent Labs:     Recent Labs   Lab 08/18/23  0540 08/17/23  0531 08/16/23  0536 08/15/23  0534   WBC 14.6*  --  9.6 8.8   HGB 9.9*  --  9.3* 8.8*   * 673* 555* 453*       Recent Labs   Lab 08/18/23  0540 08/16/23  0536 08/15/23  0534 08/14/23  0525 08/13/23  0520    135* 133* 134* 132*   CO2 23 21* 21* 21* 21*   BUN 20.6 28.0* 29.9* 32.7* 36.3*   ALBUMIN  --   --  2.4* 2.4* 2.3*   ALKPHOS  --   --  285* 196* 172*   ALT  --   --  20 19 18   AST  --   --  33 32 30       Recent Labs   Lab 08/11/23  1806   SED 84*           Imaging:     US Renal Complete Non-Vascular    Result Date: 8/13/2023  EXAM: US RENAL COMPLETE NON-VASCULAR LOCATION: [facility] DATE: 8/13/2023 INDICATION: renal failure COMPARISON: None. TECHNIQUE: Routine " Bilateral Renal and Bladder Ultrasound. FINDINGS: RIGHT KIDNEY: 10.0 cm. Normal without hydronephrosis or masses. LEFT KIDNEY: 8.9 cm. No hydronephrosis. 1.9 cm simple cyst. No follow-up needed for this. BLADDER: Moderately distended bladder.     IMPRESSION: 1.  No significant findings in the kidneys. 2.  Moderately distended bladder.    MR Foot Right w/o Contrast    Result Date: 8/11/2023  EXAM: MR FOOT RIGHT W/O CONTRAST LOCATION: [facility] DATE: 8/11/2023 INDICATION: Pain. Prior surgery. Possible osteomyelitis. COMPARISON: 06/14/2023 radiographs. TECHNIQUE: Unenhanced. FINDINGS: The exam is moderately degraded by patient motion which persisted throughout the entire study. JOINTS AND BONES: -Postop changes of a transmetatarsal amputation of the first through fifth rays at the level of the proximal portions of the metatarsal shafts. There is a soft tissue ulcer overlying the amputation site with deep extension to the bone. There is marrow edema involving all of the metatarsal stumps as well as edema in the cuboid and medial cuneiform. Findings very worrisome for multifocal osteomyelitis. No fracture or contusion. No marrow replacing lesion. Mild degenerative changes at the tibiotalar joint. TENDONS: -Moderate tenosynovitis in the tibialis anterior tendon beginning just above the level of the ankle joint. Mild tenosynovitis in the peroneal tendons without tearing. LIGAMENTS: -The ankle ligaments are grossly intact. MUSCLES AND SOFT TISSUES: -Marked atrophy of the intrinsic musculature of the foot. No discrete fluid collection to suggest an abscess. Moderate edema or cellulitis along the lateral aspect of the ankle extending into the hindfoot.     IMPRESSION: 1.  Postop changes of a transmetatarsal amputation of the first through fifth rays at the level of the proximal portions of the metatarsal shafts with findings worrisome for multifocal osteomyelitis involving the metatarsal stumps as  well as likely the cuboid and medial cuneiform. 2.  Moderate tenosynovitis of the tibialis anterior tendon.        Data reviewed today: I reviewed all medications, new labs and imaging results over the last 24 hours. I personally reviewed no images or EKG's today.  The patient's care was discussed with the Patient.

## 2023-08-18 NOTE — PROGRESS NOTES
Care Management Follow Up    Length of Stay (days): 7    Expected Discharge Date: 08/19/2023     Concerns to be Addressed:     Discharge planning.   Patient plan of care discussed at interdisciplinary rounds: Yes    Anticipated Discharge Disposition:  PT recommends TCU - pt declines and wants to go home with home care     Anticipated Discharge Services:  Pending recommendations, anticipate PT/OT/RN  Anticipated Discharge DME:      Patient/family educated on Medicare website which has current facility and service quality ratings:  declines  Education Provided on the Discharge Plan:  yes  Patient/Family in Agreement with the Plan:  yes    Referrals Placed by CM/JUAN:    Private pay costs discussed: Not applicable    Additional Information:  JUAN met with hospitalist, pt being followed by ID and podiatry. Unknown at this time if pt will need a wound vac or IV antibiotics ( on zosyn ) at discharge. JUAN following and will obtain choices from pt.   JUAN met with pt, he does not want to go to a TCU and wants to return home with home care. He plans to go to [facility] at the end of the month.       BG [id number]

## 2023-08-18 NOTE — PROGRESS NOTES
Respiratory care note:    Pt states he does not wear a cpap @ home, declines use here.  Order discontinued.     Kathi Perez, RT on 8/17/2023 at 8:19 PM

## 2023-08-18 NOTE — PLAN OF CARE
"Goal Outcome Evaluation:    Problem: Hyperglycemia  Goal: Blood Glucose Level Within Targeted Range  Outcome: Not Progressing     Problem: Fever  Goal: Fever: Plan of Care  Outcome: Progressing  Flowsheets (Taken 8/18/2023 5225)  Fever: Plan of Care: body temperature in desired range     Problem: Infection  Goal: Absence of Infection Signs and Symptoms  Outcome: Progressing          A&Ox4. Flat affect, cooperative. No potassium replacement  needed this shift. Recheck in AM. Patient approaching POD 1. NWB. IV zosyn infusing. Hypertensive, otherwise VSS on RA. RLE dressing CDI. Patient denies pain    BP (!) 159/77 (BP Location: Left arm)   Pulse 75   Temp 98.2  F (36.8  C) (Oral)   Resp 17   Ht 1.803 m (5' 11\")   Wt 67 kg (147 lb 11.3 oz)   SpO2 91%   BMI 20.60 kg/m                   "

## 2023-08-18 NOTE — PROGRESS NOTES
Therapy: IV ABX   Insurance: [facility]     100% coverage for IV abx     In reference to admission on  8/11/23 to check IV abx coverage       Please contact Intake with any questions, [phone] or In Basket pool, [facility] ([phone]).

## 2023-08-18 NOTE — PROGRESS NOTES
08/18/23 1000   Living Environment   People in Home other (see comments)  (roommate)   Current Living Arrangements house   Home Accessibility stairs to enter home   Number of Stairs, Main Entrance 3   Stair Railings, Main Entrance railings safe and in good condition   Self-Care   Equipment Currently Used at Home cane, straight;walker, standard;glucometer   Fall history within last six months no   Activity/Exercise/Self-Care Comment Independent with ADLs/IADLs at baseline. Reports roommate can assist as needed. Pt uses cane vs walker depending on the day.   General Information   Onset of Illness/Injury or Date of Surgery 08/11/23   Referring Physician Emilia   Existing Precautions/Restrictions fall;weight bearing   Right Lower Extremity (Weight-bearing Status) non weight-bearing (NWB)   Cognitive Status Examination   Cognitive Status Comments Appears grossly intact, flat affect   Visual Perception   Visual Impairment/Limitations WFL   Sensory   Sensory Quick Adds sensation intact   Range of Motion Comprehensive   General Range of Motion no range of motion deficits identified   Strength Comprehensive (MMT)   Comment, General Manual Muscle Testing (MMT) Assessment MMT not completed   Bed Mobility   Comment (Bed Mobility) SBA   Transfers   Transfer Comments CGA   Balance   Balance Comments CGA in standing   Clinical Impression   Criteria for Skilled Therapeutic Interventions Met (OT) Yes, treatment indicated   OT Diagnosis Impaired ADL independence   OT Problem List-Impairments impacting ADL balance;mobility   Assessment of Occupational Performance 1-3 Performance Deficits   Planned Therapy Interventions (OT) ADL retraining;balance training;transfer training   Clinical Decision Making Complexity (OT) low complexity   Risk & Benefits of therapy have been explained evaluation/treatment results reviewed;participants included;patient   Clinical Impression Comments Pt seen bedside for OT eval and treatment.  Pt  demonstrates decreased independence with ADLs and mobiltiy due to impaired balance and mobility, s/p foot surgery.  OT to continue to address.  Recommend TCU vs home pending progress and level of assist at home.   OT Total Evaluation Time   OT Eval, Low Complexity Minutes (46853) 10   OT Goals   Therapy Frequency (OT) Daily   OT Predicted Duration/Target Date for Goal Attainment 08/25/23   OT Goals Lower Body Dressing;Transfers   OT: Lower Body Dressing Modified independent   OT: Transfer Modified independent   OT Discharge Planning   OT Plan LB dressing, transfers in room and bath, NWB   OT Discharge Recommendation (DC Rec) Transitional Care Facility   OT Rationale for DC Rec TCU vs home iwth assist pending progress and level of assist available   OT Brief overview of current status CGA

## 2023-08-18 NOTE — PROGRESS NOTES
[facility]    Referral received from Keli Shahid RN CM, for IV antibiotics.    Benefits verified. Pt has 100% coverage for IV antibiotics under his [facility] plan.    Thank you for the referral.    Charlee Russell RN, BSN  [facility] Liaison  [phone] (Mon through Fri, 8:00 am-5:00 pm)  [phone] (Office)

## 2023-08-18 NOTE — PROGRESS NOTES
"Care Management Follow Up    Length of Stay (days): 7    Expected Discharge Date: 08/20/2023     Concerns to be Addressed:     Care progression  Patient plan of care discussed at interdisciplinary rounds: Yes    Anticipated Discharge Disposition:  Home with homecare vs TCU     Anticipated Discharge Services:    Anticipated Discharge DME:      Patient/family educated on Medicare website which has current facility and service quality ratings:    Education Provided on the Discharge Plan:  Per care team  Patient/Family in Agreement with the Plan:  Pending    Referrals Placed by CM/SW:  Yes  Private pay costs discussed: Not applicable    Additional Information:    CMRN met with patient to discussed discharge plans. Discussed pros and cons for TCU vs home with homecare d/t wound vac and IV antibiotics. Patient open to TCU, but worry he may miss his daughter's end of year event in two weeks. Patient may re-consider d/t the complexity of his current medical needs. A list of TCU was left with patient. He said he would take a look.    CM to re-approach regarding TCU.     Social Hx per previous note: \"Lives in a house with his girlfriend Lennie. Independent with ADLs and IADLs. Podiatry consult/uncontrolled DM. Declines TCU.  Family to transport. ID following.on IV zosyn, possible wound vac?  Awaiting therapy recs.\"    CM will continue to follow care progression and aide in discharge planning as needed.    Keli Shahid RN     "

## 2023-08-18 NOTE — PLAN OF CARE
Problem: Hyperglycemia  Goal: Blood Glucose Level Within Targeted Range  Outcome: Not Progressing     Problem: Plan of Care - These are the overarching goals to be used throughout the patient stay.    Goal: Optimal Comfort and Wellbeing  Outcome: Progressing     Problem: Infection  Goal: Absence of Infection Signs and Symptoms  Outcome: Progressing   Goal Outcome Evaluation:       Patient denied pain. Surgical dressing clean, dry, and intact. Patient refused dinner and only had 1 lemon ice before bedtime. Blood sugar covered per sliding scale. Patient uses urinal at bedside.

## 2023-08-18 NOTE — PROGRESS NOTES
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     Right foot diabetic ulceration/osteomyelitis with bacteremia  - Recent past hospitalisation of 6/17/23 at [facility] for osteomyelitis, with prescribed course of augmentin. XR foot from that time is suggestive of periosteal elevation. Foot wound not purulent draining, but given changes in baseline mentation, highly suspect for unresolved osteomyelitis in setting of sepsis. Sepsis by source (R foot osteomyelitis, SIRS 4/4), no overt end organ damage otherwise to suggest septic shock.  -telemetry  - MRI foot shows Postop changes of a transmetatarsal amputation of the first through fifth rays at the level of the proximal portions of the metatarsal shafts with findings worrisome for multifocal osteomyelitis involving the metatarsal stumps as well as likely the cuboid and medial cuneiform.  - Positive blood culture for gram-positive cocci in pairs and clusters, strep pyogenes  - Repeat blood culture negative  -- ID consult, appreciate input  - Oodiatry consult, S/P right foot incision and drainage with right foot removal of metatarsal base cuneiform and cuboid on 8/14/2020, underwent delayed primary closure 8/17   -Postoperative orders as per podiatry, nonweightbearing on right foot  - Started on vancomycin and Zosyn initially , switched to clindamycin and Zosyn. Stopped clindamycin and is only on Zosyn now.   - Follow up path and cx from surgery. If fails treatment, pt may need BKA.      Type II DM with arthropathy  - Poorly controled , hemoglobin A1c at 11.3  - Increased lantus from 15U to 18U, mealtime insulin increased from 4U to 6U TID with meals  - Diabetic educator consult   - Continue to monitor blood glucose closely and  titrate insulin     Acute kidney injury  - Likely related to sepsis, poorly controlled diabetes, suspected recent reduced oral intake.  - suspect base line CKD  - Creat improving slowly  - Avoid nephrotoxic drugs  - Renal US is negative for obstruction     Leucocytosis -   -Noted WBC bump to 14.6 today ?  -Pt already on abx, will await further ID reccs    Hyponatremia - resolved     Hypertension  -Significant elevation of blood pressure on admission, probably secondary to pain  -Add hydralazine IV as needed  -Started Norvasc 2.5 mg, and titrate up as needed  -Echo shows EF of 55 6% with normal LV wall thickness     Diarrhea - improving  - Probably secondary to antibiotics   - No abdominal pain   - Add lactobacillus      Weakness and deconditioning  -PT/OT evaluation    >40 MINUTES SPENT BY ME on the date of service doing chart review, history, exam, documentation & further activities per the note    Principal Problem:    Cellulitis of right foot  Active Problems:    Hyperglycemia    Diabetic ulcer of right foot associated with type 2 diabetes mellitus, with other ulcer severity, unspecified part of foot (H)      VTE prophylaxis:  Heparin SQ  DIET: Orders Placed This Encounter      Moderate Consistent Carb (60 g CHO per Meal) Diet      Disposition/Barriers to discharge: IV antibiotic, further ID and podiatry input  Code Status: No CPR- Do NOT Intubate    Subjective:  Chart reviewed and events noted.  Patient seen and examined.   In bed, denies any new symptoms. No pain in foot. Denies any chest pain, SOB.       PHYSICAL EXAM  Vitals:    08/11/23 1747   Weight: 67 kg (147 lb 11.3 oz)     B/P:129/63 T:98.5 P:87 R:17     Intake/Output Summary (Last 24 hours) at 8/15/2023 0938  Last data filed at 8/15/2023 0900  Gross per 24 hour   Intake 680 ml   Output 1850 ml   Net -1170 ml      Body mass index is 20.6 kg/m .    Constitutional: No apparent distress, and appears stated age  Respiratory:CTAB  Cardiovascular:  RRR  Musculoskeletal: R midfoot foot in clean dressing  Neurologic: Awake, alert, oriented to name, place and time.     Neuropsychiatric: Appropriate with examiner      PERTINENT LABS/IMAGING:    I have personally reviewed the following data over the past 24 hrs:    14.6 (H)  \   9.9 (L)   / 789 (H)     138 103 20.6 /  202 (H)   3.9 23 1.65 (H) \       Imaging results reviewed over the past 24 hrs:   No results found for this or any previous visit (from the past 24 hour(s)).      Discussed with patient, SW/CM    Kaity Bowman MD  Hospitalist

## 2023-08-18 NOTE — CONSULTS
[facility] Nurse Inpatient Assessment     Consulted for: R foot    Summary: VAC started today    Patient History (according to provider note(s):        Assessment:    Negative pressure wound therapy applied to: R foot     Last photo: 8/18/23   Wound due to: Surgical Wound   Wound history/plan of care:    Surgical date: 8/17/23   Service following: DPM  Date Negative Pressure Wound Therapy initiated: 8/18/23   Interventions in place: elevation  Is patient s nutritional status compromised? no   If yes, what interventions are in place? N/A  Reason for initiating vac therapy? Presence of co-morbidities, High risk of infections, Need for accelerated granulation tissue, and Prior history of delayed wound healing  Which?of?the?following?co-morbidities?apply? Diabetes and Immobility  If diabetic is patient on a diabetic management program? Yes   Is osteomyelitis present in wound? Yes   If yes what treatments are in place? IV antibiotic Zosyn    Wound base: 100 % granulation tissue     Palpation of the wound bed: normal       Drainage: scant      Volume in cannister: 0 ml     Last cannister change date: 8/18/23     Description of drainage: serosanguinous      Measurements (length x width x depth, in cm) 2 cm  x 1.5 cm  x  1.8 cm       Tunneling N/A      Undermining N/A   Periwound skin: Intact and some bruising and sutures in place        Color: pink and purple       Temperature: normal    Odor: none   Pain: denies , none   Pain intervention prior to dressing change: patient tolerated well and slow and gentle cares   Treatment goal: Heal   STATUS: initial assessment   Supplies ordered: ordered VAC, small foam and pump    Number of foam pieces removed from a wound (excluding foam for bridge) :  None    Verified this matched the number of foam pieces applied last dressing change: N/A   Number of foam pieces packed into wound (excluding foam for bridge) :  1 pc GranuFoam Black     Treatment Plan:    Wound location: R foot  Change Days: M/W/F  Supplies: Small granufoam - black  Cleanse with: Saline, pat dry.  Suction: Continuous at -125 mmHg pressure.    Back up plan: If VAC malfunctions or unable to maintain seal: VAC dressing must be removed and reapplied within 2 hours of the incident. If floor staff is not able to reapply, place NS moistened gauze in wound bed and cover with appropriate dressing to keep wound bed moist.   Change wet-to-dry dressing BID and notify WOC staff for reimplementation of VAC therapy when available.  Date canister. Chart canister output every shift.    The hospital VAC pump is not to be discharged with the patient.  Please do one of the following prior to discharge:  If a home VAC pump has been delivered, please apply the home pump to the dressing prior to patient discharge.    If the patient is discharging to LTAC or a TCU/SNF and there is a VAC pump waiting for the patient, close clamp and then disconnect the tubing for transfer, place tubing inside a glove.   If the patient is discharging to home or other facility and there is no VAC pump available for immediate placement, remove the VAC dressing and apply a wet-to-moist gauze dressing for transfer.      Orders: Written    RECOMMEND PRIMARY TEAM ORDER: None, at this time  Education provided: plan of care  Discussed plan of care with: Patient and Nurse  WOC nurse follow-up plan: Monday/WednesdayFriday  Notify WOC if wound(s) deteriorate.  Nursing to notify the Provider(s) and re-consult the WOC Nurse if new skin concern.    DATA:     Current support surface: Standard  Standard gel/foam mattress (IsoFlex, Atmos air, etc)  Containment of urine/stool: Continent of bladder and Continent of bowel  BMI: Body mass index is 20.6 kg/m .   Active diet order: Orders Placed This Encounter      Moderate Consistent Carb (60 g CHO per Meal) Diet     Output: I/O last 3 completed shifts:  In: 1386 [P.O.:880; I.V.:506]  Out: 552 [Urine:550;  Stool:2]     Labs:   Recent Labs   Lab 08/18/23  0540 08/16/23  0536 08/15/23  0534 08/13/23  0520 08/12/23  0517   ALBUMIN  --   --  2.4*   < > 2.8*   HGB 9.9*   < > 8.8*   < > 9.5*   WBC 14.6*   < > 8.8   < > 20.7*   A1C  --   --   --   --  11.3*    < > = values in this interval not displayed.     Pressure injury risk assessment:   Sensory Perception: 3-->slightly limited  Moisture: 3-->occasionally moist  Activity: 1-->bedfast  Mobility: 2-->very limited  Nutrition: 1-->very poor  Friction and Shear: 2-->potential problem  Julian Score: 12    STACEY Johnson RN CWOCN  Pager no longer is use, please contact through IGG group: [facility] [email or web address]

## 2023-08-18 NOTE — PROVIDER NOTIFICATION
Paged house officer about patient BG. 02:02 check was 258.     Response: Give insulin based on ordered ACHS sliding scale. 6AM BMP can be considered the recheck.     Administered 2 units of novolog based on ACHS sliding scale

## 2023-08-18 NOTE — PLAN OF CARE
Goal Outcome Evaluation:      Plan of Care Reviewed With: patient    Overall Patient Progress: improving    Outcome Evaluation: WOC RN placed wound vac today, pt tolerated it well with no pain. Participated in therapy and has been up in the chair most of the day. Blood sugars remain high, insulin orders now changed.

## 2023-08-19 ENCOUNTER — APPOINTMENT (OUTPATIENT)
Dept: PHYSICAL THERAPY | Facility: HOSPITAL | Age: 68
DRG: 854 | End: 2023-08-19
Payer: COMMERCIAL

## 2023-08-19 LAB
ANION GAP SERPL CALCULATED.3IONS-SCNC: 11 MMOL/L (ref 7–15)
BACTERIA WND CULT: NORMAL
BUN SERPL-MCNC: 18.3 MG/DL (ref 8–23)
CALCIUM SERPL-MCNC: 8.3 MG/DL (ref 8.8–10.2)
CHLORIDE SERPL-SCNC: 102 MMOL/L (ref 98–107)
CREAT SERPL-MCNC: 1.64 MG/DL (ref 0.67–1.17)
DEPRECATED HCO3 PLAS-SCNC: 24 MMOL/L (ref 22–29)
ERYTHROCYTE [DISTWIDTH] IN BLOOD BY AUTOMATED COUNT: 15.6 % (ref 10–15)
GFR SERPL CREATININE-BSD FRML MDRD: 46 ML/MIN/1.73M2
GLUCOSE BLDC GLUCOMTR-MCNC: 140 MG/DL (ref 70–99)
GLUCOSE BLDC GLUCOMTR-MCNC: 150 MG/DL (ref 70–99)
GLUCOSE BLDC GLUCOMTR-MCNC: 167 MG/DL (ref 70–99)
GLUCOSE BLDC GLUCOMTR-MCNC: 179 MG/DL (ref 70–99)
GLUCOSE BLDC GLUCOMTR-MCNC: 217 MG/DL (ref 70–99)
GLUCOSE SERPL-MCNC: 209 MG/DL (ref 70–99)
HCT VFR BLD AUTO: 27.4 % (ref 40–53)
HGB BLD-MCNC: 8.8 G/DL (ref 13.3–17.7)
MCH RBC QN AUTO: 27.5 PG (ref 26.5–33)
MCHC RBC AUTO-ENTMCNC: 32.1 G/DL (ref 31.5–36.5)
MCV RBC AUTO: 86 FL (ref 78–100)
PLATELET # BLD AUTO: 793 10E3/UL (ref 150–450)
POTASSIUM SERPL-SCNC: 3.8 MMOL/L (ref 3.4–5.3)
RBC # BLD AUTO: 3.2 10E6/UL (ref 4.4–5.9)
SODIUM SERPL-SCNC: 137 MMOL/L (ref 136–145)
WBC # BLD AUTO: 13.9 10E3/UL (ref 4–11)

## 2023-08-19 PROCEDURE — 82310 ASSAY OF CALCIUM: CPT | Performed by: PODIATRIST

## 2023-08-19 PROCEDURE — 250N000013 HC RX MED GY IP 250 OP 250 PS 637: Performed by: PODIATRIST

## 2023-08-19 PROCEDURE — 250N000011 HC RX IP 250 OP 636: Mod: JZ | Performed by: PODIATRIST

## 2023-08-19 PROCEDURE — 250N000011 HC RX IP 250 OP 636: Mod: JZ | Performed by: INTERNAL MEDICINE

## 2023-08-19 PROCEDURE — 36415 COLL VENOUS BLD VENIPUNCTURE: CPT | Performed by: PODIATRIST

## 2023-08-19 PROCEDURE — 99232 SBSQ HOSP IP/OBS MODERATE 35: CPT | Performed by: INTERNAL MEDICINE

## 2023-08-19 PROCEDURE — 120N000001 HC R&B MED SURG/OB

## 2023-08-19 PROCEDURE — 258N000003 HC RX IP 258 OP 636: Performed by: PODIATRIST

## 2023-08-19 PROCEDURE — 97530 THERAPEUTIC ACTIVITIES: CPT | Mod: GP

## 2023-08-19 PROCEDURE — 85014 HEMATOCRIT: CPT | Performed by: HOSPITALIST

## 2023-08-19 RX ORDER — CEFTRIAXONE 2 G/1
2 INJECTION, POWDER, FOR SOLUTION INTRAMUSCULAR; INTRAVENOUS EVERY 24 HOURS
Status: DISCONTINUED | OUTPATIENT
Start: 2023-08-19 | End: 2023-08-22 | Stop reason: HOSPADM

## 2023-08-19 RX ADMIN — Medication 1 CAPSULE: at 06:28

## 2023-08-19 RX ADMIN — Medication 1 CAPSULE: at 11:18

## 2023-08-19 RX ADMIN — CEFTRIAXONE SODIUM 2 G: 2 INJECTION, POWDER, FOR SOLUTION INTRAMUSCULAR; INTRAVENOUS at 15:47

## 2023-08-19 RX ADMIN — AMLODIPINE BESYLATE 2.5 MG: 2.5 TABLET ORAL at 09:16

## 2023-08-19 RX ADMIN — PIPERACILLIN AND TAZOBACTAM 3.38 G: 3; .375 INJECTION, POWDER, LYOPHILIZED, FOR SOLUTION INTRAVENOUS at 02:19

## 2023-08-19 RX ADMIN — HEPARIN SODIUM 5000 UNITS: 10000 INJECTION, SOLUTION INTRAVENOUS; SUBCUTANEOUS at 09:16

## 2023-08-19 RX ADMIN — Medication 1 CAPSULE: at 18:00

## 2023-08-19 RX ADMIN — PIPERACILLIN AND TAZOBACTAM 3.38 G: 3; .375 INJECTION, POWDER, LYOPHILIZED, FOR SOLUTION INTRAVENOUS at 11:18

## 2023-08-19 RX ADMIN — HEPARIN SODIUM 5000 UNITS: 10000 INJECTION, SOLUTION INTRAVENOUS; SUBCUTANEOUS at 19:22

## 2023-08-19 RX ADMIN — SODIUM CHLORIDE, POTASSIUM CHLORIDE, SODIUM LACTATE AND CALCIUM CHLORIDE: 600; 310; 30; 20 INJECTION, SOLUTION INTRAVENOUS at 01:41

## 2023-08-19 ASSESSMENT — ACTIVITIES OF DAILY LIVING (ADL)
ADLS_ACUITY_SCORE: 37
ADLS_ACUITY_SCORE: 31
ADLS_ACUITY_SCORE: 37
ADLS_ACUITY_SCORE: 37
ADLS_ACUITY_SCORE: 33
ADLS_ACUITY_SCORE: 35
ADLS_ACUITY_SCORE: 37

## 2023-08-19 NOTE — PROGRESS NOTES
ID chart check    He was resting when I came by. Will switch to ceftriaxone IV, once daily dosing. Set up home infusions if we can.    Yoshi Juraod MD

## 2023-08-19 NOTE — PLAN OF CARE
Problem: Plan of Care - These are the overarching goals to be used throughout the patient stay.    Goal: Absence of Hospital-Acquired Illness or Injury  Intervention: Identify and Manage Fall Risk  Recent Flowsheet Documentation  Taken 8/18/2023 1800 by Mitra Diez RN  Safety Promotion/Fall Prevention: activity supervised  Goal: Optimal Comfort and Wellbeing  Outcome: Progressing  Goal: Readiness for Transition of Care  Outcome: Progressing     Problem: Hyperglycemia  Goal: Blood Glucose Level Within Targeted Range  Outcome: Progressing   and 279. Denies pain.

## 2023-08-19 NOTE — PLAN OF CARE
Goal Outcome Evaluation:     Problem: Plan of Care - These are the overarching goals to be used throughout the patient stay.    Goal: Absence of Hospital-Acquired Illness or Injury  Intervention: Identify and Manage Fall Risk  Recent Flowsheet Documentation  Taken 8/19/2023 0134 by Liza Peace, RN  Safety Promotion/Fall Prevention:   assistive device/personal items within reach   patient and family education  Intervention: Prevent Skin Injury  Recent Flowsheet Documentation  Taken 8/19/2023 0134 by Liza Peace, RN  Body Position: supine, legs elevated  Goal: Optimal Comfort and Wellbeing  Outcome: Progressing     Problem: Infection  Goal: Absence of Infection Signs and Symptoms  Outcome: Progressing     Pt is alert and oriented. Denies pain. VS T 98.4, RR 17/min, NM 74/min, /77, O2 sat 94% RA. Wound vac reapplied due to leaking, minimal serosanguineous  drainage to wound vac noted on rt foot. Refused blood sugar monitoring at 02AM.

## 2023-08-19 NOTE — PROGRESS NOTES
FOOT AND ANKLE SURGERY/PODIATRY Progress Note        ASSESSMENT/PLAN:   S/P midfoot amputation with delayed primary closure of wound right foot    Continue same.  Patient to remain nonweightbearing right foot.  Patient to follow-up with vascular clinic upon discharge.  Medical management per hospitalist.  Patient to be discharged per ID.  Podiatry is signing off.  Please call with questions.      HPI: Dario Benavides was seen again today at bedside resting comfortably.  The patient is a 48 hours status post delayed primary closure of open wound right foot.  The patient is in good spirits.  He had no complaints.  He stated that his wound appears to be responding to surgical treatment.  Wound VAC is in place.    Objective findings: Dressings are clean dry and intact.  Wound VAC intact and functioning well.  Neurovascular status is intact.  Vital signs stable.    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) 05/08/2021    Tobacco abuse 07/23/2019    Type 2 diabetes mellitus with right diabetic foot infection (H) 05/04/2021        Past Surgical History:   Procedure Laterality Date    BACK SURGERY  1992    CLOSE SECONDARY WOUND LOWER EXTREMITY Right 8/17/2023    Procedure: Delayed primary closure of wound right foot;  Surgeon: Brandon Nieto DPM;  Location: [facility] [place] OR    IRRIGATION AND DEBRIDEMENT FOOT, COMBINED Right 8/14/2023    Procedure: IRRIGATION AND DEBRIDEMENT right foot with removing all infected bones and soft tissue;  Surgeon: Jun Goyal DPM;  Location: [facility] [place] [facility]    NECK SURGERY  2011       No Known Allergies      Current Facility-Administered Medications:     acetaminophen (TYLENOL) tablet 650 mg, 650 mg, Oral, Q4H PRN, Brandon Nieto DPM, 650 mg at 08/13/23 0359    amLODIPine (NORVASC) tablet 2.5 mg, 2.5  mg, Oral, Daily, Emilia, Brandon, DPM, 2.5 mg at 08/18/23 0847    glucose gel 15-30 g, 15-30 g, Oral, Q15 Min PRN **OR** dextrose 50 % injection 25-50 mL, 25-50 mL, Intravenous, Q15 Min PRN **OR** glucagon injection 1 mg, 1 mg, Subcutaneous, Q15 Min PRN, Emilia, Brandon, DPM    fentaNYL (PF) (SUBLIMAZE) injection  mcg,  mcg, Intravenous, q1 min prn, Emilia, Brandon, DPM, 50 mcg at 08/17/23 1054    heparin ANTICOAGULANT injection 5,000 Units, 5,000 Units, Subcutaneous, Q12H, Emilia, Brandon, DPM, 5,000 Units at 08/18/23 2138    hydrALAZINE (APRESOLINE) injection 10 mg, 10 mg, Intravenous, Q6H PRN, Emilia, Brandon, DPM    HYDROmorphone (DILAUDID) injection 0.2 mg, 0.2 mg, Intravenous, Q2H PRN, Emilia, Brandon, DPM    insulin aspart (NovoLOG) injection (RAPID ACTING), 10 Units, Subcutaneous, TID w/meals, Jack Mooney MD    insulin aspart (NovoLOG) injection (RAPID ACTING), 1-4 Units, Subcutaneous, 4x Daily w/meals, Emilia Brandon, DPM, 1 Units at 08/19/23 0808    insulin glargine (LANTUS PEN) injection 20 Units, 20 Units, Subcutaneous, QAM AC, Jack Mooney MD, 20 Units at 08/19/23 0808    lactated ringers infusion, , Intravenous, Continuous, Emilia Brandon, DPM, Last Rate: 10 mL/hr at 08/19/23 0141, New Bag at 08/19/23 0141    lactobacillus rhamnosus (GG) (CULTURELL) capsule 1 capsule, 1 capsule, Oral, TID AC, Emilia Brandon, DPM, 1 capsule at 08/19/23 0628    lidocaine (LMX4) cream, , Topical, Q1H PRN, Emilia, Brandon, DPM    lidocaine (LMX4) cream, , Topical, Q1H PRN, Brandon Nieto DPM    lidocaine 1 % 0.1-1 mL, 0.1-1 mL, Other, Q1H PRN, Brandon Nieto DPM    lidocaine 1 % 0.1-1 mL, 0.1-1 mL, Other, Q1H PRN, Brandon Nieto, PARK    melatonin tablet 1 mg, 1 mg, Oral, At Bedtime PRN, Brandon Nieto DPM    midazolam (VERSED) injection 0.5-2 mg, 0.5-2 mg, Intravenous, q1 min prn, Brandon Nieto DPM, 1 mg at 08/17/23 1054    naloxone (NARCAN) injection 0.2 mg, 0.2 mg, Intravenous, Q2 Min PRN  **OR** naloxone (NARCAN) injection 0.4 mg, 0.4 mg, Intravenous, Q2 Min PRN **OR** naloxone (NARCAN) injection 0.2 mg, 0.2 mg, Intramuscular, Q2 Min PRN **OR** naloxone (NARCAN) injection 0.4 mg, 0.4 mg, Intramuscular, Q2 Min PRN, EmiliaAgathah, DPM    ondansetron (ZOFRAN ODT) ODT tab 4 mg, 4 mg, Oral, Q6H PRN **OR** ondansetron (ZOFRAN) injection 4 mg, 4 mg, Intravenous, Q6H PRN, Emilia Brandon, DPM    oxyCODONE IR (ROXICODONE) half-tab 2.5 mg, 2.5 mg, Oral, Q4H PRN, Emilia, Brandon, DPM    piperacillin-tazobactam (ZOSYN) 3.375 g vial to attach to  mL bag, 3.375 g, Intravenous, Q8H, EmiliaAgathah, DPM, 3.375 g at 08/19/23 0219    polyethylene glycol (MIRALAX) Packet 17 g, 17 g, Oral, Daily PRN, EmiliaAgathah, DPM    prochlorperazine (COMPAZINE) injection 5 mg, 5 mg, Intravenous, Q6H PRN **OR** prochlorperazine (COMPAZINE) tablet 5 mg, 5 mg, Oral, Q6H PRN **OR** prochlorperazine (COMPAZINE) suppository 12.5 mg, 12.5 mg, Rectal, Q12H PRN, EmiliaArmenBrandon, DPM    sodium chloride (PF) 0.9% PF flush 3 mL, 3 mL, Intracatheter, Q8H, Emilia Brandon, DPM, 3 mL at 08/18/23 1221    sodium chloride (PF) 0.9% PF flush 3 mL, 3 mL, Intracatheter, q1 min prn, Agatha Nietoh, DPM    sodium chloride (PF) 0.9% PF flush 3 mL, 3 mL, Intracatheter, Q8H, Emilia Brandon, DPM, 3 mL at 08/17/23 0638    sodium chloride (PF) 0.9% PF flush 3 mL, 3 mL, Intracatheter, q1 min prn, Brandon Nieto DPM    History reviewed. No pertinent family history.    Social History     Socioeconomic History    Marital status:      Spouse name: Not on file    Number of children: Not on file    Years of education: Not on file    Highest education level: Not on file   Occupational History    Not on file   Tobacco Use    Smoking status: Every Day     Packs/day: 1.00     Years: 45.00     Pack years: 45.00     Types: Cigarettes    Smokeless tobacco: Never    Tobacco comments:     Seen IP by [facility] on 8/15/23 - down to 1 pack/week, declined  "cessation services and materials   Substance and Sexual Activity    Alcohol use: Not Currently    Drug use: Yes     Types: Marijuana    Sexual activity: Not Currently   Other Topics Concern    Not on file   Social History Narrative    Not on file     Social Determinants of Health     Financial Resource Strain: Not on file   Food Insecurity: Not on file   Transportation Needs: Not on file   Physical Activity: Not on file   Stress: Not on file   Social Connections: Not on file   Intimate Partner Violence: Not on file   Housing Stability: Not on file       10 point Review of Systems is negative      BP (!) 161/81 (BP Location: Left arm)   Pulse 87   Temp 98.5  F (36.9  C) (Oral)   Resp 20   Ht 1.803 m (5' 11\")   Wt 67 kg (147 lb 11.3 oz)   SpO2 94%   BMI 20.60 kg/m      BMI= Body mass index is 20.6 kg/m .    OBJECTIVE:  General appearance: Patient is alert and fully cooperative with history & exam.  No sign of distress is noted during the visit.    Imaging:         POC US Guidance Needle Placement    Result Date: 2023  Ultrasound was performed as guidance to an anesthesia procedure.  Click \"PACS images\" hyperlink below to view any stored images.  For specific procedure details, view procedure note authored by anesthesia.    POC US Guidance Needle Placement    Result Date: 2023  Ultrasound was performed as guidance to an anesthesia procedure.  Click \"PACS images\" hyperlink below to view any stored images.  For specific procedure details, view procedure note authored by anesthesia.    Echocardiogram Complete    Result Date: 2023  [id number] [address]  Name: STAN SCOTT MRN: [id number] : 1955 Study Date: 2023 01:03 PM Age: 68 yrs Gender: Male Patient Location: [facility] Reason For Study: Hypertension (HTN) Ordering Physician: CAITLIN VILLALOBOS Referring Physician: CAITLIN VILLALOBOS Performed By:   " BSA: 1.9 m2 Height: 71 in Weight: 147 lb HR: 83 ______________________________________________________________________________ Procedure Complete Echo Adult. Definity (NDC #05971-949) given intravenously. ______________________________________________________________________________ Interpretation Summary  Normal LV wall thickness. The visual ejection fraction is 55-60%. The right ventricle is normal in size and function. No significant valve disease. ______________________________________________________________________________ Left Ventricle The left ventricle is normal in size. There is normal left ventricular wall thickness. The visual ejection fraction is 55-60%. Left ventricular diastolic function is normal. No regional wall motion abnormalities noted.  Right Ventricle The right ventricle is normal in size and function.  Atria The left atrium is mildly dilated. Right atrial size is normal. Intact atrial septum.  Mitral Valve Mitral valve leaflets appear normal. There is trace mitral regurgitation.  Tricuspid Valve Tricuspid valve leaflets appear normal. There is mild (1+) tricuspid regurgitation.  Aortic Valve Aortic valve leaflets appear normal. There is no evidence of aortic stenosis or clinically significant aortic regurgitation.  Pulmonic Valve The pulmonic valve is not well visualized. There is no pulmonic valvular regurgitation.  Vessels The aorta root is normal. Normal size ascending aorta. IVC diameter <2.1 cm collapsing >50% with sniff suggests a normal RA pressure of 3 mmHg.  Pericardium There is no pericardial effusion.  ______________________________________________________________________________ MMode/2D Measurements & Calculations IVSd: 1.0 cm LVIDd: 4.9 cm LVIDs: 3.4 cm LVPWd: 0.95 cm FS: 30.1 %  LV mass(C)d: 172.2 grams LV mass(C)dI: 93.1 grams/m2 Ao root diam: 2.8 cm LA dimension: 3.9 cm asc Aorta Diam: 2.9 cm LA/Ao: 1.4 LVOT diam: 2.0 cm LVOT area: 3.1 cm2 LA Volume Indexed (AL/bp): 35.7  "ml/m2 RV Base: 4.3 cm RWT: 0.39 TAPSE: 2.0 cm  Time Measurements MM HR: 81.0 BPM  Doppler Measurements & Calculations MV E max mick: 72.0 cm/sec MV A max mick: 66.4 cm/sec MV E/A: 1.1 MV dec slope: 275.0 cm/sec2 MV dec time: 0.26 sec Ao V2 max: 133.0 cm/sec Ao max P.0 mmHg Ao V2 mean: 98.0 cm/sec Ao mean P.0 mmHg Ao V2 VTI: 26.4 cm PEYTON(I,D): 2.2 cm2 PEYTON(V,D): 2.2 cm2 LV V1 max PG: 3.5 mmHg LV V1 max: 94.0 cm/sec LV V1 VTI: 18.3 cm SV(LVOT): 57.5 ml SI(LVOT): 31.1 ml/m2 PA acc time: 0.11 sec AV Mick Ratio (DI): 0.71 PEYTON Index (cm2/m2): 1.2 E/E' av.8 Lateral E/e': 5.3 Medial E/e': 8.3 RV S Mick: 12.3 cm/sec  ______________________________________________________________________________ Report approved by: Romel Foley 2023 02:07 PM       POC US Guidance Needle Placement    Result Date: 2023  Ultrasound was performed as guidance to an anesthesia procedure.  Click \"PACS images\" hyperlink below to view any stored images.  For specific procedure details, view procedure note authored by anesthesia.    US Renal Complete Non-Vascular    Result Date: 2023  EXAM: US RENAL COMPLETE NON-VASCULAR LOCATION: [facility] DATE: 2023 INDICATION: renal failure COMPARISON: None. TECHNIQUE: Routine Bilateral Renal and Bladder Ultrasound. FINDINGS: RIGHT KIDNEY: 10.0 cm. Normal without hydronephrosis or masses. LEFT KIDNEY: 8.9 cm. No hydronephrosis. 1.9 cm simple cyst. No follow-up needed for this. BLADDER: Moderately distended bladder.     IMPRESSION: 1.  No significant findings in the kidneys. 2.  Moderately distended bladder.    MR Foot Right w/o Contrast    Result Date: 2023  EXAM: MR FOOT RIGHT W/O CONTRAST LOCATION: [facility] DATE: 2023 INDICATION: Pain. Prior surgery. Possible osteomyelitis. COMPARISON: 2023 radiographs. TECHNIQUE: Unenhanced. FINDINGS: The exam is moderately degraded by patient motion which persisted throughout the " entire study. JOINTS AND BONES: -Postop changes of a transmetatarsal amputation of the first through fifth rays at the level of the proximal portions of the metatarsal shafts. There is a soft tissue ulcer overlying the amputation site with deep extension to the bone. There is marrow edema involving all of the metatarsal stumps as well as edema in the cuboid and medial cuneiform. Findings very worrisome for multifocal osteomyelitis. No fracture or contusion. No marrow replacing lesion. Mild degenerative changes at the tibiotalar joint. TENDONS: -Moderate tenosynovitis in the tibialis anterior tendon beginning just above the level of the ankle joint. Mild tenosynovitis in the peroneal tendons without tearing. LIGAMENTS: -The ankle ligaments are grossly intact. MUSCLES AND SOFT TISSUES: -Marked atrophy of the intrinsic musculature of the foot. No discrete fluid collection to suggest an abscess. Moderate edema or cellulitis along the lateral aspect of the ankle extending into the hindfoot.     IMPRESSION: 1.  Postop changes of a transmetatarsal amputation of the first through fifth rays at the level of the proximal portions of the metatarsal shafts with findings worrisome for multifocal osteomyelitis involving the metatarsal stumps as well as likely the cuboid and medial cuneiform. 2.  Moderate tenosynovitis of the tibialis anterior tendon.             Brandon Nieto; PARK  [facility] Foot & Ankle Surgery/Podiatry

## 2023-08-19 NOTE — PROGRESS NOTES
[facility]    PROGRESS NOTE - Hospitalist Service    Assessment and Plan  68 years old male with a past medical history of DM2 with charcot foot (s/p amputation R foot), CKD3b, osteomyelitis of foot; admitted with cellulitis, /osteomyelitis of R foot, diabetic foot ulcer.     Right foot diabetic ulceration/osteomyelitis with bacteremia  - Recent past hospitalisation of 6/17/23 at [facility] for osteomyelitis, with prescribed course of augmentin. XR foot from that time is suggestive of periosteal elevation. Foot wound not purulent draining, but given changes in baseline mentation, highly suspect for unresolved osteomyelitis in setting of sepsis. Sepsis by source (R foot osteomyelitis, SIRS 4/4), no overt end organ damage otherwise to suggest septic shock.  -telemetry  - MRI foot shows Postop changes of a transmetatarsal amputation of the first through fifth rays at the level of the proximal portions of the metatarsal shafts with findings worrisome for multifocal osteomyelitis involving the metatarsal stumps as well as likely the cuboid and medial cuneiform.  - Positive blood culture for gram-positive cocci in pairs and clusters, strep pyogenes  - Repeat blood culture negative  -- ID consult, appreciate input  - Oodiatry consult, S/P right foot incision and drainage with right foot removal of metatarsal base cuneiform and cuboid on 8/14/2020, underwent delayed primary closure 8/17   -Postoperative orders as per podiatry, nonweightbearing on right foot  - Started on vancomycin and Zosyn initially , switched to clindamycin and Zosyn. Stopped clindamycin and is only on Zosyn now.   -  Anticipate discharge on IV antibiotics as per ID  - Follow up path and cx from surgery. If fails treatment, pt may need BKA.      Type II DM with arthropathy  - Poorly controled , hemoglobin A1c at 11.3  - Increased lantus from 15U to 18U, mealtime insulin increased from 4U to 6U TID with meals  - Diabetic educator  consult   - Continue to monitor blood glucose closely and titrate insulin     Acute kidney injury  - Likely related to sepsis, poorly controlled diabetes, suspected recent reduced oral intake.  - suspect base line CKD stage III  - Creat improving slowly  - Avoid nephrotoxic drugs  - Renal US is negative for obstruction  -Continue to monitor renal function     Leucocytosis -   - Noted WBC bump to 14.6 on 8/18/2023  - Continue to monitor CBC  - Pt already on abx, will await further ID reccs     Hypertension  -Significant elevation of blood pressure on admission, probably secondary to pain  -Add hydralazine IV as needed  -Started Norvasc 2.5 mg, and titrate up as needed  -Echo shows EF of 55 6% with normal LV wall thickness    Chronic anemia  - Probably of chronic illness  - Stable hemoglobin  - No signs of active bleeding  - Work-up as outpatient     Diarrhea - improving  - Probably secondary to antibiotics   - No abdominal pain   - Add lactobacillus     Hyponatremia   - resolved     Weakness and deconditioning  -PT/OT evaluation   -Patient needs TCU     40 MINUTES SPENT BY ME on the date of service doing chart review, history, exam, documentation & further activities per the note    Principal Problem:    Cellulitis of right foot  Active Problems:    Hyperglycemia    Diabetic ulcer of right foot associated with type 2 diabetes mellitus, with other ulcer severity, unspecified part of foot (H)      VTE prophylaxis:  Heparin SQ  DIET: Orders Placed This Encounter      Moderate Consistent Carb (60 g CHO per Meal) Diet      Disposition/Barriers to discharge: IV antibiotic  Code Status: No CPR- Do NOT Intubate    Subjective:  Dario is feeling about the same today, denies any chest pain or shortness of breath.  No acute significant events overnight.    PHYSICAL EXAM  Vitals:    08/11/23 1747 08/19/23 0920   Weight: 67 kg (147 lb 11.3 oz) 75.4 kg (166 lb 3.6 oz)     B/P:161/81 T:98.5 P:87 R:20     Intake/Output Summary  (Last 24 hours) at 8/19/2023 1259  Last data filed at 8/19/2023 1059  Gross per 24 hour   Intake 0 ml   Output 1100 ml   Net -1100 ml      Body mass index is 23.18 kg/m .    Constitutional: awake, alert, cooperative, no apparent distress, and appears stated age  Respiratory: No increased work of breathing, good air exchange, clear to auscultation bilaterally, no crackles or wheezing  Cardiovascular: Normal apical impulse, regular rate and rhythm, normal S1 and S2, no S3 or S4, and no murmur noted  GI: No scars, normal bowel sounds, soft, non-distended, non-tender, no masses palpated, no hepatosplenomegally  Skin: no bruising or bleeding and normal skin color, texture, turgor  Musculoskeletal: Right midfoot foot in clean dressing   Neurologic: Awake, alert, oriented to name, place and time.  Cranial nerves II-XII are grossly intact.  Motor is 5 out of 5 bilaterally.   Sensory is intact.    Neuropsychiatric: Appropriate with examiner      PERTINENT LABS/IMAGING:    I have personally reviewed the following data over the past 24 hrs:    13.9 (H)  \   8.8 (L)   / 793 (H)     137 102 18.3 /  150 (H)   3.8 24 1.64 (H) \       Imaging results reviewed over the past 24 hrs:   No results found for this or any previous visit (from the past 24 hour(s)).    Discussed with patient, family, nursing staff and discharge planner    Jack Mooney MD  [facility] Medicine Service  [phone]

## 2023-08-19 NOTE — PROGRESS NOTES
Notified by JUAN that patient is refusing TCU and plans to return home. Needs home infusion, wound vac, and home care arranged.     Referral was sent to [facility]. Patient has 100% coverage and they are following.     Home care referrals are pending     [facility] portal started today and documents faxed.

## 2023-08-19 NOTE — PLAN OF CARE
Problem: Hyperglycemia  Goal: Blood Glucose Level Within Targeted Range  8/19/2023 1505 by Katrina Fisher, RN  Outcome: Progressing  8/19/2023 1504 by Katrina Fisher, RN  Outcome: Not Progressing   Goal Outcome Evaluation:       Patient sugars continue to be elevated, sliding scale insulin administered as ordered. Patient has poor oral intake, refusing breakfast and lunch. Education provided on importance of good nutrition to promote healing. Patient did end up eating lunch that niece brought in consisting of chili dog, fries, and pop, mealtime insulin administered. Education provided on better food choices to manage diabetes. Patient verbalized understanding. Wound vac intact to right foot and functioning.

## 2023-08-19 NOTE — PROGRESS NOTES
"Care Management Follow Up    Length of Stay (days): 8    Expected Discharge Date: 08/21/2023     Concerns to be Addressed:    discharge planning   Patient plan of care discussed at interdisciplinary rounds: Yes    Anticipated Discharge Disposition:  Pt plans for discharge to home      Patient/family educated on Medicare website which has current facility and service quality ratings:  yes  Education Provided on the Discharge Plan:  yes  Patient/Family in Agreement with the Plan:  no- Pt declining TCU    Referrals Placed by CM/SW:  Home infusion  Private pay costs discussed: Not applicable    Additional Information:  SW met with pt to re-visit recommendation for TCU. Pt reports that he has not looked at the list CM provided yesterday or considered it further. SW discussed with pt the option of sending TCU referrals close to home so that he is aware of facility options. Pt reports he was at [facility] in the past and would not return there. He reports plan to go home. SW discussed with him that TCU is strongly recommended due to his medical needs at this time and discussed option of checking with other [facility] or [place] ([facility]). Pt did not answer. He does report that he has a lot to take care of at home, such as bills. SW asked about support people and pt reports that his daughter lives in [place] and his son is in half-way. SW asked about Lennie who is listed in other care management documentation. He reports that is his roommate. SW asked if she might be able to bring him things he needs from home or assist otherwise and pt reports \"I doubt it.\" SW acknowledged that pt did not appear willing to discuss TCU further at this time but discussed plan for SW to check back again tomorrow. Pt reports understanding.    MARK Hillman      "

## 2023-08-20 ENCOUNTER — APPOINTMENT (OUTPATIENT)
Dept: OCCUPATIONAL THERAPY | Facility: HOSPITAL | Age: 68
DRG: 854 | End: 2023-08-20
Payer: COMMERCIAL

## 2023-08-20 LAB
ANION GAP SERPL CALCULATED.3IONS-SCNC: 10 MMOL/L (ref 7–15)
BUN SERPL-MCNC: 14.5 MG/DL (ref 8–23)
CALCIUM SERPL-MCNC: 8.4 MG/DL (ref 8.8–10.2)
CHLORIDE SERPL-SCNC: 104 MMOL/L (ref 98–107)
CREAT SERPL-MCNC: 1.47 MG/DL (ref 0.67–1.17)
DEPRECATED HCO3 PLAS-SCNC: 25 MMOL/L (ref 22–29)
ERYTHROCYTE [DISTWIDTH] IN BLOOD BY AUTOMATED COUNT: 15.9 % (ref 10–15)
GFR SERPL CREATININE-BSD FRML MDRD: 52 ML/MIN/1.73M2
GLUCOSE BLDC GLUCOMTR-MCNC: 104 MG/DL (ref 70–99)
GLUCOSE BLDC GLUCOMTR-MCNC: 105 MG/DL (ref 70–99)
GLUCOSE BLDC GLUCOMTR-MCNC: 115 MG/DL (ref 70–99)
GLUCOSE BLDC GLUCOMTR-MCNC: 144 MG/DL (ref 70–99)
GLUCOSE BLDC GLUCOMTR-MCNC: 248 MG/DL (ref 70–99)
GLUCOSE SERPL-MCNC: 94 MG/DL (ref 70–99)
HCT VFR BLD AUTO: 27.9 % (ref 40–53)
HGB BLD-MCNC: 9 G/DL (ref 13.3–17.7)
MCH RBC QN AUTO: 27.9 PG (ref 26.5–33)
MCHC RBC AUTO-ENTMCNC: 32.3 G/DL (ref 31.5–36.5)
MCV RBC AUTO: 86 FL (ref 78–100)
PLATELET # BLD AUTO: 792 10E3/UL (ref 150–450)
POTASSIUM SERPL-SCNC: 3.7 MMOL/L (ref 3.4–5.3)
RBC # BLD AUTO: 3.23 10E6/UL (ref 4.4–5.9)
SODIUM SERPL-SCNC: 139 MMOL/L (ref 136–145)
WBC # BLD AUTO: 12.4 10E3/UL (ref 4–11)

## 2023-08-20 PROCEDURE — 99232 SBSQ HOSP IP/OBS MODERATE 35: CPT | Performed by: HOSPITALIST

## 2023-08-20 PROCEDURE — 36569 INSJ PICC 5 YR+ W/O IMAGING: CPT

## 2023-08-20 PROCEDURE — 120N000001 HC R&B MED SURG/OB

## 2023-08-20 PROCEDURE — 250N000009 HC RX 250: Performed by: INTERNAL MEDICINE

## 2023-08-20 PROCEDURE — 36415 COLL VENOUS BLD VENIPUNCTURE: CPT | Performed by: INTERNAL MEDICINE

## 2023-08-20 PROCEDURE — 82310 ASSAY OF CALCIUM: CPT | Performed by: PODIATRIST

## 2023-08-20 PROCEDURE — 250N000013 HC RX MED GY IP 250 OP 250 PS 637: Performed by: PODIATRIST

## 2023-08-20 PROCEDURE — 97535 SELF CARE MNGMENT TRAINING: CPT | Mod: GO

## 2023-08-20 PROCEDURE — 85027 COMPLETE CBC AUTOMATED: CPT | Performed by: INTERNAL MEDICINE

## 2023-08-20 PROCEDURE — 250N000011 HC RX IP 250 OP 636: Mod: JZ | Performed by: INTERNAL MEDICINE

## 2023-08-20 PROCEDURE — 250N000011 HC RX IP 250 OP 636: Mod: JZ | Performed by: PODIATRIST

## 2023-08-20 PROCEDURE — 99232 SBSQ HOSP IP/OBS MODERATE 35: CPT | Performed by: INTERNAL MEDICINE

## 2023-08-20 PROCEDURE — 250N000013 HC RX MED GY IP 250 OP 250 PS 637: Performed by: HOSPITALIST

## 2023-08-20 RX ORDER — CEFTRIAXONE 2 G/1
2 INJECTION, POWDER, FOR SOLUTION INTRAMUSCULAR; INTRAVENOUS EVERY 24 HOURS
Qty: 220 ML | Refills: 0
Start: 2023-08-20 | End: 2023-08-31

## 2023-08-20 RX ORDER — CEPHALEXIN 500 MG/1
500 CAPSULE ORAL 3 TIMES DAILY
Qty: 42 CAPSULE | Refills: 0 | Status: SHIPPED | OUTPATIENT
Start: 2023-09-01 | End: 2023-09-15

## 2023-08-20 RX ORDER — LIDOCAINE 40 MG/G
CREAM TOPICAL
Status: DISCONTINUED | OUTPATIENT
Start: 2023-08-20 | End: 2023-08-22 | Stop reason: HOSPADM

## 2023-08-20 RX ADMIN — MICONAZOLE NITRATE: 20 POWDER TOPICAL at 20:39

## 2023-08-20 RX ADMIN — Medication 1 CAPSULE: at 12:26

## 2023-08-20 RX ADMIN — HEPARIN SODIUM 5000 UNITS: 10000 INJECTION, SOLUTION INTRAVENOUS; SUBCUTANEOUS at 20:33

## 2023-08-20 RX ADMIN — Medication 1 CAPSULE: at 16:39

## 2023-08-20 RX ADMIN — LIDOCAINE HYDROCHLORIDE 2 ML: 10 INJECTION, SOLUTION EPIDURAL; INFILTRATION; INTRACAUDAL; PERINEURAL at 13:55

## 2023-08-20 RX ADMIN — CEFTRIAXONE SODIUM 2 G: 2 INJECTION, POWDER, FOR SOLUTION INTRAMUSCULAR; INTRAVENOUS at 16:26

## 2023-08-20 RX ADMIN — AMLODIPINE BESYLATE 2.5 MG: 2.5 TABLET ORAL at 09:04

## 2023-08-20 RX ADMIN — HEPARIN SODIUM 5000 UNITS: 10000 INJECTION, SOLUTION INTRAVENOUS; SUBCUTANEOUS at 09:04

## 2023-08-20 RX ADMIN — Medication 1 CAPSULE: at 06:00

## 2023-08-20 ASSESSMENT — ACTIVITIES OF DAILY LIVING (ADL)
ADLS_ACUITY_SCORE: 30
ADLS_ACUITY_SCORE: 30
ADLS_ACUITY_SCORE: 31
ADLS_ACUITY_SCORE: 31
ADLS_ACUITY_SCORE: 30
ADLS_ACUITY_SCORE: 31
ADLS_ACUITY_SCORE: 30

## 2023-08-20 NOTE — PLAN OF CARE
Problem: Plan of Care - These are the overarching goals to be used throughout the patient stay.    Goal: Absence of Hospital-Acquired Illness or Injury  Intervention: Identify and Manage Fall Risk  Recent Flowsheet Documentation  Taken 8/20/2023 0054 by Liza Peace RN  Safety Promotion/Fall Prevention: activity supervised     Problem: Plan of Care - These are the overarching goals to be used throughout the patient stay.    Goal: Optimal Comfort and Wellbeing  Outcome: Progressing     Problem: Hyperglycemia  Goal: Blood Glucose Level Within Targeted Range  Outcome: Progressing     Problem: Infection  Goal: Absence of Infection Signs and Symptoms  Outcome: Progressing   Goal Outcome Evaluation:  Pt is alert and oriented. Denies pain. /77, asymptomatic and comfortable. BG at midnight is 115. Pt does not show any problem with sleeping. Wound vac has minimal drainage.

## 2023-08-20 NOTE — PROGRESS NOTES
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 Hospital Day: 10        Right foot diabetic ulceration/osteomyelitis with bacteremia  - Recent past hospitalisation of 6/17/23 at [facility] for osteomyelitis, with prescribed course of augmentin. XR foot from that time is suggestive of periosteal elevation. Sepsis by source (R foot osteomyelitis, SIRS 4/4), no overt end organ damage otherwise to suggest septic shock.  - MRI foot showed Postop changes of a transmetatarsal amputation of the first through fifth rays at the level of the proximal portions of the metatarsal shafts with findings worrisome for multifocal osteomyelitis involving the metatarsal stumps as well as likely the cuboid and medial cuneiform.  - Positive blood culture for gram-positive cocci in pairs and clusters, Strep pyogenes  - Repeat blood culture negative from 8/17  -- ID following, appreciate input  - Podiatry consult, S/P right foot incision and drainage with right foot removal of metatarsal base cuneiform and cuboid on 8/14, underwent delayed primary closure 8/17   -Postoperative orders as per podiatry, nonweightbearing on right foot  - Started on vancomycin and Zosyn initially , switched to clindamycin and Zosyn, then Zosyn alone, now high-dose Rocephin. Will need home IV abx.   - noncompliance has complicated his treatment in the past. Sounds like he may refuse recommended duration of IV abx this time too  - If fails treatment, pt may need BKA.      Type II DM with arthropathy  - Poorly controled , hemoglobin A1c at 11.3  - Increased lantus from 15U to 18U, mealtime insulin increased from 4U to 6U TID with meals- he was not using it  regularly at home  - Diabetic educator saw him here  - Continue to monitor blood glucose closely and titrate insulin     Acute kidney injury  - Likely related to sepsis, poorly controlled diabetes, suspected recent reduced oral intake.  - suspect base line CKD stage III  - Creat improving slowly  - Avoid nephrotoxic drugs  - Renal US is negative for obstruction  -Continue to monitor renal function periodically     Leucocytosis  - Noted WBC bump to 14.6 on 8/18/2023, now trending down again  - Continue to monitor CBC     Hypertension  -Significant elevation of blood pressure on admission, probably secondary to pain  -Add hydralazine IV as needed  -Started Norvasc 2.5 mg, and titrate up as needed  -Echo shows EF of 55 6% with normal LV wall thickness     Chronic anemia  - Probably of chronic illness  - Stable hemoglobin  - No signs of active bleeding  - Work-up as outpatient     Diarrhea - improving  - Probably secondary to antibiotics   - No abdominal pain   - Added lactobacillus      Hyponatremia   - resolved     Weakness and deconditioning  -PT/OT evaluation   -Patient needs TCU but is refusing. Planning home with home IV infusion and wound vac when arrangements made.     HypoK  -replaced  -stop protocol       DVT Prophylaxis: Moderate risk. SQH  Diet: Snacks/Supplements Adult: Glucerna; Between Meals  Moderate Consistent Carb (60 g CHO per Meal) Diet    Odell Catheter: Not present  Lines: PRESENT      PICC 08/20/23 Single Lumen Right Basilic antibiotics-Site Assessment: WDL      Cardiac Monitoring: None  Code Status: No CPR- Do NOT Intubate      Clinically Significant Risk Factors              # Hypoalbuminemia: Lowest albumin = 2.3 g/dL at 8/13/2023  5:20 AM, will monitor as appropriate     # Hypertension: Noted on problem list       # DMII: A1C = 11.3 % (Ref range: <5.7 %) within past 6 months              Disposition Plan   Disposition: Home with home care as refusing TCU     Expected Discharge Date: 08/21/2023     Discharge Delays: *Medically Ready for Discharge  Placement - Homecare  Insurance Authorization needed  Destination: home with family  Discharge Comments: wound vac     Medically ready to discharge today: Yes     The patient's care was discussed with the Care Coordinator/ and Patient.    Kayce Nava MD  Hospitalist Service  [facility]  Securely message with [employer] (more info)  Text page via [employer] Paging/Directory   ______________________________________________________________________      Physical Exam   Vital Signs: Temp: 98.7  F (37.1  C) Temp src: Oral BP: (!) 155/7 Pulse: 83   Resp: 18 SpO2: 95 % O2 Device: None (Room air)    Weight: 166 lbs 3.63 oz  General: in no apparent distress, non-toxic, and alert male lying in hospital bed oriented x3  HEENT: Head normocephalic atraumatic, oral mucosa moist. Sclerae anicteric  CV: Regular rhythm, normal rate, no murmurs  Resp: No wheezes, no rales or rhonchi, no focal consolidations  GI: Belly soft, nondistended, nontender, bowel sounds present  Skin: RLE dressing CDI  Extremities: No peripheral edema, R foot partial amputation  Psych: Normal affect, mood euthymic  Neuro: Grossly normal        Medical Decision Making               Data   Recent Results (from the past 12 hour(s))   Basic metabolic panel    Collection Time: 08/20/23  6:23 AM   Result Value Ref Range    Sodium 139 136 - 145 mmol/L    Potassium 3.7 3.4 - 5.3 mmol/L    Chloride 104 98 - 107 mmol/L    Carbon Dioxide (CO2) 25 22 - 29 mmol/L    Anion Gap 10 7 - 15 mmol/L    Urea Nitrogen 14.5 8.0 - 23.0 mg/dL    Creatinine 1.47 (H) 0.67 - 1.17 mg/dL    Calcium 8.4 (L) 8.8 - 10.2 mg/dL    Glucose 94 70 - 99 mg/dL    GFR Estimate 52 (L) >60 mL/min/1.73m2   CBC with platelets    Collection Time: 08/20/23  6:23 AM   Result Value Ref Range    WBC Count 12.4 (H) 4.0 - 11.0 10e3/uL    RBC Count 3.23 (L) 4.40 - 5.90 10e6/uL    Hemoglobin 9.0 (L) 13.3 - 17.7 g/dL    Hematocrit  27.9 (L) 40.0 - 53.0 %    MCV 86 78 - 100 fL    MCH 27.9 26.5 - 33.0 pg    MCHC 32.3 31.5 - 36.5 g/dL    RDW 15.9 (H) 10.0 - 15.0 %    Platelet Count 792 (H) 150 - 450 10e3/uL   Glucose by meter    Collection Time: 08/20/23  8:23 AM   Result Value Ref Range    GLUCOSE BY METER POCT 104 (H) 70 - 99 mg/dL   Glucose by meter    Collection Time: 08/20/23 12:05 PM   Result Value Ref Range    GLUCOSE BY METER POCT 248 (H) 70 - 99 mg/dL     Interval History     Patient states doing fine today.  He has no complaints and feels ready to return home.  Denies any chest pain, shortness of breath, trouble with bowels or bladder, sleeping well, appetite good.  Feels he will be able to maintain nonweightbearing status at home.  Needs home IV antibiotics and wound VAC set up.  Not clear if we will have this done today.  Medically ready for discharge when arrangements made.

## 2023-08-20 NOTE — PROCEDURES
"Procedures      PICC Line Insertion Procedure Note  Pt. Name: Dario Benavides  MRN:        [id number]    Procedure: Insertion of a  Single Lumen  4 fr  Bard SOLO (valved) Power PICC, Lot number [id number]    Indications: Home Anbtibiotics    Contraindications : none    Procedure Details   Patient identified with 2 identifiers and \"Time Out\" conducted.  .     Central line insertion bundle followed: hand hygeine performed prior to procedure, site cleansed with cholraprep, hat, mask, sterile gloves,sterile gown worn, patient draped with maximum barrier head to toe drape, sterile field maintained.    Vein was assessed and found to be compressible and of adequate size. Two ml 1% Lidocaine administered sq to the insertion site. A 4 Fr PICC was inserted into the basilic vein of the right arm with ultrasound guidance. One attempt(s) required to access vein.   Catheter threaded without difficulty. Good blood return noted.    Modified Seldinger Technique used for insertion.    The 8 sharps that are included in the PICC kit were accounted for and disposed of in the sharps container prior to the breakdown of the sterile field.     Catheter secured with Statlock, biopatch and Tegaderm dressing applied.    Findings:  Total catheter length  48 cm, with 3 cm exposed. Mid upper arm circumference is 29 cm. Catheter was flushed with 10 cc NS. Patient  tolerated procedure well.    Tip placement verified by 3 CG technology    CLABSI prevention brochure left at bedside.    Patient's primary RN notified PICC is ready for use.    Comments:                  Eliana Fletcher, RN  PICC RN    [facility]  [phone]    "

## 2023-08-20 NOTE — PROGRESS NOTES
Infectious Diseases Progress Note  [facility]    Date of visit: 08/20/2023     ASSESSMENT   68-year-old man with a history of diabetes, hypertension.  Had been seen in ID clinic for nonhealing wounds following right-sided TMA.  Lost to follow-up.    Right foot infection.  History of right TMA, poorly healed wounds, with poor follow-up with podiatry.  Now with surrounding cellulitis in the foot and lower leg.  MRI showing multifocal osteomyelitis.  Signs of systemic illness with fevers and bacteremia improved. Now s/p debridement 8/14/23, pus noted at surgery, had resection of cuneiforms and cuboid. Surgical cultures with MSSA, group A strep. Pathology shows osteomyelitis at cuneiforms and cuboid. Now s/p closure 8/17, wound vac in place. 8/17 cultures skin vernell.    Sepsis. Presenting with fever, leukocytosis, elevated creatinine, elevated lactic acid and CRP.  Sepsis resolved.   Strep pyogenes bacteremia.  2 of 2 blood cultures on admission with gram-positive cocci in pairs and chains.    Poor medical adherence.  Lost to follow-up following several visits for poorly healing TMA wounds  Heel wound growing MSSA, group A strep  CKD.       Principal Problem:    Cellulitis of right foot  Active Problems:    Hyperglycemia    Diabetic ulcer of right foot associated with type 2 diabetes mellitus, with other ulcer severity, unspecified part of foot (H)       PLAN   Plan ceftriaxone 2gm IV daily at discharge -- he has event planned at end of month in [place]. Could then switch to oral cephalosporin 500mg PO TID for until around 9/15/23.   +/- PO flagyl for possible anaerobes -- none have cultured.   PICC and antibiotics ordered  Keep podiatry follow up. See me 9/13/23.    Yoshi Jurado MD   [facility]  Direct messaging: [employer] Paging  On-Call ID provider: [phone], option: 9      ===========================================    SUBJECTIVE / INTERVAL HISTORY:     Doing ok. Reviewed  "results, discussed plan. He is hoping for discharge tomorrow. Discussed PICC, he has had one before.        Antibiotics   Ceftriaxone 8/19-    Previous:  Vancomycin 8/11  Zosyn 8/11-19  Clindamycin 8/12-15    Physical Exam     Temp:  [98.3  F (36.8  C)-98.7  F (37.1  C)] 98.7  F (37.1  C)  Pulse:  [72-89] 89  Resp:  [17-81] 17  BP: (144-169)/(74-83) 144/83  SpO2:  [92 %-95 %] 95 %    BP (!) 144/83 (BP Location: Left arm)   Pulse 89   Temp 98.7  F (37.1  C) (Oral)   Resp 17   Ht 1.803 m (5' 11\")   Wt 75.4 kg (166 lb 3.6 oz)   SpO2 95%   BMI 23.18 kg/m      GENERAL: no distress  HENT:  Head is normocephalic, atraumatic. Oropharynx is moist without exudates or ulcers.  Many missing teeth  EYES:  Eyes have anicteric sclerae without conjunctival injection.   LUNGS:  normal resp pattern  CARDIOVASCULAR:  Regular rate  EXT: Extremities warm and without edema.  SKIN:  No acute rashes.  Right foot wrapped. Erythema extends into lower leg above dressing,, decreased. Wound vac.  NEUROLOGIC:  Grossly nonfocal.  Psych: General indifference to conversation        Cultures   8/11 blood culture: 2 of 2 with strep pyogenes  8/12 blood cultures x2: no growth to date   8/13 heel culture -- MSSA, group A strep.   8/14 OR culture -- MSSA, group A strep      Pertinent Labs:     Recent Labs   Lab 08/20/23  0623 08/19/23  0629 08/18/23  0540   WBC 12.4* 13.9* 14.6*   HGB 9.0* 8.8* 9.9*   * 793* 789*       Recent Labs   Lab 08/20/23  0623 08/19/23  0629 08/18/23  0540 08/16/23  0536 08/15/23  0534 08/14/23  0525    137 138   < > 133* 134*   CO2 25 24 23   < > 21* 21*   BUN 14.5 18.3 20.6   < > 29.9* 32.7*   ALBUMIN  --   --   --   --  2.4* 2.4*   ALKPHOS  --   --   --   --  285* 196*   ALT  --   --   --   --  20 19   AST  --   --   --   --  33 32    < > = values in this interval not displayed.       No results for input(s): CRP, SED in the last 168 hours.          Imaging:     US Renal Complete Non-Vascular    Result " Date: 8/13/2023  EXAM: US RENAL COMPLETE NON-VASCULAR LOCATION: [facility] DATE: 8/13/2023 INDICATION: renal failure COMPARISON: None. TECHNIQUE: Routine Bilateral Renal and Bladder Ultrasound. FINDINGS: RIGHT KIDNEY: 10.0 cm. Normal without hydronephrosis or masses. LEFT KIDNEY: 8.9 cm. No hydronephrosis. 1.9 cm simple cyst. No follow-up needed for this. BLADDER: Moderately distended bladder.     IMPRESSION: 1.  No significant findings in the kidneys. 2.  Moderately distended bladder.    MR Foot Right w/o Contrast    Result Date: 8/11/2023  EXAM: MR FOOT RIGHT W/O CONTRAST LOCATION: [facility] DATE: 8/11/2023 INDICATION: Pain. Prior surgery. Possible osteomyelitis. COMPARISON: 06/14/2023 radiographs. TECHNIQUE: Unenhanced. FINDINGS: The exam is moderately degraded by patient motion which persisted throughout the entire study. JOINTS AND BONES: -Postop changes of a transmetatarsal amputation of the first through fifth rays at the level of the proximal portions of the metatarsal shafts. There is a soft tissue ulcer overlying the amputation site with deep extension to the bone. There is marrow edema involving all of the metatarsal stumps as well as edema in the cuboid and medial cuneiform. Findings very worrisome for multifocal osteomyelitis. No fracture or contusion. No marrow replacing lesion. Mild degenerative changes at the tibiotalar joint. TENDONS: -Moderate tenosynovitis in the tibialis anterior tendon beginning just above the level of the ankle joint. Mild tenosynovitis in the peroneal tendons without tearing. LIGAMENTS: -The ankle ligaments are grossly intact. MUSCLES AND SOFT TISSUES: -Marked atrophy of the intrinsic musculature of the foot. No discrete fluid collection to suggest an abscess. Moderate edema or cellulitis along the lateral aspect of the ankle extending into the hindfoot.     IMPRESSION: 1.  Postop changes of a transmetatarsal amputation of the  first through fifth rays at the level of the proximal portions of the metatarsal shafts with findings worrisome for multifocal osteomyelitis involving the metatarsal stumps as well as likely the cuboid and medial cuneiform. 2.  Moderate tenosynovitis of the tibialis anterior tendon.        Data reviewed today: I reviewed all medications, new labs and imaging results over the last 24 hours. I personally reviewed no images or EKG's today.  The patient's care was discussed with the Patient.

## 2023-08-20 NOTE — PLAN OF CARE
Problem: Plan of Care - These are the overarching goals to be used throughout the patient stay.    Goal: Absence of Hospital-Acquired Illness or Injury  Intervention: Prevent Skin Injury  Recent Flowsheet Documentation  Taken 8/19/2023 1600 by Manisha Greenfield RN  Body Position: position changed independently     Problem: Hyperglycemia  Goal: Blood Glucose Level Within Targeted Range  Outcome: Progressing    Wound vac remains in place on right foot, foot wrapped with Kerlix and ace wrap.  Patient states he does not have pain in his foot.      Did not want to order/eat supper, stating his family had brought food and that he was still full.      Therapy stated patient would benefit from a rolling knee scooter.  Podiatry gave ok for order.      Manisha Greenfield RN

## 2023-08-20 NOTE — PLAN OF CARE
Problem: Infection  Goal: Absence of Infection Signs and Symptoms  Outcome: Progressing   Goal Outcome Evaluation:    Patient afebrile. Continues on IV antibiotics, PICC line placed this shift in preparation for discharge. No complaints of pain or discomfort.                      LM for patient to call back and reschedule recently missed anticoagulation visit as well as establish care for pharmacotherapy services.    Lázaro Santos, OrtegaD, BCACP

## 2023-08-20 NOTE — PROGRESS NOTES
[facility]     Need provider signature on prescription (copy put on chart)    Unlikely to get approval for vac on a Sunday.     11:44 AM  Signed [facility]

## 2023-08-21 ENCOUNTER — APPOINTMENT (OUTPATIENT)
Dept: PHYSICAL THERAPY | Facility: HOSPITAL | Age: 68
DRG: 854 | End: 2023-08-21
Payer: COMMERCIAL

## 2023-08-21 LAB
BACTERIA TISS BX CULT: NORMAL
ERYTHROCYTE [DISTWIDTH] IN BLOOD BY AUTOMATED COUNT: 15.7 % (ref 10–15)
GLUCOSE BLDC GLUCOMTR-MCNC: 152 MG/DL (ref 70–99)
GLUCOSE BLDC GLUCOMTR-MCNC: 152 MG/DL (ref 70–99)
GLUCOSE BLDC GLUCOMTR-MCNC: 175 MG/DL (ref 70–99)
GLUCOSE BLDC GLUCOMTR-MCNC: 178 MG/DL (ref 70–99)
GLUCOSE BLDC GLUCOMTR-MCNC: 199 MG/DL (ref 70–99)
HCT VFR BLD AUTO: 27.5 % (ref 40–53)
HGB BLD-MCNC: 8.9 G/DL (ref 13.3–17.7)
MCH RBC QN AUTO: 27.7 PG (ref 26.5–33)
MCHC RBC AUTO-ENTMCNC: 32.4 G/DL (ref 31.5–36.5)
MCV RBC AUTO: 86 FL (ref 78–100)
PLATELET # BLD AUTO: 762 10E3/UL (ref 150–450)
RBC # BLD AUTO: 3.21 10E6/UL (ref 4.4–5.9)
WBC # BLD AUTO: 11.7 10E3/UL (ref 4–11)

## 2023-08-21 PROCEDURE — 97605 NEG PRS WND THER DME<=50SQCM: CPT

## 2023-08-21 PROCEDURE — 85014 HEMATOCRIT: CPT | Performed by: HOSPITALIST

## 2023-08-21 PROCEDURE — 99232 SBSQ HOSP IP/OBS MODERATE 35: CPT | Performed by: INTERNAL MEDICINE

## 2023-08-21 PROCEDURE — 97530 THERAPEUTIC ACTIVITIES: CPT | Mod: GP

## 2023-08-21 PROCEDURE — 97116 GAIT TRAINING THERAPY: CPT | Mod: GP

## 2023-08-21 PROCEDURE — 250N000013 HC RX MED GY IP 250 OP 250 PS 637: Performed by: PODIATRIST

## 2023-08-21 PROCEDURE — 250N000011 HC RX IP 250 OP 636: Mod: JZ | Performed by: INTERNAL MEDICINE

## 2023-08-21 PROCEDURE — 250N000011 HC RX IP 250 OP 636: Mod: JZ | Performed by: PODIATRIST

## 2023-08-21 PROCEDURE — 99232 SBSQ HOSP IP/OBS MODERATE 35: CPT | Performed by: HOSPITALIST

## 2023-08-21 PROCEDURE — 120N000001 HC R&B MED SURG/OB

## 2023-08-21 RX ADMIN — HEPARIN SODIUM 5000 UNITS: 10000 INJECTION, SOLUTION INTRAVENOUS; SUBCUTANEOUS at 08:46

## 2023-08-21 RX ADMIN — HYDRALAZINE HYDROCHLORIDE 10 MG: 20 INJECTION INTRAMUSCULAR; INTRAVENOUS at 01:31

## 2023-08-21 RX ADMIN — HEPARIN SODIUM 5000 UNITS: 10000 INJECTION, SOLUTION INTRAVENOUS; SUBCUTANEOUS at 19:56

## 2023-08-21 RX ADMIN — Medication 1 CAPSULE: at 06:15

## 2023-08-21 RX ADMIN — AMLODIPINE BESYLATE 2.5 MG: 2.5 TABLET ORAL at 08:45

## 2023-08-21 RX ADMIN — Medication 1 CAPSULE: at 12:03

## 2023-08-21 RX ADMIN — CEFTRIAXONE SODIUM 2 G: 2 INJECTION, POWDER, FOR SOLUTION INTRAMUSCULAR; INTRAVENOUS at 16:21

## 2023-08-21 RX ADMIN — MICONAZOLE NITRATE: 20 POWDER TOPICAL at 08:46

## 2023-08-21 ASSESSMENT — ACTIVITIES OF DAILY LIVING (ADL)
ADLS_ACUITY_SCORE: 30
ADLS_ACUITY_SCORE: 24
ADLS_ACUITY_SCORE: 30
ADLS_ACUITY_SCORE: 24
ADLS_ACUITY_SCORE: 30

## 2023-08-21 NOTE — PLAN OF CARE
"  Problem: Plan of Care - These are the overarching goals to be used throughout the patient stay.    Description: The Care Plan Review/Shift Note, Individualized Goals, Hospital-Acquired Illness or Injury, Comfort and Wellbeing, and Transition Planning are the \"Overarching Goals\" and should be updated throughout the hospitalization.  Please hover over the (i) for specific information on each goal topic.  Goal: Plan of Care Review  Description: The Plan of Care Review/Shift note should be completed every shift.  The Outcome Evaluation is a brief statement about your assessment that the patient is improving, declining, or no change.  This information will be displayed automatically on your shift note.  Outcome: Progressing  Flowsheets (Taken 8/21/2023 6849)  Outcome Evaluation: Adequacy of itnake difficult to determine d/t receviign food from home daily. % of food from home intake documented. Supplement intake not documented (glucerna tid 660 kcal, 30g protein). Pt reports meals are going fine and taking the supplements. He did not want to change anything. Good fluid intake, Last weight 166 lb 8/19, up 19 lb from 8 days prior?  Plan of Care Reviewed With: patient  Overall Patient Progress: improving     Problem: Oral Intake Inadequate  Goal: Improved Oral Intake  Outcome: Progressing   Goal Outcome Evaluation:      Plan of Care Reviewed With: patient    Overall Patient Progress: improvingOverall Patient Progress: improving    Outcome Evaluation: Adequacy of itnake difficult to determine d/t receviign food from home daily. % of food from home intake documented. Supplement intake not documented (glucerna tid 660 kcal, 30g protein). Pt reports meals are going fine and taking the supplements. He did not want to change anything. Good fluid intake, Last weight 166 lb 8/19, up 19 lb from 8 days prior?      "

## 2023-08-21 NOTE — PLAN OF CARE
Problem: Plan of Care - These are the overarching goals to be used throughout the patient stay.    Goal: Optimal Comfort and Wellbeing  Outcome: Progressing     Problem: Plan of Care - These are the overarching goals to be used throughout the patient stay.    Goal: Readiness for Transition of Care  Outcome: Progressing     Patient pleasant and cooperative this shift.  Planning to discharge tomorrow, PICC placed this morning.  Assist of one to go to the bathroom, hopped with the walker.  Keren Gomez called to ask about discharge and requested to be included in conversations with the .  Groin red with redness between legs, order obtained for anti-fungal powder.    Manisha Greenfield RN

## 2023-08-21 NOTE — PROGRESS NOTES
"Care Management Follow Up    Length of Stay (days): 10    Expected Discharge Date: 08/22/2023     Concerns to be Addressed:  home wound vac, home care, BP-IV PRN Hydralazine      Patient plan of care discussed at interdisciplinary rounds: Yes    Anticipated Discharge Disposition:  home     Anticipated Discharge Services:  home care     Anticipated Discharge DME:  wound vac    Education Provided on the Discharge Plan:  per care team    Patient/Family in Agreement with the Plan:  yes    Referrals Placed by CM/SW:  home care    Private pay costs discussed:  Per [facility]: Patient has 100% coverage and they are following.         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    ID recommending IV Ceftriaxone until end of this month. PICC line in place.  Podiatry: recommending NWB and wound vac dressing.      Therapy:  Patient having difficulty maintaining NWB during session, likely to have dificulty adhering to NWB restriction. Lives with roommate who can assist as needed. Pt has 3 stairs to enter home. Recommend TCU at discharge. Pt is hoping to discharge home. If patient declines TCU, recommend wheelchair for longer distances, FWW for transfers, A of 1-2 for stair management, and home PT.    Social History:  Dario lives in a house with his girlfriend Lennie. He is independent with ADLs and IADLs. He does still drive. He \"mostly uses his cane for mobility, but also has a walker he can use if needed.\"       8/21/23:  Per previous CM, patient declines recommendation for TCU. Preference is for home with support from friend and home care.  [facility] wound vac order not yet established in portal. Spoke with Rex for [facility] [phone]. He will follow up on order.  [facility] following. She states they are still attempting to secure home care for " RN wound vac dressing changes.    11:07 AM [facility] wound vac order completed and wound vac in store room.    3:20 PM Updated patient on discharge plan. There are 7 home care agency pending. Patient notified of difficulty securing home care and there may be a need for him to go into the clinic for wound care dressing changes.               Josette Gates RN

## 2023-08-21 NOTE — PLAN OF CARE
Problem: Plan of Care - These are the overarching goals to be used throughout the patient stay.    Goal: Optimal Comfort and Wellbeing  Outcome: Progressing   Goal Outcome Evaluation:    Denies pain or discomfort, wound vac changed by WOC nurse this shift. Patient not wanting to order food, flat affect. Awaiting home care to be arranged.

## 2023-08-21 NOTE — PROGRESS NOTES
[facility] Nurse Inpatient Assessment     Consulted for: R foot    Summary: VAC started today    Patient History (according to provider note(s):        Assessment:    Negative pressure wound therapy applied to: R foot     Last photo: 8/18/23   Wound due to: Surgical Wound   Wound history/plan of care:    Surgical date: 8/17/23   Service following: DPM  Date Negative Pressure Wound Therapy initiated: 8/18/23   Interventions in place: elevation  Is patient s nutritional status compromised? no   If yes, what interventions are in place? N/A  Reason for initiating vac therapy? Presence of co-morbidities, High risk of infections, Need for accelerated granulation tissue, and Prior history of delayed wound healing  Which?of?the?following?co-morbidities?apply? Diabetes and Immobility  If diabetic is patient on a diabetic management program? Yes   Is osteomyelitis present in wound? Yes   If yes what treatments are in place? IV antibiotic Zosyn    Wound base: 100 % granulation tissue     Palpation of the wound bed: normal       Drainage: scant      Volume in cannister: 25 ml     Last cannister change date: 8/18/23     Description of drainage: serosanguinous      Measurements (length x width x depth, in cm) 2 cm  x 1.5 cm  x  1.8 cm       Tunneling N/A      Undermining N/A   Periwound skin: Intact and some bruising and sutures in place        Color: pink and purple       Temperature: normal    Odor: none   Pain: denies , none   Pain intervention prior to dressing change: patient tolerated well and slow and gentle cares   Treatment goal: Heal   STATUS: initial assessment   Supplies ordered: ordered VAC, small foam and pump    Number of foam pieces removed from a wound (excluding foam for bridge) :  1 pc GranuFoam Black   Verified this matched the number of foam pieces applied last dressing change: Yes  Number of foam pieces packed into wound (excluding foam for bridge) :  1 pc GranuFoam Black      Treatment Plan:   Wound location: R foot  Change Days: M/W/F  Supplies: Small granufoam - black  Cleanse with: Saline, pat dry.  Suction: Continuous at -125 mmHg pressure.    Back up plan: If VAC malfunctions or unable to maintain seal: VAC dressing must be removed and reapplied within 2 hours of the incident. If floor staff is not able to reapply, place NS moistened gauze in wound bed and cover with appropriate dressing to keep wound bed moist.   Change wet-to-dry dressing BID and notify WOC staff for reimplementation of VAC therapy when available.  Date canister. Chart canister output every shift.    The hospital VAC pump is not to be discharged with the patient.  Please do one of the following prior to discharge:  If a home VAC pump has been delivered, please apply the home pump to the dressing prior to patient discharge.    If the patient is discharging to LTAC or a TCU/SNF and there is a VAC pump waiting for the patient, close clamp and then disconnect the tubing for transfer, place tubing inside a glove.   If the patient is discharging to home or other facility and there is no VAC pump available for immediate placement, remove the VAC dressing and apply a wet-to-moist gauze dressing for transfer.      Orders: Reviewed    RECOMMEND PRIMARY TEAM ORDER: None, at this time  Education provided: plan of care  Discussed plan of care with: Patient and Nurse  WOC nurse follow-up plan: Monday/WednesdayFriday  Notify WOC if wound(s) deteriorate.  Nursing to notify the Provider(s) and re-consult the WOC Nurse if new skin concern.    DATA:     Current support surface: Standard  Standard gel/foam mattress (IsoFlex, Atmos air, etc)  Containment of urine/stool: Continent of bladder and Continent of bowel  BMI: Body mass index is 23.18 kg/m .   Active diet order: Orders Placed This Encounter      Moderate Consistent Carb (60 g CHO per Meal) Diet     Output: I/O last 3 completed shifts:  In: -   Out: 600 [Urine:600]      Labs:   Recent Labs   Lab 08/21/23  0516 08/16/23  0536 08/15/23  0534   ALBUMIN  --   --  2.4*   HGB 8.9*   < > 8.8*   WBC 11.7*   < > 8.8    < > = values in this interval not displayed.       Pressure injury risk assessment:   Sensory Perception: 3-->slightly limited  Moisture: 3-->occasionally moist  Activity: 3-->walks occasionally  Mobility: 3-->slightly limited  Nutrition: 3-->adequate  Friction and Shear: 2-->potential problem  Julian Score: 17    Carolin Costa, MSN RN CWOCN  Pager no longer is use, please contact through [employer] group: [facility] [facility]

## 2023-08-21 NOTE — PROGRESS NOTES
Infectious Diseases Progress Note  [facility]    Date of visit: 08/21/2023     ASSESSMENT   68-year-old man with a history of diabetes, hypertension.  Had been seen in ID clinic for nonhealing wounds following right-sided TMA.  Lost to follow-up.    Right foot infection.  History of right TMA, poorly healed wounds, with poor follow-up with podiatry.  Now with surrounding cellulitis in the foot and lower leg.  MRI showing multifocal osteomyelitis.  Signs of systemic illness with fevers and bacteremia improved. Now s/p debridement 8/14/23, pus noted at surgery, had resection of cuneiforms and cuboid. Surgical cultures with MSSA, group A strep. Pathology shows osteomyelitis at cuneiforms and cuboid. Now s/p closure 8/17, wound vac in place. 8/17 cultures skin vernell.    Sepsis. Presenting with fever, leukocytosis, elevated creatinine, elevated lactic acid and CRP.  Sepsis resolved.   Strep pyogenes bacteremia.  2 of 2 blood cultures on admission with gram-positive cocci in pairs and chains.    Poor medical adherence.  Lost to follow-up following several visits for poorly healing TMA wounds  Heel wound growing MSSA, group A strep  CKD.       Principal Problem:    Cellulitis of right foot  Active Problems:    Hypertension    Acute osteomyelitis of right foot (H)    Non compliance with medical treatment    Stage 3b chronic kidney disease (H)    Type 2 diabetes mellitus with right diabetic foot infection (H)    Hyperglycemia    Diabetic ulcer of right foot associated with type 2 diabetes mellitus, with other ulcer severity, unspecified part of foot (H)       PLAN   Plan ceftriaxone 2gm IV daily at discharge -- he has event planned at end of month in [place]. Could then switch to oral cephalosporin 500mg PO TID for until around 9/15/23.   +/- PO flagyl for possible anaerobes -- none have cultured.   Discharge antibiotics ordered  Keep podiatry follow up. See Dr. Jurado 9/13/23.    ID will sign-off    Robreto Pederson  "MD [facility]  Direct messaging: [employer] Paging  On-Call ID provider: [phone], option: 9        ===========================================    SUBJECTIVE / INTERVAL HISTORY:     Likely discharge tomorrow. No complaints. Aware of antibiotic plan and follow-up plans         Antibiotics   Ceftriaxone 8/19-    Previous:  Vancomycin 8/11  Zosyn 8/11-19  Clindamycin 8/12-15    Physical Exam     Temp:  [98.4  F (36.9  C)-99.2  F (37.3  C)] 98.4  F (36.9  C)  Pulse:  [82-89] 82  Resp:  [18] 18  BP: (141-183)/(67-86) 141/67  SpO2:  [95 %] 95 %    BP (!) 141/67 (BP Location: Left arm)   Pulse 82   Temp 98.4  F (36.9  C) (Oral)   Resp 18   Ht 1.803 m (5' 11\")   Wt 77 kg (169 lb 12.1 oz)   SpO2 95%   BMI 23.68 kg/m      GENERAL: no distress  HENT:  Head is normocephalic, atraumatic. Oropharynx is moist without exudates or ulcers.  Many missing teeth  EYES:  Eyes have anicteric sclerae without conjunctival injection.   LUNGS:  normal resp pattern  CARDIOVASCULAR:  Regular rate  EXT: Extremities warm and without edema.  SKIN:  No acute rashes.  Right foot wrapped. Erythema extends into lower leg above dressing,, decreased. Wound vac.  NEUROLOGIC:  Grossly nonfocal.  Psych: General indifference to conversation        Cultures   8/11 blood culture: 2 of 2 with strep pyogenes  8/12 blood cultures x2: no growth to date   8/13 heel culture -- MSSA, group A strep.   8/14 OR culture -- MSSA, group A strep      Pertinent Labs:     Recent Labs   Lab 08/21/23  0516 08/20/23  0623 08/19/23  0629   WBC 11.7* 12.4* 13.9*   HGB 8.9* 9.0* 8.8*   * 792* 793*       Recent Labs   Lab 08/20/23  0623 08/19/23  0629 08/18/23  0540 08/16/23  0536 08/15/23  0534    137 138   < > 133*   CO2 25 24 23   < > 21*   BUN 14.5 18.3 20.6   < > 29.9*   ALBUMIN  --   --   --   --  2.4*   ALKPHOS  --   --   --   --  285*   ALT  --   --   --   --  20   AST  --   --   --   --  33    < > = values in this interval " not displayed.       No results for input(s): CRP, SED in the last 168 hours.          Imaging:     US Renal Complete Non-Vascular    Result Date: 8/13/2023  EXAM: US RENAL COMPLETE NON-VASCULAR LOCATION: [facility] DATE: 8/13/2023 INDICATION: renal failure COMPARISON: None. TECHNIQUE: Routine Bilateral Renal and Bladder Ultrasound. FINDINGS: RIGHT KIDNEY: 10.0 cm. Normal without hydronephrosis or masses. LEFT KIDNEY: 8.9 cm. No hydronephrosis. 1.9 cm simple cyst. No follow-up needed for this. BLADDER: Moderately distended bladder.     IMPRESSION: 1.  No significant findings in the kidneys. 2.  Moderately distended bladder.    MR Foot Right w/o Contrast    Result Date: 8/11/2023  EXAM: MR FOOT RIGHT W/O CONTRAST LOCATION: [facility] DATE: 8/11/2023 INDICATION: Pain. Prior surgery. Possible osteomyelitis. COMPARISON: 06/14/2023 radiographs. TECHNIQUE: Unenhanced. FINDINGS: The exam is moderately degraded by patient motion which persisted throughout the entire study. JOINTS AND BONES: -Postop changes of a transmetatarsal amputation of the first through fifth rays at the level of the proximal portions of the metatarsal shafts. There is a soft tissue ulcer overlying the amputation site with deep extension to the bone. There is marrow edema involving all of the metatarsal stumps as well as edema in the cuboid and medial cuneiform. Findings very worrisome for multifocal osteomyelitis. No fracture or contusion. No marrow replacing lesion. Mild degenerative changes at the tibiotalar joint. TENDONS: -Moderate tenosynovitis in the tibialis anterior tendon beginning just above the level of the ankle joint. Mild tenosynovitis in the peroneal tendons without tearing. LIGAMENTS: -The ankle ligaments are grossly intact. MUSCLES AND SOFT TISSUES: -Marked atrophy of the intrinsic musculature of the foot. No discrete fluid collection to suggest an abscess. Moderate edema or cellulitis  along the lateral aspect of the ankle extending into the hindfoot.     IMPRESSION: 1.  Postop changes of a transmetatarsal amputation of the first through fifth rays at the level of the proximal portions of the metatarsal shafts with findings worrisome for multifocal osteomyelitis involving the metatarsal stumps as well as likely the cuboid and medial cuneiform. 2.  Moderate tenosynovitis of the tibialis anterior tendon.        Data reviewed today: I reviewed all medications, new labs and imaging results over the last 24 hours. I personally reviewed no images or EKG's today.  The patient's care was discussed with the Patient.

## 2023-08-21 NOTE — PLAN OF CARE
Problem: Plan of Care - These are the overarching goals to be used throughout the patient stay.    Goal: Plan of Care Review  Description: The Plan of Care Review/Shift note should be completed every shift.  The Outcome Evaluation is a brief statement about your assessment that the patient is improving, declining, or no change.  This information will be displayed automatically on your shift note.  Outcome: Progressing     Problem: Plan of Care - These are the overarching goals to be used throughout the patient stay.    Goal: Optimal Comfort and Wellbeing  Outcome: Progressing     Problem: Hyperglycemia  Goal: Blood Glucose Level Within Targeted Range  Outcome: Progressing   Goal Outcome Evaluation:  Pt is alert and oriented. VSS except for /86, given Hydralazine PRN, effective BP down to 150/74. BG at midnight is 175. Single lumen PICC line flushing well. Slept good.

## 2023-08-21 NOTE — PROGRESS NOTES
[facility]    Medicine Progress Note - Hospitalist Service    Date of Admission:  8/11/2023    Assessment & Plan                Dario Benavides is a 68 year old male with history of DM2 with charcot foot (s/p TM amputation R foot), CKD3b, osteomyelitis of foot; admitted with cellulitis/osteomyelitis of R foot, diabetic foot ulcer. Hospital Day: 11        Right foot diabetic ulceration/osteomyelitis with bacteremia  - Recent past hospitalization of 6/17/23 at [place] for osteomyelitis, with prescribed course of augmentin. XR foot from that time is suggestive of periosteal elevation. Sepsis by source (R foot osteomyelitis, SIRS 4/4), no overt end organ damage otherwise to suggest septic shock.  - MRI foot showed Postop changes of a transmetatarsal amputation of the first through fifth rays at the level of the proximal portions of the metatarsal shafts with findings worrisome for multifocal osteomyelitis involving the metatarsal stumps as well as likely the cuboid and medial cuneiform.  - Positive blood culture for gram-positive cocci in pairs and clusters, Strep pyogenes  - Repeat blood culture negative from 8/17  -- ID following, appreciate input  - Podiatry consult, S/P right foot incision and drainage with right foot removal of metatarsal base cuneiform and cuboid on 8/14, underwent delayed primary closure 8/17   -Postoperative orders as per podiatry, nonweightbearing on right foot  - Started on vancomycin and Zosyn initially , switched to clindamycin and Zosyn, then Zosyn alone, now high-dose Rocephin. Will need home IV abx.   - noncompliance has complicated his treatment in the past. Sounds like he may refuse recommended duration of IV abx this time too  - If fails treatment, pt may need BKA.      Type II DM with arthropathy  - Poorly controled , hemoglobin A1c at 11.3  - Increased lantus from 15U to 18U, mealtime insulin increased from 4U to 6U TID with meals- he was not using it  regularly at home  - Diabetic educator saw him here  - Continue to monitor blood glucose closely and titrate insulin     Acute kidney injury  - Likely related to sepsis, poorly controlled diabetes, suspected recent reduced oral intake.  - suspect baseline CKD stage III  - Creat improving slowly  - Avoid nephrotoxic drugs  - Renal US is negative for obstruction  -Continue to monitor renal function periodically     Leucocytosis  - Noted WBC bump to 14.6 on 8/18/2023, now trending down again  - Continue to monitor CBC periodically     Hypertension  -Significant elevation of blood pressure on admission, probably secondary to pain  -Add hydralazine IV as needed  -Started Norvasc 2.5 mg, and titrate up as needed  -Echo shows EF of 55-60% with normal LV wall thickness     Chronic anemia  - Probably of chronic illness  - Stable hemoglobin  - No signs of active bleeding  - Work-up as outpatient     Diarrhea - improving  - Probably secondary to antibiotics   - No abdominal pain   - Added lactobacillus      Hyponatremia   - resolved     Weakness and deconditioning  -PT/OT evaluation   -Patient needs TCU but is refusing. Planning home with home IV infusion and wound vac when arrangements made.     HypoK  -replaced       DVT Prophylaxis: Moderate risk. SQH  Diet: Snacks/Supplements Adult: Glucerna; Between Meals  Moderate Consistent Carb (60 g CHO per Meal) Diet    Odell Catheter: Not present  Lines: PRESENT      PICC 08/20/23 Single Lumen Right Basilic antibiotics-Site Assessment: WDL      Cardiac Monitoring: None  Code Status: No CPR- Do NOT Intubate      Clinically Significant Risk Factors              # Hypoalbuminemia: Lowest albumin = 2.3 g/dL at 8/13/2023  5:20 AM, will monitor as appropriate       # Hypertension: Noted on problem list       # DMII: A1C = 11.3 % (Ref range: <5.7 %) within past 6 months              Disposition Plan   Disposition: Home with home care as refusing TCU     Expected Discharge Date: 08/22/2023     Discharge Delays: IV Medication - consider oral or Home Infusion  Placement - Homecare  Other (Add Comment)  Destination: home with family  Discharge Comments: wound vac     Medically ready to discharge today: Yes     The patient's care was discussed with the Care Coordinator/ and Patient.    Kayce Nava MD  Hospitalist Service  [facility]  Securely message with [employer] (more info)  Text page via [employer] Paging/Directory   ______________________________________________________________________      Physical Exam   Vital Signs: Temp: 98.4  F (36.9  C) Temp src: Oral BP: (!) 141/67 Pulse: 82   Resp: 18 SpO2: 95 % O2 Device: None (Room air)    Weight: 169 lbs 12.07 oz  General: in no apparent distress, non-toxic, and alert male lying in hospital bed oriented x3  HEENT: Head normocephalic atraumatic, oral mucosa moist. Sclerae anicteric  Skin: RLE dressing CDI  Extremities: No peripheral edema, R foot partial amputation  Psych: Normal affect, mood euthymic  Neuro: Grossly normal        Medical Decision Making               Data   Recent Results (from the past 12 hour(s))   CBC with platelets    Collection Time: 08/21/23  5:16 AM   Result Value Ref Range    WBC Count 11.7 (H) 4.0 - 11.0 10e3/uL    RBC Count 3.21 (L) 4.40 - 5.90 10e6/uL    Hemoglobin 8.9 (L) 13.3 - 17.7 g/dL    Hematocrit 27.5 (L) 40.0 - 53.0 %    MCV 86 78 - 100 fL    MCH 27.7 26.5 - 33.0 pg    MCHC 32.4 31.5 - 36.5 g/dL    RDW 15.7 (H) 10.0 - 15.0 %    Platelet Count 762 (H) 150 - 450 10e3/uL   Glucose by meter    Collection Time: 08/21/23  8:21 AM   Result Value Ref Range    GLUCOSE BY METER POCT 152 (H) 70 - 99 mg/dL   Glucose by meter    Collection Time: 08/21/23 11:38 AM   Result Value Ref Range    GLUCOSE BY METER POCT 199 (H) 70 - 99 mg/dL   Glucose by meter    Collection Time: 08/21/23  4:30 PM   Result Value Ref Range    GLUCOSE BY METER POCT 178 (H) 70 - 99 mg/dL     Interval History     Patient states  doing fine today.  Feels ready to discharge home if arrangements are made.  Would like insulin refill sent to his [facility].  Awaiting home care arrangements.

## 2023-08-22 ENCOUNTER — MEDICAL CORRESPONDENCE (OUTPATIENT)
Dept: HEALTH INFORMATION MANAGEMENT | Facility: CLINIC | Age: 68
End: 2023-08-22
Payer: MEDICAID

## 2023-08-22 ENCOUNTER — TELEPHONE (OUTPATIENT)
Dept: PODIATRY | Facility: CLINIC | Age: 68
End: 2023-08-22
Payer: MEDICAID

## 2023-08-22 VITALS
TEMPERATURE: 98 F | SYSTOLIC BLOOD PRESSURE: 176 MMHG | OXYGEN SATURATION: 94 % | HEIGHT: 71 IN | RESPIRATION RATE: 18 BRPM | BODY MASS INDEX: 23.77 KG/M2 | WEIGHT: 169.75 LBS | DIASTOLIC BLOOD PRESSURE: 84 MMHG | HEART RATE: 85 BPM

## 2023-08-22 LAB
GLUCOSE BLDC GLUCOMTR-MCNC: 104 MG/DL (ref 70–99)
GLUCOSE BLDC GLUCOMTR-MCNC: 147 MG/DL (ref 70–99)
GLUCOSE BLDC GLUCOMTR-MCNC: 94 MG/DL (ref 70–99)

## 2023-08-22 PROCEDURE — 250N000011 HC RX IP 250 OP 636: Mod: JZ | Performed by: INTERNAL MEDICINE

## 2023-08-22 PROCEDURE — 250N000011 HC RX IP 250 OP 636: Mod: JZ | Performed by: PODIATRIST

## 2023-08-22 PROCEDURE — 99239 HOSP IP/OBS DSCHRG MGMT >30: CPT | Performed by: HOSPITALIST

## 2023-08-22 PROCEDURE — 250N000013 HC RX MED GY IP 250 OP 250 PS 637: Performed by: PODIATRIST

## 2023-08-22 RX ORDER — AMLODIPINE BESYLATE 2.5 MG/1
2.5 TABLET ORAL DAILY
Qty: 30 TABLET | Refills: 0 | Status: SHIPPED | OUTPATIENT
Start: 2023-08-23 | End: 2024-03-22

## 2023-08-22 RX ADMIN — Medication 1 CAPSULE: at 11:55

## 2023-08-22 RX ADMIN — MICONAZOLE NITRATE: 20 POWDER TOPICAL at 11:56

## 2023-08-22 RX ADMIN — CEFTRIAXONE SODIUM 2 G: 2 INJECTION, POWDER, FOR SOLUTION INTRAMUSCULAR; INTRAVENOUS at 14:30

## 2023-08-22 RX ADMIN — AMLODIPINE BESYLATE 2.5 MG: 2.5 TABLET ORAL at 09:52

## 2023-08-22 RX ADMIN — HEPARIN SODIUM 5000 UNITS: 10000 INJECTION, SOLUTION INTRAVENOUS; SUBCUTANEOUS at 09:52

## 2023-08-22 RX ADMIN — Medication 1 CAPSULE: at 06:12

## 2023-08-22 ASSESSMENT — ACTIVITIES OF DAILY LIVING (ADL)
ADLS_ACUITY_SCORE: 24
ADLS_ACUITY_SCORE: 26
ADLS_ACUITY_SCORE: 24
ADLS_ACUITY_SCORE: 26
ADLS_ACUITY_SCORE: 24

## 2023-08-22 NOTE — PLAN OF CARE
"  Problem: Plan of Care - These are the overarching goals to be used throughout the patient stay.    Goal: Plan of Care Review  Description: The Plan of Care Review/Shift note should be completed every shift.  The Outcome Evaluation is a brief statement about your assessment that the patient is improving, declining, or no change.  This information will be displayed automatically on your shift note.  Outcome: Adequate for Care Transition  Goal: Patient-Specific Goal (Individualized)  Description: You can add care plan individualizations to a care plan. Examples of Individualization might be:  \"Parent requests to be called daily at 9am for status\", \"I have a hard time hearing out of my right ear\", or \"Do not touch me to wake me up as it startles me\".  Outcome: Adequate for Care Transition  Goal: Absence of Hospital-Acquired Illness or Injury  Outcome: Adequate for Care Transition  Intervention: Identify and Manage Fall Risk  Recent Flowsheet Documentation  Taken 8/22/2023 1558 by Madeline Lala RN  Safety Promotion/Fall Prevention:   activity supervised   patient and family education   nonskid shoes/slippers when out of bed   supervised activity  Taken 8/22/2023 1000 by Madeline Lala RN  Safety Promotion/Fall Prevention:   activity supervised   patient and family education   nonskid shoes/slippers when out of bed   supervised activity  Intervention: Prevent Skin Injury  Recent Flowsheet Documentation  Taken 8/22/2023 1558 by Madeline Lala RN  Body Position: position changed independently  Taken 8/22/2023 1000 by Madeline Lala RN  Body Position: position changed independently  Goal: Optimal Comfort and Wellbeing  Outcome: Adequate for Care Transition  Goal: Readiness for Transition of Care  Outcome: Adequate for Care Transition     Problem: Hyperglycemia  Goal: Blood Glucose Level Within Targeted Range  Outcome: Adequate for Care Transition     Problem: Fever  Goal: Fever: Plan of Care  Outcome: Adequate for " Care Transition     Problem: Infection  Goal: Absence of Infection Signs and Symptoms  Outcome: Adequate for Care Transition     Problem: Oral Intake Inadequate  Goal: Improved Oral Intake  Outcome: Adequate for Care Transition   Goal Outcome Evaluation:

## 2023-08-22 NOTE — PLAN OF CARE
Problem: Plan of Care - These are the overarching goals to be used throughout the patient stay.    Goal: Absence of Hospital-Acquired Illness or Injury  Intervention: Identify and Manage Fall Risk  Recent Flowsheet Documentation  Taken 8/22/2023 0147 by Liza Peace RN  Safety Promotion/Fall Prevention: activity supervised     Problem: Plan of Care - These are the overarching goals to be used throughout the patient stay.    Goal: Optimal Comfort and Wellbeing  Outcome: Progressing     Problem: Hyperglycemia  Goal: Blood Glucose Level Within Targeted Range  Outcome: Progressing   Goal Outcome Evaluation:  Pt is alert and oriented x4, pleasant. VSS. BG at 01AM is 104. Offered snack, pt declined. PICC line flushed and working. Slept between cares.

## 2023-08-22 NOTE — DISCHARGE SUMMARY
[facility] MEDICINE  DISCHARGE SUMMARY     Primary Care Physician: Physician No Ref-Primary  Admission Date: 8/11/2023   Discharge Provider: Kayce Nava MD Discharge Date: 8/22/2023   Diet:   Active Diet and Nourishment Order   Procedures    Snacks/Supplements Adult: Glucerna; Between Meals    Moderate Consistent Carb (60 g CHO per Meal) Diet    Diet       Code Status: No CPR- Do NOT Intubate   Activity: DCACTIVITY: Activity as tolerated        Condition at Discharge: Good     REASON FOR PRESENTATION(See Admission Note for Details)   Weakness, confusion    PRINCIPAL & ACTIVE DISCHARGE DIAGNOSES     Principal Problem:    Cellulitis of right foot  Active Problems:    Hypertension    Acute osteomyelitis of right foot (H)    Non compliance with medical treatment    Stage 3b chronic kidney disease (H)    Type 2 diabetes mellitus with right diabetic foot infection (H)    Hyperglycemia    Diabetic ulcer of right foot associated with type 2 diabetes mellitus, with other ulcer severity, unspecified part of foot (H)      PENDING LABS     Unresulted Labs Ordered in the Past 30 Days of this Admission       Date and Time Order Name Status Description    8/17/2023 12:06 PM Anaerobic Bacterial Culture Routine Preliminary     8/14/2023  9:06 AM Fungal or Yeast Culture Routine Preliminary             PROCEDURES ( this hospitalization only)      Procedure(s):  Delayed primary closure of wound right foot    RECOMMENDATIONS TO OUTPATIENT PROVIDER FOR F/U VISIT     Follow-up Appointments     Follow-up and recommended labs and tests       Follow up with primary care provider, Physician No Ref-Primary, within 7   days for hospital follow- up.  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 Hospital Day: 12         Right foot diabetic ulceration/osteomyelitis with bacteremia  - MRI foot showed Postop changes of a transmetatarsal amputation of the first through fifth rays at the level of the proximal portions of the metatarsal shafts with findings worrisome for multifocal osteomyelitis involving the metatarsal stumps as well as likely the cuboid and medial cuneiform.  - Positive blood culture for gram-positive cocci in pairs and clusters, Strep pyogenes  - Repeat blood culture negative from 8/17  -- ID saw here, appreciate input  - Podiatry consult, S/P right foot incision and drainage with right foot removal of metatarsal base cuneiform and cuboid on 8/14, underwent delayed primary closure 8/17   -Postoperative orders as per podiatry, nonweightbearing on right foot  - ID recommended high-dose Rocephin for ~4 weeks, however patient indicated willingness to continue only through end of month due to social event. ID advised on oral antibiotic after that for risk mitigation.  - noncompliance has complicated his treatment in the past. He refused TCU. We are concerned he may end up needing BKA.      Type II DM with arthropathy  - Poorly controled , hemoglobin A1c at 11.3  - Restarted Lantus (he was not taking) and uptitrated to 20U, mealtime insulin increased to 8U TID with meals- he was not using it regularly at home  - Diabetic educator saw him here  - patient told me he was out of insulin at home, refills sent. No other supplies needed per DM ed assessment    Hypertension  -Significant elevation of blood pressure on admission  -Started Norvasc 2.5 mg  -Echo shows EF of 55-60% with normal LV wall thickness  -Follow up as outpatient     Chronic anemia  - Probably of chronic illness  - Stable hemoglobin  - No signs of active bleeding  - Work-up as outpatient     Weakness and deconditioning  -PT/OT evaluation   -Patient needs TCU  but is refusing. Discharging home with home IV infusion and wound vac.     Candidal intertrigo  -nystatin powder ordered       Discharge Medications with Med changes:     Current Discharge Medication List        START taking these medications    Details   amLODIPine (NORVASC) 2.5 MG tablet Take 1 tablet (2.5 mg) by mouth daily  Qty: 30 tablet, Refills: 0    Associated Diagnoses: Primary hypertension      cefTRIAXone (ROCEPHIN) 2 GM vial Inject 2 g into the vein every 24 hours for 11 days Last day 8/31/23 then remove PICC. Weekly labs while on IV antibiotics routine through [facility] home infusion. Fax results to Dr. Jurado .  Qty: 220 mL, Refills: 0    Associated Diagnoses: Acute osteomyelitis of right foot (H)      cephALEXin (KEFLEX) 500 MG capsule Take 1 capsule (500 mg) by mouth 3 times daily for 14 days  Qty: 42 capsule, Refills: 0    Associated Diagnoses: Acute osteomyelitis of right foot (H)      insulin glargine (LANTUS PEN) 100 UNIT/ML pen Inject 20 Units Subcutaneous every morning (before breakfast)  Qty: 15 mL, Refills: 0    Comments: If Lantus is not covered by insurance, may substitute Basaglar or Semglee or other insulin glargine product per insurance preference at same dose and frequency.    Associated Diagnoses: Type 2 diabetes mellitus with right diabetic foot infection (H)      insulin pen needle (32G X 4 MM) 32G X 4 MM miscellaneous Use 4 pen needles daily or as directed.  Qty: 100 each, Refills: 0    Associated Diagnoses: Type 2 diabetes mellitus with right diabetic foot infection (H)      miconazole (MICATIN) 2 % external powder Apply topically 2 times daily Apply to areas of moisture/rash in skin folds  Qty: 43 g, Refills: 0    Associated Diagnoses: Candidal intertrigo           CONTINUE these medications which have CHANGED    Details   insulin aspart (NOVOLOG PEN) 100 UNIT/ML pen Inject 8 Units Subcutaneous 3 times daily (with meals) Only take if you are eating well  Qty: 15 mL, Refills:  0    Associated Diagnoses: Type 2 diabetes mellitus with right diabetic foot infection (H)           CONTINUE these medications which have NOT CHANGED    Details   acetaminophen (TYLENOL) 325 MG tablet Take 2 tablets by mouth every 4 hours as needed for mild pain               Consults     PHARMACY TO DOSE VANCO  CARE MANAGEMENT / SOCIAL WORK IP CONSULT  PHARMACY TO DOSE VANCO  PODIATRY IP CONSULT  PHARMACY TO DOSE VANCO  INFECTIOUS DISEASES IP CONSULT  DIABETES EDUCATION IP CONSULT  NUTRITION SERVICES ADULT IP CONSULT  SMOKING CESSATION PROGRAM IP CONSULT  PHYSICAL THERAPY ADULT IP CONSULT  OCCUPATIONAL THERAPY ADULT IP CONSULT  WOUND OSTOMY CONTINENCE NURSE  IP CONSULT  VASCULAR ACCESS ADULT IP CONSULT        SIGNIFICANT IMAGING FINDINGS     Results for orders placed or performed during the hospital encounter of 08/11/23   MR Foot Right w/o Contrast    Impression    IMPRESSION:  1.  Postop changes of a transmetatarsal amputation of the first through fifth rays at the level of the proximal portions of the metatarsal shafts with findings worrisome for multifocal osteomyelitis involving the metatarsal stumps as well as likely the   cuboid and medial cuneiform.    2.  Moderate tenosynovitis of the tibialis anterior tendon.   US Renal Complete Non-Vascular    Impression    IMPRESSION:  1.  No significant findings in the kidneys.    2.  Moderately distended bladder.   Echocardiogram Complete   Result Value Ref Range    LVEF  55-60%        SIGNIFICANT LABORATORY FINDINGS     See emr    Discharge Orders        Home Infusion Referral      Home Care Referral      Reason for your hospital stay    Foot infection     Follow-up and recommended labs and tests     Follow up with primary care provider, Physician No Ref-Primary, within 7 days for hospital follow- up.  The following labs/tests are recommended: CBC     Activity    Your activity upon discharge: activity as tolerated     When to contact your care team    Call your  primary doctor if you have any of the following: chest pain, shortness of breath, fever, chills, fainting, dizziness, vomiting, constipation, dehydration, worsening pain, bleeding, or trouble urinating, or any other symptoms that are new or concerning to you.     Diet    Follow this diet upon discharge: diabetic diet       Examination   Physical Exam   Temp:  [98.4  F (36.9  C)-99.3  F (37.4  C)] 99.3  F (37.4  C)  Pulse:  [82-87] 87  Resp:  [17-18] 18  BP: (137-154)/(63-73) 154/73  SpO2:  [93 %-97 %] 93 %  Wt Readings from Last 1 Encounters:   08/21/23 77 kg (169 lb 12.1 oz)       General: in no apparent distress, non-toxic, and alert male lying in hospital bed oriented x3  HEENT: Head normocephalic atraumatic, oral mucosa moist. Sclerae anicteric  Skin: RLE dressing CDI  Extremities: No peripheral edema, R foot partial amputation  Psych: Normal affect, mood euthymic  Neuro: Grossly normal    Please see EMR for more detailed significant labs, imaging, consultant notes etc.    IKayce MD, personally saw the patient today and spent greater than 30 minutes discharging this patient.    Kayce Nava MD  [facility]    CC:No Ref-Primary, Physician

## 2023-08-22 NOTE — PROGRESS NOTES
Care Management Discharge Note    Discharge Date: 08/22/2023       Discharge Disposition: Home    Discharge Services:  home infusion and outpatient wound vac clinic    Discharge DME:  Patient states he has wheelchair at home.    Discharge Transportation: girlfriend    Private pay costs discussed: Not applicable    Education Provided on the Discharge Plan:  yes    Persons Notified of Discharge Plans: patient, Podiatry clinic, [facility] and girlfriend      Patient/Family in Agreement with the Plan: yes    Handoff Referral Completed: yes    Additional Information:    Patient to discharge home with support from his girlfriend Patricia. Unable to secure home care for wound vac dressing changes. Arranged for outpatient dressing change with his Podiatry clinic. First appt will be this Thursday. Patient has home [employer] wound vac and supplies. [facility] will follow for home IV antibiotic support.         Josette Gates RN

## 2023-08-22 NOTE — PROGRESS NOTES
Patient is doing fine today.  He has no complaints.  He feels ready to discharge home when arrangements have been made.  He feels he will be able to be compliant with nonweightbearing status.  Elijah DONAHUE full note to follow

## 2023-08-22 NOTE — TELEPHONE ENCOUNTER
Caller: Vanessa, care manager TOMASAS    Provider: PARK Nieto    Detailed reason for call: Patient had surgery with Dr. Nieto and will discharge today; needs to be seen outpatient for wound vac changes M/W/F and by wound. Please advise; there are openings tomorrow for NV but Friday is full; and/or should patient be seen by Chu this week?    Best phone number to contact: [phone]    Best time to contact: Any    Ok to leave a detailed message: Yes    Ok to speak to authorized person if needed: N/A      (Noted to patient if reason is related to wound or incision, to please send a photo via email or [employer].)

## 2023-08-22 NOTE — PLAN OF CARE
Occupational Therapy Discharge Summary    Reason for therapy discharge:    Discharged to home with home therapy.    Progress towards therapy goal(s). See goals on Care Plan in [facility] electronic health record for goal details.  Goals partially met.  Barriers to achieving goals:   discharge from facility.    Therapy recommendation(s):    Continued therapy is recommended.  Rationale/Recommendations:  discharge home w/ home therapy .    Goal Outcome Evaluation:

## 2023-08-22 NOTE — PLAN OF CARE
Physical Therapy Discharge Summary    Reason for therapy discharge:    Discharged to home with home therapy.    Progress towards therapy goal(s). See goals on Care Plan in [facility] electronic health record for goal details.  Goals partially met.  Barriers to achieving goals:   discharge from facility.    Therapy recommendation(s):    Continued therapy is recommended.  Rationale/Recommendations:  PT goals not fully met .

## 2023-08-22 NOTE — PROGRESS NOTES
Pt discharged at 530pm. Reviewed AVS with pt and niece at bedside including follow up appts, wound care appts and discharge medications. All questions answered. Wound vac sent with pt. Pts niece called p$ at 615pm stating pharmacy meds were sent to was closed. Writer paged cross cover, reviewed meds, ok with pt picking them up in am.

## 2023-08-22 NOTE — PLAN OF CARE
"Goal Outcome Evaluation:    Pt denies pain. Flat affect expresses that he is \"mad\" because he was supposed to discharge today. BG stable, poor appetite did not want to eat dinner despite encouragement. SBA to to BR w/ walker NWB on R foot. VSS RA.      Problem: Plan of Care - These are the overarching goals to be used throughout the patient stay.    Goal: Plan of Care Review  Description: The Plan of Care Review/Shift note should be completed every shift.  The Outcome Evaluation is a brief statement about your assessment that the patient is improving, declining, or no change.  This information will be displayed automatically on your shift note.  Outcome: Progressing  Flowsheets (Taken 8/21/2023 0683)  Plan of Care Reviewed With: patient  Overall Patient Progress: improving     Problem: Plan of Care - These are the overarching goals to be used throughout the patient stay.    Goal: Absence of Hospital-Acquired Illness or Injury  Intervention: Identify and Manage Fall Risk  Recent Flowsheet Documentation  Taken 8/21/2023 1634 by Anita Alonso, RN  Safety Promotion/Fall Prevention: activity supervised     Problem: Plan of Care - These are the overarching goals to be used throughout the patient stay.    Goal: Optimal Comfort and Wellbeing  Outcome: Progressing     Problem: Plan of Care - These are the overarching goals to be used throughout the patient stay.    Goal: Readiness for Transition of Care  Outcome: Progressing     Problem: Hyperglycemia  Goal: Blood Glucose Level Within Targeted Range  Outcome: Progressing         Plan of Care Reviewed With: patient    Overall Patient Progress: improvingOverall Patient Progress: improving           "

## 2023-08-22 NOTE — TELEPHONE ENCOUNTER
Can do NV Thursday 8/24 and the Mon 8/28 at 10:00. Please schedule with Chu next week if possible. Thanks!

## 2023-08-22 NOTE — PROGRESS NOTES
[facility]    Met with patient at bedside for teach of IV antibiotic via SL valved PICC.  Plan is for patient to discharge on Ceftriaxone 2g q 24 hours.  Pt will get his dose this afternoon before discharging.  He will next dose indep in the home tomorrow afternoon. Pt will be going into clinic for his 3x/week wound vac changes.    Provided hands-on teaching using teaching mats and demo equipment.  Patient taught SAS  method and was able to provide return demonstration using aseptic technique.  Extension tubing was added to his PICC.  IV catheter flushed without resistance and had good blood return.  Delivery of med and supplies will be delivered to patient's home  tonight between 7-9 pm.  A nurse  will contact patient to set up the first home nursing visit.    Provided 24/7 phone number and answered all questions.  Patient verbalized understanding of discharge plans.    Thank you for the opportunity to provide infusion services to this patient.  Updated pt's bedside RN and Vanessa Gates RN CM.    Charlee Russell, RN, BSN  [facility]  [phone] (Monday through Friday, 8am-5pm)  [phone] (office)

## 2023-08-22 NOTE — TELEPHONE ENCOUNTER
Patient scheduled for nurse visits Thursday and Monday and visit with Dr. Sage on Wednesday next week.

## 2023-08-24 ENCOUNTER — OFFICE VISIT (OUTPATIENT)
Dept: VASCULAR SURGERY | Facility: CLINIC | Age: 68
End: 2023-08-24
Attending: PODIATRIST
Payer: COMMERCIAL

## 2023-08-24 ENCOUNTER — PATIENT OUTREACH (OUTPATIENT)
Dept: CARE COORDINATION | Facility: CLINIC | Age: 68
End: 2023-08-24
Payer: MEDICAID

## 2023-08-24 VITALS
SYSTOLIC BLOOD PRESSURE: 160 MMHG | TEMPERATURE: 97.8 F | RESPIRATION RATE: 16 BRPM | DIASTOLIC BLOOD PRESSURE: 78 MMHG | HEART RATE: 78 BPM

## 2023-08-24 DIAGNOSIS — Z89.431 STATUS POST AMPUTATION OF RIGHT FOOT THROUGH METATARSAL BONE (H): Primary | ICD-10-CM

## 2023-08-24 LAB — BACTERIA WND CULT: ABNORMAL

## 2023-08-24 PROCEDURE — 97607 NEG PRS WND THR NDME<=50SQCM: CPT

## 2023-08-24 ASSESSMENT — PAIN SCALES - GENERAL: PAINLEVEL: NO PAIN (0)

## 2023-08-24 NOTE — PATIENT INSTRUCTIONS
Please bring a pack with black foam and a cartridge to each appointment    If you do not have a back up plan in place: If the negative pressure wound therapy malfunctions or unable to maintain seal: dressing must be removed and reapplied within 2 hours of the incident. If unable to reapply negative pressure wound dressing, place Normal Saline moistened gauze in wound bed and cover with appropriate dressing to keep wound bed moist.  Change wet-to-dry dressing two times a day until healthcare staff can re-implement negative pressure therapy.   Change canister at least weekly.  [facility] Contact Center can be reached at [phone], 24 hours a day 7 days a week    What is V.A.C.  Therapy?   V.A.C.  Therapy is a medical device system that   promotes wound healing by delivering negative   pressure (a vacuum) to the wound through a   patented dressing and therapy unit creating an   environment that promotes the wound healing   process. This negative pressure helps draw wound   edges together, remove wound fluids and infectious   materials and promote granulation tissue formation   (the connective tissue in healing wounds).   Unlike gauze bandages that merely cover a wound,   V.A.C.  Therapy actively works to help the wound   healing process.   The V.A.C.  Therapy System helps:    Promote wound healing    Provide a moist wound healing environment    Draw wound edges together    Remove fluid and infectious materials    Reduce wound odor    Reduce the need for daily dressing changes   When should I call my clinician when on   V.A.C.  Therapy?   Immediately report to your clinician if you have any   of these symptoms:    Fever over 102     Diarrhea    Headache    Sore throat    Confusion    Sick to your stomach or throwing up    Dizziness or feel faint when you stand up    Redness around the wound    Skin itches or rash present    Wound is sore, red or swollen    Pus or bad smell from the wound    Area in or around wound feels  very warm    Vacuum-Assisted Closure of a Wound  Vacuum-assisted closure (VAC) of a wound is a type of treatment to help wounds heal. It s also known as negative pressure wound therapy. During the treatment, a device lowers air pressure on the wound. This can help the wound heal more quickly.  Understanding the wound VAC system  A wound VAC system has several parts. A foam or gauze dressing is put directly on the wound. The dressing is changed every 24 to 72 hours. An adhesive film covers and seals the dressing and wound. A drainage tube leads from under the adhesive film and connects to a portable vacuum pump. This pump removes air pressure over the wound. It may do this constantly. Or it may do it in cycles. During the treatment, you ll need to carry the portable pump everywhere you go.  Why wound VAC is used  You might need this therapy for a recent traumatic wound. Or you may need it for a chronic wound. This is a wound that does not heal the way it should over time. This can happen with wounds in people who have diabetes. You may need a wound VAC if you ve had a recent skin graft. And you may need a wound VAC for a large wound. Large wounds can take a longer time to heal.  A wound vacuum system may help your wound heal more quickly by:  Draining extra fluid from the wound  Reducing swelling  Reducing bacteria in the wound  Keeping your wound moist and warm  Helping draw together wound edges  Increasing blood flow to your wound  Decreasing inflammation  Wound VAC offers some other advantages over other types of wound care. It may decrease your overall discomfort. The dressings usually need to be changed less often. And they may be easier to keep in position.  Risks of wound VAC  Wound VAC has some rare risks, such as:  Bleeding (which may be severe)  Wound infection  An abnormal connection between the intestinal tract and the skin (enteric fistula)  Proper training in dressing changes can help reduce the risk  for these complications. Also, your doctor will carefully evaluate you to make sure you are a good candidate for the therapy. Certain problems can increase your risk for complications. These include:  Exposed organs or blood vessels  High risk of bleeding from another medical problem  Wound infection  Nearby bone infection  Dead wound tissue  Cancer tissue  Fragile skin, such as from aging or longtime use of topical steroids  Allergy to adhesive  Very poor blood flow to your wound  Wounds close to joints that may reopen because of movement  Your doctor will discuss the risks that apply to you. Make sure to talk with him or her about all of your questions and concerns.  Getting ready for wound VAC  You likely won t need to do much to get ready for wound VAC. In some cases, you may need to wait a while before having this therapy. For example, your doctor may first need to treat an infection in your wound. Dead or damaged tissue may also need to be removed from your wound.  You or a caregiver may need training on how to use the wound VAC device. This is done if you will be able to have your wound vacuum therapy at home. In other cases, you may need to have your wound vacuum therapy in a health care facility.  On the day of your procedure  A health care provider will cover your wound with foam or gauze wound dressing. An adhesive film will be put over the dressing and wound. This seals the wound. The foam connects to a drainage tube, which leads to a vacuum pump. This pump is portable. When the pump is turned on, it draws fluid through the foam and out the drainage tubing. The pump may run constantly, or it may cycle off and on. Your exact setup will depend on the specific type of wound vacuum system that you use.  Managing your wound  You may need the dressing changed about once a day. You may need it changed more or less often, depending on your wound. You or your caregiver may be trained to do this at home. Or it may  be done by a visiting health care provider. Your doctor may prescribe a pain medicine. This is to prevent or reduce pain during the dressing change.  You will likely need to use the wound VAC system for several weeks or months. During this time, you ll carry the portable pump everywhere you go.  Nutrition for wound healing  During this time, make sure you follow a healthy diet. This is needed so the wound can heal and to prevent infection. Your doctor can tell you more about what to include in your diet during this time.  follow up with your doctor if you have a medical condition that led to your wound, such as diabetes. He or she can help you prevent future wounds.  Follow-up care  Your doctor will carefully keep track of your healing. Make sure to keep all follow-up appointments.  When to call your health care provider  Call your health care provider right away if you have any of these:  Fever of 100.4 F (38.0 C) or higher  Increased redness, swelling, or warmth around wound  Increased pain  Bright red blood or blood clots in tubing or the collection chamber of the vacuum

## 2023-08-24 NOTE — PROGRESS NOTES
[facility]    Background: Transitional Care Management program identified per system criteria and reviewed by [facility] team for possible outreach.    Assessment: Upon chart review, [facility] Team member will not proceed with patient outreach related to this episode of Transitional Care Management program due to reason below:    Patient has a follow up appointment with an appropriate provider today for hospital discharge    Plan: Transitional Care Management episode addressed appropriately per reason noted above.      FAZAL Lind  [facility], [facility]    *Connected Care Resource Team does NOT follow patient ongoing. Referrals are identified based on internal discharge reports and the outreach is to ensure patient has an understanding of their discharge instructions.

## 2023-08-24 NOTE — PROGRESS NOTES
[facility]  -  Nurse Visit        Date of Service:  August 24, 2023     Requesting Provider: PARK Sage    Diagnosis:     ICD-10-CM    1. 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 Arrives with natividad [place]. Reports pain of 0.  Denies any fevers, chills, or generalized ill feeling. Reports no alarms or leaks.     Dressing on Arrival: NWPT running at 125 mmHg. Upon removal of dressings moderate serosanguinous drainage is noted. Sutures are present and skin is macerated along incision line.    New Wounds noted: No    Right foot 8/24    Right foot 8/24    Right foot/leg 8/24    Right foot 8/24    Vital Signs: BP (!) 160/78   Pulse 78   Temp 97.8  F (36.6  C)   Resp 16     Assessment:      General:  Patient presents to clinic in no apparent distress.   Psychiatric:  Alert and oriented x3.   Lower extremity:  edema is present.    Integumentary:  Skin is pink (lower leg-patient states this has improved with IV antibiotics)    Circumferential volume measures:       No data to display                Wound info:  Incision/Surgical Site 08/14/23 Right Foot (Active)   Incision Assessment UTV 08/22/23 1558   Marie-Incision Assessment UTV 08/21/23 1634   Closure Sutures 08/21/23 1634   Incision Drainage Amount Small 08/21/23 1634   Drainage Description Serosanguinous 08/21/23 1634   Incision Care Therapy - negative pressure 08/22/23 1558   Dressing Intervention Other (Comment);Moist drainage 08/21/23 1634       Undermining is not present.    The periwoundskin is maceration      Plan:         1. Patient will return in 4 days for nurse visit.            2. As listed below, treatment provided irrigation, mechanical cleansing, and dressings to promote autolytic debridement.             Cleansed with: Normal saline    Protected  skin with: 3M Cavilon    Dressings Applied to wound: Negative wound vac therapy:      Applied vac drape to the periwound skin to protect from the drainage and sponge; window pane fashion   Cut the black vac foam to fit the size of the wound. Used 1 piece(s) of black sponge total.   Applied vac drape to wherever the bridging is going to be (NEVER apply black sponge to the intact skin)   Covered with vac drape for air tight seal. Cut a hole the size of a quarter and applied suction pad. Be mindful of the direction of the tubing.  Connected tubing and turned vac machine to 125mmHG continuous suction.   No alarm or leaks noted.    Compression Applied to the right leg: Tubular compression  Compression Applied to the left leg: None    Offloading used: Wheelchair    Trial Products: No    Provider notified regarding concerns: No    Treatment Changes: No    Tolerated Dressing Change:  Yes    Taught Regarding: follow up appointment(s), wound cares, wound care supplies, elevation, offloading, and compliance. Discussed with niece and patient that they need to bring a cartridge and foam pack to each visit. Demonstrated locking the VAC screen and changing the cartridge. Also discussed the importance of staying non-weight bearing and elevated foot as much as possible.     Educational Barriers: No barriers      Lola Gonzalez RN, WTA-C, CFCN

## 2023-08-28 ENCOUNTER — OFFICE VISIT (OUTPATIENT)
Dept: VASCULAR SURGERY | Facility: CLINIC | Age: 68
End: 2023-08-28
Attending: PODIATRIST
Payer: COMMERCIAL

## 2023-08-28 VITALS
SYSTOLIC BLOOD PRESSURE: 142 MMHG | RESPIRATION RATE: 12 BRPM | TEMPERATURE: 98.1 F | HEART RATE: 76 BPM | DIASTOLIC BLOOD PRESSURE: 76 MMHG

## 2023-08-28 DIAGNOSIS — Z89.431 STATUS POST AMPUTATION OF RIGHT FOOT THROUGH METATARSAL BONE (H): Primary | ICD-10-CM

## 2023-08-28 PROCEDURE — 97607 NEG PRS WND THR NDME<=50SQCM: CPT

## 2023-08-28 ASSESSMENT — PAIN SCALES - GENERAL: PAINLEVEL: NO PAIN (0)

## 2023-08-28 NOTE — PATIENT INSTRUCTIONS
Please bring a pack with black foam and a cartridge to each appointment.    Keep dressing in place until Wednesdays apt. Call [phone] if any breakthrough drainage.    If you do not have a back up plan in place: If the negative pressure wound therapy malfunctions or unable to maintain seal: dressing must be removed and reapplied within 2 hours of the incident. If unable to reapply negative pressure wound dressing, place Normal Saline moistened gauze in wound bed and cover with appropriate dressing to keep wound bed moist.  Change wet-to-dry dressing two times a day until healthcare staff can re-implement negative pressure therapy.   Change canister at least weekly.  [facility] Contact Center can be reached at [phone], 24 hours a day 7 days a week    What is V.A.C.  Therapy?   V.A.C.  Therapy is a medical device system that   promotes wound healing by delivering negative   pressure (a vacuum) to the wound through a   patented dressing and therapy unit creating an   environment that promotes the wound healing   process. This negative pressure helps draw wound   edges together, remove wound fluids and infectious   materials and promote granulation tissue formation   (the connective tissue in healing wounds).   Unlike gauze bandages that merely cover a wound,   V.A.C.  Therapy actively works to help the wound   healing process.   The V.A.C.  Therapy System helps:    Promote wound healing    Provide a moist wound healing environment    Draw wound edges together    Remove fluid and infectious materials    Reduce wound odor    Reduce the need for daily dressing changes   When should I call my clinician when on   V.A.C.  Therapy?   Immediately report to your clinician if you have any   of these symptoms:    Fever over 102     Diarrhea    Headache    Sore throat    Confusion    Sick to your stomach or throwing up    Dizziness or feel faint when you stand up    Redness around the wound    Skin itches or rash present     Wound is sore, red or swollen    Pus or bad smell from the wound    Area in or around wound feels very warm    Vacuum-Assisted Closure of a Wound  Vacuum-assisted closure (VAC) of a wound is a type of treatment to help wounds heal. It s also known as negative pressure wound therapy. During the treatment, a device lowers air pressure on the wound. This can help the wound heal more quickly.  Understanding the wound VAC system  A wound VAC system has several parts. A foam or gauze dressing is put directly on the wound. The dressing is changed every 24 to 72 hours. An adhesive film covers and seals the dressing and wound. A drainage tube leads from under the adhesive film and connects to a portable vacuum pump. This pump removes air pressure over the wound. It may do this constantly. Or it may do it in cycles. During the treatment, you ll need to carry the portable pump everywhere you go.  Why wound VAC is used  You might need this therapy for a recent traumatic wound. Or you may need it for a chronic wound. This is a wound that does not heal the way it should over time. This can happen with wounds in people who have diabetes. You may need a wound VAC if you ve had a recent skin graft. And you may need a wound VAC for a large wound. Large wounds can take a longer time to heal.  A wound vacuum system may help your wound heal more quickly by:  Draining extra fluid from the wound  Reducing swelling  Reducing bacteria in the wound  Keeping your wound moist and warm  Helping draw together wound edges  Increasing blood flow to your wound  Decreasing inflammation  Wound VAC offers some other advantages over other types of wound care. It may decrease your overall discomfort. The dressings usually need to be changed less often. And they may be easier to keep in position.  Risks of wound VAC  Wound VAC has some rare risks, such as:  Bleeding (which may be severe)  Wound infection  An abnormal connection between the intestinal  tract and the skin (enteric fistula)  Proper training in dressing changes can help reduce the risk for these complications. Also, your doctor will carefully evaluate you to make sure you are a good candidate for the therapy. Certain problems can increase your risk for complications. These include:  Exposed organs or blood vessels  High risk of bleeding from another medical problem  Wound infection  Nearby bone infection  Dead wound tissue  Cancer tissue  Fragile skin, such as from aging or longtime use of topical steroids  Allergy to adhesive  Very poor blood flow to your wound  Wounds close to joints that may reopen because of movement  Your doctor will discuss the risks that apply to you. Make sure to talk with him or her about all of your questions and concerns.  Getting ready for wound VAC  You likely won t need to do much to get ready for wound VAC. In some cases, you may need to wait a while before having this therapy. For example, your doctor may first need to treat an infection in your wound. Dead or damaged tissue may also need to be removed from your wound.  You or a caregiver may need training on how to use the wound VAC device. This is done if you will be able to have your wound vacuum therapy at home. In other cases, you may need to have your wound vacuum therapy in a health care facility.  On the day of your procedure  A health care provider will cover your wound with foam or gauze wound dressing. An adhesive film will be put over the dressing and wound. This seals the wound. The foam connects to a drainage tube, which leads to a vacuum pump. This pump is portable. When the pump is turned on, it draws fluid through the foam and out the drainage tubing. The pump may run constantly, or it may cycle off and on. Your exact setup will depend on the specific type of wound vacuum system that you use.  Managing your wound  You may need the dressing changed about once a day. You may need it changed more or less  often, depending on your wound. You or your caregiver may be trained to do this at home. Or it may be done by a visiting health care provider. Your doctor may prescribe a pain medicine. This is to prevent or reduce pain during the dressing change.  You will likely need to use the wound VAC system for several weeks or months. During this time, you ll carry the portable pump everywhere you go.  Nutrition for wound healing  During this time, make sure you follow a healthy diet. This is needed so the wound can heal and to prevent infection. Your doctor can tell you more about what to include in your diet during this time.  follow up with your doctor if you have a medical condition that led to your wound, such as diabetes. He or she can help you prevent future wounds.  Follow-up care  Your doctor will carefully keep track of your healing. Make sure to keep all follow-up appointments.  When to call your health care provider  Call your health care provider right away if you have any of these:  Fever of 100.4 F (38.0 C) or higher  Increased redness, swelling, or warmth around wound  Increased pain  Bright red blood or blood clots in tubing or the collection chamber of the vacuum

## 2023-08-28 NOTE — PROGRESS NOTES
[facility]  -  Nurse Visit        Date of Service:  August 28, 2023     Requesting Provider: PARK Sage    Diagnosis:     ICD-10-CM    1. 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 Reports pain of 1.  Denies any fevers, chills, or generalized ill feeling. Arrives with friend/care giver. Wheel chair. No cam boot. Stated wound VAC was found not running for 15-20 minutes the last few days. Periwound is very mascerated. VAC held and will bring to apt Wednesday with Dr Sage.    Dressing on Arrival: Wound VAC running at 125mmhg, Pt is currently using tubular for compression and did tolerate well. Upon removal of dressings moderate Serosanguinous drainage is noted.    New Wounds noted: No    Vital Signs: BP (!) 142/76   Pulse 76   Temp 98.1  F (36.7  C)   Resp 12     Assessment:      General:  Patient presents to clinic in no apparent distress.   Psychiatric:  Alert and oriented x3.   Lower extremity:  edema is present.    Integumentary:  Skin is mascerated    Circumferential volume measures:       No data to display                Wound info:  Incision/Surgical Site 08/14/23 Right Foot (Active)   Incision Assessment UTV 08/22/23 1558   Marie-Incision Assessment UTV 08/21/23 1634   Closure Sutures 08/21/23 1634   Incision Drainage Amount Small 08/21/23 1634   Drainage Description Serosanguinous 08/21/23 1634   Incision Care Therapy - negative pressure 08/22/23 1558   Dressing Intervention Other (Comment);Moist drainage 08/21/23 1634       Undermining is not present.    The periwoundskin is maceration      Plan:         1. Patient will return in 2 days for with Dr Sage.            2. As listed below, treatment provided irrigation, mechanical cleansing, and dressings to promote autolytic debridement.             Cleansed with:  Dilute hibiclens 30cc in 500cc NS and soap and water    Protected skin with: 3M Cavilon    Dressings Applied to wound: Silver alginate, Gauze, and ABD    Compression Applied to the right leg: Tubular compression  Compression Applied to the left leg: None                                                                                      Offloading used: Wheelchair    Trial Products: No    Provider notified regarding concerns: Yes: VAC on hold    Treatment Changes: Yes: VAC on hold aquacel/gauze    Tolerated Dressing Change:  Yes    Taught Regarding: follow up appointment(s), wound cares, offloading, and compliance    Educational Barriers: No barriers      Timothy Justin RN

## 2023-08-29 ENCOUNTER — LAB REQUISITION (OUTPATIENT)
Dept: LAB | Facility: CLINIC | Age: 68
End: 2023-08-29
Payer: COMMERCIAL

## 2023-08-29 DIAGNOSIS — L03.115 CELLULITIS OF RIGHT LOWER LIMB: ICD-10-CM

## 2023-08-29 LAB
BASOPHILS # BLD AUTO: 0 10E3/UL (ref 0–0.2)
BASOPHILS NFR BLD AUTO: 1 %
CREAT SERPL-MCNC: 1.49 MG/DL (ref 0.67–1.17)
EOSINOPHIL # BLD AUTO: 0.4 10E3/UL (ref 0–0.7)
EOSINOPHIL NFR BLD AUTO: 7 %
ERYTHROCYTE [DISTWIDTH] IN BLOOD BY AUTOMATED COUNT: 15.2 % (ref 10–15)
GFR SERPL CREATININE-BSD FRML MDRD: 51 ML/MIN/1.73M2
HCT VFR BLD AUTO: 27.3 % (ref 40–53)
HGB BLD-MCNC: 8.9 G/DL (ref 13.3–17.7)
IMM GRANULOCYTES # BLD: 0 10E3/UL
IMM GRANULOCYTES NFR BLD: 0 %
LYMPHOCYTES # BLD AUTO: 1.4 10E3/UL (ref 0.8–5.3)
LYMPHOCYTES NFR BLD AUTO: 27 %
MCH RBC QN AUTO: 28.2 PG (ref 26.5–33)
MCHC RBC AUTO-ENTMCNC: 32.6 G/DL (ref 31.5–36.5)
MCV RBC AUTO: 86 FL (ref 78–100)
MONOCYTES # BLD AUTO: 0.4 10E3/UL (ref 0–1.3)
MONOCYTES NFR BLD AUTO: 9 %
NEUTROPHILS # BLD AUTO: 2.9 10E3/UL (ref 1.6–8.3)
NEUTROPHILS NFR BLD AUTO: 56 %
NRBC # BLD AUTO: 0 10E3/UL
NRBC BLD AUTO-RTO: 0 /100
PLATELET # BLD AUTO: 392 10E3/UL (ref 150–450)
RBC # BLD AUTO: 3.16 10E6/UL (ref 4.4–5.9)
WBC # BLD AUTO: 5.2 10E3/UL (ref 4–11)

## 2023-08-29 PROCEDURE — 82565 ASSAY OF CREATININE: CPT | Performed by: INTERNAL MEDICINE

## 2023-08-29 PROCEDURE — 85025 COMPLETE CBC W/AUTO DIFF WBC: CPT | Performed by: INTERNAL MEDICINE

## 2023-09-01 ENCOUNTER — OFFICE VISIT (OUTPATIENT)
Dept: VASCULAR SURGERY | Facility: CLINIC | Age: 68
End: 2023-09-01
Attending: PODIATRIST
Payer: MEDICAID

## 2023-09-01 VITALS — HEART RATE: 106 BPM | OXYGEN SATURATION: 97 %

## 2023-09-01 DIAGNOSIS — L97.514 ULCER OF RIGHT FOOT WITH NECROSIS OF BONE (H): Primary | ICD-10-CM

## 2023-09-01 PROCEDURE — G0463 HOSPITAL OUTPT CLINIC VISIT: HCPCS | Performed by: PODIATRIST

## 2023-09-01 PROCEDURE — 99213 OFFICE O/P EST LOW 20 MIN: CPT | Mod: 24 | Performed by: PODIATRIST

## 2023-09-01 PROCEDURE — 11044 DBRDMT BONE 1ST 20 SQ CM/<: CPT | Performed by: PODIATRIST

## 2023-09-01 ASSESSMENT — PAIN SCALES - GENERAL: PAINLEVEL: NO PAIN (0)

## 2023-09-01 NOTE — PROGRESS NOTES
FOOT AND ANKLE SURGERY/PODIATRY CONSULT NOTE        ASSESSMENT: Ulcerations right foot        TREATMENT:  -I discussed with the patient and the patient's family friend today that the surgical site on the right foot has a significant amount of nonviable tissue with full-thickness dehiscence resulting in full-thickness ulcerations which probes to bone.    -Based on the above, we discussed in detail treatment options to include attempted salvage of the right foot versus below the knee amputation.  Attempted salvage would include but not limited to additional surgical resection of nonviable tissue with use of wound VAC postoperatively, nonweightbearing x3 to 4 months minimally.  We also discussed below the knee amputation for more definitive procedure at this time.  All questions invited and answered.  Patient like to consider his options.    -Discussed smoking sensation and negative affects on wound healing.    -After discussion of risk factors nursing staff removed dressing, cleansed wound and consent obtained 2% Lidocaine HCL jelly was applied, under clean conditions, the right foot ulceration(s) were debrided using . bone .  Devitalized and nonviable tissue, along with any fibrin and slough, was removed to improve granulation tissue formation, stimulate wound healing, decrease overall bacteria load, disrupt biofilm formation and decrease edge senescence. Wound drainage was scant none. Total excisional debridement was 12 sq cm  bone  with a depth of 5 cm.   Ulcers were improved afterwards and .  Measures were as noted on the flow sheet. A gauze dressing was applied. He will continue to apply a gauze dressing qday.    -I asked the patient to contact my office within the next 1 to 3 days with his decision on course of treatment he would prefer.  We discussed that I would recommend ABIs if he decides on attempted salvage of the right foot.      Helio Sage DPM  [facility]      HPI: Dario Benavides was seen today for a right foot ulceration.  Patient underwent I&D on the right foot on 8/14 with Dr. Cintron and subsequent I&D with delayed primary closure on 8/17 with Dr. Nieto.  Patient is currently on antibiotics per ID.  He is living at home and admits smoking.  Patient's family friend is present today.    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) 05/08/2021    Tobacco abuse 07/23/2019    Type 2 diabetes mellitus with right diabetic foot infection (H) 05/04/2021       Past Surgical History:   Procedure Laterality Date    BACK SURGERY  1992    CLOSE SECONDARY WOUND LOWER EXTREMITY Right 8/17/2023    Procedure: Delayed primary closure of wound right foot;  Surgeon: Brandon Nieto DPM;  Location: [facility] OR    IRRIGATION AND DEBRIDEMENT FOOT, COMBINED Right 8/14/2023    Procedure: IRRIGATION AND DEBRIDEMENT right foot with removing all infected bones and soft tissue;  Surgeon: Jun Goyal DPM;  Location: [facility] OR    NECK SURGERY  2011        No Known Allergies      Current Outpatient Medications:     acetaminophen (TYLENOL) 325 MG tablet, Take 2 tablets by mouth every 4 hours as needed for mild pain, Disp: , Rfl:     cephALEXin (KEFLEX) 500 MG capsule, Take 1 capsule (500 mg) by mouth 3 times daily for 14 days, Disp: 42 capsule, Rfl: 0    insulin aspart (NOVOLOG PEN) 100 UNIT/ML pen, Inject 8 Units Subcutaneous 3 times daily (with meals) Only take if you are eating well, Disp: 15 mL, Rfl: 0    amLODIPine (NORVASC) 2.5 MG tablet, Take 1 tablet (2.5 mg) by mouth daily (Patient not taking: Reported on 9/1/2023), Disp: 30 tablet, Rfl: 0    insulin glargine (LANTUS PEN) 100 UNIT/ML pen, Inject 20 Units Subcutaneous every morning (before breakfast) (Patient not taking: Reported  on 9/1/2023), Disp: 15 mL, Rfl: 0    insulin pen needle (32G X 4 MM) 32G X 4 MM miscellaneous, Use 4 pen needles daily or as directed. (Patient not taking: Reported on 9/1/2023), Disp: 100 each, Rfl: 0    miconazole (MICATIN) 2 % external powder, Apply topically 2 times daily Apply to areas of moisture/rash in skin folds (Patient not taking: Reported on 9/1/2023), Disp: 43 g, Rfl: 0    Social History     Social History Narrative    Not on file       No family history on file.    Review of Systems - 10 point Review of Systems is negative except for right foot ulcers which is noted in HPI.      OBJECTIVE:  Appearance: alert, well appearing, and in no distress.    Pulse 106   SpO2 97%     BMI= There is no height or weight on file to calculate BMI.    General appearance: Patient is alert and fully cooperative with history & exam.  No sign of distress is noted during the visit.     Psychiatric: Affect is pleasant & appropriate.  Patient appears motivated to improve health.     Respiratory: Breathing is regular & unlabored while sitting.     HEENT: Hearing is intact to spoken word.  Speech is clear.  No gross evidence of visual impairment that would impact ambulation.    Vascular: Dorsalis pedis non-palpableRight.  Dermatologic:   VASC Wound Right lateral foot (Active)   Pre Size Length 3 09/01/23 1300   Pre Size Width 2.5 09/01/23 1300   Pre Size Depth 5 09/01/23 1300   Pre Total Sq cm 7.5 09/01/23 1300       VASC Wound Right foot (Active)   Pre Size Length 2 09/01/23 1300   Pre Size Width 2 09/01/23 1300   Pre Size Depth 3 09/01/23 1300   Pre Total Sq cm 4 09/01/23 1300       Incision/Surgical Site 08/14/23 Right Foot (Active)   Incision Assessment UTV 08/22/23 1558   Marie-Incision Assessment UTV 08/21/23 1634   Closure Sutures 08/21/23 1634   Incision Drainage Amount Small 08/21/23 1634   Drainage Description Serosanguinous 08/21/23 1634   Incision Care Therapy - negative pressure 08/22/23 1558   Dressing Intervention  Other (Comment);Moist drainage 08/21/23 9841   Full-thickness dehiscence right foot incisions with sutures present resulting in full-thickness ulcerations which probe to deep tissues and to bone with considerable nonviable tissue.  No erythema, fluctuance or purulence identified right foot.  Neurologic: Diminished to light touch Right.  Musculoskeletal: Midfoot amputation right.

## 2023-09-01 NOTE — PATIENT INSTRUCTIONS
Wrap foot with dry gauze and change daily.     Call us with a decision regarding your surgery. [phone]     Surgery options:    Incision and drainage with use of wound vac and IV antibiotics with infectious diease.  Below the knee amputation.

## 2023-09-06 ENCOUNTER — TELEPHONE (OUTPATIENT)
Dept: VASCULAR SURGERY | Facility: CLINIC | Age: 68
End: 2023-09-06
Payer: MEDICAID

## 2023-09-06 NOTE — TELEPHONE ENCOUNTER
Writer called and spoke with [place] daughter, he is scheduled for a consult with Dr. Ge on 9/15.  She is concerned that the wound is infected and states since he was last seen he hasn't been very active, he pulled a muscle getting into the car.  She wants to know if its ok to wait until 9/15 or does Dr. Sage want to see him sooner?

## 2023-09-06 NOTE — TELEPHONE ENCOUNTER
Caller: Pilar    Provider: PARK Sage    Detailed reason for call: Patient has discussed with family, and they would like to proceed with BKA as discussed with Dr. Sage. Please call to discuss next steps/to schedule.     Best phone number to contact: [phone]    Best time to contact: Any    Ok to leave a detailed message: Yes    Ok to speak to authorized person if needed: Yes: Pilar      (Noted to patient if reason is related to wound or incision, to please send a photo via email or [employer].)

## 2023-09-07 NOTE — TELEPHONE ENCOUNTER
Caller: Timothy    Provider: MD Kwasi Ge    Detailed reason for call: Timothy is calling concerned about her dad and his care, (she is on the consent to comm) she is requesting a call back to discuss his current care plan    Best phone number to contact: [phone]     Best time to contact: any    Ok to leave a detailed message: Yes    Ok to speak to authorized person if needed: No      (Noted to patient if reason is related to wound or incision, to please send a photo via email or [employer].)

## 2023-09-07 NOTE — TELEPHONE ENCOUNTER
Daughter Timothy called to discuss Dario's care in detail. She is just learning about his medical situation now as she lives a few hours away in a different state. She did see her Dad last night. Reviewed appointment information and treatment plan from appointment with Dr. Sage on 9/1.     Comments: Wrap foot with dry gauze and change daily.      Call us with a decision regarding your surgery. [phone]      Surgery options:     1.            Incision and drainage with use of wound vac and IV antibiotics with infectious diease.  2.            Below the knee amputation.    Let her know that Dario's transport to appointments named Pilar had called clinic back with the decision to proceed with an amputation rather than attempting to salvage the limb. Dario is set up for an appointment with Dr. Ge on 9/15. She confirmed Dario's decision with him last night when she saw him to proceed with the amputation. She states that Pilar and Lennie (Dario's niece) are okay to speak to regarding appointments etc, but she would like to be kept in the loop regarding medical decisions and plan of care as she does not speak with Pilar or Lennie.    Also discussed that if they are concerned regarding infection and lethargy they should bring Dario to the ED. Timothy states he does not want to go and is refusing. Let Timothy know she can call back with further questions or concerns.

## 2023-09-11 LAB — BACTERIA TISS BX CULT: NO GROWTH

## 2023-09-15 DIAGNOSIS — L97.514 ULCER OF RIGHT FOOT WITH NECROSIS OF BONE (H): Primary | ICD-10-CM

## 2023-09-15 DIAGNOSIS — Z89.431 STATUS POST AMPUTATION OF RIGHT FOOT THROUGH METATARSAL BONE (H): ICD-10-CM

## 2023-09-20 ENCOUNTER — ANCILLARY PROCEDURE (OUTPATIENT)
Dept: VASCULAR ULTRASOUND | Facility: CLINIC | Age: 68
End: 2023-09-20
Attending: SURGERY
Payer: MEDICAID

## 2023-09-20 DIAGNOSIS — Z89.431 STATUS POST AMPUTATION OF RIGHT FOOT THROUGH METATARSAL BONE (H): ICD-10-CM

## 2023-09-20 DIAGNOSIS — L97.514 ULCER OF RIGHT FOOT WITH NECROSIS OF BONE (H): ICD-10-CM

## 2023-09-20 PROCEDURE — 93923 UPR/LXTR ART STDY 3+ LVLS: CPT

## 2023-09-22 ENCOUNTER — OFFICE VISIT (OUTPATIENT)
Dept: VASCULAR SURGERY | Facility: CLINIC | Age: 68
End: 2023-09-22
Attending: SURGERY
Payer: MEDICAID

## 2023-09-22 VITALS
TEMPERATURE: 97.9 F | DIASTOLIC BLOOD PRESSURE: 65 MMHG | HEART RATE: 100 BPM | SYSTOLIC BLOOD PRESSURE: 140 MMHG | RESPIRATION RATE: 20 BRPM

## 2023-09-22 DIAGNOSIS — Z89.431 STATUS POST TRANSMETATARSAL AMPUTATION OF RIGHT FOOT (H): Primary | ICD-10-CM

## 2023-09-22 PROCEDURE — 99203 OFFICE O/P NEW LOW 30 MIN: CPT | Performed by: SURGERY

## 2023-09-22 PROCEDURE — G0463 HOSPITAL OUTPT CLINIC VISIT: HCPCS | Performed by: SURGERY

## 2023-09-22 ASSESSMENT — PAIN SCALES - GENERAL: PAINLEVEL: NO PAIN (0)

## 2023-09-22 NOTE — PATIENT INSTRUCTIONS
Rafat Bishop,    Thank you for entrusting your care with us today. After your visit today with MD Kwasi Ge this is the plan that was discussed at your appointment.    Take your time and call us when you decide on what you would like to proceed with doing going forward.       Patient Education   Below-the-Knee Leg Amputation: Before Your Surgery  What is a below-the-knee amputation?  A below-the-knee amputation is surgery to remove your leg below the knee. Your doctor removes the leg and keeps as much healthy skin, blood vessel, and nerve tissue as possible.  Having your leg removed is traumatic. You have to learn to live with new limitations. This can be hard and frustrating. You may feel depressed. Or you may grieve for your previous lifestyle. Talking with your family, friends, and health professionals about how you feel may help. You may also find it helps to talk with a person who has had an amputation.  Even though losing a limb is a challenge, it does not change who you are. It doesn't prevent you from enjoying life. You will have to learn new ways to do things. But you will still be able to work and take part in sports and activities. And you can still learn, love, play, and live life to its fullest.  Many organizations can help you adjust to your new life. For example, you can go to amputee-coalition.org for information and support.  Your doctor will tell you how much of your leg should be removed. Your doctor will leave enough healthy skin to cover the remaining part of your leg (residual limb). You may get an artificial leg. This is called a prosthesis. If you get one, your doctor will shape your residual limb for the best possible fit.  Your doctor may sew together the skin to cover the residual limb. Or your doctor may leave it open to make sure it heals as it should. In this case, the skin may be sewn together 10 to 14 days later.  After surgery, you will stay in the hospital for several  days. How long you stay depends on your general health and the way your doctor does the surgery. You may spend part of your recovery in a rehabilitation (rehab) facility.  Your residual limb may heal as soon as 4 to 8 weeks after surgery. But it may take longer. You will need physical rehab. The rehab can sometimes start within 48 hours of your surgery. It may last as long as 1 year.  How do you prepare for surgery?  Surgery can be stressful. This information will help you understand what you can expect. And it will help you safely prepare for surgery.  Preparing for surgery    You may need to shower or bathe with a special soap the night before and the morning of your surgery. The soap contains chlorhexidine. It reduces the amount of bacteria on your skin that could cause an infection after surgery.     Be sure you have someone to take you home. Anesthesia and pain medicine will make it unsafe for you to drive or get home on your own.     Understand exactly what surgery is planned, along with the risks, benefits, and other options.     If you take a medicine that prevents blood clots, your doctor may tell you to stop taking it before your surgery. Or your doctor may tell you to keep taking it. (These medicines include aspirin and other blood thinners.) Make sure that you understand exactly what your doctor wants you to do.     Tell your doctor ALL the medicines, vitamins, supplements, and herbal remedies you take. Some may increase the risk of problems during your surgery. Your doctor will tell you if you should stop taking any of them before the surgery and how soon to do it.     Make sure your doctor and the hospital have a copy of your advance directive. If you don't have one, you may want to prepare one. It lets others know your health care wishes. It's a good thing to have before any type of surgery or procedure.   What happens on the day of surgery?    Follow the instructions exactly about when to stop eating  "and drinking. If you don't, your surgery may be canceled. If your doctor told you to take your medicines on the day of surgery, take them with only a sip of water.     Follow your doctor's instructions about when to bathe or shower before your surgery. Do not apply lotions, perfumes, deodorants, or nail polish.     Do not shave the surgical site yourself.     Take off all jewelry and piercings. And take out contact lenses, if you wear them.   At the hospital or surgery center    Bring a picture ID.     The area for surgery is often marked to make sure there are no errors.     You will be kept comfortable and safe by your anesthesia provider. You will be asleep during the surgery.     The surgery will take about 45 to 90 minutes.   When should you call your doctor?   You have questions or concerns.     You don't understand how to prepare for your surgery.     You become ill before the surgery (such as fever, flu, or a cold).     You need to reschedule or have changed your mind about having the surgery.   Where can you learn more?  Go to https://www.[employer].net/patiented  Enter C024 in the search box to learn more about \"Below-the-Knee Leg Amputation: Before Your Surgery.\"  Current as of: November 9, 2022               Content Version: 13.7    3572-4935 [employer].   Care instructions adapted under license by your healthcare professional. If you have questions about a medical condition or this instruction, always ask your healthcare professional. [employer] disclaims any warranty or liability for your use of this information.      Patient Education   Below-the-Knee Leg Amputation: What to Expect at Home  Your Recovery  A below-the-knee amputation is surgery to remove your leg below the knee. Your doctor removed the leg while keeping as much healthy bone, skin, blood vessel, and nerve tissue as possible.  After the surgery, you will probably have bandages, a rigid dressing, or a cast over " the remaining part of your leg (remaining limb). The leg may be swollen for at least 4 weeks after your surgery. If you have a rigid dressing or cast, your doctor will set up regular visits to change the dressing or cast and check the healing. If you have elastic bandages, your doctor will tell you how to change them.  You may have pain in your remaining limb. You also may think you have feeling or pain where your leg was. This is called phantom pain. It is common and may come and go for a year or longer. Your doctor can give you medicine for both types of pain.  You may have already started a rehabilitation program (rehab). You will continue this under the guidance of your doctor or physical therapist. You will need to do a lot of work to recondition your muscles and relearn activities, balance, and coordination. The rehab can last as long as a year.  You may have been fitted with a temporary artificial leg while you were still in the hospital. If this is the case, your doctor will teach you how to care for it. If you are getting an artificial leg, you may need to get used to it before you go back to work and your other activities. You will probably not wear it all the time, so you will need to learn how to use a wheelchair, crutches, or other device. You will have to make changes in your home. Your workplace may be able to make allowances for you.  Having your leg amputated can be traumatic. And learning to live with new limits can be hard and frustrating. Many people feel depressed and may grieve for their former lifestyle. It's important to understand these feelings. Talking with your family, friends, and health professionals about your frustrations is an important part of your recovery. You may also find that it helps to talk with a person who has had an amputation.  Remember that even though you've lost a limb, it doesn't change who you are or prevent you from enjoying life. You'll have to adapt and learn new  ways to do things. But you can still work and take part in sports and activities. And you can still learn, love, play, and live life to its fullest.  Many organizations can help you adjust to your new life. For example, you can go to amputee-coalition.org for information and support.  This care sheet gives you a general idea about how long it will take for you to recover. But each person recovers at a different pace. Follow the steps below to get better as quickly as possible.  How can you care for yourself at home?  Activity    Be active. Talk to your doctor about what you can do. If you are active and use your remaining limb, it will heal faster.     You may shower when your doctor okays it. Wash the remaining limb with soap and water, and pat it dry. You may need help doing this at first.     You may need to adapt your car to your situation before you drive.     You will probably be able to return to work and your usual routine when your remaining limb heals. This can be as soon as 4 to 8 weeks after surgery, but it may take longer.   Diet    You can eat your normal diet. If your stomach is upset, try bland, low-fat foods like plain rice, broiled chicken, toast, and yogurt.     You may notice that your bowel movements are not regular right after your surgery. This is common. Try to avoid constipation and straining with bowel movements. Take a fiber supplement every day. If you have not had a bowel movement after a couple of days, ask your doctor about taking a mild laxative.   Medicines    Your doctor will tell you if and when you can restart your medicines. The doctor will also give you instructions about taking any new medicines.     If you stopped taking aspirin or some other blood thinner, your doctor will tell you when to start taking it again.     Be safe with medicines. Take pain medicines exactly as directed.  If the doctor gave you a prescription medicine for pain, take it as prescribed.  If you are not  taking a prescription pain medicine, ask your doctor if you can take an over-the-counter medicine.     If you think your pain medicine is making you sick to your stomach:  Take your medicine after meals (unless your doctor has told you not to).  Ask your doctor for a different pain medicine.     If your doctor prescribed antibiotics, take them as directed. Do not stop taking them just because you feel better. You need to take the full course of antibiotics.   Remaining limb care    You may have bandages, a rigid dressing, or a cast on your remaining limb. Your doctor will tell you how to take care of it. Depending on your dressing and the doctor's instructions:  Check your remaining limb daily for irritation, skin breaks, and redness. Tell your doctor about any problems you see.  Wash your remaining limb with mild soap and warm water every night. Pat it dry.     If you have a temporary artificial leg, remove it before you go to sleep.   Exercise    Rehabilitation is a series of exercises you do after your surgery. This helps you learn to use your remaining limb and artificial leg. You will work with your doctor and physical therapist to plan this exercise program. To get the best results, you need to do the exercises correctly and as often and as long as your doctor tells you. Your rehab program will give you a number of exercises to do. Always do them as your therapist tells you.   Other instructions    Preventing contractures is very important. A contracture occurs when a joint becomes stuck in one position. If this happens, it may be hard or impossible to straighten your remaining limb and use an artificial leg.  Make sure you put equal weight on both hips when you sit. Use firm chairs, and sit up straight.  Keep your remaining limb flat with your knees straight and your legs together while you are lying on your back.  Lie on your stomach as much as possible to stretch your hip joint.  Do not sit for more than an  "hour or two. Stand, or lie on your stomach now and then.  Do not put pillows under your hips or knees or between your thighs.  Do not rest your remaining limb on crutch handles or a wheelchair.   Follow-up care is a key part of your treatment and safety. Be sure to make and go to all appointments, and call your doctor if you are having problems. It's also a good idea to know your test results and keep a list of the medicines you take.  When should you call for help?   Call 911 anytime you think you may need emergency care. For example, call if:    You passed out (lost consciousness).     You have chest pain, are short of breath, or you cough up blood.   Call your doctor now or seek immediate medical care if:    You have pain that does not get better after you take pain medicine.     You are sick to your stomach or cannot drink fluids.     You have loose stitches, or your incision comes open.     You have signs of a blood clot in your leg (called a deep vein thrombosis), such as:  Pain in your calf, back of the knee, thigh, or groin.  Redness or swelling in your leg.     You have signs of infection, such as:  Increased pain, swelling, warmth, or redness.  Red streaks leading from the incision.  Pus draining from the incision.  A fever.     You bleed through your bandage.   Watch closely for any changes in your health, and be sure to contact your doctor if you have any problems.  Where can you learn more?  Go to https://www.[employer].net/patiented  Enter U059 in the search box to learn more about \"Below-the-Knee Leg Amputation: What to Expect at Home.\"  Current as of: November 9, 2022               Content Version: 13.7    8920-7869 [employer].   Care instructions adapted under license by your healthcare professional. If you have questions about a medical condition or this instruction, always ask your healthcare professional. [employer] disclaims any warranty or liability for your use of " this information.

## 2023-09-22 NOTE — PROGRESS NOTES
[facility]        Patient is here for a evaluation to discuss BKA. right foot nonhealing ulceration.     Pt is currently taking no meds that would impact our treatment plan.    BP (!) 140/65   Pulse 100   Temp 97.9  F (36.6  C)   Resp 20     The provider has been notified that the patient has no concerns.     Questions patient would like addressed today are: He had ultrasound on 9/20 and wondering what the results are.     Refills are needed: N/A    Has homecare services and agency name:  No       Right foot TMA

## 2023-09-24 NOTE — PROGRESS NOTES
Vascular & Endovascular Surgery Clinic Consultation   Vascular Surgeon: Kwasi Ge MD, RPVI       Location:  [facility]    Dario Benavides  Medical Record #:  [id number]  YOB: 1955  Age:  68 year old     Date of Service: 9/22/2023      Chief Complaint/Reason for Visit: consideration of below knee amputation    HPI:  Mr. Benavides is a pleasant 68 year old male seen today with his friend, Suzie, for evaluation for a below knee amputation.  He has had nonhealing wounds of the right lower extremity and subsequent TMA.  He has discussed salvage of the infected TMA with Dr. Sage and was leaning toward amputation.      Review of Systems:    A 12 point ROS was reviewed and is negative except for symptoms noted in HPI.     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) 05/08/2021    Tobacco abuse 07/23/2019    Type 2 diabetes mellitus with right diabetic foot infection (H) 05/04/2021         Past Surgical History:   Procedure Laterality Date    BACK SURGERY  1992    CLOSE SECONDARY WOUND LOWER EXTREMITY Right 8/17/2023    Procedure: Delayed primary closure of wound right foot;  Surgeon: Brandon Nieto DPM;  Location: [facility] OR    IRRIGATION AND DEBRIDEMENT FOOT, COMBINED Right 8/14/2023    Procedure: IRRIGATION AND DEBRIDEMENT right foot with removing all infected bones and soft tissue;  Surgeon: Jun Goyal DPM;  Location: [facility] OR    NECK SURGERY  2011        Allergies:   No Known Allergies     Medications:    Current Outpatient Medications   Medication    acetaminophen (TYLENOL) 325 MG tablet    amLODIPine (NORVASC) 2.5 MG tablet    insulin aspart (NOVOLOG PEN) 100 UNIT/ML pen    insulin glargine (LANTUS PEN) 100 UNIT/ML pen    insulin pen needle  (32G X 4 MM) 32G X 4 MM miscellaneous    miconazole (MICATIN) 2 % external powder     No current facility-administered medications for this visit.        Social Habits:    Tobacco Use      Smoking status: Former        Packs/day: 1.00        Years: 45.00        Pack years: 45        Types: Cigarettes        Quit date: 2023        Years since quittin.0      Smokeless tobacco: Never      Tobacco comments: Seen IP by [facility] on 8/15/23 - down to 1 pack/week, declined cessation services and materials       Alcohol Use: Not on file       History   Drug Use    Types: Marijuana        Family History:  No family history on file.    Physical Examination:  BP (!) 140/65   Pulse 100   Temp 97.9  F (36.6  C)   Resp 20   Wt Readings from Last 1 Encounters:   23 169 lb 12.1 oz     There is no height or weight on file to calculate BMI.    Exam:  General: No apparent distress. Answers questions appropriately   Neurologic: Focally intact, Alert and oriented x 3.   Eyes: Grossly normal.   Cardiac:  RRR  Pulmonary:  Non labored breathing with normal respiratory effort  Abdominal: Soft, non distended, non tender to palpation.  No pulsatile mass.   Musculoskeletal/Extremity: fibrinous exudate over nonhealing TMA    Vascular Exam:     DP: Left: 2+        PT:      Right: 2+    Laboratory Findings:  Hemoglobin   Date Value Ref Range Status   2023 8.9 (L) 13.3 - 17.7 g/dL Final   2023 8.9 (L) 13.3 - 17.7 g/dL Final     WBC Count   Date Value Ref Range Status   2023 5.2 4.0 - 11.0 10e3/uL Final   2023 11.7 (H) 4.0 - 11.0 10e3/uL Final       Imaging:    I personally reviewed the non invasive vascular labs  from 2023 which shows right and left ANGELO that are 0.94 and TcPO2s on the right below knee of 72 which is adequate for healing.  At the ankle he is reduced at 30    Impression/Shared Medical Decision Making:    #1- Nonhealing right TMA  Mr. Benavides is a pleasant 68 year old male evaluated for  consideration of BKA.  We have very long discussion about various treatment for nonhealing wounds.  This includes aggressive limb salvage with consideration of further imaging to evaluate his arterial tree, potential procedures including angiograms and bypasses along with revision amputations.  We discussed that he does have normal ankle-brachial indexes and a palpable pulse at the ankle which does generally suggest enough large vessel flow to allow for healing unless there is substantial microvascular disease, which I suspect he has.  We discussed that he has adequate healing potential below-knee to undergo primary amputation which would also be course of action that would be reasonable.  We discussed extensively that there is no right answer and scenarios like this.  Primary rotation was certainly give him a good shot at healing and allow him to return to life likely more quickly.  It is quite a rehab endeavor however and there are never guarantees of healing despite adequate potential on studies.  We discuss that we could evaluate for arterial options as well.  He is very unsure what he would like to do.    Risks, benefits, and alternatives of amputation and limb salvage discussed including but not limited to death, anesthetic or cardiopulmonary complications, bleeding, infection, lymphatic leak, dissection, embolization, vessel perforation or other injury, worsening ischemia with resulting limb loss, compartment syndrome, fasciotomy, and acute renal insufficiency due to contrast exposure requiring temporary or permanent dialysis, and non healing wounds.  We discussed operative conduct including team approach, potential need for blood transfusion    He would like to think more.    Discussed warning signs including, but not limited to, worsening infection or limb ischemia.  If he experiences any of these, he has been advised to present to the emergency department immediately and contact my team.    Mr. Benavides  is in agreement with this plan as outlined.    Recommendations/Plan:  We will plan to discuss further how he will like to proceed.    Kwasi Ge MD, [facility]  Vascular & Endovascular Surgery      30 minutes spent on the day of encounter doing chart review from our system and care everywhere, history and exam, documentation, coordinating care, and further activities as noted with over half spent counseling.

## 2023-10-06 ENCOUNTER — TELEPHONE (OUTPATIENT)
Dept: VASCULAR SURGERY | Facility: CLINIC | Age: 68
End: 2023-10-06
Payer: MEDICAID

## 2023-10-06 NOTE — TELEPHONE ENCOUNTER
Pt was seen by Dr. Laura Vyas on 9/22. BKA was discussed and pt wanted to think about it. Writer spoke with his caregiver Rajani, she is not with him right now but she thinks he wants to proceed. She will have him call back to schedule.

## 2023-10-10 NOTE — TELEPHONE ENCOUNTER
Writer tried to call Dario. Voicemail box is full.     Writer spoke with Rajani, she stated that she has not been able to reach the patient. She will go over today to discuss BKA and call us back.

## 2023-10-19 ENCOUNTER — TELEPHONE (OUTPATIENT)
Dept: VASCULAR SURGERY | Facility: CLINIC | Age: 68
End: 2023-10-19
Payer: MEDICAID

## 2023-10-19 NOTE — TELEPHONE ENCOUNTER
Spoke with [facility] and told them the last time the pt had the wound vac according to documented clinic notes  was 8/28/23 and the wound vac was placed on hold that day. Pt seen in clinic 9/1/23 with Dr. Sage and pt was deciding on I&D vs BKA and vac not applied at that time. Pt saw Dr. Ge 9/22/23 to discuss BKA. Last note was 10/10 and not able to reach the pt and were awaiting a call back.

## 2023-10-19 NOTE — TELEPHONE ENCOUNTER
[facility] left  requesting wound measurements from 9/8-9/22 and 10/1-10/22, this is for the wound vac for the right foot.  [facility] is in the process of discontinuation  for the wound vac. Also let them know if we discontinued and the date that occurred.    They can be reached at [phone] ext [phone] ask for Rula or Elizabeth, referrance # [id number]

## 2023-11-02 ENCOUNTER — TELEPHONE (OUTPATIENT)
Dept: CARE COORDINATION | Facility: HOSPITAL | Age: 68
End: 2023-11-02
Payer: MEDICAID

## 2023-11-02 NOTE — PROGRESS NOTES
Received a call from [employer] requesting updated contact information for patient as they have not been able to reach him. Provided them with contact numbers for patient.     Cristina Cain RN

## 2024-03-22 ENCOUNTER — APPOINTMENT (OUTPATIENT)
Dept: ULTRASOUND IMAGING | Facility: HOSPITAL | Age: 69
DRG: 854 | End: 2024-03-22
Attending: STUDENT IN AN ORGANIZED HEALTH CARE EDUCATION/TRAINING PROGRAM
Payer: MEDICARE

## 2024-03-22 ENCOUNTER — APPOINTMENT (OUTPATIENT)
Dept: RADIOLOGY | Facility: HOSPITAL | Age: 69
DRG: 854 | End: 2024-03-22
Attending: EMERGENCY MEDICINE
Payer: MEDICARE

## 2024-03-22 ENCOUNTER — APPOINTMENT (OUTPATIENT)
Dept: MRI IMAGING | Facility: HOSPITAL | Age: 69
DRG: 854 | End: 2024-03-22
Attending: PODIATRIST
Payer: MEDICARE

## 2024-03-22 ENCOUNTER — HOSPITAL ENCOUNTER (INPATIENT)
Facility: HOSPITAL | Age: 69
LOS: 6 days | Discharge: HOME HEALTH | DRG: 854 | End: 2024-03-28
Attending: EMERGENCY MEDICINE | Admitting: HOSPITALIST
Payer: MEDICARE

## 2024-03-22 DIAGNOSIS — E11.628 TYPE 2 DIABETES MELLITUS WITH RIGHT DIABETIC FOOT INFECTION (H): ICD-10-CM

## 2024-03-22 DIAGNOSIS — L08.9 TYPE 2 DIABETES MELLITUS WITH RIGHT DIABETIC FOOT INFECTION (H): ICD-10-CM

## 2024-03-22 DIAGNOSIS — L97.519 ULCER OF RIGHT FOOT, UNSPECIFIED ULCER STAGE (H): ICD-10-CM

## 2024-03-22 DIAGNOSIS — Z89.431 STATUS POST AMPUTATION OF RIGHT FOOT THROUGH METATARSAL BONE (H): ICD-10-CM

## 2024-03-22 DIAGNOSIS — I10 PRIMARY HYPERTENSION: ICD-10-CM

## 2024-03-22 DIAGNOSIS — Z91.199 NON COMPLIANCE WITH MEDICAL TREATMENT: ICD-10-CM

## 2024-03-22 DIAGNOSIS — L97.514 ULCER OF RIGHT FOOT WITH NECROSIS OF BONE (H): Primary | ICD-10-CM

## 2024-03-22 DIAGNOSIS — R73.9 HYPERGLYCEMIA: ICD-10-CM

## 2024-03-22 PROBLEM — E87.1 HYPONATREMIA: Status: ACTIVE | Noted: 2021-05-04

## 2024-03-22 LAB
ALBUMIN SERPL BCG-MCNC: 3.1 G/DL (ref 3.5–5.2)
ALBUMIN UR-MCNC: 30 MG/DL
ALP SERPL-CCNC: 270 U/L (ref 40–150)
ALT SERPL W P-5'-P-CCNC: 21 U/L (ref 0–70)
ANION GAP SERPL CALCULATED.3IONS-SCNC: 13 MMOL/L (ref 7–15)
APPEARANCE UR: CLEAR
AST SERPL W P-5'-P-CCNC: 25 U/L (ref 0–45)
B-OH-BUTYR SERPL-SCNC: 0.5 MMOL/L
BACTERIA #/AREA URNS HPF: ABNORMAL /HPF
BASE EXCESS BLDV CALC-SCNC: 0.6 MMOL/L (ref -3–3)
BASOPHILS # BLD AUTO: 0 10E3/UL (ref 0–0.2)
BASOPHILS NFR BLD AUTO: 0 %
BILIRUB DIRECT SERPL-MCNC: <0.2 MG/DL (ref 0–0.3)
BILIRUB SERPL-MCNC: 0.3 MG/DL
BILIRUB UR QL STRIP: NEGATIVE
BUN SERPL-MCNC: 62.9 MG/DL (ref 8–23)
CALCIUM SERPL-MCNC: 9.2 MG/DL (ref 8.8–10.2)
CHLORIDE SERPL-SCNC: 82 MMOL/L (ref 98–107)
COLOR UR AUTO: ABNORMAL
CREAT SERPL-MCNC: 2.27 MG/DL (ref 0.67–1.17)
DEPRECATED HCO3 PLAS-SCNC: 23 MMOL/L (ref 22–29)
EGFRCR SERPLBLD CKD-EPI 2021: 31 ML/MIN/1.73M2
EOSINOPHIL # BLD AUTO: 0.2 10E3/UL (ref 0–0.7)
EOSINOPHIL NFR BLD AUTO: 1 %
ERYTHROCYTE [DISTWIDTH] IN BLOOD BY AUTOMATED COUNT: 12.8 % (ref 10–15)
GLUCOSE BLDC GLUCOMTR-MCNC: 305 MG/DL (ref 70–99)
GLUCOSE BLDC GLUCOMTR-MCNC: 366 MG/DL (ref 70–99)
GLUCOSE BLDC GLUCOMTR-MCNC: >600 MG/DL (ref 70–99)
GLUCOSE SERPL-MCNC: 668 MG/DL (ref 70–99)
GLUCOSE UR STRIP-MCNC: >1000 MG/DL
HBA1C MFR BLD: 14.2 %
HCO3 BLDV-SCNC: 26 MMOL/L (ref 21–28)
HCT VFR BLD AUTO: 31.1 % (ref 40–53)
HGB BLD-MCNC: 10.5 G/DL (ref 13.3–17.7)
HGB UR QL STRIP: ABNORMAL
IMM GRANULOCYTES # BLD: 0.1 10E3/UL
IMM GRANULOCYTES NFR BLD: 1 %
KETONES UR STRIP-MCNC: NEGATIVE MG/DL
LACTATE SERPL-SCNC: 1.2 MMOL/L (ref 0.7–2)
LEUKOCYTE ESTERASE UR QL STRIP: NEGATIVE
LYMPHOCYTES # BLD AUTO: 1.3 10E3/UL (ref 0.8–5.3)
LYMPHOCYTES NFR BLD AUTO: 8 %
MAGNESIUM SERPL-MCNC: 2.4 MG/DL (ref 1.7–2.3)
MCH RBC QN AUTO: 28.3 PG (ref 26.5–33)
MCHC RBC AUTO-ENTMCNC: 33.8 G/DL (ref 31.5–36.5)
MCV RBC AUTO: 84 FL (ref 78–100)
MONOCYTES # BLD AUTO: 0.5 10E3/UL (ref 0–1.3)
MONOCYTES NFR BLD AUTO: 3 %
NEUTROPHILS # BLD AUTO: 14.6 10E3/UL (ref 1.6–8.3)
NEUTROPHILS NFR BLD AUTO: 87 %
NITRATE UR QL: NEGATIVE
NRBC # BLD AUTO: 0 10E3/UL
NRBC BLD AUTO-RTO: 0 /100
O2/TOTAL GAS SETTING VFR VENT: 21 %
OXYHGB MFR BLDV: 20 % (ref 70–75)
PCO2 BLDV: 47 MM HG (ref 40–50)
PH BLDV: 7.36 [PH] (ref 7.32–7.43)
PH UR STRIP: 5 [PH] (ref 5–7)
PLATELET # BLD AUTO: 516 10E3/UL (ref 150–450)
PO2 BLDV: 15 MM HG (ref 25–47)
POTASSIUM SERPL-SCNC: 4.9 MMOL/L (ref 3.4–5.3)
PROT SERPL-MCNC: 8.3 G/DL (ref 6.4–8.3)
RBC # BLD AUTO: 3.71 10E6/UL (ref 4.4–5.9)
RBC URINE: 2 /HPF
SAO2 % BLDV: 20 % (ref 70–75)
SODIUM SERPL-SCNC: 118 MMOL/L (ref 135–145)
SODIUM SERPL-SCNC: 127 MMOL/L (ref 135–145)
SP GR UR STRIP: 1.02 (ref 1–1.03)
SQUAMOUS EPITHELIAL: <1 /HPF
UROBILINOGEN UR STRIP-MCNC: <2 MG/DL
WBC # BLD AUTO: 16.8 10E3/UL (ref 4–11)
WBC URINE: 2 /HPF

## 2024-03-22 PROCEDURE — 36415 COLL VENOUS BLD VENIPUNCTURE: CPT | Performed by: HOSPITALIST

## 2024-03-22 PROCEDURE — 83036 HEMOGLOBIN GLYCOSYLATED A1C: CPT | Performed by: HOSPITALIST

## 2024-03-22 PROCEDURE — 36415 COLL VENOUS BLD VENIPUNCTURE: CPT | Performed by: EMERGENCY MEDICINE

## 2024-03-22 PROCEDURE — 80076 HEPATIC FUNCTION PANEL: CPT | Performed by: EMERGENCY MEDICINE

## 2024-03-22 PROCEDURE — 258N000003 HC RX IP 258 OP 636: Performed by: HOSPITALIST

## 2024-03-22 PROCEDURE — 82962 GLUCOSE BLOOD TEST: CPT

## 2024-03-22 PROCEDURE — 82010 KETONE BODYS QUAN: CPT | Performed by: EMERGENCY MEDICINE

## 2024-03-22 PROCEDURE — 250N000011 HC RX IP 250 OP 636: Performed by: EMERGENCY MEDICINE

## 2024-03-22 PROCEDURE — 99223 1ST HOSP IP/OBS HIGH 75: CPT | Performed by: HOSPITALIST

## 2024-03-22 PROCEDURE — 83735 ASSAY OF MAGNESIUM: CPT | Performed by: EMERGENCY MEDICINE

## 2024-03-22 PROCEDURE — 250N000011 HC RX IP 250 OP 636: Performed by: HOSPITALIST

## 2024-03-22 PROCEDURE — 120N000001 HC R&B MED SURG/OB

## 2024-03-22 PROCEDURE — 258N000003 HC RX IP 258 OP 636: Performed by: EMERGENCY MEDICINE

## 2024-03-22 PROCEDURE — 93925 LOWER EXTREMITY STUDY: CPT

## 2024-03-22 PROCEDURE — 93005 ELECTROCARDIOGRAM TRACING: CPT | Performed by: EMERGENCY MEDICINE

## 2024-03-22 PROCEDURE — 93922 UPR/L XTREMITY ART 2 LEVELS: CPT

## 2024-03-22 PROCEDURE — 83605 ASSAY OF LACTIC ACID: CPT | Performed by: EMERGENCY MEDICINE

## 2024-03-22 PROCEDURE — 81001 URINALYSIS AUTO W/SCOPE: CPT | Performed by: EMERGENCY MEDICINE

## 2024-03-22 PROCEDURE — 96360 HYDRATION IV INFUSION INIT: CPT

## 2024-03-22 PROCEDURE — 99291 CRITICAL CARE FIRST HOUR: CPT | Mod: 25

## 2024-03-22 PROCEDURE — 73630 X-RAY EXAM OF FOOT: CPT | Mod: RT

## 2024-03-22 PROCEDURE — 250N000013 HC RX MED GY IP 250 OP 250 PS 637: Performed by: HOSPITALIST

## 2024-03-22 PROCEDURE — 82805 BLOOD GASES W/O2 SATURATION: CPT | Performed by: EMERGENCY MEDICINE

## 2024-03-22 PROCEDURE — 85025 COMPLETE CBC W/AUTO DIFF WBC: CPT | Performed by: EMERGENCY MEDICINE

## 2024-03-22 PROCEDURE — 87205 SMEAR GRAM STAIN: CPT | Performed by: HOSPITALIST

## 2024-03-22 PROCEDURE — 250N000012 HC RX MED GY IP 250 OP 636 PS 637: Performed by: HOSPITALIST

## 2024-03-22 PROCEDURE — 250N000012 HC RX MED GY IP 250 OP 636 PS 637: Performed by: EMERGENCY MEDICINE

## 2024-03-22 PROCEDURE — 96361 HYDRATE IV INFUSION ADD-ON: CPT

## 2024-03-22 PROCEDURE — 87040 BLOOD CULTURE FOR BACTERIA: CPT | Performed by: EMERGENCY MEDICINE

## 2024-03-22 PROCEDURE — 84295 ASSAY OF SERUM SODIUM: CPT | Performed by: HOSPITALIST

## 2024-03-22 PROCEDURE — 73721 MRI JNT OF LWR EXTRE W/O DYE: CPT | Mod: RT,MG

## 2024-03-22 RX ORDER — PIPERACILLIN SODIUM, TAZOBACTAM SODIUM 3; .375 G/15ML; G/15ML
3.38 INJECTION, POWDER, LYOPHILIZED, FOR SOLUTION INTRAVENOUS EVERY 8 HOURS
Status: DISCONTINUED | OUTPATIENT
Start: 2024-03-22 | End: 2024-03-24

## 2024-03-22 RX ORDER — SODIUM CHLORIDE 9 MG/ML
INJECTION, SOLUTION INTRAVENOUS CONTINUOUS
Status: DISCONTINUED | OUTPATIENT
Start: 2024-03-22 | End: 2024-03-25 | Stop reason: ALTCHOICE

## 2024-03-22 RX ORDER — AMLODIPINE BESYLATE 2.5 MG/1
2.5 TABLET ORAL DAILY
Status: DISCONTINUED | OUTPATIENT
Start: 2024-03-22 | End: 2024-03-28 | Stop reason: HOSPADM

## 2024-03-22 RX ORDER — PIPERACILLIN SODIUM, TAZOBACTAM SODIUM 3; .375 G/15ML; G/15ML
3.38 INJECTION, POWDER, LYOPHILIZED, FOR SOLUTION INTRAVENOUS ONCE
Qty: 15 ML | Refills: 0 | Status: COMPLETED | OUTPATIENT
Start: 2024-03-22 | End: 2024-03-22

## 2024-03-22 RX ORDER — ONDANSETRON 4 MG/1
4 TABLET, ORALLY DISINTEGRATING ORAL EVERY 6 HOURS PRN
Status: DISCONTINUED | OUTPATIENT
Start: 2024-03-22 | End: 2024-03-28 | Stop reason: HOSPADM

## 2024-03-22 RX ORDER — DEXTROSE MONOHYDRATE 25 G/50ML
25-50 INJECTION, SOLUTION INTRAVENOUS
Status: DISCONTINUED | OUTPATIENT
Start: 2024-03-22 | End: 2024-03-28 | Stop reason: HOSPADM

## 2024-03-22 RX ORDER — AMOXICILLIN 250 MG
2 CAPSULE ORAL 2 TIMES DAILY PRN
Status: DISCONTINUED | OUTPATIENT
Start: 2024-03-22 | End: 2024-03-28 | Stop reason: HOSPADM

## 2024-03-22 RX ORDER — ONDANSETRON 2 MG/ML
4 INJECTION INTRAMUSCULAR; INTRAVENOUS EVERY 6 HOURS PRN
Status: DISCONTINUED | OUTPATIENT
Start: 2024-03-22 | End: 2024-03-28 | Stop reason: HOSPADM

## 2024-03-22 RX ORDER — VANCOMYCIN HYDROCHLORIDE 1 G/200ML
1000 INJECTION, SOLUTION INTRAVENOUS EVERY 24 HOURS
Status: DISCONTINUED | OUTPATIENT
Start: 2024-03-23 | End: 2024-03-23

## 2024-03-22 RX ORDER — HEPARIN SODIUM 5000 [USP'U]/.5ML
5000 INJECTION, SOLUTION INTRAVENOUS; SUBCUTANEOUS EVERY 8 HOURS
Status: DISCONTINUED | OUTPATIENT
Start: 2024-03-22 | End: 2024-03-28 | Stop reason: HOSPADM

## 2024-03-22 RX ORDER — ACETAMINOPHEN 650 MG/1
650 SUPPOSITORY RECTAL EVERY 4 HOURS PRN
Status: DISCONTINUED | OUTPATIENT
Start: 2024-03-22 | End: 2024-03-28 | Stop reason: HOSPADM

## 2024-03-22 RX ORDER — AMOXICILLIN 250 MG
1 CAPSULE ORAL 2 TIMES DAILY PRN
Status: DISCONTINUED | OUTPATIENT
Start: 2024-03-22 | End: 2024-03-28 | Stop reason: HOSPADM

## 2024-03-22 RX ORDER — NICOTINE POLACRILEX 4 MG
15-30 LOZENGE BUCCAL
Status: DISCONTINUED | OUTPATIENT
Start: 2024-03-22 | End: 2024-03-28 | Stop reason: HOSPADM

## 2024-03-22 RX ORDER — ACETAMINOPHEN 325 MG/1
650 TABLET ORAL EVERY 4 HOURS PRN
Status: DISCONTINUED | OUTPATIENT
Start: 2024-03-22 | End: 2024-03-28 | Stop reason: HOSPADM

## 2024-03-22 RX ORDER — LIDOCAINE 40 MG/G
CREAM TOPICAL
Status: DISCONTINUED | OUTPATIENT
Start: 2024-03-22 | End: 2024-03-28 | Stop reason: HOSPADM

## 2024-03-22 RX ORDER — HYDRALAZINE HYDROCHLORIDE 20 MG/ML
10 INJECTION INTRAMUSCULAR; INTRAVENOUS EVERY 4 HOURS PRN
Status: DISCONTINUED | OUTPATIENT
Start: 2024-03-22 | End: 2024-03-28 | Stop reason: HOSPADM

## 2024-03-22 RX ORDER — CALCIUM CARBONATE 500 MG/1
1000 TABLET, CHEWABLE ORAL 4 TIMES DAILY PRN
Status: DISCONTINUED | OUTPATIENT
Start: 2024-03-22 | End: 2024-03-28 | Stop reason: HOSPADM

## 2024-03-22 RX ADMIN — INSULIN GLARGINE 20 UNITS: 100 INJECTION, SOLUTION SUBCUTANEOUS at 20:08

## 2024-03-22 RX ADMIN — SODIUM CHLORIDE 500 ML: 9 INJECTION, SOLUTION INTRAVENOUS at 14:49

## 2024-03-22 RX ADMIN — AMLODIPINE BESYLATE 2.5 MG: 2.5 TABLET ORAL at 19:57

## 2024-03-22 RX ADMIN — SODIUM CHLORIDE 1000 ML: 9 INJECTION, SOLUTION INTRAVENOUS at 16:57

## 2024-03-22 RX ADMIN — PIPERACILLIN AND TAZOBACTAM 3.38 G: 3; .375 INJECTION, POWDER, FOR SOLUTION INTRAVENOUS at 21:48

## 2024-03-22 RX ADMIN — INSULIN ASPART 8 UNITS: 100 INJECTION, SOLUTION INTRAVENOUS; SUBCUTANEOUS at 17:19

## 2024-03-22 RX ADMIN — SODIUM CHLORIDE: 9 INJECTION, SOLUTION INTRAVENOUS at 20:48

## 2024-03-22 RX ADMIN — HEPARIN SODIUM 5000 UNITS: 5000 INJECTION, SOLUTION INTRAVENOUS; SUBCUTANEOUS at 21:48

## 2024-03-22 RX ADMIN — VANCOMYCIN HYDROCHLORIDE 1500 MG: 5 INJECTION, POWDER, LYOPHILIZED, FOR SOLUTION INTRAVENOUS at 19:57

## 2024-03-22 RX ADMIN — PIPERACILLIN AND TAZOBACTAM 3.38 G: 3; .375 INJECTION, POWDER, FOR SOLUTION INTRAVENOUS at 16:57

## 2024-03-22 ASSESSMENT — ACTIVITIES OF DAILY LIVING (ADL)
ADLS_ACUITY_SCORE: 38
ADLS_ACUITY_SCORE: 38
ADLS_ACUITY_SCORE: 36
ADLS_ACUITY_SCORE: 38
ADLS_ACUITY_SCORE: 40
ADLS_ACUITY_SCORE: 40
DEPENDENT_IADLS:: INDEPENDENT

## 2024-03-22 ASSESSMENT — COLUMBIA-SUICIDE SEVERITY RATING SCALE - C-SSRS
1. IN THE PAST MONTH, HAVE YOU WISHED YOU WERE DEAD OR WISHED YOU COULD GO TO SLEEP AND NOT WAKE UP?: NO
6. HAVE YOU EVER DONE ANYTHING, STARTED TO DO ANYTHING, OR PREPARED TO DO ANYTHING TO END YOUR LIFE?: NO
2. HAVE YOU ACTUALLY HAD ANY THOUGHTS OF KILLING YOURSELF IN THE PAST MONTH?: NO

## 2024-03-22 NOTE — PROGRESS NOTES
Patient at ultrasound at the time of my visit.  I will plan to see him tomorrow.  Images of the right foot indicate full-thickness ulceration.  Will plan on right ankle MRI to investigate for underlying osteomyelitis of the right rear foot.

## 2024-03-22 NOTE — PHARMACY-VANCOMYCIN DOSING SERVICE
"Pharmacy Vancomycin Initial Note  Date of Service 2024  Patient's  1955  68 year old, male    Indication: Skin and Soft Tissue Infection    Current estimated CrCl = Estimated Creatinine Clearance: 34.7 mL/min (A) (based on SCr of 2.27 mg/dL (H)).    Creatinine for last 3 days  3/22/2024:  2:45 PM Creatinine 2.27 mg/dL    Recent Vancomycin Level(s) for last 3 days  No results found for requested labs within last 3 days.      Vancomycin IV Administrations (past 72 hours)        No vancomycin orders with administrations in past 72 hours.                    Nephrotoxins and other renal medications (From now, onward)      Start     Dose/Rate Route Frequency Ordered Stop    24 2100  piperacillin-tazobactam (ZOSYN) 3.375 g vial to attach to  mL bag        Note to Pharmacy: For [facility], [facility] and [facility]: For Zosyn-naive patients, use the \"Zosyn initial dose + extended infusion\" order panel.    3.375 g  over 240 Minutes Intravenous EVERY 8 HOURS 24 1646      24 1630  vancomycin (VANCOCIN) 1,500 mg in sodium chloride 0.9 % 250 mL intermittent infusion         1,500 mg  over 90 Minutes Intravenous ONCE 24 1606              Contrast Orders - past 72 hours (72h ago, onward)      None            [employer] Prediction of Planned Initial Vancomycin Regimen  Loading dose: 1500 mg at 21:00 2024.  Regimen: 1000 mg IV every 24 hours.  Start time: 21:00 on 2024  Exposure target: AUC24 (range)400-600 mg/L.hr   AUC24,ss: 589 mg/L.hr  Probability of AUC24 > 400: 87 %  Ctrough,ss: 19.6 mg/L  Probability of Ctrough,ss > 20: 48 %  Probability of nephrotoxicity (Lodise MARIA ELENA ): 17 %        Plan:  Start vancomycin 1000 mg IV q24h. Dosing aggressively with prediction of improved renal function with next check, will monitor closely.  Vancomycin monitoring method: AUC  Vancomycin therapeutic monitoring goal: 400-600 mg*h/L  Pharmacy will check vancomycin levels as appropriate in 1-3 Days.  Serum " creatinine levels will be ordered daily for the first week of therapy and at least twice weekly for subsequent weeks.      Kathi Sotelo RPH

## 2024-03-22 NOTE — PHARMACY-VANCOMYCIN DOSING SERVICE
Pharmacy Vancomycin Initial Note  Date of Service 2024  Patient's  1955  68 year old, male    Indication: Skin and Soft Tissue Infection    Current estimated CrCl = Estimated Creatinine Clearance: 34.7 mL/min (A) (based on SCr of 2.27 mg/dL (H)).    Creatinine for last 3 days  3/22/2024:  2:45 PM Creatinine 2.27 mg/dL    Recent Vancomycin Level(s) for last 3 days  No results found for requested labs within last 3 days.      Vancomycin IV Administrations (past 72 hours)        No vancomycin orders with administrations in past 72 hours.                    Nephrotoxins and other renal medications (From now, onward)      Start     Dose/Rate Route Frequency Ordered Stop    24 1630  piperacillin-tazobactam (ZOSYN) 3.375 g vial to attach to  mL bag         3.375 g  over 30 Minutes Intravenous ONCE 24 1559              Contrast Orders - past 72 hours (72h ago, onward)      None              Plan:  Start vancomycin 1500 mg IV x1 in ED.  Please re-consult pharmacy if therapy to continue upon admisison.    Kathi Sotelo, PharmD

## 2024-03-22 NOTE — ED NOTES
Leny from ultrasound called to inform that Vascular  saw pt. In ultrasound and ordered MRI of LE- pt. Went there directly from Ultrasound.

## 2024-03-22 NOTE — H&P
[facility]    History and Physical - Hospitalist Service       Date of Admission:  3/22/2024    Assessment & Plan      Dario Benavides is a 68 year old male admitted on 3/22/2024. He has past medical history of poorly controlled diabetes with Charcot foot status post transmetatarsal amputation of the right foot, history of osteomyelitis, diabetic foot ulcer, CKD stage III presented with worsening right heel ulcer.  Workup revealed osteomyelitis..     Sepsis 2/2 R foot osteomyelitis  -Patient on arrival had tachycardia, with leukocytosis and normal lactate. Source R foot ulcer  -Reviewed x-ray of right foot.    -Arterial Dopplers and ANGELO right lower extremity revealed moderate SFA stenosis.  Of left lower extremity revealed severe resting ischemia.  Also suspect mid distal SFA stenosis.  -MRI right foot showed osteomyelitis of the anterior calcaneus.  Hindfoot ulceration with surrounding cellulitis.  -Wound cultures and blood cultures ordered  -Vanco and Zosyn ordered  -Vascular, ID and podiatry consulted  -WOC consulted  -Pain control with tylenol    Diabetes mellitus with hyperglycemia  -A1c today is 14.2, worsened from last time of 11.2  -Patient noncompliant with his insulin regimen, states blood glucose runs between 2-400 at home.  -Blood sugar more than 600 on admission  -Home regimen includes Lantus 20 units and NovoLog 8 units 3 times daily with meals based on last discharge summary  -Start patient on medium dose sliding scale, titrate as needed, resumed lantus of 20 units at bedtime  -Monitor blood glucose closely, hypoglycemia protocol    SHANITA on CKD stage 3  -Baseline creat round 2, noted to be 2.27 on admission  -Gentle hydration,monitor and repeat labs in AM    Hyponatremia - likely pseudohyponatremia in setting of hyperglycemia  -Corrected Na 127/132  -Monitor with correction of BS    Hypertension-blood pressure slightly upon arrival  -Continue home amlodipine.  This is what  patient was discharged during his last hospitalization here in August 2023, although patient stated he does not take it.  -Hydralazine as needed ordered    Chronic anemia  -Baseline hemoglobin around 8, noted to be 10.5 today  -Monitor intermittently.        Diet: Combination Diet Moderate Consistent Carb (60 g CHO per Meal) Diet; Low Saturated Fat Na <2400mg Diet, No Caffeine Diet    DVT Prophylaxis: Heparin SQ  Odell Catheter: Not present  Lines: PRESENT             Cardiac Monitoring: None  Code Status: No CPR- Do NOT Intubate , discussed with patient on admission    Clinically Significant Risk Factors Present on Admission         # Hyponatremia: Lowest Na = 118 mmol/L in last 2 days, will monitor as appropriate      # Hypoalbuminemia: Lowest albumin = 3.1 g/dL at 3/22/2024  2:45 PM, will monitor as appropriate     # Hypertension: Noted on problem list     # DMII: A1C = 14.2 % (Ref range: <5.7 %) within past 6 months              Disposition Plan      Expected Discharge Date: 03/24/2024                  Kaity Bowman MD  Hospitalist Service  [facility]  Securely message with [employer] (more info)  Text page via "[email or web address]" Paging/Directory     ______________________________________________________________________    Chief Complaint       History is obtained from the patient    History of Present Illness   Dario Benavides is a 68 year old male who has past medical history of poorly controlled diabetes with Charcot foot status post transmetatarsal amputation of the right foot, history of osteomyelitis, diabetic foot ulcer, CKD stage III presented with worsening of ulcer on his right foot.  Patient underwent amputation in August 2023 of his right forefoot and has not followed up with anyone since then.  He is poorly controlled diabetes and unlikely that he is taking his insulin as instructed.  Patient is very noncompliant.  Patient states his son brought him to the ED because he was worried  about the hole in his foot.I do not go anywhere or take any medications because I am stubborn as patient reports.  Denies any chest pain, shortness of breath, abdominal pain, nausea, vomiting, cough, diarrhea, constipation, dysuria or polyuria.  No dizziness, palpitations.  No tingling or numbness.  No recent travel.  No known exposures to Covid.  No sick contact.      On arrival to ED, patient noted to be hyponatremic with sodium of 118.  Also noted blood sugar of over 600, hence likely pseudohyponatremia.  Creatinine of 2.27.  A1c of 14.2.  X-ray of the foot reviewed showing ulceration.  Arterial Dopplers and ABIs showed right lower extremity revealed moderate SFA stenosis and left mid distal SFA stenosis.  MRI showed hindfoot ulceration and associated osteomyelitis of the anterior calcaneus.  Patient will need IV antibiotics and further assessment by specialist.            Past Medical History    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) 05/08/2021    Tobacco abuse 07/23/2019    Type 2 diabetes mellitus with right diabetic foot infection (H) 05/04/2021       Past Surgical History   Past Surgical History:   Procedure Laterality Date    BACK SURGERY  1992    CLOSE SECONDARY WOUND LOWER EXTREMITY Right 8/17/2023    Procedure: Delayed primary closure of wound right foot;  Surgeon: Brandon Nieto DPM;  Location: [facility] OR    IRRIGATION AND DEBRIDEMENT FOOT, COMBINED Right 8/14/2023    Procedure: IRRIGATION AND DEBRIDEMENT right foot with removing all infected bones and soft tissue;  Surgeon: Jun Goyal DPM;  Location: [facility] OR    NECK SURGERY  2011       Prior to Admission Medications   Prior to Admission Medications   Prescriptions Last Dose Informant Patient Reported? Taking?   insulin glargine (LANTUS PEN) 100  UNIT/ML pen Unknown  Yes Yes   Sig: Inject 6 Units Subcutaneous daily as needed for other      Facility-Administered Medications: None        Review of Systems    The 10 point Review of Systems is negative other than noted in the HPI or here.      Physical Exam   Vital Signs: Temp: 97.7  F (36.5  C) Temp src: Oral BP: (!) 140/69 Pulse: 70   Resp: 16 SpO2: 100 % O2 Device: None (Room air)    Weight: 169 lbs 0 oz    General: Pleasant elderly male in NAD  HEENT:EOMI, AT,NC  CVS:RRR, no edema  RS:CTAB  Abd: Soft, NT,ND  Neurology:Grossly normal  Ext: R foot fore and mid foot amputated. R foot heel ulcer+, surrounding erythema+, R shin erythema+. No discharge from ulcer.   Psy:Approrpiate affect      Medical Decision Making       >70 MINUTES SPENT BY ME on the date of service doing chart review, history, exam, documentation & further activities per the note.  MANAGEMENT DISCUSSED with the following over the past 24 hours: Patient       Data     I have personally reviewed the following data over the past 24 hrs:    16.8 (H)  \   10.5 (L)   / 516 (H)     118 (LL) 82 (L) 62.9 (H) /  366 (H)   4.9 23 2.27 (H) \     ALT: 21 AST: 25 AP: 270 (H) TBILI: 0.3   ALB: 3.1 (L) TOT PROTEIN: 8.3 LIPASE: N/A     TSH: N/A T4: N/A A1C: 14.2 (H)     Procal: N/A CRP: N/A Lactic Acid: 1.2

## 2024-03-22 NOTE — ED TRIAGE NOTES
"Pt presents with complaint of \"big hole in my amputated foot area.\" Son is with patient and states that pt had foot amputated about a year ago and never did any follow up appointments and has not seen anyone to manage his diabetes. Pt is insulin dependent and has not taken any insulin or done blood glucose checks in quite some time. Son was recently out of the picture and has not been able to help. Pt does not have strips, insulin, medications, etc. Blood glucose was checked last night with last strip and was in the 500s. Measured HI on our glucometer indicating over 600.     Triage Assessment (Adult)       Row Name 03/22/24 1400          Triage Assessment    Airway WDL WDL        Respiratory WDL    Respiratory WDL WDL        Skin Circulation/Temperature WDL    Skin Circulation/Temperature WDL WDL        Cardiac WDL    Cardiac WDL X;rhythm     Pulse Rate & Regularity tachycardic        Peripheral/Neurovascular WDL    Peripheral Neurovascular WDL WDL        Cognitive/Neuro/Behavioral WDL    Cognitive/Neuro/Behavioral WDL WDL                     "

## 2024-03-22 NOTE — ED PROVIDER NOTES
EMERGENCY DEPARTMENT ENCOUNTER      NAME: Dario Benavides  AGE: 68 year old male  YOB: 1955  MRN: [id number]  EVALUATION DATE & TIME: No admission date for patient encounter.    PCP: No Ref-Primary, Physician    ED PROVIDER: Kendy Walker M.D.      Chief Complaint   Patient presents with    Wound Infection    Hyperglycemia         FINAL IMPRESSION:  1. Hyperglycemia    2. Ulcer of right foot, unspecified ulcer stage (H)        ED COURSE & MEDICAL DECISION MAKING:    Pertinent Labs & Imaging studies reviewed. (See chart for details)    3:14 PM I spoke to lab. The patient has high blood sugar and low sodium.  3:25 PM I met with the patient, obtained history, performed physical exam, and discussed ED plan.  4:37 PM  I spoke to hospitalist, Dr. Bowman.    ED Course as of 03/22/24 2227   Fri Mar 22, 2024   1616 Patient is a 68-year-old male with a history of diabetes who reports compliance with his insulin although also reports that his blood sugars run in the 2-4 100s regularly.  He reports having his forefoot amputated about a year ago and said it did heal initially.  He never got follow-up.  He still has stitches in place from that initial surgery.  He does not remember who did it but does report that it was done here.  He said he started noticed a hole in there a couple of months ago but does not have any significant pain.  He denies any fevers or chills.  He says he still has some feeling to his foot.  He wraps it himself.  It is malodorous with a deep ulcer although no bone or tendon is visible.  He has no tenderness.  He has a mild amount of erythema to his lower shin but no significant swelling.  He also reports that his blood sugars just became elevated today after he drank a bunch of [employer].  Patient otherwise looks well.  I will get imaging of his foot and get him started on antibiotics.  Blood sugars quite elevated with sodium being low.  I think it is likely a pseudohyponatremia and  if we corrected it is probably in the high 120s.   1619 The corrected sodium for that glucose is 127 or 132 depending on which calculator is used.   1619 I think given the patient's lack of follow-up he will definitely need admission to the hospital to control his blood sugars and to further evaluate the foot.   1623 I reviewed the note from Dr. Sage on 9/1/2023 and the note from Dr. Laura Vyas on 9/22/2023.  At that time they had talked to the patient about potentially needing a below the knee amputation.  The pictures in the chart from those days look quite a bit worse than it looks today.  The area of concern before has actually healed well but now he has a wound on the sole of his foot.   1625 I reviewed the patient's x-ray.  I do not see any obvious signs of osteomyelitis.  We can still get him admitted to the hospital for further workup.   1641 I spoke with Dr. Bowman the hospitalist service.  She agreed with a [facility] inpatient admission.       Medical Decision Making    History:  Supplemental history from: patient  External Record(s) reviewed: I reviewed the note from 9/22/23 Vascular surgery follow-up    Work Up:  Emergent/Severe conditions considered and evaluated for: Sepsis, DKA, electrolyte abnormality, renal failure, osteomyelitis  I independently reviewed and interpreted EKG and foot x-ray which showed no obvious signs of osteomyelitis.  See full radiology report for all details  In additional to work up documented, I considered the following work up: None  Medications given that require intensive monitoring for toxicity: None    External consultation:  Discussion of management with another provider: Hospitalist    Complicating factors:  Care impacted by chronic illness: hypertension, CKD 3b, T2DM, tobacco abuse  Care affected by social determinants of health: Access to care, compliance    Disposition considerations: Admission    At the conclusion of the encounter I discussed  the results of  all of the tests and the disposition with patient.   All questions were answered.  The patient acknowledged understanding and was involved in the decision making regarding the overall care plan.     Total critical care time 35 minutes     MEDICATIONS GIVEN IN THE EMERGENCY:  Medications   lidocaine 1 % 0.1-1 mL (has no administration in time range)   lidocaine (LMX4) cream (has no administration in time range)   sodium chloride (PF) 0.9% PF flush 3 mL (3 mLs Intracatheter Not Given 3/22/24 1801)   sodium chloride (PF) 0.9% PF flush 3 mL (has no administration in time range)   senna-docusate (SENOKOT-S/PERICOLACE) 8.6-50 MG per tablet 1 tablet (has no administration in time range)     Or   senna-docusate (SENOKOT-S/PERICOLACE) 8.6-50 MG per tablet 2 tablet (has no administration in time range)   calcium carbonate (TUMS) chewable tablet 1,000 mg (has no administration in time range)   acetaminophen (TYLENOL) tablet 650 mg (has no administration in time range)     Or   acetaminophen (TYLENOL) Suppository 650 mg (has no administration in time range)   melatonin tablet 1 mg (has no administration in time range)   ondansetron (ZOFRAN ODT) ODT tab 4 mg (has no administration in time range)     Or   ondansetron (ZOFRAN) injection 4 mg (has no administration in time range)   glucose gel 15-30 g (has no administration in time range)     Or   dextrose 50 % injection 25-50 mL (has no administration in time range)     Or   glucagon injection 1 mg (has no administration in time range)   insulin aspart (NovoLOG) injection (RAPID ACTING) (5 Units Subcutaneous $Given 3/1955)   insulin aspart (NovoLOG) injection (RAPID ACTING) (has no administration in time range)   piperacillin-tazobactam (ZOSYN) 3.375 g vial to attach to  mL bag (3.375 g Intravenous $Given 3/22/24 2148)   heparin ANTICOAGULANT injection 5,000 Units (5,000 Units Subcutaneous $Given 3/22/24 2148)   insulin glargine (LANTUS PEN) injection 20 Units (20  "Units Subcutaneous $Given 3/22/24 2008)   amLODIPine (NORVASC) tablet 2.5 mg (2.5 mg Oral $Given 3/1955)   hydrALAZINE (APRESOLINE) injection 10 mg (has no administration in time range)   sodium chloride 0.9 % infusion ( Intravenous $New Bag 3/22/24 2048)   vancomycin (VANCOCIN) 1,000 mg in 200 mL dextrose intermittent infusion (has no administration in time range)   sodium chloride 0.9% BOLUS 500 mL (0 mLs Intravenous Stopped 3/22/24 1657)   piperacillin-tazobactam (ZOSYN) 3.375 g vial to attach to  mL bag (0 g Intravenous Stopped 3/22/24 2036)   sodium chloride 0.9% BOLUS 1,000 mL (0 mLs Intravenous Stopped 3/22/24 2036)   vancomycin (VANCOCIN) 1,500 mg in sodium chloride 0.9 % 250 mL intermittent infusion (1,500 mg Intravenous $New Bag 3/1955)   insulin aspart (NovoLOG) injection (RAPID ACTING) (8 Units Subcutaneous $Given 3/22/24 1719)     =================================================================    HPI    Triage Note: Pt presents with complaint of \"big hole in my amputated foot area.\" Son is with patient and states that pt had foot amputated about a year ago and never did any follow up appointments and has not seen anyone to manage his diabetes. Pt is insulin dependent and has not taken any insulin or done blood glucose checks in quite some time. Son was recently out of the picture and has not been able to help. Pt does not have strips, insulin, medications, etc. Blood glucose was checked last night with last strip and was in the 500s. Measured HI on our glucometer indicating over 600.     Triage Assessment (Adult)       Row Name 03/22/24 1400          Triage Assessment    Airway WDL WDL        Respiratory WDL    Respiratory WDL WDL        Skin Circulation/Temperature WDL    Skin Circulation/Temperature WDL WDL        Cardiac WDL    Cardiac WDL X;rhythm     Pulse Rate & Regularity tachycardic        Peripheral/Neurovascular WDL    Peripheral Neurovascular WDL WDL        " Cognitive/Neuro/Behavioral WDL    Cognitive/Neuro/Behavioral WDL WDL                   Patient information was obtained from: patient    Use of : N/A      Dario Benavides is a 68 year old male who presents wound infection. 1 year ago, the patient had a right foot amputation due to an infection after stubbing his toe. A couple months ago, the patient noticed a hole and a smell to the amputation.     The patient also complains of high blood sugar due to drinking lots of koolaid recently. His blood sugar runs from 200-400 normally and he has been compliant on his insulin.    Denies fever, chills, nausea, vomiting, chest pain, shortness of breath, history of heart problems, or any other complaints at this time.    Per chart review,  9/22/23 The patient patient visited vascular surgery center for status post transmetatarsal amputation of right foot. I personally reviewed the non invasive vascular laba from 9/20/2023 which shows right and left ANGELO that are 0.94 and TcPO2s on the right below knee of 72 which is adequate for healing. At the ankle he is reduced at 30.    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) 05/08/2021    Tobacco abuse 07/23/2019    Type 2 diabetes mellitus with right diabetic foot infection (H) 05/04/2021       PAST SURGICAL HISTORY:  Past Surgical History:   Procedure Laterality Date    BACK SURGERY  1992    CLOSE SECONDARY WOUND LOWER EXTREMITY Right 8/17/2023    Procedure: Delayed primary closure of wound right foot;  Surgeon: Brandon Nieto DPM;  Location: [facility] OR    IRRIGATION AND DEBRIDEMENT FOOT, COMBINED Right 8/14/2023    Procedure: IRRIGATION AND DEBRIDEMENT right foot with removing all infected bones and soft tissue;  Surgeon: Jun Goyal DPM;  Location:   [facility] OR    NECK SURGERY  2011       CURRENT MEDICATIONS:      Current Facility-Administered Medications:     acetaminophen (TYLENOL) tablet 650 mg, 650 mg, Oral, Q4H PRN **OR** acetaminophen (TYLENOL) Suppository 650 mg, 650 mg, Rectal, Q4H PRN, Kaity Bowman MD    amLODIPine (NORVASC) tablet 2.5 mg, 2.5 mg, Oral, Daily, Kaity Bowman MD, 2.5 mg at 03/1955    calcium carbonate (TUMS) chewable tablet 1,000 mg, 1,000 mg, Oral, 4x Daily PRN, Kaity Bowman MD    glucose gel 15-30 g, 15-30 g, Oral, Q15 Min PRN **OR** dextrose 50 % injection 25-50 mL, 25-50 mL, Intravenous, Q15 Min PRN **OR** glucagon injection 1 mg, 1 mg, Subcutaneous, Q15 Min PRN, Kaity Bowman MD    heparin ANTICOAGULANT injection 5,000 Units, 5,000 Units, Subcutaneous, Q8H, Kaity Bowman MD, 5,000 Units at 03/22/24 2148    hydrALAZINE (APRESOLINE) injection 10 mg, 10 mg, Intravenous, Q4H PRN, Kaity Bowman MD    insulin aspart (NovoLOG) injection (RAPID ACTING), 1-7 Units, Subcutaneous, TID AC, Kaity Bowman MD, 5 Units at 03/1955    insulin aspart (NovoLOG) injection (RAPID ACTING), 1-5 Units, Subcutaneous, At Bedtime, Kaity Bowman MD    insulin glargine (LANTUS PEN) injection 20 Units, 20 Units, Subcutaneous, At Bedtime, Kaity Bowman MD, 20 Units at 03/22/24 2008    lidocaine (LMX4) cream, , Topical, Q1H PRN, Kaity Bowman MD    lidocaine 1 % 0.1-1 mL, 0.1-1 mL, Other, Q1H PRN, Kaity Bowman MD    melatonin tablet 1 mg, 1 mg, Oral, At Bedtime PRN, Kaity Bowman MD    ondansetron (ZOFRAN ODT) ODT tab 4 mg, 4 mg, Oral, Q6H PRN **OR** ondansetron (ZOFRAN) injection 4 mg, 4 mg, Intravenous, Q6H PRN, Kaity Bowman MD    piperacillin-tazobactam (ZOSYN) 3.375 g vial to attach to  mL bag, 3.375 g, Intravenous, Q8H, Kaity Bowman MD, 3.375 g at 03/22/24 2148    senna-docusate (SENOKOT-S/PERICOLACE) 8.6-50 MG per tablet 1 tablet, 1 tablet, Oral, BID PRN **OR** senna-docusate  "(SENOKOT-S/PERICOLACE) 8.6-50 MG per tablet 2 tablet, 2 tablet, Oral, BID PRN, Kaity Bowman MD    sodium chloride (PF) 0.9% PF flush 3 mL, 3 mL, Intracatheter, Q8H, Kaity Bowman MD    sodium chloride (PF) 0.9% PF flush 3 mL, 3 mL, Intracatheter, q1 min prn, Kaity Bowman MD    sodium chloride 0.9 % infusion, , Intravenous, Continuous, Kaity Bowman MD, Last Rate: 75 mL/hr at 24, New Bag at 24    [START ON 3/23/2024] vancomycin (VANCOCIN) 1,000 mg in 200 mL dextrose intermittent infusion, 1,000 mg, Intravenous, Q24H, Kaity Bowman MD    Current Outpatient Medications:     insulin glargine (LANTUS PEN) 100 UNIT/ML pen, Inject 6 Units Subcutaneous daily as needed for other, Disp: , Rfl:     ALLERGIES:  No Known Allergies    FAMILY HISTORY:  No family history on file.    SOCIAL HISTORY:   Social History     Socioeconomic History    Marital status:    Tobacco Use    Smoking status: Former     Packs/day: 1.00     Years: 45.00     Additional pack years: 0.00     Total pack years: 45.00     Types: Cigarettes     Quit date: 2023     Years since quittin.5    Smokeless tobacco: Never    Tobacco comments:     Seen IP by [facility] on 8/15/23 - down to 1 pack/week, declined cessation services and materials   Substance and Sexual Activity    Alcohol use: Not Currently    Drug use: Yes     Types: Marijuana    Sexual activity: Not Currently       PHYSICAL EXAM    VITAL SIGNS: BP (!) 140/69   Pulse 70   Temp 97.7  F (36.5  C) (Oral)   Resp 16   Ht 1.803 m (5' 11\")   Wt 76.7 kg (169 lb)   SpO2 100%   BMI 23.57 kg/m     GENERAL: Awake, alert, answering questions appropriately  SPEECH:  Easy to understand speech, Normal volume and andrez  PULMONARY: No respiratory distress, Lungs clear to auscultation bilaterally  CARDIOVASCULAR: Regular rate and rhythm, Distal pulses present and normal.  ABDOMINAL: Soft, Nondistended, Nontender, No rebound or guarding, No palpable " masses  EXTREMITIES: No lower extremity edema. Deep ulcer to the right foot, no visible bone, foul odor, multiple stitches present from a previous surgery  PSYCH: Normal mood and affect     LAB:  All pertinent labs reviewed and interpreted.  Results for orders placed or performed during the hospital encounter of 03/22/24   Foot  XR, G/E 3 views, right    Impression    IMPRESSION: Status post amputation of the mid and forefoot. No fracture or osteomyelitis.   US Lower Extremity Arterial Duplex Bilateral    Impression    IMPRESSION:  1.  RIGHT LOWER EXTREMITY: Mild resting ischemia. Moderate proximal SFA stenosis.  2.  LEFT LOWER EXTREMITY: Severe resting ischemia. Digit pressure favorable for wound healing. Given markedly diminished velocities and monophasic waveforms at the distal thigh, suspect mid - distal SFA disease. Stenotic digit waveforms.  3.  Enlarged right inguinal lymph nodes, largest measuring 2.2 x 1.6 x 1.1 cm.   MR Ankle Right w/o Contrast    Impression    IMPRESSION:  1.  Plantar hindfoot ulceration with surrounding cellulitis.  2.  Associated osteomyelitis of the adjacent anterior calcaneus.   US ANGELO with PPG wo Exercise    Impression    IMPRESSION:  1.  RIGHT LOWER EXTREMITY: Mild resting ischemia. Moderate proximal SFA stenosis.  2.  LEFT LOWER EXTREMITY: Severe resting ischemia. Digit pressure favorable for wound healing. Given markedly diminished velocities and monophasic waveforms at the distal thigh, suspect mid - distal SFA disease. Stenotic digit waveforms.  3.  Enlarged right inguinal lymph nodes, largest measuring 2.2 x 1.6 x 1.1 cm.   Basic metabolic panel   Result Value Ref Range    Sodium 118 (LL) 135 - 145 mmol/L    Potassium 4.9 3.4 - 5.3 mmol/L    Chloride 82 (L) 98 - 107 mmol/L    Carbon Dioxide (CO2) 23 22 - 29 mmol/L    Anion Gap 13 7 - 15 mmol/L    Urea Nitrogen 62.9 (H) 8.0 - 23.0 mg/dL    Creatinine 2.27 (H) 0.67 - 1.17 mg/dL    GFR Estimate 31 (L) >60 mL/min/1.73m2    Calcium  9.2 8.8 - 10.2 mg/dL    Glucose 668 (HH) 70 - 99 mg/dL   UA with Microscopic reflex to Culture    Specimen: Urine, Clean Catch   Result Value Ref Range    Color Urine Light Yellow Colorless, Straw, Light Yellow, Yellow    Appearance Urine Clear Clear    Glucose Urine >1000 (A) Negative mg/dL    Bilirubin Urine Negative Negative    Ketones Urine Negative Negative mg/dL    Specific Gravity Urine 1.023 1.001 - 1.030    Blood Urine 0.06 mg/dL (A) Negative    pH Urine 5.0 5.0 - 7.0    Protein Albumin Urine 30 (A) Negative mg/dL    Urobilinogen Urine <2.0 <2.0 mg/dL    Nitrite Urine Negative Negative    Leukocyte Esterase Urine Negative Negative    Bacteria Urine Few (A) None Seen /HPF    RBC Urine 2 <=2 /HPF    WBC Urine 2 <=5 /HPF    Squamous Epithelials Urine <1 <=1 /HPF   Ketone Beta-Hydroxybutyrate Quantitative   Result Value Ref Range    Ketone (Beta-Hydroxybutyrate) Quantitative 0.50 (H) <=0.30 mmol/L   Glucose by meter   Result Value Ref Range    GLUCOSE BY METER POCT >600 (HH) 70 - 99 mg/dL   Result Value Ref Range    Magnesium 2.4 (H) 1.7 - 2.3 mg/dL   Blood gas venous   Result Value Ref Range    pH Venous 7.36 7.32 - 7.43    pCO2 Venous 47 40 - 50 mm Hg    pO2 Venous 15 (L) 25 - 47 mm Hg    Bicarbonate Venous 26 21 - 28 mmol/L    Base Excess/Deficit Venous 0.6 -3.0 - 3.0 mmol/L    FIO2 21     Oxyhemoglobin Venous 20 (L) 70 - 75 %    O2 Sat, Venous 20.0 (L) 70.0 - 75.0 %   Lactic acid whole blood   Result Value Ref Range    Lactic Acid 1.2 0.7 - 2.0 mmol/L   Hepatic function panel   Result Value Ref Range    Protein Total 8.3 6.4 - 8.3 g/dL    Albumin 3.1 (L) 3.5 - 5.2 g/dL    Bilirubin Total 0.3 <=1.2 mg/dL    Alkaline Phosphatase 270 (H) 40 - 150 U/L    AST 25 0 - 45 U/L    ALT 21 0 - 70 U/L    Bilirubin Direct <0.20 0.00 - 0.30 mg/dL   CBC with platelets and differential   Result Value Ref Range    WBC Count 16.8 (H) 4.0 - 11.0 10e3/uL    RBC Count 3.71 (L) 4.40 - 5.90 10e6/uL    Hemoglobin 10.5 (L) 13.3 -  17.7 g/dL    Hematocrit 31.1 (L) 40.0 - 53.0 %    MCV 84 78 - 100 fL    MCH 28.3 26.5 - 33.0 pg    MCHC 33.8 31.5 - 36.5 g/dL    RDW 12.8 10.0 - 15.0 %    Platelet Count 516 (H) 150 - 450 10e3/uL    % Neutrophils 87 %    % Lymphocytes 8 %    % Monocytes 3 %    % Eosinophils 1 %    % Basophils 0 %    % Immature Granulocytes 1 %    NRBCs per 100 WBC 0 <1 /100    Absolute Neutrophils 14.6 (H) 1.6 - 8.3 10e3/uL    Absolute Lymphocytes 1.3 0.8 - 5.3 10e3/uL    Absolute Monocytes 0.5 0.0 - 1.3 10e3/uL    Absolute Eosinophils 0.2 0.0 - 0.7 10e3/uL    Absolute Basophils 0.0 0.0 - 0.2 10e3/uL    Absolute Immature Granulocytes 0.1 <=0.4 10e3/uL    Absolute NRBCs 0.0 10e3/uL   Result Value Ref Range    Hemoglobin A1C 14.2 (H) <5.7 %   Glucose by meter   Result Value Ref Range    GLUCOSE BY METER POCT 366 (H) 70 - 99 mg/dL   Result Value Ref Range    Sodium 127 (L) 135 - 145 mmol/L       RADIOLOGY:  MR Ankle Right w/o Contrast   Final Result   IMPRESSION:   1.  Plantar hindfoot ulceration with surrounding cellulitis.   2.  Associated osteomyelitis of the adjacent anterior calcaneus.      US ANGELO with PPG wo Exercise   Final Result   IMPRESSION:   1.  RIGHT LOWER EXTREMITY: Mild resting ischemia. Moderate proximal SFA stenosis.   2.  LEFT LOWER EXTREMITY: Severe resting ischemia. Digit pressure favorable for wound healing. Given markedly diminished velocities and monophasic waveforms at the distal thigh, suspect mid - distal SFA disease. Stenotic digit waveforms.   3.  Enlarged right inguinal lymph nodes, largest measuring 2.2 x 1.6 x 1.1 cm.      US Lower Extremity Arterial Duplex Bilateral   Final Result   IMPRESSION:   1.  RIGHT LOWER EXTREMITY: Mild resting ischemia. Moderate proximal SFA stenosis.   2.  LEFT LOWER EXTREMITY: Severe resting ischemia. Digit pressure favorable for wound healing. Given markedly diminished velocities and monophasic waveforms at the distal thigh, suspect mid - distal SFA disease. Stenotic digit  waveforms.   3.  Enlarged right inguinal lymph nodes, largest measuring 2.2 x 1.6 x 1.1 cm.      Foot  XR, G/E 3 views, right   Final Result   IMPRESSION: Status post amputation of the mid and forefoot. No fracture or osteomyelitis.            EKG:    Date and time: August 13, 2023 at 1458  Rate: 97 bpm  Rhythm: Sinus rhythm  NJ interval: 122 ms  QRS interval: 94 ms  QT/QTc: 346/439 ms  ST changes or T wave changes: No acute ST or T wave abnormality  Change from prior ECG: No significant change from prior  I have independently reviewed and interpreted this EKG.     PROCEDURES:     Critical Care     Performed by: Dr Kendy Walker  Authorized by: Dr Kendy Walker  Total critical care time: 35 minutes  Critical care was necessary to treat or prevent imminent or life-threatening deterioration of the following conditions: Blood sugar of 668 requiring IV fluids and some insulin.  Critical care was time spent personally by me on the following activities: development of treatment plan with patient or surrogate, discussions with consultants, examination of patient, evaluation of patient's response to treatment, obtaining history from patient or surrogate, ordering and performing treatments and interventions, ordering and review of laboratory studies, ordering and review of radiographic studies, re-evaluation of patient's condition and monitoring for potential decompensation.  Critical care time was exclusive of separately billable procedures and treating other patients.        I, Avel Devi, am serving as a scribe to document services personally performed by Dr. Walker based on my observation and the provider's statements to me. I, Kendy Walker MD attest that Avel Devi is acting in a scribe capacity, has observed my performance of the services and has documented them in accordance with my direction.    Kendy Walker M.D.  Emergency Medicine  [facility] EMERGENCY  DEPARTMENT  [address] [place] [id number]  [phone]  Dept: [phone]       Kendy Walker MD  03/22/24 222

## 2024-03-22 NOTE — PHARMACY-ADMISSION MEDICATION HISTORY
Pharmacist Admission Medication History    Admission medication history is complete. The information provided in this note is only as accurate as the sources available at the time of the update.    Information Source(s): Patient, Patient's pharmacy, and [facility] via in-person and phone    Pertinent Information: Patient reports the only medication he is taking is Lantus (purple pen). When I asked when and how much, he responded 6 units when my numbers say I should. He said doses were not with meals, so evidence does point to Lantus. However, [facility] does not have any fills reported for Lantus, so I called his pharmacy ([facility]) and they have never filled Lantus. Patient reports he has been off all other medications for months.    Changes made to PTA medication list:  Added: None  Deleted: amlodipine, Novolog, miconazole, acetaminophen  Changed: Lantus    Allergies reviewed with patient and updates made in EHR: yes    Medication History Completed By: Kathi Sotelo [facility] 3/22/2024 5:29 PM    PTA Med List   Medication Sig Last Dose    insulin glargine (LANTUS PEN) 100 UNIT/ML pen Inject 6 Units Subcutaneous daily as needed for other Unknown

## 2024-03-23 ENCOUNTER — ANESTHESIA EVENT (OUTPATIENT)
Dept: SURGERY | Facility: HOSPITAL | Age: 69
DRG: 854 | End: 2024-03-23
Payer: MEDICARE

## 2024-03-23 LAB
ANION GAP SERPL CALCULATED.3IONS-SCNC: 12 MMOL/L (ref 7–15)
BUN SERPL-MCNC: 41.7 MG/DL (ref 8–23)
CALCIUM SERPL-MCNC: 8.7 MG/DL (ref 8.8–10.2)
CHLORIDE SERPL-SCNC: 98 MMOL/L (ref 98–107)
CREAT SERPL-MCNC: 1.65 MG/DL (ref 0.67–1.17)
DEPRECATED HCO3 PLAS-SCNC: 21 MMOL/L (ref 22–29)
EGFRCR SERPLBLD CKD-EPI 2021: 46 ML/MIN/1.73M2
ERYTHROCYTE [DISTWIDTH] IN BLOOD BY AUTOMATED COUNT: 12.9 % (ref 10–15)
GLUCOSE BLDC GLUCOMTR-MCNC: 131 MG/DL (ref 70–99)
GLUCOSE BLDC GLUCOMTR-MCNC: 142 MG/DL (ref 70–99)
GLUCOSE BLDC GLUCOMTR-MCNC: 149 MG/DL (ref 70–99)
GLUCOSE BLDC GLUCOMTR-MCNC: 194 MG/DL (ref 70–99)
GLUCOSE BLDC GLUCOMTR-MCNC: 215 MG/DL (ref 70–99)
GLUCOSE SERPL-MCNC: 170 MG/DL (ref 70–99)
HCT VFR BLD AUTO: 31 % (ref 40–53)
HGB BLD-MCNC: 10.3 G/DL (ref 13.3–17.7)
MCH RBC QN AUTO: 28.5 PG (ref 26.5–33)
MCHC RBC AUTO-ENTMCNC: 33.2 G/DL (ref 31.5–36.5)
MCV RBC AUTO: 86 FL (ref 78–100)
PLATELET # BLD AUTO: 469 10E3/UL (ref 150–450)
POTASSIUM SERPL-SCNC: 3.9 MMOL/L (ref 3.4–5.3)
RBC # BLD AUTO: 3.62 10E6/UL (ref 4.4–5.9)
SODIUM SERPL-SCNC: 131 MMOL/L (ref 135–145)
WBC # BLD AUTO: 12.7 10E3/UL (ref 4–11)

## 2024-03-23 PROCEDURE — 250N000013 HC RX MED GY IP 250 OP 250 PS 637: Performed by: STUDENT IN AN ORGANIZED HEALTH CARE EDUCATION/TRAINING PROGRAM

## 2024-03-23 PROCEDURE — 250N000013 HC RX MED GY IP 250 OP 250 PS 637: Performed by: HOSPITALIST

## 2024-03-23 PROCEDURE — 120N000001 HC R&B MED SURG/OB

## 2024-03-23 PROCEDURE — 82962 GLUCOSE BLOOD TEST: CPT

## 2024-03-23 PROCEDURE — 250N000011 HC RX IP 250 OP 636: Performed by: INTERNAL MEDICINE

## 2024-03-23 PROCEDURE — 250N000011 HC RX IP 250 OP 636: Performed by: HOSPITALIST

## 2024-03-23 PROCEDURE — 85027 COMPLETE CBC AUTOMATED: CPT | Performed by: HOSPITALIST

## 2024-03-23 PROCEDURE — 36415 COLL VENOUS BLD VENIPUNCTURE: CPT | Performed by: HOSPITALIST

## 2024-03-23 PROCEDURE — 87040 BLOOD CULTURE FOR BACTERIA: CPT | Performed by: STUDENT IN AN ORGANIZED HEALTH CARE EDUCATION/TRAINING PROGRAM

## 2024-03-23 PROCEDURE — 36415 COLL VENOUS BLD VENIPUNCTURE: CPT | Performed by: STUDENT IN AN ORGANIZED HEALTH CARE EDUCATION/TRAINING PROGRAM

## 2024-03-23 PROCEDURE — 99233 SBSQ HOSP IP/OBS HIGH 50: CPT | Performed by: INTERNAL MEDICINE

## 2024-03-23 PROCEDURE — 258N000003 HC RX IP 258 OP 636: Performed by: HOSPITALIST

## 2024-03-23 PROCEDURE — 80048 BASIC METABOLIC PNL TOTAL CA: CPT | Performed by: HOSPITALIST

## 2024-03-23 PROCEDURE — 258N000003 HC RX IP 258 OP 636: Performed by: INTERNAL MEDICINE

## 2024-03-23 RX ORDER — ATORVASTATIN CALCIUM 40 MG/1
80 TABLET, FILM COATED ORAL EVERY EVENING
Status: DISCONTINUED | OUTPATIENT
Start: 2024-03-23 | End: 2024-03-28 | Stop reason: HOSPADM

## 2024-03-23 RX ORDER — ASPIRIN 81 MG/1
81 TABLET ORAL DAILY
Status: DISCONTINUED | OUTPATIENT
Start: 2024-03-23 | End: 2024-03-28 | Stop reason: HOSPADM

## 2024-03-23 RX ORDER — CEFAZOLIN SODIUM 1 G/50ML
1250 SOLUTION INTRAVENOUS EVERY 24 HOURS
Status: DISCONTINUED | OUTPATIENT
Start: 2024-03-23 | End: 2024-03-24

## 2024-03-23 RX ADMIN — PIPERACILLIN AND TAZOBACTAM 3.38 G: 3; .375 INJECTION, POWDER, FOR SOLUTION INTRAVENOUS at 05:41

## 2024-03-23 RX ADMIN — SODIUM CHLORIDE: 9 INJECTION, SOLUTION INTRAVENOUS at 09:48

## 2024-03-23 RX ADMIN — ATORVASTATIN CALCIUM 80 MG: 40 TABLET, FILM COATED ORAL at 20:43

## 2024-03-23 RX ADMIN — PIPERACILLIN AND TAZOBACTAM 3.38 G: 3; .375 INJECTION, POWDER, FOR SOLUTION INTRAVENOUS at 22:18

## 2024-03-23 RX ADMIN — AMLODIPINE BESYLATE 2.5 MG: 2.5 TABLET ORAL at 08:10

## 2024-03-23 RX ADMIN — PIPERACILLIN AND TAZOBACTAM 3.38 G: 3; .375 INJECTION, POWDER, FOR SOLUTION INTRAVENOUS at 14:19

## 2024-03-23 RX ADMIN — HEPARIN SODIUM 5000 UNITS: 5000 INJECTION, SOLUTION INTRAVENOUS; SUBCUTANEOUS at 05:17

## 2024-03-23 RX ADMIN — HEPARIN SODIUM 5000 UNITS: 5000 INJECTION, SOLUTION INTRAVENOUS; SUBCUTANEOUS at 14:19

## 2024-03-23 RX ADMIN — VANCOMYCIN HYDROCHLORIDE 1250 MG: 5 INJECTION, POWDER, LYOPHILIZED, FOR SOLUTION INTRAVENOUS at 20:35

## 2024-03-23 ASSESSMENT — ACTIVITIES OF DAILY LIVING (ADL)
ADLS_ACUITY_SCORE: 27
ADLS_ACUITY_SCORE: 40
ADLS_ACUITY_SCORE: 27
ADLS_ACUITY_SCORE: 40
ADLS_ACUITY_SCORE: 27
ADLS_ACUITY_SCORE: 40
ADLS_ACUITY_SCORE: 40
ADLS_ACUITY_SCORE: 27
ADLS_ACUITY_SCORE: 27
ADLS_ACUITY_SCORE: 40
ADLS_ACUITY_SCORE: 27
ADLS_ACUITY_SCORE: 27
ADLS_ACUITY_SCORE: 40
ADLS_ACUITY_SCORE: 27
ADLS_ACUITY_SCORE: 40
ADLS_ACUITY_SCORE: 40
ADLS_ACUITY_SCORE: 27
ADLS_ACUITY_SCORE: 40
ADLS_ACUITY_SCORE: 40
ADLS_ACUITY_SCORE: 27
ADLS_ACUITY_SCORE: 40

## 2024-03-23 NOTE — PROGRESS NOTES
Per vascular surgery patient has elected to proceed with BKA.  I will sign off at this time.  Please contact me with questions.

## 2024-03-23 NOTE — PROGRESS NOTES
[facility]    Medicine Progress Note - Hospitalist Service    Date of Admission:  3/22/2024    Assessment & Plan   Dario Benavides is a 68 year old male admitted on 3/22/2024 for right foot osteomyelitis. He has past medical history of poorly controlled diabetes with Charcot foot status post transmetatarsal amputation of the right foot, history of osteomyelitis, diabetic foot ulcer, CKD stage III presented with worsening right heel ulcer.     Sepsis 2/2 R foot osteomyelitis   MRI right foot showed osteomyelitis of the anterior calcaneus.   -Vascular surgery plans for right BKA 3/24 (NPO @MN and hold anticoagulation)   -Continue Zosyn and vancomycin.  Per ID curbside, antibiotics will be discontinued postop  -Lantus reduced to 10u tonight    Diabetes mellitus with hyperglycemia   A1c on admission 14.2 and glucose was over 600. Sister and daughter report patient nonadherence to diabetes diet, not checking glucose or use of insulin over a year.  I discussed with patient and family extensively diabetes management which is likely the root cause of his ongoing foot osteomyelitis.   -Lantus reduced to 10u tonight  -sliding scale insulin     Hyponatremia - likely pseudohyponatremia in setting of hyperglycemia  -Monitor with correction of BS    SHANITA on CKD stage 3  -Baseline creat round 2, noted to be 2.27 on admission. Improved.     Chronic anemia  Hb around 10.5, likely due to ACD with ongoing infection and DM. Will hold off iron studies given ferritin will likely elevated in the setting of infection.         Diet: Combination Diet Moderate Consistent Carb (60 g CHO per Meal) Diet; Low Saturated Fat Na <2400mg Diet, No Caffeine Diet  NPO per Anesthesia Guidelines for Procedure/Surgery Except for: Meds    DVT Prophylaxis: VTE Prophylaxis contraindicated due to planned surgery 3/24.   Odell Catheter: Not present  Lines: None     Cardiac Monitoring: None  Code Status: No CPR- Do NOT Intubate       Clinically Significant Risk Factors Present on Admission         # Hyponatremia: Lowest Na = 118 mmol/L in last 2 days, will monitor as appropriate      # Hypoalbuminemia: Lowest albumin = 3.1 g/dL at 3/22/2024  2:45 PM, will monitor as appropriate     # Hypertension: Noted on problem list     # DMII: A1C = 14.2 % (Ref range: <5.7 %) within past 6 months              Disposition Plan      Expected Discharge Date: 03/27/2024      Destination: home with family;home with help/services              Helen Dwyer MD  Hospitalist Service  [facility]  Securely message with [employer] (more info)  Text page via [employer] Paging/Directory   ______________________________________________________________________    Interval History   No acute events overnight.  Patient was pleasant and calm this morning.  We discussed extensively on the importance of managing diabetes to reduce risk of future diabetic related wound infection.  He reports living at home with son and son's girlfriend.  He was vague about his glucose level and insulin use.    I was notified by nurse about patient's sister and daughter wanted to discuss his care with me.  Sister and daughter were at bedside and very upset about him not taking care of himself and diabetes.  They want me to tell the patient that he has to do the correct things.  I acknowledged their concern but ultimately told them that patient is his own decision maker and we as medical providers will provide the best recommendation but cannot force him to do anything.  During my entire conversation with the family, patient closed his eyes and had minimal participation to the conversation.  When asked about the surgical plan for tomorrow he said he is okay with that.    Physical Exam   Vital Signs: Temp: 98.3  F (36.8  C) Temp src: Oral BP: (!) 149/74 Pulse: 83   Resp: 18 SpO2: 99 % O2 Device: None (Room air)    Weight: 159 lbs 11.2 oz    General: Pleasant elderly male in  NAD  HEENT:EOMI, AT,NC  CVS:RRR, no edema  RS:CTAB  Abd: Soft, NT,ND  Neurology:Grossly normal  Ext: R foot fore and mid foot amputated. R foot heel wrapped  Psy:Approrpiate affect    Medical Decision Making       60 MINUTES SPENT BY ME on the date of service doing chart review, history, exam, documentation & further activities per the note.      Data     I have personally reviewed the following data over the past 24 hrs:    12.7 (H)  \   10.3 (L)   / 469 (H)     131 (L) 98 41.7 (H) /  194 (H)   3.9 21 (L) 1.65 (H) \     TSH: N/A T4: N/A A1C: N/A

## 2024-03-23 NOTE — PLAN OF CARE
Assumed care for pt at 1945, arrived back from [facility]. Pt is alert and oriented x4, flat affect. Pt had a wound on the sole of foot, dry dressing applied as pt reported having drainage there earlier. RLE warm and red, pt reports numbness but denies pain. Wound cultures sent as well as UA/UC. IV fluids and antibiotics started. Pt's BG was 366, novolog and lantus given.      Problem: Skin Injury Risk Increased  Goal: Skin Health and Integrity  Outcome: Progressing  Intervention: Plan: Nurse Driven Intervention: Friction and Shear  Recent Flowsheet Documentation  Taken 3/22/2024 2045 by Jenny Dye RN  Friction/Shear Interventions: HOB 30 degrees or less     Problem: Wound  Goal: Absence of Infection Signs and Symptoms  Outcome: Progressing  Goal: Optimal Pain Control and Function  Outcome: Progressing     Problem: Adult Inpatient Plan of Care  Goal: Absence of Hospital-Acquired Illness or Injury  Intervention: Identify and Manage Fall Risk  Recent Flowsheet Documentation  Taken 3/22/2024 2045 by Jenny Dye RN  Safety Promotion/Fall Prevention:   activity supervised   assistive device/personal items within reach   clutter free environment maintained   mobility aid in reach   nonskid shoes/slippers when out of bed   room door open   safety round/check completed  Intervention: Prevent Skin Injury  Recent Flowsheet Documentation  Taken 3/22/2024 2045 by Jenny Dey RN  Device Skin Pressure Protection: absorbent pad utilized/changed  Intervention: Prevent Infection  Recent Flowsheet Documentation  Taken 3/22/2024 2045 by Jenny Dye, RN  Infection Prevention:   hand hygiene promoted   rest/sleep promoted   Goal Outcome Evaluation:

## 2024-03-23 NOTE — PLAN OF CARE
Problem: Adult Inpatient Plan of Care  Goal: Absence of Hospital-Acquired Illness or Injury  Intervention: Prevent Infection  Recent Flowsheet Documentation  Taken 3/23/2024 1419 by Chance Hernandez, RN  Infection Prevention:   hand hygiene promoted   rest/sleep promoted     Problem: Wound  Goal: Absence of Infection Signs and Symptoms  Outcome: Progressing     Problem: Wound  Goal: Skin Health and Integrity  Intervention: Optimize Skin Protection  Recent Flowsheet Documentation  Taken 3/23/2024 1419 by Chance Hernandez, RN  Activity Management:   activity adjusted per tolerance   ambulated in room   up in chair   Goal Outcome Evaluation:       Pt A&Ox4. Flat affect. VSS on RA. R foot amputation, dressing CDI. L toe ulcer, open to air. L PIV with NS running. IV abx. Currently on cardiac/diabetic diet, NPO at midnight for procedure tomorrow. Family states patient is non-compliant with diet. Pt left ED to get food at the [employer].  Chance Hernandez, BAY on 3/23/2024 at 3:07 PM

## 2024-03-23 NOTE — CONSULTS
Care Management Initial Consult    General Information  Assessment completed with: Patient,    Type of CM/SW Visit: Initial Assessment    Primary Care Provider verified and updated as needed: Yes   Readmission within the last 30 days: no previous admission in last 30 days      Reason for Consult: discharge planning  Advance Care Planning: Advance Care Planning Reviewed: no concerns identified          Communication Assessment  Patient's communication style: spoken language (English or Bilingual)             Cognitive  Cognitive/Neuro/Behavioral: WDL                      Living Environment:   People in home: child(radha), adult     Current living Arrangements: house      Able to return to prior arrangements: yes       Family/Social Support:  Care provided by: self  Provides care for: no one  Marital Status:   Children          Description of Support System: Supportive, Involved    Support Assessment: Patient communicates needs well met, Adequate family and caregiver support    Current Resources:   Patient receiving home care services: No     Community Resources: None  Equipment currently used at home:    Supplies currently used at home: Diabetic Supplies    Employment/Financial:  Employment Status: retired        Financial Concerns:             Does the patient's insurance plan have a 3 day qualifying hospital stay waiver?  No    Lifestyle & Psychosocial Needs:  Social Determinants of Health     Food Insecurity: Not on file   Depression: Not on file   Housing Stability: Not on file   Tobacco Use: Medium Risk (9/22/2023)    Patient History     Smoking Tobacco Use: Former     Smokeless Tobacco Use: Never     Passive Exposure: Not on file   Financial Resource Strain: Not on file   Alcohol Use: Not on file   Transportation Needs: Not on file   Physical Activity: Not on file   Interpersonal Safety: Not on file   Stress: Not on file   Social Connections: Not on file       Functional Status:  Prior to admission patient  "needed assistance:   Dependent ADLs:: Independent, Ambulation-cane, Ambulation-walker  Dependent IADLs:: Independent       Mental Health Status:          Chemical Dependency Status:                Values/Beliefs:  Spiritual, Cultural Beliefs, Catholic Practices, Values that affect care:                 Additional Information:  Met with patient at the bedside for initial assessment. Introduced self and care management role. Patient lives in his home, son Tarun recently moved in \"was out of the picture for awhile because he was incarcinated\". Tarun's girlfriend also lives in the home. Patient is independent with all his I/ADLs. He states that he uses a walker or cane prn. He states that he is still  but he and his spouse has been  for \"about a decade\". Daughter Timothy is main contact and lives with her mom.   He admits that he is not compliant with his medications or follow up appointments. He is agreeable to home care services if this is recommended.   Multiple consults pending. CM to follow hospital progression, treatment team recommendations and assist with discharge planning.  Family to transport at discharge.    Mayte Llamas RN     "

## 2024-03-23 NOTE — PROGRESS NOTES
Patient was found outside of ED sitting on bench. Patient brought back to room. Pt stated he wanted to go for a walk and get some fresh air. Pt unhooked himself from his IVF. Educated patient to inform staff before he goes on a walk, boundaries and to not unhook himself from the IV.       GENI MAR RN

## 2024-03-23 NOTE — PLAN OF CARE
Problem: Skin Injury Risk Increased  Goal: Skin Health and Integrity  Outcome: Progressing     Problem: Wound  Goal: Optimal Pain Control and Function  Outcome: Progressing     Problem: Wound  Goal: Skin Health and Integrity  Outcome: Progressing   Goal Outcome Evaluation:  Oriented x4, VSS on room air. Denies pain. R foot dressing intact. Iv fluids and iv antibiotics infusing. Podiatry, ID, vascular and WOC consult.

## 2024-03-23 NOTE — CONSULTS
Vascular Surgery Consultation    Dario Benavides MRN# [id number]   Age: 68 year old YOB: 1955     Date of Admission:  3/22/2024    Date of Consult:   03/23/24    Reason for consult: Right foot wound       Requesting service: Hospital medicine                   Assessment and Plan:   68-year-old male with poorly controlled diabetes (hemoglobin A1c 14.2%), CKD, hypertension, hyperlipidemia, previous right TMA, now admitted with diabetic foot infection of the right heel.  Calcaneal osteomyelitis demonstrated on MRI.    Discussed the options of attempted limb salvage with the patient as well as podiatrist, Dr. Sage, who reviewed the pictures of the wound and MRI results.  The patient would prefer to proceed right below-knee amputation.  This is discussed with the patient that we would perform this in a staged manner, beginning with a guillotine below-knee amputation.  This will be scheduled for tomorrow.  N.p.o. at midnight.    Patient also has developing left foot wounds that will need to be addressed in near future with LLE angiogram.    Aspirin, high dose statin  Cont abx    Discussed with staff, Dr. Guillen as well as Dr. Steel.    Konrad Vigil MD          History of Present Illness:   Mr. Benavides is a 68 year old male with poorly controlled diabetes, CKD, and chronic limb threatening ischemia. He underwent right TMA in August 2023. This initially failed to heal, missed several follow appointments, and met with Dr. Ge in vascular surgery clinic in Sept '23 when the option BKA was discussed. He since underwent local debridement of the TMA wound and this has now healed. He now presents with a chronic large wound of the right heel.  He is still ambulating.  He tells me he still occasionally smokes.  He is not taking insulin for some time nor any other medications at home as he feels his medications are not helping him.  He denies any fevers, chills, chest pain, dyspnea, abdominal  pain.            Past Medical History:     Past Medical History:   Diagnosis Date    Hypertension     Non compliance with medical treatment 2021    Stage 3b chronic kidney disease (H) 2021    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) 2021    Tobacco abuse 2019    Type 2 diabetes mellitus with right diabetic foot infection (H) 2021             Past Surgical History:     Past Surgical History:   Procedure Laterality Date    BACK SURGERY  1992    CLOSE SECONDARY WOUND LOWER EXTREMITY Right 2023    Procedure: Delayed primary closure of wound right foot;  Surgeon: Brandon Nieto DPM;  Location: [facility] OR    IRRIGATION AND DEBRIDEMENT FOOT, COMBINED Right 2023    Procedure: IRRIGATION AND DEBRIDEMENT right foot with removing all infected bones and soft tissue;  Surgeon: Jun Goyal DPM;  Location: [facility] OR    NECK SURGERY               Social History:     Social History     Tobacco Use    Smoking status: Former     Packs/day: 1.00     Years: 45.00     Additional pack years: 0.00     Total pack years: 45.00     Types: Cigarettes     Quit date: 2023     Years since quittin.5    Smokeless tobacco: Never    Tobacco comments:     Seen IP by [facility]TT[facility] on 8/15/23 - down to 1 pack/week, declined cessation services and materials   Substance Use Topics    Alcohol use: Not Currently             Family History:   No family history on file.             Allergies:   No Known Allergies          Medications:     Current Facility-Administered Medications   Medication    acetaminophen (TYLENOL) tablet 650 mg    Or    acetaminophen (TYLENOL) Suppository 650 mg    amLODIPine (NORVASC) tablet 2.5 mg    calcium carbonate (TUMS) chewable tablet 1,000 mg    glucose gel 15-30 g    Or    dextrose 50 % injection 25-50 mL    Or    glucagon injection 1 mg    heparin ANTICOAGULANT injection 5,000 Units    hydrALAZINE (APRESOLINE) injection 10 mg    insulin  "aspart (NovoLOG) injection (RAPID ACTING)    insulin aspart (NovoLOG) injection (RAPID ACTING)    insulin glargine (LANTUS PEN) injection 20 Units    lidocaine (LMX4) cream    lidocaine 1 % 0.1-1 mL    melatonin tablet 1 mg    ondansetron (ZOFRAN ODT) ODT tab 4 mg    Or    ondansetron (ZOFRAN) injection 4 mg    piperacillin-tazobactam (ZOSYN) 3.375 g vial to attach to  mL bag    senna-docusate (SENOKOT-S/PERICOLACE) 8.6-50 MG per tablet 1 tablet    Or    senna-docusate (SENOKOT-S/PERICOLACE) 8.6-50 MG per tablet 2 tablet    sodium chloride (PF) 0.9% PF flush 3 mL    sodium chloride (PF) 0.9% PF flush 3 mL    sodium chloride 0.9 % infusion    vancomycin (VANCOCIN) 1,250 mg in sodium chloride 0.9 % 250 mL intermittent infusion               Review of Systems:   A 12 point review of systems was completed and found to be negative unless otherwise stated in the above HPI           Physical Exam:   /65 (BP Location: Left arm)   Pulse 86   Temp 98  F (36.7  C) (Oral)   Resp 20   Ht 1.803 m (5' 11\")   Wt 72.4 kg (159 lb 11.2 oz)   SpO2 98%   BMI 22.27 kg/m        Intake/Output Summary (Last 24 hours) at 3/23/2024 1352  Last data filed at 3/23/2024 0541  Gross per 24 hour   Intake 1360 ml   Output 600 ml   Net 760 ml       GEN: A&Ox3, no acute distress  NEURO: CN II-XII grossly intact. No gross neurologic deficits  NECK: trachea midline, no JVD  HEENT: No scleral icterus.  RESP: Nonlabored breathing on room air, no cough  CV: RRR by radial pulse, noncyanotic   ABD: soft, non-tender, nondistended. No guarding or rebound tenderness  MSK: Moves all extremities independently. Normal active range of motion.  PSYCH: cooperative   PULSES: Palpable femoral and right popliteal artery pulse, multiphasic right DP and PT Doppler signals, monophasic left DP and PT Doppler signals.  EXTREMITIES: Large ulcer over the base of the right heel.  Developing ulcer at the left plantar foot          Data:   All laboratory data " reviewed    Results:  BMP  Recent Labs   Lab 03/23/24  1203 03/23/24  1047 03/23/24  0801 03/23/24  0205 03/22/24 2248 03/22/24 2103 03/22/24 1942 03/22/24  1445   NA  --  131*  --   --   --  127*  --  118*   POTASSIUM  --  3.9  --   --   --   --   --  4.9   CHLORIDE  --  98  --   --   --   --   --  82*   CO2  --  21*  --   --   --   --   --  23   BUN  --  41.7*  --   --   --   --   --  62.9*   CR  --  1.65*  --   --   --   --   --  2.27*   * 170* 131* 142*   < >  --    < > 668*    < > = values in this interval not displayed.     CBC  Recent Labs   Lab 03/23/24  1047 03/22/24  1445   WBC 12.7* 16.8*   HGB 10.3* 10.5*   * 516*     LFT  Recent Labs   Lab 03/22/24  1445   AST 25   ALT 21   ALKPHOS 270*   BILITOTAL 0.3   ALBUMIN 3.1*     Recent Labs   Lab 03/23/24  1203 03/23/24  1047 03/23/24  0801 03/23/24  0205 03/22/24 2248 03/22/24 1942   * 170* 131* 142* 305* 366*       Imaging:  MR Ankle Right w/o Contrast    Result Date: 3/22/2024  EXAM: MR ANKLE RIGHT W/O CONTRAST LOCATION: [facility] DATE: 3/22/2024 INDICATION: History diabetes, Charcot foot, status post amputation, with worsening ulcer. Concern for osteomyelitis. COMPARISON: X-rays 03/22/2024 TECHNIQUE: Unenhanced. FINDINGS: Previous amputation of the foot at the level of the naviculocuneiform and calcaneocuboid joints. There is an ulceration along the plantar hindfoot superficial to the anterior calcaneus with surrounding cellulitis. There is marrow edema with abnormal T1 marrow signal involving the anterior calcaneus compatible with osteomyelitis. No abscess. Generalized muscle atrophy.     IMPRESSION: 1.  Plantar hindfoot ulceration with surrounding cellulitis. 2.  Associated osteomyelitis of the adjacent anterior calcaneus.    US Lower Extremity Arterial Duplex Bilateral    Result Date: 3/22/2024  [facility] LOCATION: [facility] DATE: 3/22/2024 EXAM: RESTING ANKLE-BRACHIAL  INDICES (ABIs) AND DUPLEX ARTERIAL ULTRASOUND OF THE LOWER EXTREMITIES BILATERALLY INDICATION: Right foot amputation now with nonhealing open wound. Right foot pain. History of stroke. TECHNIQUE: Duplex imaging is performed utilizing gray-scale, two-dimensional images and color-flow imaging. Doppler waveform analysis and spectral Doppler imaging is also performed. COMPARISON: None. FINDINGS: ANGELO FINDINGS: RIGHT: Brachial: Not obtained Ankle (PT): 127 - - 0.83 Ankle (DP): Not obtainable Digit: Not obtainable LEFT: Brachial: 153 Ankle (PT): 81 - - 0.53 Ankle (DP): 90 - - 0.59 Digit: 83 WAVEFORMS: Right leg: Multiphasic through the anterior tibial artery. Left leg:  Multiphasic through the mid SFA transitioning to monophasic. Stenotic digit waveforms. DUPLEX ARTERIAL ULTRASOUND FINDINGS: (velocities in cm/s) RIGHT CFA: 140 PFA: 179 SFA prox: 259 SFA mid: 123 SFA dist: 147 Pop:132 PT: 59 AT: 29 DP: Not obtained LEFT CFA: 156 PFA: 189 SFA prox: 90 SFA mid: 32 SFA dist: 9 Pop:62 PT: 11 AT: 12 DP: Not obtained     IMPRESSION: 1.  RIGHT LOWER EXTREMITY: Mild resting ischemia. Moderate proximal SFA stenosis. 2.  LEFT LOWER EXTREMITY: Severe resting ischemia. Digit pressure favorable for wound healing. Given markedly diminished velocities and monophasic waveforms at the distal thigh, suspect mid - distal SFA disease. Stenotic digit waveforms. 3.  Enlarged right inguinal lymph nodes, largest measuring 2.2 x 1.6 x 1.1 cm.    US ANGELO with PPG wo Exercise    Result Date: 3/22/2024  [facility] LOCATION: [facility] DATE: 3/22/2024 EXAM: RESTING ANKLE-BRACHIAL INDICES (ABIs) AND DUPLEX ARTERIAL ULTRASOUND OF THE LOWER EXTREMITIES BILATERALLY INDICATION: Right foot amputation now with nonhealing open wound. Right foot pain. History of stroke. TECHNIQUE: Duplex imaging is performed utilizing gray-scale, two-dimensional images and color-flow imaging. Doppler waveform analysis and spectral Doppler imaging  is also performed. COMPARISON: None. FINDINGS: ANGELO FINDINGS: RIGHT: Brachial: Not obtained Ankle (PT): 127 - - 0.83 Ankle (DP): Not obtainable Digit: Not obtainable LEFT: Brachial: 153 Ankle (PT): 81 - - 0.53 Ankle (DP): 90 - - 0.59 Digit: 83 WAVEFORMS: Right leg: Multiphasic through the anterior tibial artery. Left leg:  Multiphasic through the mid SFA transitioning to monophasic. Stenotic digit waveforms. DUPLEX ARTERIAL ULTRASOUND FINDINGS: (velocities in cm/s) RIGHT CFA: 140 PFA: 179 SFA prox: 259 SFA mid: 123 SFA dist: 147 Pop:132 PT: 59 AT: 29 DP: Not obtained LEFT CFA: 156 PFA: 189 SFA prox: 90 SFA mid: 32 SFA dist: 9 Pop:62 PT: 11 AT: 12 DP: Not obtained     IMPRESSION: 1.  RIGHT LOWER EXTREMITY: Mild resting ischemia. Moderate proximal SFA stenosis. 2.  LEFT LOWER EXTREMITY: Severe resting ischemia. Digit pressure favorable for wound healing. Given markedly diminished velocities and monophasic waveforms at the distal thigh, suspect mid - distal SFA disease. Stenotic digit waveforms. 3.  Enlarged right inguinal lymph nodes, largest measuring 2.2 x 1.6 x 1.1 cm.    Foot  XR, G/E 3 views, right    Result Date: 3/22/2024  EXAM: XR FOOT RIGHT G/E 3 VIEWS LOCATION: [facility] DATE: 3/22/2024 INDICATION: wound, right foot pain. COMPARISON: None.     IMPRESSION: Status post amputation of the mid and forefoot. No fracture or osteomyelitis.

## 2024-03-23 NOTE — PROGRESS NOTES
03/23/24 1042   Appointment Info   Signing Clinician's Name / Credentials (PT) Neva De León PT   Appointment Canceled Reason (PT) Hold (see Cancel Comments row)   Appointment Cancel Comments (PT) Pt to have a BKA. PT will hold until after his  BKA .

## 2024-03-24 ENCOUNTER — ANESTHESIA (OUTPATIENT)
Dept: SURGERY | Facility: HOSPITAL | Age: 69
DRG: 854 | End: 2024-03-24
Payer: MEDICARE

## 2024-03-24 ENCOUNTER — APPOINTMENT (OUTPATIENT)
Dept: MRI IMAGING | Facility: HOSPITAL | Age: 69
DRG: 854 | End: 2024-03-24
Attending: STUDENT IN AN ORGANIZED HEALTH CARE EDUCATION/TRAINING PROGRAM
Payer: MEDICARE

## 2024-03-24 LAB
ABO/RH(D): NORMAL
ANION GAP SERPL CALCULATED.3IONS-SCNC: 9 MMOL/L (ref 7–15)
ANTIBODY SCREEN: NEGATIVE
BASOPHILS # BLD AUTO: 0.1 10E3/UL (ref 0–0.2)
BASOPHILS NFR BLD AUTO: 1 %
BUN SERPL-MCNC: 27.1 MG/DL (ref 8–23)
CALCIUM SERPL-MCNC: 8.4 MG/DL (ref 8.8–10.2)
CHLORIDE SERPL-SCNC: 104 MMOL/L (ref 98–107)
CREAT SERPL-MCNC: 1.52 MG/DL (ref 0.67–1.17)
DEPRECATED HCO3 PLAS-SCNC: 22 MMOL/L (ref 22–29)
EGFRCR SERPLBLD CKD-EPI 2021: 50 ML/MIN/1.73M2
EOSINOPHIL # BLD AUTO: 0.6 10E3/UL (ref 0–0.7)
EOSINOPHIL NFR BLD AUTO: 6 %
ERYTHROCYTE [DISTWIDTH] IN BLOOD BY AUTOMATED COUNT: 13.2 % (ref 10–15)
GLUCOSE BLDC GLUCOMTR-MCNC: 117 MG/DL (ref 70–99)
GLUCOSE BLDC GLUCOMTR-MCNC: 130 MG/DL (ref 70–99)
GLUCOSE BLDC GLUCOMTR-MCNC: 132 MG/DL (ref 70–99)
GLUCOSE BLDC GLUCOMTR-MCNC: 165 MG/DL (ref 70–99)
GLUCOSE BLDC GLUCOMTR-MCNC: 185 MG/DL (ref 70–99)
GLUCOSE BLDC GLUCOMTR-MCNC: 98 MG/DL (ref 70–99)
GLUCOSE SERPL-MCNC: 124 MG/DL (ref 70–99)
HCT VFR BLD AUTO: 26.2 % (ref 40–53)
HGB BLD-MCNC: 8.6 G/DL (ref 13.3–17.7)
IMM GRANULOCYTES # BLD: 0.1 10E3/UL
IMM GRANULOCYTES NFR BLD: 1 %
LYMPHOCYTES # BLD AUTO: 2.1 10E3/UL (ref 0.8–5.3)
LYMPHOCYTES NFR BLD AUTO: 22 %
MCH RBC QN AUTO: 28.1 PG (ref 26.5–33)
MCHC RBC AUTO-ENTMCNC: 32.8 G/DL (ref 31.5–36.5)
MCV RBC AUTO: 86 FL (ref 78–100)
MONOCYTES # BLD AUTO: 0.3 10E3/UL (ref 0–1.3)
MONOCYTES NFR BLD AUTO: 4 %
NEUTROPHILS # BLD AUTO: 6.3 10E3/UL (ref 1.6–8.3)
NEUTROPHILS NFR BLD AUTO: 66 %
NRBC # BLD AUTO: 0 10E3/UL
NRBC BLD AUTO-RTO: 0 /100
PLATELET # BLD AUTO: 472 10E3/UL (ref 150–450)
POTASSIUM SERPL-SCNC: 3.8 MMOL/L (ref 3.4–5.3)
RBC # BLD AUTO: 3.06 10E6/UL (ref 4.4–5.9)
SODIUM SERPL-SCNC: 135 MMOL/L (ref 135–145)
SPECIMEN EXPIRATION DATE: NORMAL
WBC # BLD AUTO: 9.6 10E3/UL (ref 4–11)

## 2024-03-24 PROCEDURE — 258N000003 HC RX IP 258 OP 636: Performed by: HOSPITALIST

## 2024-03-24 PROCEDURE — 250N000011 HC RX IP 250 OP 636: Performed by: HOSPITALIST

## 2024-03-24 PROCEDURE — 86900 BLOOD TYPING SEROLOGIC ABO: CPT | Performed by: STUDENT IN AN ORGANIZED HEALTH CARE EDUCATION/TRAINING PROGRAM

## 2024-03-24 PROCEDURE — 250N000009 HC RX 250: Performed by: STUDENT IN AN ORGANIZED HEALTH CARE EDUCATION/TRAINING PROGRAM

## 2024-03-24 PROCEDURE — 250N000011 HC RX IP 250 OP 636: Performed by: STUDENT IN AN ORGANIZED HEALTH CARE EDUCATION/TRAINING PROGRAM

## 2024-03-24 PROCEDURE — 88311 DECALCIFY TISSUE: CPT | Mod: TC | Performed by: SURGERY

## 2024-03-24 PROCEDURE — 250N000009 HC RX 250: Performed by: SURGERY

## 2024-03-24 PROCEDURE — 120N000001 HC R&B MED SURG/OB

## 2024-03-24 PROCEDURE — 73720 MRI LWR EXTREMITY W/O&W/DYE: CPT | Mod: LT,MG

## 2024-03-24 PROCEDURE — 85025 COMPLETE CBC W/AUTO DIFF WBC: CPT | Performed by: INTERNAL MEDICINE

## 2024-03-24 PROCEDURE — 360N000076 HC SURGERY LEVEL 3, PER MIN: Performed by: SURGERY

## 2024-03-24 PROCEDURE — 88311 DECALCIFY TISSUE: CPT | Mod: 26 | Performed by: PATHOLOGY

## 2024-03-24 PROCEDURE — A9585 GADOBUTROL INJECTION: HCPCS | Performed by: INTERNAL MEDICINE

## 2024-03-24 PROCEDURE — 80048 BASIC METABOLIC PNL TOTAL CA: CPT | Performed by: INTERNAL MEDICINE

## 2024-03-24 PROCEDURE — 0Y6H0Z3 DETACHMENT AT RIGHT LOWER LEG, LOW, OPEN APPROACH: ICD-10-PCS | Performed by: SURGERY

## 2024-03-24 PROCEDURE — 272N000001 HC OR GENERAL SUPPLY STERILE: Performed by: SURGERY

## 2024-03-24 PROCEDURE — 88307 TISSUE EXAM BY PATHOLOGIST: CPT | Mod: 26 | Performed by: PATHOLOGY

## 2024-03-24 PROCEDURE — 250N000011 HC RX IP 250 OP 636: Performed by: ANESTHESIOLOGY

## 2024-03-24 PROCEDURE — 255N000002 HC RX 255 OP 636: Performed by: INTERNAL MEDICINE

## 2024-03-24 PROCEDURE — 27882 AMPUTATION OF LOWER LEG: CPT | Mod: RT | Performed by: SURGERY

## 2024-03-24 PROCEDURE — 99232 SBSQ HOSP IP/OBS MODERATE 35: CPT | Performed by: INTERNAL MEDICINE

## 2024-03-24 PROCEDURE — 250N000013 HC RX MED GY IP 250 OP 250 PS 637: Performed by: STUDENT IN AN ORGANIZED HEALTH CARE EDUCATION/TRAINING PROGRAM

## 2024-03-24 PROCEDURE — 370N000017 HC ANESTHESIA TECHNICAL FEE, PER MIN: Performed by: SURGERY

## 2024-03-24 PROCEDURE — 999N000141 HC STATISTIC PRE-PROCEDURE NURSING ASSESSMENT: Performed by: SURGERY

## 2024-03-24 PROCEDURE — 36415 COLL VENOUS BLD VENIPUNCTURE: CPT | Performed by: INTERNAL MEDICINE

## 2024-03-24 PROCEDURE — 272N000004 HC RX 272: Performed by: SURGERY

## 2024-03-24 RX ORDER — CEFAZOLIN SODIUM/WATER 2 G/20 ML
2 SYRINGE (ML) INTRAVENOUS
Status: COMPLETED | OUTPATIENT
Start: 2024-03-24 | End: 2024-03-24

## 2024-03-24 RX ORDER — GADOBUTROL 604.72 MG/ML
7 INJECTION INTRAVENOUS ONCE
Status: COMPLETED | OUTPATIENT
Start: 2024-03-24 | End: 2024-03-24

## 2024-03-24 RX ORDER — FENTANYL CITRATE 50 UG/ML
25-100 INJECTION, SOLUTION INTRAMUSCULAR; INTRAVENOUS
Status: DISCONTINUED | OUTPATIENT
Start: 2024-03-24 | End: 2024-03-24 | Stop reason: HOSPADM

## 2024-03-24 RX ORDER — ONDANSETRON 4 MG/1
4 TABLET, ORALLY DISINTEGRATING ORAL EVERY 30 MIN PRN
Status: CANCELLED | OUTPATIENT
Start: 2024-03-24

## 2024-03-24 RX ORDER — LIDOCAINE HYDROCHLORIDE 10 MG/ML
INJECTION, SOLUTION INFILTRATION; PERINEURAL PRN
Status: DISCONTINUED | OUTPATIENT
Start: 2024-03-24 | End: 2024-03-24

## 2024-03-24 RX ORDER — MAGNESIUM HYDROXIDE 1200 MG/15ML
LIQUID ORAL PRN
Status: DISCONTINUED | OUTPATIENT
Start: 2024-03-24 | End: 2024-03-28 | Stop reason: HOSPADM

## 2024-03-24 RX ORDER — NALOXONE HYDROCHLORIDE 0.4 MG/ML
0.4 INJECTION, SOLUTION INTRAMUSCULAR; INTRAVENOUS; SUBCUTANEOUS
Status: DISCONTINUED | OUTPATIENT
Start: 2024-03-24 | End: 2024-03-28 | Stop reason: HOSPADM

## 2024-03-24 RX ORDER — PROPOFOL 10 MG/ML
INJECTION, EMULSION INTRAVENOUS CONTINUOUS PRN
Status: DISCONTINUED | OUTPATIENT
Start: 2024-03-24 | End: 2024-03-24

## 2024-03-24 RX ORDER — BUPIVACAINE HYDROCHLORIDE 5 MG/ML
INJECTION, SOLUTION EPIDURAL; INTRACAUDAL
Status: COMPLETED | OUTPATIENT
Start: 2024-03-24 | End: 2024-03-24

## 2024-03-24 RX ORDER — FENTANYL CITRATE 50 UG/ML
50 INJECTION, SOLUTION INTRAMUSCULAR; INTRAVENOUS EVERY 5 MIN PRN
Status: CANCELLED | OUTPATIENT
Start: 2024-03-24

## 2024-03-24 RX ORDER — NALOXONE HYDROCHLORIDE 0.4 MG/ML
0.2 INJECTION, SOLUTION INTRAMUSCULAR; INTRAVENOUS; SUBCUTANEOUS
Status: DISCONTINUED | OUTPATIENT
Start: 2024-03-24 | End: 2024-03-28 | Stop reason: HOSPADM

## 2024-03-24 RX ORDER — NALOXONE HYDROCHLORIDE 1 MG/ML
0.1 INJECTION INTRAMUSCULAR; INTRAVENOUS; SUBCUTANEOUS
Status: CANCELLED | OUTPATIENT
Start: 2024-03-24

## 2024-03-24 RX ORDER — FENTANYL CITRATE 50 UG/ML
100 INJECTION, SOLUTION INTRAMUSCULAR; INTRAVENOUS
Status: DISCONTINUED | OUTPATIENT
Start: 2024-03-24 | End: 2024-03-24 | Stop reason: HOSPADM

## 2024-03-24 RX ORDER — BUPIVACAINE HYDROCHLORIDE 2.5 MG/ML
INJECTION, SOLUTION EPIDURAL; INFILTRATION; INTRACAUDAL
Status: DISCONTINUED | OUTPATIENT
Start: 2024-03-24 | End: 2024-03-24

## 2024-03-24 RX ORDER — OXYCODONE HYDROCHLORIDE 5 MG/1
5 TABLET ORAL EVERY 4 HOURS PRN
Status: DISCONTINUED | OUTPATIENT
Start: 2024-03-24 | End: 2024-03-28 | Stop reason: HOSPADM

## 2024-03-24 RX ORDER — HYDROMORPHONE HYDROCHLORIDE 1 MG/ML
0.3 INJECTION, SOLUTION INTRAMUSCULAR; INTRAVENOUS; SUBCUTANEOUS EVERY 4 HOURS PRN
Status: DISCONTINUED | OUTPATIENT
Start: 2024-03-24 | End: 2024-03-28 | Stop reason: HOSPADM

## 2024-03-24 RX ORDER — FENTANYL CITRATE 50 UG/ML
25 INJECTION, SOLUTION INTRAMUSCULAR; INTRAVENOUS EVERY 5 MIN PRN
Status: CANCELLED | OUTPATIENT
Start: 2024-03-24

## 2024-03-24 RX ORDER — CEFAZOLIN SODIUM/WATER 2 G/20 ML
2 SYRINGE (ML) INTRAVENOUS SEE ADMIN INSTRUCTIONS
Status: DISCONTINUED | OUTPATIENT
Start: 2024-03-24 | End: 2024-03-24 | Stop reason: HOSPADM

## 2024-03-24 RX ORDER — SODIUM CHLORIDE, SODIUM LACTATE, POTASSIUM CHLORIDE, CALCIUM CHLORIDE 600; 310; 30; 20 MG/100ML; MG/100ML; MG/100ML; MG/100ML
INJECTION, SOLUTION INTRAVENOUS CONTINUOUS
Status: DISCONTINUED | OUTPATIENT
Start: 2024-03-24 | End: 2024-03-24 | Stop reason: HOSPADM

## 2024-03-24 RX ORDER — ONDANSETRON 2 MG/ML
4 INJECTION INTRAMUSCULAR; INTRAVENOUS EVERY 30 MIN PRN
Status: CANCELLED | OUTPATIENT
Start: 2024-03-24

## 2024-03-24 RX ORDER — SODIUM CHLORIDE, SODIUM LACTATE, POTASSIUM CHLORIDE, CALCIUM CHLORIDE 600; 310; 30; 20 MG/100ML; MG/100ML; MG/100ML; MG/100ML
INJECTION, SOLUTION INTRAVENOUS CONTINUOUS
Status: CANCELLED | OUTPATIENT
Start: 2024-03-24

## 2024-03-24 RX ORDER — OXYCODONE HYDROCHLORIDE 5 MG/1
10 TABLET ORAL EVERY 4 HOURS PRN
Status: DISCONTINUED | OUTPATIENT
Start: 2024-03-24 | End: 2024-03-28 | Stop reason: HOSPADM

## 2024-03-24 RX ADMIN — Medication 2 G: at 10:50

## 2024-03-24 RX ADMIN — FENTANYL CITRATE 50 MCG: 50 INJECTION, SOLUTION INTRAMUSCULAR; INTRAVENOUS at 10:15

## 2024-03-24 RX ADMIN — PIPERACILLIN AND TAZOBACTAM 3.38 G: 3; .375 INJECTION, POWDER, FOR SOLUTION INTRAVENOUS at 14:32

## 2024-03-24 RX ADMIN — SODIUM CHLORIDE: 9 INJECTION, SOLUTION INTRAVENOUS at 23:50

## 2024-03-24 RX ADMIN — BUPIVACAINE HYDROCHLORIDE 15 ML: 5 INJECTION, SOLUTION EPIDURAL; INTRACAUDAL at 10:15

## 2024-03-24 RX ADMIN — BUPIVACAINE HYDROCHLORIDE 30 ML: 5 INJECTION, SOLUTION EPIDURAL; INTRACAUDAL; PERINEURAL at 10:12

## 2024-03-24 RX ADMIN — PROPOFOL 100 MCG/KG/MIN: 10 INJECTION, EMULSION INTRAVENOUS at 10:40

## 2024-03-24 RX ADMIN — MIDAZOLAM HYDROCHLORIDE 1 MG: 1 INJECTION, SOLUTION INTRAMUSCULAR; INTRAVENOUS at 10:15

## 2024-03-24 RX ADMIN — LIDOCAINE HYDROCHLORIDE 30 ML: 10 INJECTION, SOLUTION INFILTRATION; PERINEURAL at 10:40

## 2024-03-24 RX ADMIN — PIPERACILLIN AND TAZOBACTAM 3.38 G: 3; .375 INJECTION, POWDER, FOR SOLUTION INTRAVENOUS at 06:02

## 2024-03-24 RX ADMIN — GADOBUTROL 7 ML: 604.72 INJECTION INTRAVENOUS at 18:09

## 2024-03-24 RX ADMIN — SODIUM CHLORIDE: 9 INJECTION, SOLUTION INTRAVENOUS at 04:56

## 2024-03-24 RX ADMIN — ATORVASTATIN CALCIUM 80 MG: 40 TABLET, FILM COATED ORAL at 21:37

## 2024-03-24 ASSESSMENT — ACTIVITIES OF DAILY LIVING (ADL)
ADLS_ACUITY_SCORE: 26
ADLS_ACUITY_SCORE: 27
ADLS_ACUITY_SCORE: 26

## 2024-03-24 ASSESSMENT — LIFESTYLE VARIABLES: TOBACCO_USE: 1

## 2024-03-24 NOTE — PLAN OF CARE
"  Problem: Wound  Goal: Optimal Functional Ability  Intervention: Optimize Functional Ability  Recent Flowsheet Documentation  Taken 3/23/2024 1615 by Ivania Brown, RN  Activity Management: activity adjusted per tolerance   Goal Outcome Evaluation:        Problem: Skin Injury Risk Increased  Goal: Skin Health and Integrity  Intervention: Optimize Skin Protection  Recent Flowsheet Documentation  Taken 3/23/2024 1615 by Ivania Brown, RN  Activity Management: activity adjusted per tolerance  Head of Bed (HOB) Positioning: HOB at 20 degrees      Patient alert and oriented x 4.  Withdrawn and quiet.  Has denied pain/discomfort all evening.  No pain med requested.  Left foot dressing remains clean,dry and intact.  I.V. fluids and antibiotics infusing.  Good appetite.  Ate 100% of dinner.  Blood glucose - 149 and 215.  Per Nursing assistant Patient attempted to leave unit and was stopped. Patient did not resist.  NPO after midnight  for procedure.  Patient aware of NPO status.    Blood pressure 129/64, pulse 71, temperature 98.3  F (36.8  C), temperature source Oral, resp. rate 18, height 1.803 m (5' 11\"), weight 72.4 kg (159 lb 11.2 oz), SpO2 99%.        "

## 2024-03-24 NOTE — ANESTHESIA POSTPROCEDURE EVALUATION
Patient: Dario Benavides    Procedure: Procedure(s):  Guillotine right below knee amputation       Anesthesia Type:  General    Note:  Disposition: Inpatient   Postop Pain Control: Uneventful            Sign Out: Well controlled pain   PONV: No   Neuro/Psych: Uneventful            Sign Out: Acceptable/Baseline neuro status   Airway/Respiratory: Uneventful            Sign Out: Acceptable/Baseline resp. status   CV/Hemodynamics: Uneventful            Sign Out: Acceptable CV status; No obvious hypovolemia; No obvious fluid overload   Other NRE: NONE   DID A NON-ROUTINE EVENT OCCUR? No           Last vitals:  Vitals:    03/24/24 1022 03/24/24 1028 03/24/24 1130   BP: 117/57 113/56 (!) 151/70   Pulse: 62 62 53   Resp: 14 16 16   Temp:   36.1  C (97  F)   SpO2:   100%       Electronically Signed By: Quoc Meng MD  March 24, 2024  11:35 AM

## 2024-03-24 NOTE — OP NOTE
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    Postoperative diagnosis  Chronic right foot infection with right calcaneal osteomyelitis.    Surgeon: Dr. Steel    Assistant:   Konrad Vigil MD - vascular surgery resident    Name of the procedure  Right guillotine below-knee amputation    Indication for procedure:  68 year old male with previous right TMA approximately one year ago that initially had trouble healing but eventually healed with additional debridements who is now admitted with poorly controlled type 2 diabetes (hgb A1c 14%) and chronic right heal wound. MRI demonstrated right calcaneal osteomyelitis. The patient elected to forego additional attempts at limb salvage and instead chose to proceed with below knee amputation. As we discussed multiple times over the previous days our plan is to proceed with a staged amputation, the first being a guillotine right below knee amputation and returning for completion below-knee amputation after any residual infection is treated and diabetes is controlled.    Description of procedure:    Patient received a right adductor canal block in the pre-operative area and was then brought to the operating room and transferred to the operating room table in stable condition. Patient was sedated with monitored anesthesia care.  Patient was then positioned in the supine position. The right foot and leg were prepped and draped in the usual sterile fashion. Time out was called.    Right leg was exsanguinated with esmarch wrap and the tourniquet was inflated to 250 mmHg. The right foot was then amputated just proximal to the ankle using a gigle saw. Aterior tibial, posterior  tibial, peroneal arteries, and lesser saphenous vein were suture ligated with 2-0 silk ties. Tourniquet was deflated. Excellent hemostasis was achieved with electrocautery and additional silk ties.    The stump was dressed with a layer of surgicel, xeroform, gauze fluffs, abd pads, kerlix roll, and ace wrap.    Patient tolerated the procedure well without any apparent complications. Sedation was stopped and he was brought to the post op recovery area in stable condition.    Prior to surgery he had instructed me not to call any family members post-op.    Estimated blood loss: 10 ml    Specimens: Right foot and ankle    Complications: None      Dr. Steel was present and scrubbed for the entirety of the procedure.    Konrad Vigil MD

## 2024-03-24 NOTE — ANESTHESIA PROCEDURE NOTES
Sciatic Procedure Note    Pre-Procedure   Staff -        Anesthesiologist:  Quoc Meng MD       Performed By: anesthesiologist       Location: pre-op       Procedure Start/Stop Times: 3/24/2024 10:12 AM and 3/24/2024 10:15 AM       Pre-Anesthestic Checklist: patient identified, IV checked, site marked, risks and benefits discussed, informed consent, monitors and equipment checked, pre-op evaluation, at physician/surgeon's request and post-op pain management  Timeout:       Correct Patient: Yes        Correct Procedure: Yes        Correct Site: Yes        Correct Position: Yes        Correct Laterality: Yes        Site Marked: Yes  Procedure Documentation  Procedure: Sciatic       Diagnosis: R FOOT INFECTION       Laterality: right       Patient Position: supine       Patient Prep/Sterile Barriers: sterile gloves, mask       Skin prep: Chloraprep (popliteal approach).       Needle Type: short bevel and insulated       Needle Gauge: 20.        Needle Length (Inches): 4        Ultrasound guided       1. Ultrasound was used to identify targeted nerve, plexus, vascular marker, or fascial plane and place a needle adjacent to it in real-time.       2. Ultrasound was used to visualize the spread of anesthetic in close proximity to the above referenced structure.       3. A permanent image is entered into the patient's record.       4. The visualized anatomic structures appeared normal.       5. There were no apparent abnormal pathologic findings.    Assessment/Narrative         The placement was negative for: blood aspirated, painful injection and site bleeding       Paresthesias: No.       Bolus given via needle..        Secured via.        Insertion/Infusion Method: Single Shot       Complications: none    Medication(s) Administered   Bupivacaine 0.5% PF (Infiltration) - Infiltration   30 mL - 3/24/2024 10:12:00 AM  Medication Administration Time: 3/24/2024 10:12 AM      FOR [facility] ([facility] [place] ONLY:   Pain  "Team Contact information: please page the Pain Team Via [facility]. Search \"Pain\". During daytime hours, please page the attending first. At night please page the resident first.      "

## 2024-03-24 NOTE — ANESTHESIA PROCEDURE NOTES
Adductor canal Procedure Note    Pre-Procedure   Staff -        Anesthesiologist:  Quoc Meng MD       Performed By: anesthesiologist       Location: pre-op       Procedure Start/Stop Times: 3/24/2024 10:15 AM and 3/24/2024 10:18 AM       Pre-Anesthestic Checklist: patient identified, IV checked, site marked, risks and benefits discussed, informed consent, monitors and equipment checked, pre-op evaluation, at physician/surgeon's request and post-op pain management  Timeout:       Correct Patient: Yes        Correct Procedure: Yes        Correct Site: Yes        Correct Position: Yes        Correct Laterality: Yes        Site Marked: Yes  Procedure Documentation  Procedure: Adductor canal       Diagnosis: R FOOT INFECTION       Laterality: right       Patient Position: supine       Patient Prep/Sterile Barriers: sterile gloves, mask       Skin prep: Chloraprep       Needle Type: short bevel and insulated       Needle Gauge: 20.        Needle Length (Inches): 4        Ultrasound guided       1. Ultrasound was used to identify targeted nerve, plexus, vascular marker, or fascial plane and place a needle adjacent to it in real-time.       2. Ultrasound was used to visualize the spread of anesthetic in close proximity to the above referenced structure.       3. A permanent image is entered into the patient's record.       4. The visualized anatomic structures appeared normal.       5. There were no apparent abnormal pathologic findings.    Assessment/Narrative         The placement was negative for: blood aspirated, painful injection and site bleeding       Paresthesias: No.       Bolus given via needle..        Secured via.        Insertion/Infusion Method: Single Shot       Complications: none    Medication(s) Administered   Bupivacaine 0.5% PF (Infiltration) - Infiltration   15 mL - 3/24/2024 10:15:00 AM  Medication Administration Time: 3/24/2024 10:15 AM      FOR [facility] ([facility] [place]) ONLY:   Pain Team  "Contact information: please page the Pain Team Via [facility]. Search \"Pain\". During daytime hours, please page the attending first. At night please page the resident first.      "

## 2024-03-24 NOTE — PLAN OF CARE
Goal Outcome Evaluation:           Problem: Adult Inpatient Plan of Care  Goal: Optimal Comfort and Wellbeing  Outcome: Progressing   Pt is alert and oriented, up independently in his room. Pt denies pain overnight. NPO for procedure at 10:00.  . Getting iv zosyn. NPO. Pt has been resting when rounded upon. Uneventful shift.  Sadie Storey RN

## 2024-03-24 NOTE — PLAN OF CARE
Problem: Wound  Goal: Optimal Coping  Outcome: Progressing       Problem: Wound  Goal: Absence of Infection Signs and Symptoms  Outcome: Progressing     Problem: Wound  Goal: Skin Health and Integrity  Outcome: Progressing  Intervention: Optimize Skin Protection  Recent Flowsheet Documentation  Taken 3/24/2024 0753 by Chance Hernandez RN  Activity Management: activity adjusted per tolerance  Head of Bed (HOB) Positioning: HOB at 20 degrees     Problem: Adult Inpatient Plan of Care  Goal: Absence of Hospital-Acquired Illness or Injury  Intervention: Identify and Manage Fall Risk  Recent Flowsheet Documentation  Taken 3/24/2024 0753 by Chance Hernandez RN  Safety Promotion/Fall Prevention:   nonskid shoes/slippers when out of bed   patient and family education     Problem: Adult Inpatient Plan of Care  Goal: Absence of Hospital-Acquired Illness or Injury  Intervention: Prevent Infection  Recent Flowsheet Documentation  Taken 3/24/2024 0753 by Chance Hernandez RN  Infection Prevention:   hand hygiene promoted   rest/sleep promoted     Problem: Skin Injury Risk Increased  Goal: Skin Health and Integrity  Intervention: Plan: Nurse Driven Intervention: Moisture Management  Recent Flowsheet Documentation  Taken 3/24/2024 0753 by Chance Hernandez RN  Moisture Interventions: Encourage regular toileting  Bathing/Skin Care: wipes, CHG      Goal Outcome Evaluation:       Pt A&Ox4. Vitals stable on RA. CHG bath done. Did not take morning meds due to procedure. Right ankle amputation surgery done today, Ace wrap dressing clean, dry, and intact. Left toe ulcer with mepliex. R PIV with NS running at 75 mL/hr. Pt got up to use urinal, voided 150 mL, triggering bed alarm. Pt on consistent carb diet, slowly advance diet.  Chance Hernandez, BAY on 3/24/2024 at 2:49 PM

## 2024-03-24 NOTE — ANESTHESIA CARE TRANSFER NOTE
Patient: Dario Benavides    Procedure: Procedure(s):  Guillotine right below knee amputation       Diagnosis: Ulcer of right foot with necrosis of bone (H) [L97.514]  Diagnosis Additional Information: No value filed.    Anesthesia Type:   General     Note:    Oropharynx: oropharynx clear of all foreign objects  Level of Consciousness: awake  Oxygen Supplementation: nasal cannula    Independent Airway: airway patency satisfactory and stable  Dentition: dentition unchanged  Vital Signs Stable: post-procedure vital signs reviewed and stable  Report to RN Given: handoff report given  Patient transferred to: PACU  Comments: Patient transferred to [facility].  SBAR report given to RN on floor per institutional policy and procedure.  Taken to PACU on O2 at 2L via NC.  Transport called.  Transfer of care.  Handoff Report: Identifed the Patient, Identified the Reponsible Provider, Reviewed the pertinent medical history, Discussed the surgical course, Reviewed Intra-OP anesthesia mangement and issues during anesthesia, Set expectations for post-procedure period and Allowed opportunity for questions and acknowledgement of understanding      Vitals:  Vitals Value Taken Time   BP     Temp     Pulse     Resp     SpO2         Electronically Signed By: KASEY MONTEIRO CRNA  March 24, 2024  11:20 AM

## 2024-03-24 NOTE — ANESTHESIA PREPROCEDURE EVALUATION
Anesthesia Pre-Procedure Evaluation    Patient: Dario Benavides   MRN: [id number] : 1955        Procedure : Procedure(s):   Amputation,  right foot (Right: Foot)           Past Medical History:   Diagnosis Date    Hypertension     Non compliance with medical treatment 2021    Stage 3b chronic kidney disease (H) 2021    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) 2021    Tobacco abuse 2019    Type 2 diabetes mellitus with right diabetic foot infection (H) 2021      Past Surgical History:   Procedure Laterality Date    BACK SURGERY  1992    CLOSE SECONDARY WOUND LOWER EXTREMITY Right 2023    Procedure: Delayed primary closure of wound right foot;  Surgeon: Brandon Nieto DPM;  Location: [facility] OR    IRRIGATION AND DEBRIDEMENT FOOT, COMBINED Right 2023    Procedure: IRRIGATION AND DEBRIDEMENT right foot with removing all infected bones and soft tissue;  Surgeon: Jun Goyal DPM;  Location: [facility] OR    NECK SURGERY        No Known Allergies   Social History     Tobacco Use    Smoking status: Former     Packs/day: 3.00     Years: 45.00     Additional pack years: 0.00     Total pack years: 135.00     Types: Cigarettes     Quit date: 2023     Years since quittin.5    Smokeless tobacco: Never    Tobacco comments:     Seen IP by CTTS on 8/15/23 - down to 1 pack/week, declined cessation services and materials   Substance Use Topics    Alcohol use: Not Currently      Wt Readings from Last 1 Encounters:   24 72.4 kg (159 lb 11.2 oz)        Anesthesia Evaluation   Pt has had prior anesthetic.         ROS/MED HX  ENT/Pulmonary:  - neg pulmonary ROS   (+)                tobacco use, Current use,                       Neurologic:  - neg neurologic ROS     Cardiovascular:  - neg cardiovascular ROS   (+)  hypertension- -   -  - -                                      METS/Exercise Tolerance: >4 METS    Hematologic:  - neg  "hematologic  ROS     Musculoskeletal:  - neg musculoskeletal ROS     GI/Hepatic:  - neg GI/hepatic ROS     Renal/Genitourinary:  - neg Renal ROS   (+) renal disease, type: CRI and ARF, Pt does not require dialysis,           Endo:  - neg endo ROS   (+)  type II DM, Last HgA1c: 11,  Using insulin,                 Psychiatric/Substance Use:  - neg psychiatric ROS     Infectious Disease: Comment: Osteomyelitis of foot - neg infectious disease ROS   (+) Recent Fever,           Malignancy:  - neg malignancy ROS     Other:  - neg other ROS          Physical Exam    Airway        Mallampati: II   TM distance: > 3 FB   Neck ROM: full   Mouth opening: > 3 cm    Respiratory Devices and Support         Dental       (+) Edentulous      Cardiovascular   cardiovascular exam normal          Pulmonary   pulmonary exam normal                OUTSIDE LABS:  CBC:   Lab Results   Component Value Date    WBC 9.6 03/24/2024    WBC 12.7 (H) 03/23/2024    HGB 8.6 (L) 03/24/2024    HGB 10.3 (L) 03/23/2024    HCT 26.2 (L) 03/24/2024    HCT 31.0 (L) 03/23/2024     (H) 03/24/2024     (H) 03/23/2024     BMP:   Lab Results   Component Value Date     03/24/2024     (L) 03/23/2024    POTASSIUM 3.8 03/24/2024    POTASSIUM 3.9 03/23/2024    CHLORIDE 104 03/24/2024    CHLORIDE 98 03/23/2024    CO2 22 03/24/2024    CO2 21 (L) 03/23/2024    BUN 27.1 (H) 03/24/2024    BUN 41.7 (H) 03/23/2024    CR 1.52 (H) 03/24/2024    CR 1.65 (H) 03/23/2024     (H) 03/24/2024     (H) 03/24/2024     COAGS: No results found for: \"PTT\", \"INR\", \"FIBR\"  POC: No results found for: \"BGM\", \"HCG\", \"HCGS\"  HEPATIC:   Lab Results   Component Value Date    ALBUMIN 3.1 (L) 03/22/2024    PROTTOTAL 8.3 03/22/2024    ALT 21 03/22/2024    AST 25 03/22/2024    ALKPHOS 270 (H) 03/22/2024    BILITOTAL 0.3 03/22/2024     OTHER:   Lab Results   Component Value Date    LACT 1.2 03/22/2024    A1C 14.2 (H) 03/22/2024    JOSHUA 8.4 (L) 03/24/2024    MAG 2.4 " (H) 03/22/2024    LIPASE 18 08/11/2023    CRP 0.6 06/04/2021    SED 84 (H) 08/11/2023       Anesthesia Plan    ASA Status:  3    NPO Status:  NPO Appropriate    Anesthesia Type: General.     - Airway: Mask Only   Induction: Intravenous.   Maintenance: TIVA.        Consents    Anesthesia Plan(s) and associated risks, benefits, and realistic alternatives discussed. Questions answered and patient/representative(s) expressed understanding.     - Discussed: Risks, Benefits and Alternatives for BOTH SEDATION and the PROCEDURE were discussed     - Discussed with:  Patient      - Extended Intubation/Ventilatory Support Discussed: No.      - Patient is DNR/DNI Status: Yes             Suspend during perioperative period? Yes (Patient is okay with intubation as part of anesthetic, but would not like defibrillation/chest compressions as part of any required resuscitation. He is okay with chemical resuscitation PRN.).    Use of blood products discussed: No .     Postoperative Care    Pain management: Peripheral nerve block (Single Shot), Oral pain medications, Multi-modal analgesia.   PONV prophylaxis: Ondansetron (or other 5HT-3), Background Propofol Infusion     Comments:    Other Comments: GA with mask/TIVA  Pop/saph blocks for postop pain control per surgeon request              Quoc Meng MD

## 2024-03-24 NOTE — PROGRESS NOTES
[facility]    Medicine Progress Note - Hospitalist Service    Date of Admission:  3/22/2024    Assessment & Plan   Dario Benavides is a 68 year old male admitted on 3/22/2024 for right foot osteomyelitis. He has past medical history of poorly controlled diabetes with Charcot foot status post transmetatarsal amputation of the right foot, history of osteomyelitis, diabetic foot ulcer, CKD stage III presented with worsening right heel ulcer.     Sepsis 2/2 R foot osteomyelitis s/p BKA 3/24  MRI right foot showed osteomyelitis of the anterior calcaneus.   -s/p right BKA 3/24 with vascular surgery   -Has been on Zosyn and vancomycin.  Per ID curbside, antibiotics could be discontinued postop. Stopped on 3/24 after surgery  - For pain, tylenol and oxycodone 5-10 mg prn and 0.3mg dilaudid for breakthrough     Diabetes mellitus with hyperglycemia   A1c on admission 14.2 and glucose was over 600. Sister and daughter report patient nonadherence to diabetes diet, not checking glucose or use of insulin over a year.  I discussed with patient and family extensively diabetes management which is likely the root cause of his ongoing foot osteomyelitis.   -Lantus reduced to 10u 3/23 given NPO with surgery. Will need adjustment and optimization   -Sliding scale insulin   -Will need outpatient diabetes education and management    Hyponatremia - likely pseudohyponatremia in setting of hyperglycemia  -Monitor with correction of BS    SHANITA on CKD stage 3, resolved  -Baseline creat round 2, noted to be 2.27 on admission. Cr improved to 1.5.     Chronic anemia  Hb around 10.5, likely due to ACD with ongoing infection and DM. Will hold off iron studies given ferritin will likely elevated in the setting of infection.   - Continue to monitor         Diet:      DVT Prophylaxis: VTE Prophylaxis contraindicated due to planned surgery 3/24.   Odell Catheter: Not present  Lines: None     Cardiac Monitoring: None  Code  Status: No CPR- Do NOT Intubate      Clinically Significant Risk Factors         # Hyponatremia: Lowest Na = 118 mmol/L in last 2 days, will monitor as appropriate      # Hypoalbuminemia: Lowest albumin = 3.1 g/dL at 3/22/2024  2:45 PM, will monitor as appropriate     # Hypertension: Noted on problem list       # DMII: A1C = 14.2 % (Ref range: <5.7 %) within past 6 months              Disposition Plan     Expected Discharge Date: 03/27/2024      Destination: home with family;home with help/services              Helen Dwyer MD  Hospitalist Service  [facility]  Securely message with [employer] (more info)  Text page via [employer] Paging/Directory   ______________________________________________________________________    Interval History   No acute events overnight.  Patient had BKA without complications. No pain yet.     Physical Exam   Vital Signs: Temp: 97.7  F (36.5  C) Temp src: Axillary BP: (!) 164/76 Pulse: 59   Resp: 16 SpO2: 98 % O2 Device: None (Room air)    Weight: 159 lbs 11.2 oz    General: Pleasant, drowsy   HEENT:EOMI, AT,NC  CVS:RRR, no edema  RS:CTAB  Abd: Soft, NT,ND  Neurology:Grossly normal  Ext: R foot wrapped   Psy:Approrpiate affect    Medical Decision Making       60 MINUTES SPENT BY ME on the date of service doing chart review, history, exam, documentation & further activities per the note.      Data     I have personally reviewed the following data over the past 24 hrs:    9.6  \   8.6 (L)   / 472 (H)     135 104 27.1 (H) /  117 (H)   3.8 22 1.52 (H) \

## 2024-03-25 ENCOUNTER — APPOINTMENT (OUTPATIENT)
Dept: OCCUPATIONAL THERAPY | Facility: HOSPITAL | Age: 69
DRG: 854 | End: 2024-03-25
Attending: HOSPITALIST
Payer: MEDICARE

## 2024-03-25 ENCOUNTER — APPOINTMENT (OUTPATIENT)
Dept: PHYSICAL THERAPY | Facility: HOSPITAL | Age: 69
DRG: 854 | End: 2024-03-25
Attending: HOSPITALIST
Payer: MEDICARE

## 2024-03-25 PROBLEM — A41.9 SEPSIS (H): Status: ACTIVE | Noted: 2024-03-25

## 2024-03-25 PROBLEM — Z91.199 NON COMPLIANCE WITH MEDICAL TREATMENT: Status: ACTIVE | Noted: 2021-05-04

## 2024-03-25 PROBLEM — E11.628 TYPE 2 DIABETES MELLITUS WITH RIGHT DIABETIC FOOT INFECTION (H): Status: ACTIVE | Noted: 2021-05-04

## 2024-03-25 PROBLEM — D63.8 ANEMIA IN OTHER CHRONIC DISEASES CLASSIFIED ELSEWHERE: Status: ACTIVE | Noted: 2024-03-25

## 2024-03-25 PROBLEM — N18.32 STAGE 3B CHRONIC KIDNEY DISEASE (H): Status: ACTIVE | Noted: 2021-05-04

## 2024-03-25 PROBLEM — L08.9 TYPE 2 DIABETES MELLITUS WITH RIGHT DIABETIC FOOT INFECTION (H): Status: ACTIVE | Noted: 2021-05-04

## 2024-03-25 LAB
ANION GAP SERPL CALCULATED.3IONS-SCNC: 9 MMOL/L (ref 7–15)
BASOPHILS # BLD AUTO: 0.1 10E3/UL (ref 0–0.2)
BASOPHILS NFR BLD AUTO: 1 %
BUN SERPL-MCNC: 16.8 MG/DL (ref 8–23)
CALCIUM SERPL-MCNC: 8.4 MG/DL (ref 8.8–10.2)
CHLORIDE SERPL-SCNC: 108 MMOL/L (ref 98–107)
CREAT SERPL-MCNC: 1.27 MG/DL (ref 0.67–1.17)
DEPRECATED HCO3 PLAS-SCNC: 21 MMOL/L (ref 22–29)
EGFRCR SERPLBLD CKD-EPI 2021: 62 ML/MIN/1.73M2
EOSINOPHIL # BLD AUTO: 0.5 10E3/UL (ref 0–0.7)
EOSINOPHIL NFR BLD AUTO: 6 %
ERYTHROCYTE [DISTWIDTH] IN BLOOD BY AUTOMATED COUNT: 13.4 % (ref 10–15)
GLUCOSE BLDC GLUCOMTR-MCNC: 134 MG/DL (ref 70–99)
GLUCOSE BLDC GLUCOMTR-MCNC: 187 MG/DL (ref 70–99)
GLUCOSE BLDC GLUCOMTR-MCNC: 288 MG/DL (ref 70–99)
GLUCOSE BLDC GLUCOMTR-MCNC: 290 MG/DL (ref 70–99)
GLUCOSE SERPL-MCNC: 139 MG/DL (ref 70–99)
HCT VFR BLD AUTO: 29.1 % (ref 40–53)
HGB BLD-MCNC: 9.3 G/DL (ref 13.3–17.7)
IMM GRANULOCYTES # BLD: 0.2 10E3/UL
IMM GRANULOCYTES NFR BLD: 3 %
LYMPHOCYTES # BLD AUTO: 1.9 10E3/UL (ref 0.8–5.3)
LYMPHOCYTES NFR BLD AUTO: 23 %
MCH RBC QN AUTO: 27.7 PG (ref 26.5–33)
MCHC RBC AUTO-ENTMCNC: 32 G/DL (ref 31.5–36.5)
MCV RBC AUTO: 87 FL (ref 78–100)
MONOCYTES # BLD AUTO: 0.4 10E3/UL (ref 0–1.3)
MONOCYTES NFR BLD AUTO: 5 %
NEUTROPHILS # BLD AUTO: 5.1 10E3/UL (ref 1.6–8.3)
NEUTROPHILS NFR BLD AUTO: 62 %
NRBC # BLD AUTO: 0 10E3/UL
NRBC BLD AUTO-RTO: 0 /100
PLATELET # BLD AUTO: 496 10E3/UL (ref 150–450)
POTASSIUM SERPL-SCNC: 4 MMOL/L (ref 3.4–5.3)
RBC # BLD AUTO: 3.36 10E6/UL (ref 4.4–5.9)
SODIUM SERPL-SCNC: 138 MMOL/L (ref 135–145)
WBC # BLD AUTO: 8.1 10E3/UL (ref 4–11)

## 2024-03-25 PROCEDURE — 99232 SBSQ HOSP IP/OBS MODERATE 35: CPT | Performed by: INTERNAL MEDICINE

## 2024-03-25 PROCEDURE — 250N000011 HC RX IP 250 OP 636

## 2024-03-25 PROCEDURE — 97166 OT EVAL MOD COMPLEX 45 MIN: CPT | Mod: GO

## 2024-03-25 PROCEDURE — 11042 DBRDMT SUBQ TIS 1ST 20SQCM/<: CPT | Performed by: PODIATRIST

## 2024-03-25 PROCEDURE — 99253 IP/OBS CNSLTJ NEW/EST LOW 45: CPT | Mod: 25 | Performed by: PODIATRIST

## 2024-03-25 PROCEDURE — 36415 COLL VENOUS BLD VENIPUNCTURE: CPT | Performed by: INTERNAL MEDICINE

## 2024-03-25 PROCEDURE — 999N000197 HC STATISTIC WOC PT EDUCATION, 0-15 MIN

## 2024-03-25 PROCEDURE — 250N000013 HC RX MED GY IP 250 OP 250 PS 637: Performed by: STUDENT IN AN ORGANIZED HEALTH CARE EDUCATION/TRAINING PROGRAM

## 2024-03-25 PROCEDURE — 97162 PT EVAL MOD COMPLEX 30 MIN: CPT | Mod: GP | Performed by: PHYSICAL THERAPIST

## 2024-03-25 PROCEDURE — 120N000001 HC R&B MED SURG/OB

## 2024-03-25 PROCEDURE — 80048 BASIC METABOLIC PNL TOTAL CA: CPT | Performed by: INTERNAL MEDICINE

## 2024-03-25 PROCEDURE — 85025 COMPLETE CBC W/AUTO DIFF WBC: CPT | Performed by: INTERNAL MEDICINE

## 2024-03-25 PROCEDURE — 250N000013 HC RX MED GY IP 250 OP 250 PS 637: Performed by: HOSPITALIST

## 2024-03-25 RX ADMIN — HEPARIN SODIUM 5000 UNITS: 5000 INJECTION, SOLUTION INTRAVENOUS; SUBCUTANEOUS at 21:56

## 2024-03-25 RX ADMIN — ASPIRIN 81 MG: 81 TABLET, COATED ORAL at 09:41

## 2024-03-25 RX ADMIN — ATORVASTATIN CALCIUM 80 MG: 40 TABLET, FILM COATED ORAL at 21:56

## 2024-03-25 RX ADMIN — ACETAMINOPHEN 650 MG: 325 TABLET ORAL at 09:41

## 2024-03-25 RX ADMIN — HEPARIN SODIUM 5000 UNITS: 5000 INJECTION, SOLUTION INTRAVENOUS; SUBCUTANEOUS at 13:57

## 2024-03-25 RX ADMIN — AMLODIPINE BESYLATE 2.5 MG: 2.5 TABLET ORAL at 09:41

## 2024-03-25 ASSESSMENT — ACTIVITIES OF DAILY LIVING (ADL)
ADLS_ACUITY_SCORE: 26
ADLS_ACUITY_SCORE: 27
ADLS_ACUITY_SCORE: 27
ADLS_ACUITY_SCORE: 26
ADLS_ACUITY_SCORE: 26
ADLS_ACUITY_SCORE: 27
ADLS_ACUITY_SCORE: 27
ADLS_ACUITY_SCORE: 26
ADLS_ACUITY_SCORE: 27
ADLS_ACUITY_SCORE: 26
ADLS_ACUITY_SCORE: 26
ADLS_ACUITY_SCORE: 27
ADLS_ACUITY_SCORE: 26
ADLS_ACUITY_SCORE: 26

## 2024-03-25 NOTE — CONSULTS
Consultation - Foot and Ankle/Podiatry  Dario KERRY Benavides,  1955, MRN [id number]    Admitting Dx: Hyponatremia [E87.1]  Hyperglycemia [R73.9]  Ulcer of right foot, unspecified ulcer stage (H) [L97.519]    PCP: No Ref-Primary, Physician, None   Code status:  No CPR- Do NOT Intubate       Extended Emergency Contact Information  Primary Emergency Contact: Timothy Benavides  Mobile Phone: [phone]  Relation: Daughter  Secondary Emergency Contact: ZACHARIAH BENAVIDES  Mobile Phone: [phone]  Relation: Son       ASSESSMENT   Diabetic ulceration left foot into subcutaneous tissue   Principal Problem:    Ulcer of right foot, unspecified ulcer stage (H)  Active Problems:    Hypertension    Hyponatremia    Non compliance with medical treatment    Stage 3b chronic kidney disease (H)    Type 2 diabetes mellitus with right diabetic foot infection (H)    Hyperglycemia    Sepsis (H)    Anemia in other chronic diseases classified elsewhere       PLAN   -I discussed with the patient that after removal of nonviable tissue along the plantar first MPJ on the left foot there is a full-thickness ulceration, no signs of infection.  WBC 8.1.  Afebrile.    -No surgical intervention planned at this time. I discussed the principles of wound healing today including the importance of nonweightbearing on the involved limb, good vascular perfusion, good glycemic control and the absence of infection.     -Sharp, excisional debridement ulceration plantar first MPJ left foot with a #15 blade into subcutaneous tissues debriding 0.64 cm.  Patient tolerated the procedure well.  Gauze dressing applied today.    -I will asked the [facility] nurse to manage the left foot ulceration while in house and discussed plans for the patient to follow-up with me for outpatient management of the left foot ulceration upon discharge.    -Medical management per hospitalist/vascular surgery.  I would like to see the patient within the next 2 to 3 weeks for  outpatient management of the left foot ulceration.    Thank you for the consultation. I will sign off at this time.  Please contact me with questions    Helio Sage DPM  [facility]/Foot & Ankle Surgery  On-Call: [phone]  ______________________________________________________________________        Reason For Consult: Ulcer of right foot, unspecified ulcer stage (H)  Diabetic ulceration left foot into subcutaneous tissue       HPI    I have been requested by vascular surgery to evaluate Dario Benavides who is a 68 year old year old male for the above.  Patient is status post right BKA and on exam he was determined to have nonviable tissue along the plantar left foot.  Patient states that he does not know of any previous left foot ulceration at this location.  Past medical history significant for DM2, CKD.         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   Social History  Reviewed, and he  reports that he quit smoking about 6 months ago. His smoking use included cigarettes. He has a 135 pack-year smoking history. He has never used smokeless tobacco. He reports that he does not currently use alcohol. He reports current drug use. Drug: Marijuana.   Allergies  No Known Allergies Family History  family history is not on file.  family history not pertinent to presenting problem.    Psychosocial Needs  Social  "History     Social History Narrative    Not on file     Additional psychosocial needs reviewed per nursing assessment.       Prior to Admission Medications   Medications Prior to Admission   Medication Sig Dispense Refill Last Dose    insulin glargine (LANTUS PEN) 100 UNIT/ML pen Inject 6 Units Subcutaneous daily as needed for other   Unknown              Review of Systems:  All other systems negative in detail except what is noted in HPI.  Physical Exam:  Temp:  [97.3  F (36.3  C)-98.5  F (36.9  C)] 98.5  F (36.9  C)  Pulse:  [58-70] 62  Resp:  [16-18] 16  BP: (132-176)/(63-79) 162/76  SpO2:  [97 %-98 %] 97 %    General appearance: Patient is alert and fully cooperative with history & exam.  No sign of distress is noted during the visit.  Psychiatric: Affect is pleasant & appropriate.  Patient appears motivated to improve health.  Respiratory: Breathing is regular & unlabored while sitting.  HEENT: Hearing is intact to spoken word.  Speech is clear.  No gross evidence of visual impairment that would impact ambulation.    Vascular: Dorsalis pedis and posterior tibial pulses are nonpalpable left foot.  Dermatologic: After debridement of nonviable tissue plantar first MPJ left foot there is a full-thickness ulceration measuring 1 x 0.6 x 0.2 cm.  No erythema left foot.  Neurologic: Diminished light touch left foot.  Musculoskeletal: Right BKA.     BP (!) 162/76   Pulse 62   Temp 98.5  F (36.9  C) (Oral)   Resp 16   Ht 1.803 m (5' 11\")   Wt 72.4 kg (159 lb 11.2 oz)   SpO2 97%   BMI 22.27 kg/m      BMI= Body mass index is 22.27 kg/m .    Pertinent Labs    [unfilled]       Recent Labs   Lab 03/25/24  0639 03/24/24  0932 03/23/24  1047    135 131*   CO2 21* 22 21*   BUN 16.8 27.1* 41.7*       Lab Results   Component Value Date    ALT 21 03/22/2024    AST 25 03/22/2024    ALKPHOS 270 (H) 03/22/2024          Lab Results   Component Value Date    CRP 0.6 06/04/2021      [unfilled]     Pertinent Radiology  Radiology " "Results: Reviewed  MR Foot Left w/o & w Contrast    Result Date: 3/24/2024  EXAM: MR FOOT LEFT W/O and W CONTRAST LOCATION: [facility] DATE: 3/24/2024 INDICATION: Plantar wound, eval osteomyelitis COMPARISON: None. TECHNIQUE: Routine. Additional postgadolinium T1 sequences were obtained. IV CONTRAST: 7ml gadavist FINDINGS: Image quality is degraded by motion. There is a shallow ulceration plantar to the first MTP joint with surrounding cellulitis - phlegmon and/or fibrosis. No fluid collection to suggest abscess. No marrow abnormality to suggest osteomyelitis. There may be an additional shallow ulceration versus scar plantar to the fifth MTP joint. No joint effusion or synovitis. Atrophy of most of the foot muscles. Old healed fifth metatarsal shaft fracture.     IMPRESSION: 1.  Shallow ulceration plantar to the first MTP joint with associated cellulitis - phlegmon and/or fibrosis. 2.  No abscess. 3.  No evidence of osteomyelitis.    POC US Guidance Needle Placement    Result Date: 3/24/2024  Ultrasound was performed as guidance to an anesthesia procedure.  Click \"PACS images\" hyperlink below to view any stored images.  For specific procedure details, view procedure note authored by anesthesia.    MR Ankle Right w/o Contrast    Result Date: 3/22/2024  EXAM: MR ANKLE RIGHT W/O CONTRAST LOCATION: [facility] DATE: 3/22/2024 INDICATION: History diabetes, Charcot foot, status post amputation, with worsening ulcer. Concern for osteomyelitis. COMPARISON: X-rays 03/22/2024 TECHNIQUE: Unenhanced. FINDINGS: Previous amputation of the foot at the level of the naviculocuneiform and calcaneocuboid joints. There is an ulceration along the plantar hindfoot superficial to the anterior calcaneus with surrounding cellulitis. There is marrow edema with abnormal T1 marrow signal involving the anterior calcaneus compatible with osteomyelitis. No abscess. Generalized muscle atrophy. "     IMPRESSION: 1.  Plantar hindfoot ulceration with surrounding cellulitis. 2.  Associated osteomyelitis of the adjacent anterior calcaneus.    US Lower Extremity Arterial Duplex Bilateral    Result Date: 3/22/2024  [facility] LOCATION: [facility] DATE: 3/22/2024 EXAM: RESTING ANKLE-BRACHIAL INDICES (ABIs) AND DUPLEX ARTERIAL ULTRASOUND OF THE LOWER EXTREMITIES BILATERALLY INDICATION: Right foot amputation now with nonhealing open wound. Right foot pain. History of stroke. TECHNIQUE: Duplex imaging is performed utilizing gray-scale, two-dimensional images and color-flow imaging. Doppler waveform analysis and spectral Doppler imaging is also performed. COMPARISON: None. FINDINGS: ANGELO FINDINGS: RIGHT: Brachial: Not obtained Ankle (PT): 127 - - 0.83 Ankle (DP): Not obtainable Digit: Not obtainable LEFT: Brachial: 153 Ankle (PT): 81 - - 0.53 Ankle (DP): 90 - - 0.59 Digit: 83 WAVEFORMS: Right leg: Multiphasic through the anterior tibial artery. Left leg:  Multiphasic through the mid SFA transitioning to monophasic. Stenotic digit waveforms. DUPLEX ARTERIAL ULTRASOUND FINDINGS: (velocities in cm/s) RIGHT CFA: 140 PFA: 179 SFA prox: 259 SFA mid: 123 SFA dist: 147 Pop:132 PT: 59 AT: 29 DP: Not obtained LEFT CFA: 156 PFA: 189 SFA prox: 90 SFA mid: 32 SFA dist: 9 Pop:62 PT: 11 AT: 12 DP: Not obtained     IMPRESSION: 1.  RIGHT LOWER EXTREMITY: Mild resting ischemia. Moderate proximal SFA stenosis. 2.  LEFT LOWER EXTREMITY: Severe resting ischemia. Digit pressure favorable for wound healing. Given markedly diminished velocities and monophasic waveforms at the distal thigh, suspect mid - distal SFA disease. Stenotic digit waveforms. 3.  Enlarged right inguinal lymph nodes, largest measuring 2.2 x 1.6 x 1.1 cm.    US ANGELO with PPG wo Exercise    Result Date: 3/22/2024  [facility] LOCATION: [facility] DATE: 3/22/2024 EXAM: RESTING ANKLE-BRACHIAL INDICES (ABIs) AND  DUPLEX ARTERIAL ULTRASOUND OF THE LOWER EXTREMITIES BILATERALLY INDICATION: Right foot amputation now with nonhealing open wound. Right foot pain. History of stroke. TECHNIQUE: Duplex imaging is performed utilizing gray-scale, two-dimensional images and color-flow imaging. Doppler waveform analysis and spectral Doppler imaging is also performed. COMPARISON: None. FINDINGS: ANGELO FINDINGS: RIGHT: Brachial: Not obtained Ankle (PT): 127 - - 0.83 Ankle (DP): Not obtainable Digit: Not obtainable LEFT: Brachial: 153 Ankle (PT): 81 - - 0.53 Ankle (DP): 90 - - 0.59 Digit: 83 WAVEFORMS: Right leg: Multiphasic through the anterior tibial artery. Left leg:  Multiphasic through the mid SFA transitioning to monophasic. Stenotic digit waveforms. DUPLEX ARTERIAL ULTRASOUND FINDINGS: (velocities in cm/s) RIGHT CFA: 140 PFA: 179 SFA prox: 259 SFA mid: 123 SFA dist: 147 Pop:132 PT: 59 AT: 29 DP: Not obtained LEFT CFA: 156 PFA: 189 SFA prox: 90 SFA mid: 32 SFA dist: 9 Pop:62 PT: 11 AT: 12 DP: Not obtained     IMPRESSION: 1.  RIGHT LOWER EXTREMITY: Mild resting ischemia. Moderate proximal SFA stenosis. 2.  LEFT LOWER EXTREMITY: Severe resting ischemia. Digit pressure favorable for wound healing. Given markedly diminished velocities and monophasic waveforms at the distal thigh, suspect mid - distal SFA disease. Stenotic digit waveforms. 3.  Enlarged right inguinal lymph nodes, largest measuring 2.2 x 1.6 x 1.1 cm.    Foot  XR, G/E 3 views, right    Result Date: 3/22/2024  EXAM: XR FOOT RIGHT G/E 3 VIEWS LOCATION: [facility] DATE: 3/22/2024 INDICATION: wound, right foot pain. COMPARISON: None.     IMPRESSION: Status post amputation of the mid and forefoot. No fracture or osteomyelitis.

## 2024-03-25 NOTE — PLAN OF CARE
Problem: Adult Inpatient Plan of Care  Goal: Optimal Comfort and Wellbeing  Outcome: Progressing   Goal Outcome Evaluation:       Pt is alert and oriented but continues to be drowsy, responds to voice. Pt denies any pain or discomfort. Getting iv fluids. Voiding in good amounts. Ace wrap to RLE is dry and intact, no drainage noted. RLE elevated on pillows. Pt has been resting when rounded upon.  Sadie Storey RN

## 2024-03-25 NOTE — PROGRESS NOTES
[facility]    PROGRESS NOTE - Hospitalist Service    Assessment and Plan    Principal Problem:    Ulcer of right foot, unspecified ulcer stage (H)  Active Problems:    Hypertension    Hyponatremia    Non compliance with medical treatment    Stage 3b chronic kidney disease (H)    Type 2 diabetes mellitus with right diabetic foot infection (H)    Hyperglycemia    Sepsis (H)    Anemia in other chronic diseases classified elsewhere    Dario Benavides is a 68 year old male with h/o right foot osteomyelitis. He has past medical history of poorly controlled diabetes with Charcot foot status post transmetatarsal amputation of the right foot, history of osteomyelitis, diabetic foot ulcer, CKD stage III presented with worsening right heel ulcer.      Sepsis 2/2 R foot osteomyelitis s/p BKA 3/24  - MRI right foot showed osteomyelitis of the anterior calcaneus.   - s/p right BKA 3/24 with vascular surgery   - Has been on Zosyn and vancomycin. Per previous [facility] note  Per ID [email or web address], antibiotics could be discontinued postop. Stopped on 3/24 after surgery  -Pain controlled   - For pain, tylenol and oxycodone 5-10 mg prn and 0.3mg dilaudid for breakthrough   - Pt/OT to see  - vascular to determine when to resume VTE meds      Diabetes mellitus with hyperglycemia   A1c on admission 14.2 and glucose was over 600. Sister and daughter report patient nonadherence to diabetes diet, not checking glucose or use of insulin over a year.  I discussed with patient and family extensively diabetes management which is likely the root cause of his ongoing foot osteomyelitis.   -Lantus reduced to 10u 3/23 due patient was NPO for surgery BG elevated this am and eating diet now increase to 16 unit this even  - Novolog sliding scale   - Will need outpatient diabetes education and management     Hyponatremia - likely pseudohyponatremia in setting of hyperglycemia  - Monitor with correction of BS     SHANITA on CKD stage 3,  "resolved  - Baseline creat round 2, noted to be 2.27 on admission. Cr improved to 1.5.      Chronic anemia  Hb around 10.5, likely due to ACD with ongoing infection and DM. Previous HMS held off on getting iron studies given ferritin will likely elevated in the setting of infection.   - Continue to monitor       Clinically Significant Risk Factors      # Overweight: Estimated body mass index is 28.31 kg/m  as calculated from the following:  Height as of this encounter: 1.626 m (5' 4\").  Weight as of this encounter: 74.8 kg (164 lb 14.4 oz)., PRESENT ON ADMISSION    COVID-19 PCR Results           No data to display              COVID-19 Antibody Results, Testing for Immunity           No data to display               Code Status: No CPR- Do NOT Intubate  VTE prophylaxis:  subcutaneous heparin on hold, Vascular to determine when can resume   DIET: Orders Placed This Encounter      Combination Diet Moderate Consistent Carb (60 g CHO per Meal) Diet    Drains/Lines: Patient has No line.    Odell Catheter: Not present      Weight bearing status: NWB RLE    Expected Discharge Date: 03/27/2024      Destination: home with family;home with help/services      # DMII: A1C = 14.2 % (Ref range: <5.7 %) within past 6 months    Subjective:  Pain controlled, tolerating diet     PHYSICAL EXAM  Temp:  [97  F (36.1  C)-98.5  F (36.9  C)] 98.5  F (36.9  C)  Pulse:  [53-73] 62  Resp:  [14-18] 16  BP: (113-176)/(56-79) 162/76  SpO2:  [96 %-100 %] 97 %  Wt Readings from Last 1 Encounters:   03/23/24 72.4 kg (159 lb 11.2 oz)       Intake/Output Summary (Last 24 hours) at 3/25/2024 0811  Last data filed at 3/25/2024 0600  Gross per 24 hour   Intake 1073 ml   Output 1450 ml   Net -377 ml      Body mass index is 22.27 kg/m .    Physical Exam  Constitutional:       Appearance: Normal appearance.   HENT:      Head: Normocephalic and atraumatic.   Cardiovascular:      Rate and Rhythm: Normal rate and regular rhythm.      Pulses: Normal pulses. "   Pulmonary:      Effort: Pulmonary effort is normal. No respiratory distress.      Breath sounds: Normal breath sounds. No wheezing or rales.   Abdominal:      General: Bowel sounds are normal. There is no distension.      Palpations: Abdomen is soft.      Tenderness: There is no abdominal tenderness. There is no guarding.   Musculoskeletal:      Comments: RLE BKA, lower limb wrapped   Neurological:      General: No focal deficit present.      Mental Status: He is alert and oriented to person, place, and time. Mental status is at baseline.       PERTINENT LABS/IMAGING:  Results for orders placed or performed during the hospital encounter of 03/22/24   Foot  XR, G/E 3 views, right    Impression    IMPRESSION: Status post amputation of the mid and forefoot. No fracture or osteomyelitis.   US Lower Extremity Arterial Duplex Bilateral    Impression    IMPRESSION:  1.  RIGHT LOWER EXTREMITY: Mild resting ischemia. Moderate proximal SFA stenosis.  2.  LEFT LOWER EXTREMITY: Severe resting ischemia. Digit pressure favorable for wound healing. Given markedly diminished velocities and monophasic waveforms at the distal thigh, suspect mid - distal SFA disease. Stenotic digit waveforms.  3.  Enlarged right inguinal lymph nodes, largest measuring 2.2 x 1.6 x 1.1 cm.   MR Ankle Right w/o Contrast    Impression    IMPRESSION:  1.  Plantar hindfoot ulceration with surrounding cellulitis.  2.  Associated osteomyelitis of the adjacent anterior calcaneus.   US ANGELO with PPG wo Exercise    Impression    IMPRESSION:  1.  RIGHT LOWER EXTREMITY: Mild resting ischemia. Moderate proximal SFA stenosis.  2.  LEFT LOWER EXTREMITY: Severe resting ischemia. Digit pressure favorable for wound healing. Given markedly diminished velocities and monophasic waveforms at the distal thigh, suspect mid - distal SFA disease. Stenotic digit waveforms.  3.  Enlarged right inguinal lymph nodes, largest measuring 2.2 x 1.6 x 1.1 cm.   MR Foot Left w/o & w  Contrast    Impression    IMPRESSION:  1.  Shallow ulceration plantar to the first MTP joint with associated cellulitis - phlegmon and/or fibrosis.  2.  No abscess.  3.  No evidence of osteomyelitis.       Imaging results reviewed over the past 24 hrs:   Recent Results (from the past 24 hour(s))   MR Foot Left w/o & w Contrast    Narrative    EXAM: MR FOOT LEFT W/O and W CONTRAST  LOCATION: [facility]  DATE: 3/24/2024    INDICATION: Plantar wound, eval osteomyelitis  COMPARISON: None.  TECHNIQUE: Routine. Additional postgadolinium T1 sequences were obtained.  IV CONTRAST: 7ml gadavist    FINDINGS:   Image quality is degraded by motion.    There is a shallow ulceration plantar to the first MTP joint with surrounding cellulitis - phlegmon and/or fibrosis. No fluid collection to suggest abscess. No marrow abnormality to suggest osteomyelitis.    There may be an additional shallow ulceration versus scar plantar to the fifth MTP joint.    No joint effusion or synovitis. Atrophy of most of the foot muscles. Old healed fifth metatarsal shaft fracture.      Impression    IMPRESSION:  1.  Shallow ulceration plantar to the first MTP joint with associated cellulitis - phlegmon and/or fibrosis.  2.  No abscess.  3.  No evidence of osteomyelitis.     Recent Labs   Lab 03/25/24  0813 03/25/24  0639 03/24/24  2050 03/24/24  1136 03/24/24  0932 03/23/24  1203 03/23/24  1047 03/22/24  1942 03/22/24  1445   WBC  --  8.1  --   --  9.6  --  12.7*  --  16.8*   HGB  --  9.3*  --   --  8.6*  --  10.3*  --  10.5*   MCV  --  87  --   --  86  --  86  --  84   PLT  --  496*  --   --  472*  --  469*  --  516*   NA  --  138  --   --  135  --  131*   < > 118*   POTASSIUM  --  4.0  --   --  3.8  --  3.9  --  4.9   CHLORIDE  --  108*  --   --  104  --  98  --  82*   CO2  --  21*  --   --  22  --  21*  --  23   BUN  --  16.8  --   --  27.1*  --  41.7*  --  62.9*   CR  --  1.27*  --   --  1.52*  --  1.65*  --  2.27*  "  ANIONGAP  --  9  --   --  9  --  12  --  13   JOSHUA  --  8.4*  --   --  8.4*  --  8.7*  --  9.2   * 139* 165*   < > 124*   < > 170*   < > 668*   ALBUMIN  --   --   --   --   --   --   --   --  3.1*   PROTTOTAL  --   --   --   --   --   --   --   --  8.3   BILITOTAL  --   --   --   --   --   --   --   --  0.3   ALKPHOS  --   --   --   --   --   --   --   --  270*   ALT  --   --   --   --   --   --   --   --  21   AST  --   --   --   --   --   --   --   --  25    < > = values in this interval not displayed.     No results for input(s): \"CHOL\", \"HDL\", \"LDL\", \"TRIG\", \"CHOLHDLRATIO\" in the last 02019 hours.  Recent Labs   Lab Test 03/25/24  0639      POTASSIUM 4.0   CHLORIDE 108*   CO2 21*   *   BUN 16.8   CR 1.27*   GFRESTIMATED 62   JOSHUA 8.4*     Recent Labs   Lab Test 03/22/24  1445 08/12/23  0517   A1C 14.2* 11.3*     Recent Labs   Lab Test 03/25/24  0639 03/24/24  0932 03/23/24  1047   HGB 9.3* 8.6* 10.3*     No results for input(s): \"TROPONINI\" in the last 47263 hours.  No results for input(s): \"BNP\", \"NTBNPI\", \"NTBNP\" in the last 96995 hours.  No results for input(s): \"TSH\" in the last 75082 hours.  No results for input(s): \"INR\" in the last 50334 hours.    40 MINUTES SPENT BY ME on the date of service doing chart review, history, exam, documentation, discussion with nursing staff and specialist, & further activities per the note.  Naty Kaye MD  [facility] Medicine Service  [phone]   "

## 2024-03-25 NOTE — PROGRESS NOTES
S:  Patient seen at [facility] with an order for a RT BKA consult ordered by Musa.       Patient states: He stubbed his toe about a year ago and has been fighting an infection for over a year.  Patient reported functional abilities prior to amputation: Patient is active and able to ambulate as he is a  prior to amputation.  O: Visual inspection: patient has a long BKA which is wrapped in ace bandage  Measurement of limb: limb is wrapped in ace bandage  Business card left with patient with direct line and email.  A:  Patient is a good candidate for: Prosthetic leg   The goal of the new prosthesis is to:  to be able to walk and get back to work  Potential K level:  high k2 low k3  Motivation for prosthetic device: highly motivated  P:  Anticipated DC is unknown and unknown where to at this point  Will contact patient 1 week from today to check on status.

## 2024-03-25 NOTE — PROGRESS NOTES
"   03/25/24 0846   Appointment Info   Signing Clinician's Name / Credentials (PT) Nneka Ho, PT, DPT   Living Environment   People in Home child(radha), adult   Current Living Arrangements house   Home Accessibility stairs to enter home   Number of Stairs, Main Entrance 3   Stair Railings, Main Entrance railings safe and in good condition   Self-Care   Current Activity Tolerance fair   Equipment Currently Used at Home cane, straight;grab bar, tub/shower   Fall history within last six months no   Activity/Exercise/Self-Care Comment Pt independent with all ADLs at baseline. Son assists with IADLs. Patient has a tub/shower with grab bars.   General Information   Onset of Illness/Injury or Date of Surgery 03/22/24   Referring Physician Kaity Bowman MD   Patient/Family Therapy Goals Statement (PT) None stated.   Pertinent History of Current Problem (include personal factors and/or comorbidities that impact the POC) Per H&P: \"68 year old male admitted on 3/22/2024. He has past medical history of poorly controlled diabetes with Charcot foot status post transmetatarsal amputation of the right foot, history of osteomyelitis, diabetic foot ulcer, CKD stage III presented with worsening right heel ulcer.  Workup revealed osteomyelitis.. \"   Existing Precautions/Restrictions fall;weight bearing   Weight-Bearing Status - RLE nonweight-bearing   Pain Assessment   Patient Currently in Pain No   Range of Motion (ROM)   Range of Motion ROM is WFL   Strength (Manual Muscle Testing)   Strength (Manual Muscle Testing) Deficits observed during functional mobility   Bed Mobility   Bed Mobility supine-sit;sit-supine   Supine-Sit [place] (Bed Mobility) supervision;verbal cues   Sit-Supine [place] (Bed Mobility) supervision;verbal cues   Bed Mobility Limitations decreased ability to use legs for bridging/pushing   Impairments Contributing to Impaired Bed Mobility decreased strength   Assistive Device (Bed Mobility) bed " rails   Transfers   Transfers sit-stand transfer;bed-chair transfer   Transfer Safety Concerns Noted decreased weight-shifting ability;decreased balance during turns   Impairments Contributing to Impaired Transfers impaired balance;decreased strength   Bed-Chair Transfer   Assistive Device (Bed-Chair Transfers) walker, front-wheeled   Bed-Chair [place] (Transfers) minimum assist (75% patient effort);verbal cues   Sit-Stand Transfer   Sit-Stand [place] (Transfers) minimum assist (75% patient effort);verbal cues   Assistive Device (Sit-Stand Transfers) walker, front-wheeled   Gait/Stairs (Locomotion)   [place] Level (Gait) minimum assist (75% patient effort);verbal cues   Assistive Device (Gait) walker, front-wheeled   Distance in Feet (Gait) 3'   Pattern (Gait) step-to   Comment, (Gait/Stairs) Fully clears feet for first transfer, pivots on foot on second transfer. Stairs not yet attempted due to inability to fully clear foot for ambulation.   Clinical Impression   Criteria for Skilled Therapeutic Intervention Yes, treatment indicated   PT Diagnosis (PT) impaired functional mobility   Influenced by the following impairments decreased strength, impaired balance   Functional limitations due to impairments transfers, ambulation   Clinical Presentation (PT Evaluation Complexity) evolving   Clinical Presentation Rationale Clinical judgment.   Clinical Decision Making (Complexity) moderate complexity   Planned Therapy Interventions (PT) balance training;bed mobility training;gait training;home exercise program;neuromuscular re-education;patient/family education;strengthening;transfer training   Risk & Benefits of therapy have been explained evaluation/treatment results reviewed;participants voiced agreement with care plan;participants included;patient   PT Total Evaluation Time   PT Eval, Moderate Complexity Minutes (31622) 10   Physical Therapy Goals   PT Frequency Daily   PT Predicted Duration/Target Date  for Goal Attainment 04/01/24   PT Goals Bed Mobility;Transfers;Gait;Stairs   PT: Bed Mobility Independent;Supine to/from sit   PT: Transfers Supervision/stand-by assist;Sit to/from stand;Within precautions   PT: Gait Supervision/stand-by assist;Rolling walker;Within precautions;50 feet   PT: Stairs Minimal assist;3 stairs;Rail on both sides   PT Discharge Planning   PT Plan transfers, NWB R LE, progress to gait as able   PT Discharge Recommendation (DC Rec) Transitional Care Facility   PT Rationale for DC Rec Patient able to complete pivot transfers only. Patient will be restricted with ADLs/IADLs due to recent below knee amputation and NWB restriction. Patient has 3 stairs to enter home. Recommend TCU at discharge.   PT Brief overview of current status Supine <> sit, SBA. Sit <> stand, min assist of 1. Pivots bed <> chair, min assist of 1.   PT Equipment Needed at Discharge walker, rolling   Total Session Time   Total Session Time (sum of timed and untimed services) 10

## 2024-03-25 NOTE — PLAN OF CARE
Problem: Surgery Nonspecified  Goal: Blood Glucose Level Within Targeted Range  Outcome: Progressing     Problem: Surgery Nonspecified  Goal: Optimal Pain Control and Function  Outcome: Progressing     Problem: Surgery Nonspecified  Goal: Nausea and Vomiting Relief  Outcome: Progressing     Problem: Surgery Nonspecified  Goal: Effective Urinary Elimination  Outcome: Progressing   Goal Outcome Evaluation:       Blood sugars were 98 and 165 , denies pain or discomfort, tolerated regular diet well without nausea or vomiting, voided 350ml in the urinal, right leg/foot dressing clean, dry and intact, went down for MRI of left foot, alert and oriented, has been lethargic but easily arousable, hs bp 176/79, rechecked for 142/67.

## 2024-03-25 NOTE — PLAN OF CARE
"Calm and cooperative. Alert and oriented. Flat affect. Very minimal response to questions, and sometimes just responding \"I don't know\". Denied pain. No drainage visible on ace wraps of both right leg and left foot. BG elevated and corrected with novolog for 290 bg 4 units. Pt. Very unforthcoming with information. 1 word answers to most questions. Offered repositioning and pt. Refused. Continued to deny pain.       "

## 2024-03-25 NOTE — PROGRESS NOTES
03/25/24 1425   Appointment Info   Signing Clinician's Name / Credentials (OT) Bee Reyes,OTR/L   Living Environment   People in Home child(radha), adult  (son)   Current Living Arrangements house   Home Accessibility stairs to enter home   Number of Stairs, Main Entrance 3   Stair Railings, Main Entrance railings safe and in good condition   Self-Care   Equipment Currently Used at Home cane, straight;walker, rolling   Fall history within last six months no   Activity/Exercise/Self-Care Comment IND ADLs, son A w/ IADLs.   General Information   Onset of Illness/Injury or Date of Surgery 03/22/24   Patient/Family Therapy Goal Statement (OT) none stated   Additional Occupational Profile Info/Pertinent History of Current Problem Dario Benavides is a 68 year old male with h/o right foot osteomyelitis. He has past medical history of poorly controlled diabetes with Charcot foot status post transmetatarsal amputation of the right foot, history of osteomyelitis, diabetic foot ulcer, CKD stage III presented with worsening right heel ulcer.   Existing Precautions/Restrictions fall;weight bearing   Cognitive Status Examination   Orientation Status other (see comments)  (alert to name, place and date NT,)   Affect/Mental Status (Cognitive) flat/blunted affect   Follows Commands WFL   Visual Perception   Visual Impairment/Limitations WFL   Range of Motion Comprehensive   General Range of Motion no range of motion deficits identified   Strength Comprehensive (MMT)   General Manual Muscle Testing (MMT) Assessment no strength deficits identified   Coordination   Upper Extremity Coordination No deficits were identified   Bed Mobility   Bed Mobility supine-sit;sit-supine   Supine-Sit [place] (Bed Mobility) supervision   Sit-Supine [place] (Bed Mobility) supervision   Transfers   Transfers bed-chair transfer;sit-stand transfer   Transfer Skill: Bed to Chair/Chair to Bed   Bed-Chair [place] (Transfers) minimum assist  "(75% patient effort)   Sit-Stand Transfer   Sit-Stand [place] (Transfers) minimum assist (75% patient effort)   Balance   Balance Assessment standing dynamic balance   Balance Comments fair balance   Activities of Daily Living   BADL Assessment/Intervention lower body dressing   Lower Body Dressing Assessment/Training   [place] Level (Lower Body Dressing) dependent (less than 25% patient effort)   Clinical Impression   Criteria for Skilled Therapeutic Interventions Met (OT) Yes, treatment indicated   OT Diagnosis decreased ADLs   OT Problem List-Impairments impacting ADL balance;pain;post-surgical precautions;mobility   Assessment of Occupational Performance 1-3 Performance Deficits   Identified Performance Deficits drsg, trsfs,   Planned Therapy Interventions (OT) ADL retraining;transfer training   Clinical Decision Making Complexity (OT) problem focused assessment/low complexity   Risk & Benefits of therapy have been explained evaluation/treatment results reviewed   OT Total Evaluation Time   OT Eval, Moderate Complexity Minutes (17644) 8  (co eval)   OT Goals   Therapy Frequency (OT) Daily   OT Goals Lower Body Dressing;Transfers   OT: Lower Body Dressing Minimal assist   OT: Transfer Minimal assist;with assistive device;within precautions   Interventions   Interventions Quick Adds Self-Care/Home Management   Self-Care/Home Management   Self-Care/Home Mgmt/ADL, Compensatory, Meal Prep Minutes (60510) 5  (co tx)   Symptoms Noted During/After Treatment (Meal Preparation/Planning Training) none   Treatment Detail/Skilled Intervention pt agreeable to OT session, demo supine>sit w/ SBA, trsf bed<>chair w/ min A w/ FWW, cues for following NWB precautions, able to follow NWB precautions w/ \"hopping\" and heel/toe method, Cues for hand placement. Rtn to supine w/ SBA. Bed alarm on   Lower Body Dressing Training Assistance dependent (less than 25% patient effort)  (don sock)   OT Discharge Planning   OT Plan NWB, " alex, trsfs   OT Discharge Recommendation (DC Rec) Transitional Care Facility   OT Rationale for DC Rec Ax1 close trsfs and ADLs, rec. TCU to improve IND  in ADLs prior to returning home, pt has stairs,   OT Brief overview of current status min A trsf, SBA bed mobility, dependent drs g   Total Session Time   Timed Code Treatment Minutes 5   Total Session Time (sum of timed and untimed services) 13

## 2024-03-25 NOTE — PROGRESS NOTES
Vascular Surgery Progress Note  03/25/2024       Subjective:  Patient doing well this am, states pain is well controlled, no strikethrough on ACE bandage.     Objective:  Temp:  [97.3  F (36.3  C)-98.5  F (36.9  C)] 97.9  F (36.6  C)  Pulse:  [60-71] 71  Resp:  [16-18] 16  BP: (142-176)/(67-79) 153/67  SpO2:  [97 %-99 %] 99 %    I/O last 3 completed shifts:  In: 1473 [P.O.:840; I.V.:633]  Out: 2400 [Urine:2400]      Gen: Awake, alert, NAD  Resp: NLB on RA  Incision: ACE bandage in place with no strikethrough     Labs:  Recent Labs   Lab 03/25/24  0639 03/24/24  0932 03/23/24  1047   WBC 8.1 9.6 12.7*   HGB 9.3* 8.6* 10.3*   * 472* 469*       Recent Labs   Lab 03/25/24  1623 03/25/24  1200 03/25/24  0813 03/25/24  0639 03/24/24  1136 03/24/24  0932 03/23/24  1203 03/23/24  1047 03/22/24  1942 03/22/24  1445   NA  --   --   --  138  --  135  --  131*   < > 118*   POTASSIUM  --   --   --  4.0  --  3.8  --  3.9  --  4.9   CHLORIDE  --   --   --  108*  --  104  --  98  --  82*   CO2  --   --   --  21*  --  22  --  21*  --  23   BUN  --   --   --  16.8  --  27.1*  --  41.7*  --  62.9*   CR  --   --   --  1.27*  --  1.52*  --  1.65*  --  2.27*   * 288* 134* 139*   < > 124*   < > 170*   < > 668*   JOSHUA  --   --   --  8.4*  --  8.4*  --  8.7*  --  9.2   MAG  --   --   --   --   --   --   --   --   --  2.4*    < > = values in this interval not displayed.       Imaging:  No new imaging reviewed.     Assessment/Plan:   68 year old male with PMH of poorly controlled T2DM (A1c 14%) with chronic  R heel wound with osteomyelitis,with patient choosing to proceed with below the knee amputation, plan for staged operation with kendra KNAPPA 3/24, with completion planned for several weeks from now, pending improvement in a1c. Doing well postoperatively.     - Hgb stable today at 9.3 from 8.6 yesterday (s/p 1u pRBC) (10.3 preop)  - BG control has been much improved BG to 215 max overnight, appreciate ongoing medical  management   - OK to resume DVT ppx (ordered for you)   - Abx stopped per ID   - WOC consult for vac placement on guillotine amputation site.   - Patient should have stump protector on at all times, orthotics consult placed for this   - Will plan for completion BKA in coming weeks, patient will need to have follow up arranged with our team in 4 weeks, with A1c check at that time, with goal A1c <10 to proceed with amputation for improved wound healing potential. Given current A1c, wound would have high risk of infection or poor healing if we were to proceed with formalization at this time, discussed this with patient today.        Discussed with vascular surgery staff, Dr. Guillen who is in agreement with the above.  - - - - - - - - - - - - - - - - - -  Rudi Ochoa, PGY-3  Vascular Surgery Resident

## 2024-03-25 NOTE — PLAN OF CARE
Problem: Surgery Nonspecified  Goal: Optimal Pain Control and Function  Outcome: Progressing     Problem: Wound  Goal: Optimal Wound Healing  Outcome: Progressing   Goal Outcome Evaluation:       Pt denies any pain this shift, prn tylenol given to help with swelling.  Pt sleeping on and off throughout shift.  Right BKA stump wrapped with ace.  Dressing intact, no drainage noted.    Lian Nix RN

## 2024-03-25 NOTE — PROGRESS NOTES
Care Management Follow Up    Length of Stay (days): 3    Expected Discharge Date: 03/27/2024     Concerns to be Addressed: discharge planning     Patient plan of care discussed at interdisciplinary rounds: Yes    Anticipated Discharge Disposition:  TCU     Anticipated Discharge Services:  per TCU  Anticipated Discharge DME:  rolling walker    Patient/family educated on Medicare website which has current facility and service quality ratings:    Education Provided on the Discharge Plan:    Patient/Family in Agreement with the Plan:      Referrals Placed by CM/SW:    Private pay costs discussed: Not applicable    Additional Information:  CM reviewed chart.  TALAT 3/24.   PT and OT recommending TCU.  CM will discuss with patient tomorrow.        Madhavi Perez RN

## 2024-03-26 LAB
ALBUMIN SERPL BCG-MCNC: 2.6 G/DL (ref 3.5–5.2)
ANION GAP SERPL CALCULATED.3IONS-SCNC: 6 MMOL/L (ref 7–15)
BUN SERPL-MCNC: 13.2 MG/DL (ref 8–23)
CALCIUM SERPL-MCNC: 8.6 MG/DL (ref 8.8–10.2)
CHLORIDE SERPL-SCNC: 105 MMOL/L (ref 98–107)
CREAT SERPL-MCNC: 1.31 MG/DL (ref 0.67–1.17)
DEPRECATED HCO3 PLAS-SCNC: 26 MMOL/L (ref 22–29)
EGFRCR SERPLBLD CKD-EPI 2021: 60 ML/MIN/1.73M2
ERYTHROCYTE [DISTWIDTH] IN BLOOD BY AUTOMATED COUNT: 13.5 % (ref 10–15)
GLUCOSE BLDC GLUCOMTR-MCNC: 150 MG/DL (ref 70–99)
GLUCOSE BLDC GLUCOMTR-MCNC: 184 MG/DL (ref 70–99)
GLUCOSE BLDC GLUCOMTR-MCNC: 212 MG/DL (ref 70–99)
GLUCOSE BLDC GLUCOMTR-MCNC: 302 MG/DL (ref 70–99)
GLUCOSE BLDC GLUCOMTR-MCNC: 86 MG/DL (ref 70–99)
GLUCOSE SERPL-MCNC: 107 MG/DL (ref 70–99)
HCT VFR BLD AUTO: 27.3 % (ref 40–53)
HGB BLD-MCNC: 8.8 G/DL (ref 13.3–17.7)
MCH RBC QN AUTO: 28 PG (ref 26.5–33)
MCHC RBC AUTO-ENTMCNC: 32.2 G/DL (ref 31.5–36.5)
MCV RBC AUTO: 87 FL (ref 78–100)
PHOSPHATE SERPL-MCNC: 2.6 MG/DL (ref 2.5–4.5)
PLATELET # BLD AUTO: 582 10E3/UL (ref 150–450)
POTASSIUM SERPL-SCNC: 3.8 MMOL/L (ref 3.4–5.3)
RBC # BLD AUTO: 3.14 10E6/UL (ref 4.4–5.9)
SODIUM SERPL-SCNC: 137 MMOL/L (ref 135–145)
WBC # BLD AUTO: 7 10E3/UL (ref 4–11)

## 2024-03-26 PROCEDURE — 120N000001 HC R&B MED SURG/OB

## 2024-03-26 PROCEDURE — G0463 HOSPITAL OUTPT CLINIC VISIT: HCPCS | Mod: 25

## 2024-03-26 PROCEDURE — 250N000013 HC RX MED GY IP 250 OP 250 PS 637: Performed by: STUDENT IN AN ORGANIZED HEALTH CARE EDUCATION/TRAINING PROGRAM

## 2024-03-26 PROCEDURE — 250N000013 HC RX MED GY IP 250 OP 250 PS 637: Performed by: INTERNAL MEDICINE

## 2024-03-26 PROCEDURE — 80069 RENAL FUNCTION PANEL: CPT | Performed by: INTERNAL MEDICINE

## 2024-03-26 PROCEDURE — 99232 SBSQ HOSP IP/OBS MODERATE 35: CPT | Performed by: INTERNAL MEDICINE

## 2024-03-26 PROCEDURE — 250N000013 HC RX MED GY IP 250 OP 250 PS 637: Performed by: HOSPITALIST

## 2024-03-26 PROCEDURE — 36415 COLL VENOUS BLD VENIPUNCTURE: CPT | Performed by: INTERNAL MEDICINE

## 2024-03-26 PROCEDURE — 250N000011 HC RX IP 250 OP 636

## 2024-03-26 PROCEDURE — 85027 COMPLETE CBC AUTOMATED: CPT | Performed by: INTERNAL MEDICINE

## 2024-03-26 PROCEDURE — 97605 NEG PRS WND THER DME<=50SQCM: CPT

## 2024-03-26 RX ADMIN — HEPARIN SODIUM 5000 UNITS: 5000 INJECTION, SOLUTION INTRAVENOUS; SUBCUTANEOUS at 21:00

## 2024-03-26 RX ADMIN — HEPARIN SODIUM 5000 UNITS: 5000 INJECTION, SOLUTION INTRAVENOUS; SUBCUTANEOUS at 14:44

## 2024-03-26 RX ADMIN — OXYCODONE HYDROCHLORIDE 5 MG: 5 TABLET ORAL at 09:26

## 2024-03-26 RX ADMIN — AMLODIPINE BESYLATE 2.5 MG: 2.5 TABLET ORAL at 08:16

## 2024-03-26 RX ADMIN — ATORVASTATIN CALCIUM 80 MG: 40 TABLET, FILM COATED ORAL at 20:54

## 2024-03-26 RX ADMIN — ASPIRIN 81 MG: 81 TABLET, COATED ORAL at 08:16

## 2024-03-26 RX ADMIN — ACETAMINOPHEN 650 MG: 325 TABLET ORAL at 09:26

## 2024-03-26 RX ADMIN — HEPARIN SODIUM 5000 UNITS: 5000 INJECTION, SOLUTION INTRAVENOUS; SUBCUTANEOUS at 06:14

## 2024-03-26 ASSESSMENT — ACTIVITIES OF DAILY LIVING (ADL)
ADLS_ACUITY_SCORE: 31
ADLS_ACUITY_SCORE: 27
ADLS_ACUITY_SCORE: 31
ADLS_ACUITY_SCORE: 27

## 2024-03-26 NOTE — CONSULTS
[facility] Nurse Inpatient Assessment     Consulted for: URMILA GILLILAND - start VAC; L lat foot wound    Patient History (according to provider note(s):    Assessment & Plan  Dario Benavides is a 68 year old male admitted on 3/22/2024. He has past medical history of poorly controlled diabetes with Charcot foot status post transmetatarsal amputation of the right foot, history of osteomyelitis, diabetic foot ulcer, CKD stage III presented with worsening right heel ulcer.  Workup revealed osteomyelitis..      Sepsis 2/2 R foot osteomyelitis  -Patient on arrival had tachycardia, with leukocytosis and normal lactate. Source R foot ulcer  -Reviewed x-ray of right foot.    -Arterial Dopplers and ANGELO right lower extremity revealed moderate SFA stenosis.  Of left lower extremity revealed severe resting ischemia.  Also suspect mid distal SFA stenosis.  -MRI right foot showed osteomyelitis of the anterior calcaneus.  Hindfoot ulceration with surrounding cellulitis.  -Wound cultures and blood cultures ordered  -Vanco and Zosyn ordered  -Vascular, ID and podiatry consulted  -WO consulted  -Pain control with tylenol     Diabetes mellitus with hyperglycemia  -A1c today is 14.2, worsened from last time of 11.2  -Patient noncompliant with his insulin regimen, states blood glucose runs between 2-400 at home.  -Blood sugar more than 600 on admission  -Home regimen includes Lantus 20 units and NovoLog 8 units 3 times daily with meals based on last discharge summary  -Start patient on medium dose sliding scale, titrate as needed, resumed lantus of 20 units at bedtime  -Monitor blood glucose closely, hypoglycemia protocol     Assessment:    Negative pressure wound therapy applied to: URMILA RAMILA     Last photo: 3/26/24   Wound due to: Surgical Wound   Wound history/plan of care:    Surgical date: 3/24/24   Service following: Vascular surgery  Date Negative Pressure Wound Therapy initiated: 3/26/24   Interventions in  place: offloading and elevation  Is patient s nutritional status compromised? no   If yes, what interventions are in place? N/A  Reason for initiating vac therapy? Presence of co-morbidities, High risk of infections, and Need for accelerated granulation tissue  Which?of?the?following?co-morbidities?apply? Diabetes  If diabetic is patient on a diabetic management program? Yes   Is osteomyelitis present in wound? no   If yes what treatments are in place? N/A    Wound base: 40% bone, 60 % non-granular tissue viable tissue     Palpation of the wound bed: normal       Drainage: moderate      Volume in cannister: new     Last cannister change date: 3/26/24     Description of drainage: bloody      Measurements (length x width x depth, in cm) 6 cm  x 7.5 cm  x  0.4 cm       Tunneling N/A      Undermining N/A   Periwound skin: Intact       Color: normal and consistent with surrounding tissue       Temperature: normal    Odor: none   Pain: denies  and no grimacing or signs of discomfort   Pain intervention prior to dressing change: patient tolerated well and slow and gentle cares   Treatment goal: Prepare wound bed for flap/graft  STATUS: initial assessment   Supplies ordered: ordered black foam    Number of foam pieces removed from a wound (excluding foam for bridge) :  Gauze    Verified this matched the number of foam pieces applied last dressing change: N/A   Number of foam pieces packed into wound (excluding foam for bridge) :  1 piece GranuFoam Black, 1 piece adaptic    Left foot plantar    Last Photo: 3/26/24  1.5 cm x 1 cm x 0.3 cm  Treatment Plan:   Wound location: R BKA  Change Days: T/F  Supplies: Small black foam  Cleanse with: Saline, pat dry.  Suction: Continuous at -125 mmHg pressure.    Back up plan: If VAC malfunctions or unable to maintain seal: VAC dressing must be removed and reapplied within 2 hours of the incident. If floor staff is not able to reapply, place NS moistened gauze in wound bed and cover with  appropriate dressing to keep wound bed moist.   Change wet-to-dry dressing BID and notify WOC staff for reimplementation of VAC therapy when available.  Date canister. Chart canister output every shift.    The hospital VAC pump is not to be discharged with the patient.  Please do one of the following prior to discharge:  If a home VAC pump has been delivered, please apply the home pump to the dressing prior to patient discharge.    If the patient is discharging to LTAC or a TCU/SNF and there is a VAC pump waiting for the patient, close clamp and then disconnect the tubing for transfer, place tubing inside a glove.   If the patient is discharging to home or other facility and there is no VAC pump available for immediate placement, remove the VAC dressing and apply a wet-to-moist gauze dressing for transfer.       L foot - every 3 days  Cleanse with saline, gently dry.  Mepilex to cover wound.    Orders: Written    RECOMMEND PRIMARY TEAM ORDER: None, at this time  Education provided: plan of care  Discussed plan of care with: Patient and Nurse  WOC nurse follow-up plan: Tuesday/Friday  Notify WOC if wound(s) deteriorate.  Nursing to notify the Provider(s) and re-consult the WOC Nurse if new skin concern.    DATA:     Current support surface: Standard  Standard gel/foam mattress (IsoFlex, Atmos air, etc)  Containment of urine/stool: Continent of bladder and Continent of bowel  BMI: Body mass index is 22.27 kg/m .   Active diet order: Orders Placed This Encounter      Combination Diet Moderate Consistent Carb (60 g CHO per Meal) Diet     Output: I/O last 3 completed shifts:  In: 600 [P.O.:600]  Out: 1900 [Urine:1900]     Labs:   Recent Labs   Lab 03/26/24  0611 03/23/24  1047 03/22/24  1445   ALBUMIN 2.6*  --  3.1*   HGB 8.8*   < > 10.5*   WBC 7.0   < > 16.8*   A1C  --   --  14.2*    < > = values in this interval not displayed.     Pressure injury risk assessment:   Sensory Perception: 3-->slightly limited  Moisture:  4-->rarely moist  Activity: 2-->chairfast  Mobility: 3-->slightly limited  Nutrition: 3-->adequate  Friction and Shear: 2-->potential problem  Julian Score: 17    Carolin Costa MSN RN CWOCN  Pager no longer is use, please contact through [employer] group: [facility] [employer] [employer] [facility]

## 2024-03-26 NOTE — PLAN OF CARE
Problem: Adult Inpatient Plan of Care  Goal: Absence of Hospital-Acquired Illness or Injury  Intervention: Identify and Manage Fall Risk  Recent Flowsheet Documentation  Taken 3/26/2024 1615 by Katlin Cormier RN  Safety Promotion/Fall Prevention: activity supervised  Taken 3/26/2024 0815 by Katlin Cormier RN  Safety Promotion/Fall Prevention: activity supervised     Problem: Adult Inpatient Plan of Care  Goal: Absence of Hospital-Acquired Illness or Injury  Intervention: Prevent Skin Injury  Recent Flowsheet Documentation  Taken 3/26/2024 1615 by Katlin Cormier RN  Body Position: legs elevated  Taken 3/26/2024 0815 by Katlin Cormier RN  Body Position: legs elevated   Goal Outcome Evaluation:         Problem: Skin Injury Risk Increased  Goal: Skin Health and Integrity  Intervention: Plan: Nurse Driven Intervention: Moisture Management  Recent Flowsheet Documentation  Taken 3/26/2024 0800 by Katlin Cormier RN  Moisture Interventions: Encourage regular toileting  Bathing/Skin Care: moisturizer applied     Problem: Skin Injury Risk Increased  Goal: Skin Health and Integrity  Intervention: Optimize Skin Protection  Recent Flowsheet Documentation  Taken 3/26/2024 1615 by Katlin Cormier RN  Activity Management: activity adjusted per tolerance  Head of Bed (HOB) Positioning: HOB at 20-30 degrees  Taken 3/26/2024 0815 by Katlin Cormier RN  Activity Management: activity adjusted per tolerance  Head of Bed (HOB) Positioning: HOB at 20-30 degrees     Problem: Wound  Goal: Skin Health and Integrity  Intervention: Optimize Skin Protection  Recent Flowsheet Documentation  Taken 3/26/2024 1615 by Katlin Cormier RN  Activity Management: activity adjusted per tolerance  Head of Bed (HOB) Positioning: HOB at 20-30 degrees  Taken 3/26/2024 0815 by Katlin Cormier RN  Activity Management: activity adjusted per tolerance  Head of Bed (HOB) Positioning: HOB at 20-30 degrees     Problem: Surgery  Nonspecified  Goal: Blood Glucose Level Within Targeted Range  Intervention: Optimize Glycemic Control  Recent Flowsheet Documentation  Taken 3/26/2024 1615 by Katlin Cormier, RN  Glycemic Management: blood glucose monitored     Patient alert and oriented x3 denied pain around his surgical area on his right lower extremity. Dressing on right lower extremity clean, dry and intact. Wound vac in place, wound vac dressing Tuesday and Friday per Woc Nurse. Drainage from wound vac minimal only in the tubing, serosanguinous.

## 2024-03-26 NOTE — PLAN OF CARE
Problem: Comorbidity Management  Goal: Blood Glucose Levels Within Targeted Range  Intervention: Monitor and Manage Glycemia  Recent Flowsheet Documentation  Taken 3/26/2024 0058 by Reynaldo Ramirez RN  Medication Review/Management: medications reviewed     Problem: Surgery Nonspecified  Goal: Effective Urinary Elimination  Outcome: Progressing     Problem: Surgery Nonspecified  Goal: Optimal Pain Control and Function  Outcome: Progressing   Goal Outcome Evaluation:       Patient has minimal speech. Has flat affect. Calm and cooperative. Patient denied pain. Right lower extremity stump ace wrap dressing is clean, dry and intact elevated with pillows. 2 am . Uses urinal to void.

## 2024-03-26 NOTE — PROGRESS NOTES
[facility]    PROGRESS NOTE - Hospitalist Service    Assessment and Plan    Dario Benavides is a 68 year old male with h/o right foot osteomyelitis. He has past medical history of poorly controlled diabetes with Charcot foot status post transmetatarsal amputation of the right foot, history of osteomyelitis, diabetic foot ulcer, CKD stage III presented with worsening right heel ulcer.      Sepsis 2/2 R foot osteomyelitis s/p BKA 3/24  - MRI right foot showed osteomyelitis of the anterior calcaneus.   - s/p right BKA 3/24 with vascular surgery   - Has been on Zosyn and vancomycin. Per previous [facility] note  Per ID mekhi, antibiotics could be discontinued postop. Stopped on 3/24 after surgery  -Pain controlled   - For pain, tylenol and oxycodone 5-10 mg prn and 0.3mg dilaudid for breakthrough   - Pt/OT to see  - On Hpearin for DVT prophylaxis   - Patient will need to be able to tolerate vac placement as well as a change at bedside prior to discharge,  - Vascular surgery will  plan for completion BKA in coming weeks, patient will need to have follow up arranged with Vascular in 4 weeks, with A1c check at that time, with goal A1c <10 to proceed with amputation for improved wound healing potential.     Given current A1c, wound would have high risk of infection or poor healing if Vascular surgey were to proceed with formalization at this time.      Diabetes mellitus with hyperglycemia   A1c on admission 14.2 and glucose was over 600. Sister and daughter report patient nonadherence to diabetes diet, not checking glucose or use of insulin over a year.  [facility] team discussed with patient and family extensively diabetes management which is likely the root cause of his ongoing foot osteomyelitis.   -Lantus  at 16 units at bed time   - Novolog sliding scale   - Will need outpatient diabetes education and management     Hyponatremia - likely pseudohyponatremia in setting of hyperglycemia  - Monitor with  "correction of BS     SHANITA on CKD stage 3, resolved  - Baseline creat round 2, noted to be 2.27 on admission. Cr improved to 1.5.      Chronic anemia  Hb around 10.5, likely due to ACD with ongoing infection and DM. Previous HMS held off on getting iron studies given ferritin will likely elevated in the setting of infection.   - Continue to monitor       Clinically Significant Risk Factors      # Overweight: Estimated body mass index is 28.31 kg/m  as calculated from the following:  Height as of this encounter: 1.626 m (5' 4\").  Weight as of this encounter: 74.8 kg (164 lb 14.4 oz)., PRESENT ON ADMISSION    Code Status: No CPR- Do NOT Intubate  VTE prophylaxis:  subcutaneous heparin on hold, Vascular to determine when can resume   DIET: Orders Placed This Encounter      Combination Diet Moderate Consistent Carb (60 g CHO per Meal) Diet    Drains/Lines: Patient has No line.    Odell Catheter: Not present      Weight bearing status: NWB RLE    Expected Discharge Date: 03/27/2024      Destination: home with family;home with help/services      # DMII: A1C = 14.2 % (Ref range: <5.7 %) within past 6 months    Subjective:  Charts reviewed and patient was examined. No new complaints  Eating and drinking well  Pain well controlled     PHYSICAL EXAM  Temp:  [97.9  F (36.6  C)-98.6  F (37  C)] 98.3  F (36.8  C)  Pulse:  [60-71] 62  Resp:  [16-19] 18  BP: (137-192)/(67-84) 137/67  SpO2:  [95 %-99 %] 97 %  Wt Readings from Last 1 Encounters:   03/23/24 72.4 kg (159 lb 11.2 oz)     Body mass index is 22.27 kg/m .    Physical Exam  Constitutional:       Appearance: Normal appearance.   HENT:      Head: Normocephalic and atraumatic.   Cardiovascular:      Rate and Rhythm: Normal rate and regular rhythm.      Pulses: Normal pulses.   Pulmonary:      Effort: Pulmonary effort is normal. No respiratory distress.      Breath sounds: Normal breath sounds. No wheezing or rales.   Abdominal:      General: Bowel sounds are normal. There is no " "distension.      Palpations: Abdomen is soft.      Tenderness: There is no abdominal tenderness. There is no guarding.   Musculoskeletal:      Comments: RLE BKA, lower limb wrapped   Neurological:      General: No focal deficit present.      Mental Status: He is alert and oriented to person, place, and time. Mental status is at baseline.             Imaging results reviewed over the past 24 hrs:   No results found for this or any previous visit (from the past 24 hour(s)).    Recent Labs   Lab 03/26/24  1137 03/26/24  0822 03/26/24  0611 03/25/24  0813 03/25/24  0639 03/24/24  1136 03/24/24  0932 03/22/24  1942 03/22/24  1445   WBC  --   --  7.0  --  8.1  --  9.6   < > 16.8*   HGB  --   --  8.8*  --  9.3*  --  8.6*   < > 10.5*   MCV  --   --  87  --  87  --  86   < > 84   PLT  --   --  582*  --  496*  --  472*   < > 516*   NA  --   --  137  --  138  --  135   < > 118*   POTASSIUM  --   --  3.8  --  4.0  --  3.8   < > 4.9   CHLORIDE  --   --  105  --  108*  --  104   < > 82*   CO2  --   --  26  --  21*  --  22   < > 23   BUN  --   --  13.2  --  16.8  --  27.1*   < > 62.9*   CR  --   --  1.31*  --  1.27*  --  1.52*   < > 2.27*   ANIONGAP  --   --  6*  --  9  --  9   < > 13   JOSHUA  --   --  8.6*  --  8.4*  --  8.4*   < > 9.2   * 86 107*   < > 139*   < > 124*   < > 668*   ALBUMIN  --   --  2.6*  --   --   --   --   --  3.1*   PROTTOTAL  --   --   --   --   --   --   --   --  8.3   BILITOTAL  --   --   --   --   --   --   --   --  0.3   ALKPHOS  --   --   --   --   --   --   --   --  270*   ALT  --   --   --   --   --   --   --   --  21   AST  --   --   --   --   --   --   --   --  25    < > = values in this interval not displayed.     No results for input(s): \"CHOL\", \"HDL\", \"LDL\", \"TRIG\", \"CHOLHDLRATIO\" in the last 92615 hours.  Recent Labs   Lab Test 03/25/24  0639      POTASSIUM 4.0   CHLORIDE 108*   CO2 21*   *   BUN 16.8   CR 1.27*   GFRESTIMATED 62   JOSHUA 8.4*     Recent Labs   Lab Test " "03/22/24  1445 08/12/23  0517   A1C 14.2* 11.3*     Recent Labs   Lab Test 03/25/24  0639 03/24/24  0932 03/23/24  1047   HGB 9.3* 8.6* 10.3*     No results for input(s): \"TROPONINI\" in the last 48610 hours.  No results for input(s): \"BNP\", \"NTBNPI\", \"NTBNP\" in the last 29563 hours.  No results for input(s): \"TSH\" in the last 29370 hours.  No results for input(s): \"INR\" in the last 41962 hours.    40 MINUTES SPENT BY ME on the date of service doing chart review, history, exam, documentation, discussion with nursing staff and specialist, & further activities per the note.    Dr ALE Olmedo MD, FACP  Hospitalist ( Internal medicine)  Pager: [phone]    "

## 2024-03-26 NOTE — DISCHARGE INSTRUCTIONS
Wound location: R BKA  Change Days: T/F  Supplies: Small black foam  Cleanse with: Saline, pat dry.  Suction: Continuous at -125 mmHg pressure.    Back up plan: If VAC malfunctions or unable to maintain seal: VAC dressing must be removed and reapplied within 2 hours of the incident. If not able to reapply, place NS moistened gauze in wound bed and cover with appropriate dressing to keep wound bed moist.   Change wet-to-dry dressing twice daily and notify home care staff for reimplementation of VAC therapy when available.          L foot - every 3 days  Cleanse with saline, gently dry.  Mepilex to cover wound.

## 2024-03-26 NOTE — PROGRESS NOTES
"Care Management Follow Up    Length of Stay (days): 4    Expected Discharge Date: 03/27/2024     Concerns to be Addressed: discharge planning     Patient plan of care discussed at interdisciplinary rounds: Yes    Anticipated Discharge Disposition:  home with home care     Anticipated Discharge Services:  home care PT, OT, SN  Anticipated Discharge DME:  has DME at home    Patient/family educated on Medicare website which has current facility and service quality ratings:  yes  Education Provided on the Discharge Plan:  yes  Patient/Family in Agreement with the Plan:  yes    Referrals Placed by CM/SW:  home care  Private pay costs discussed: Not applicable    Additional Information:  TALAT 3/24.     PT and OT recommending TCU.    CM met with patient and discussed recommendations.    Patient adamant that he does not want to go to a TCU.  Patient requesting home care.   Patient reports he has support at home 24/7, one level living, a few steps to enter home that family can assist him with.   Patient reports he has a knee scooter, wheelchair, walker, and cane at home.  CM placed home care referral.     [facility] accepted for RN (wound VAC), PT, and OT.    Patient also reports he has a wound VAC at home from about a year ago he never returned.  Wound VAC is in patient's bedroom and patient reports his bedroom is locked and he is the only one with the key.  Patient does not trust anyone else to have the key to retreive wound VAC.   CM will order a new wound VAC from [facility] so patient can have home VAC attached prior to discharge.     Plan: home with home care RN, PT, and OT.  Order home wound VAC. Family transport.    Social Hx:  \"Patient lives in his home, son Tarun recently moved in \"was out of the picture for awhile because he was incarcinated\". Tarun's girlfriend also lives in the home. Patient is independent with all his I/ADLs. He states that he uses a walker or cane prn. He states that he is still  " "but he and his spouse has been  for \"about a decade\". Daughter Timothy is main contact and lives with her mom.\"        Madhavi Peerz RN      "

## 2024-03-26 NOTE — PROGRESS NOTES
Vascular Surgery Progress Note  03/26/2024       Subjective:  Doing well today, reports he has no pain whatsoever. [facility] team unable to do vac placement yesterday, plan for placement today. Reports eating, drinking well. No fever, chills, complaints.      Objective:  Temp:  [97.9  F (36.6  C)-98.6  F (37  C)] 98.2  F (36.8  C)  Pulse:  [60-71] 64  Resp:  [16-19] 19  BP: (152-192)/(67-84) 192/84  SpO2:  [95 %-99 %] 95 %    I/O last 3 completed shifts:  In: 600 [P.O.:600]  Out: 1900 [Urine:1900]      Gen: Awake, alert, NAD  Resp: NLB on RA  Abd: not examined,   Incision: amputation site clean, dry, wrapped with no strikethrough  Ext: WWP, no edema in LLE       Labs:  Recent Labs   Lab 03/26/24  0611 03/25/24  0639 03/24/24  0932   WBC 7.0 8.1 9.6   HGB 8.8* 9.3* 8.6*   * 496* 472*       Recent Labs   Lab 03/26/24  0822 03/26/24  0611 03/26/24  0201 03/25/24  0813 03/25/24  0639 03/24/24  1136 03/24/24  0932 03/22/24  1942 03/22/24  1445   NA  --  137  --   --  138  --  135   < > 118*   POTASSIUM  --  3.8  --   --  4.0  --  3.8   < > 4.9   CHLORIDE  --  105  --   --  108*  --  104   < > 82*   CO2  --  26  --   --  21*  --  22   < > 23   BUN  --  13.2  --   --  16.8  --  27.1*   < > 62.9*   CR  --  1.31*  --   --  1.27*  --  1.52*   < > 2.27*   GLC 86 107* 150*   < > 139*   < > 124*   < > 668*   JOSHUA  --  8.6*  --   --  8.4*  --  8.4*   < > 9.2   MAG  --   --   --   --   --   --   --   --  2.4*   PHOS  --  2.6  --   --   --   --   --   --   --     < > = values in this interval not displayed.       Imaging:  No new imaging reviewed     Assessment/Plan:   68 year old male with PMH of poorly controlled T2DM (A1c 14%) with chronic  R heel wound with osteomyelitis,with patient choosing to proceed with below the knee amputation, plan for staged operation with kendra GILLILAND 3/24, with completion planned for several weeks from now, pending improvement in a1c. Doing well postoperatively, denies pain     - Patient has not  taken any pain medications, tylenol sched, prn oxy available.   - Hgb 8.8 today from 9.3, will monitor, recheck tmr.    - BG control still improved 115-288, medicine team adjusting regimen.   - Abx stopped per ID   - WOC consult for vac placement on guillotine amputation site, should be able to place today.   - Patient will need to be able to tolerate vac placement as well as a change at bedside prior to discharge, next change will be thursday.   - Will plan for completion BKA in coming weeks, patient will need to have follow up arranged with our team in 4 weeks, with A1c check at that time, with goal A1c <10 to proceed with amputation for improved wound healing potential. Given current A1c, wound would have high risk of infection or poor healing if we were to proceed with formalization at this time, discussed this with patient     Discussed with vascular surgery staff, who is in agreement with the above.  - - - - - - - - - - - - - - - - - -  Rudi Ochoa, PGY-3  Vascular Surgery Resident

## 2024-03-27 ENCOUNTER — APPOINTMENT (OUTPATIENT)
Dept: OCCUPATIONAL THERAPY | Facility: HOSPITAL | Age: 69
DRG: 854 | End: 2024-03-27
Payer: MEDICARE

## 2024-03-27 ENCOUNTER — APPOINTMENT (OUTPATIENT)
Dept: PHYSICAL THERAPY | Facility: HOSPITAL | Age: 69
DRG: 854 | End: 2024-03-27
Payer: MEDICARE

## 2024-03-27 LAB
BACTERIA BLD CULT: NO GROWTH
BACTERIA BLD CULT: NO GROWTH
BACTERIA SKIN AEROBE CULT: ABNORMAL
CREAT SERPL-MCNC: 1.24 MG/DL (ref 0.67–1.17)
EGFRCR SERPLBLD CKD-EPI 2021: 64 ML/MIN/1.73M2
ERYTHROCYTE [DISTWIDTH] IN BLOOD BY AUTOMATED COUNT: 13.4 % (ref 10–15)
GLUCOSE BLDC GLUCOMTR-MCNC: 102 MG/DL (ref 70–99)
GLUCOSE BLDC GLUCOMTR-MCNC: 205 MG/DL (ref 70–99)
GLUCOSE BLDC GLUCOMTR-MCNC: 222 MG/DL (ref 70–99)
GLUCOSE BLDC GLUCOMTR-MCNC: 229 MG/DL (ref 70–99)
GLUCOSE BLDC GLUCOMTR-MCNC: 284 MG/DL (ref 70–99)
GRAM STAIN RESULT: ABNORMAL
HCT VFR BLD AUTO: 28.9 % (ref 40–53)
HGB BLD-MCNC: 9.4 G/DL (ref 13.3–17.7)
MCH RBC QN AUTO: 28.5 PG (ref 26.5–33)
MCHC RBC AUTO-ENTMCNC: 32.5 G/DL (ref 31.5–36.5)
MCV RBC AUTO: 88 FL (ref 78–100)
PATH REPORT.COMMENTS IMP SPEC: NORMAL
PATH REPORT.COMMENTS IMP SPEC: NORMAL
PATH REPORT.FINAL DX SPEC: NORMAL
PATH REPORT.GROSS SPEC: NORMAL
PATH REPORT.MICROSCOPIC SPEC OTHER STN: NORMAL
PATH REPORT.RELEVANT HX SPEC: NORMAL
PHOTO IMAGE: NORMAL
PLATELET # BLD AUTO: 620 10E3/UL (ref 150–450)
RBC # BLD AUTO: 3.3 10E6/UL (ref 4.4–5.9)
WBC # BLD AUTO: 8.1 10E3/UL (ref 4–11)

## 2024-03-27 PROCEDURE — 85014 HEMATOCRIT: CPT

## 2024-03-27 PROCEDURE — 82565 ASSAY OF CREATININE: CPT | Performed by: INTERNAL MEDICINE

## 2024-03-27 PROCEDURE — 99232 SBSQ HOSP IP/OBS MODERATE 35: CPT | Performed by: INTERNAL MEDICINE

## 2024-03-27 PROCEDURE — 250N000011 HC RX IP 250 OP 636

## 2024-03-27 PROCEDURE — 250N000013 HC RX MED GY IP 250 OP 250 PS 637: Performed by: STUDENT IN AN ORGANIZED HEALTH CARE EDUCATION/TRAINING PROGRAM

## 2024-03-27 PROCEDURE — 36415 COLL VENOUS BLD VENIPUNCTURE: CPT

## 2024-03-27 PROCEDURE — 250N000013 HC RX MED GY IP 250 OP 250 PS 637: Performed by: HOSPITALIST

## 2024-03-27 PROCEDURE — 97535 SELF CARE MNGMENT TRAINING: CPT | Mod: GO

## 2024-03-27 PROCEDURE — 120N000001 HC R&B MED SURG/OB

## 2024-03-27 PROCEDURE — 97116 GAIT TRAINING THERAPY: CPT | Mod: GP

## 2024-03-27 PROCEDURE — 97110 THERAPEUTIC EXERCISES: CPT | Mod: GP

## 2024-03-27 RX ORDER — AMLODIPINE BESYLATE 2.5 MG/1
2.5 TABLET ORAL DAILY
Qty: 30 TABLET | Refills: 0 | Status: SHIPPED | OUTPATIENT
Start: 2024-03-28 | End: 2024-04-26

## 2024-03-27 RX ORDER — ACETAMINOPHEN 325 MG/1
650 TABLET ORAL EVERY 4 HOURS PRN
Qty: 30 TABLET | Refills: 0 | Status: ON HOLD | OUTPATIENT
Start: 2024-03-27 | End: 2024-09-06

## 2024-03-27 RX ORDER — ATORVASTATIN CALCIUM 80 MG/1
80 TABLET, FILM COATED ORAL EVERY EVENING
Qty: 30 TABLET | Refills: 0 | Status: SHIPPED | OUTPATIENT
Start: 2024-03-27 | End: 2024-04-26

## 2024-03-27 RX ORDER — ASPIRIN 81 MG/1
81 TABLET ORAL DAILY
Qty: 30 TABLET | Refills: 0 | Status: SHIPPED | OUTPATIENT
Start: 2024-03-28 | End: 2024-04-26

## 2024-03-27 RX ADMIN — ATORVASTATIN CALCIUM 80 MG: 40 TABLET, FILM COATED ORAL at 22:30

## 2024-03-27 RX ADMIN — HEPARIN SODIUM 5000 UNITS: 5000 INJECTION, SOLUTION INTRAVENOUS; SUBCUTANEOUS at 06:00

## 2024-03-27 RX ADMIN — ASPIRIN 81 MG: 81 TABLET, COATED ORAL at 09:25

## 2024-03-27 RX ADMIN — AMLODIPINE BESYLATE 2.5 MG: 2.5 TABLET ORAL at 07:48

## 2024-03-27 RX ADMIN — HEPARIN SODIUM 5000 UNITS: 5000 INJECTION, SOLUTION INTRAVENOUS; SUBCUTANEOUS at 14:05

## 2024-03-27 RX ADMIN — HEPARIN SODIUM 5000 UNITS: 5000 INJECTION, SOLUTION INTRAVENOUS; SUBCUTANEOUS at 22:30

## 2024-03-27 ASSESSMENT — ACTIVITIES OF DAILY LIVING (ADL)
ADLS_ACUITY_SCORE: 31
ADLS_ACUITY_SCORE: 30
ADLS_ACUITY_SCORE: 30
ADLS_ACUITY_SCORE: 31
ADLS_ACUITY_SCORE: 30
ADLS_ACUITY_SCORE: 31
ADLS_ACUITY_SCORE: 30
ADLS_ACUITY_SCORE: 30

## 2024-03-27 NOTE — PLAN OF CARE
Problem: Wound  Goal: Optimal Functional Ability  Outcome: Progressing     Problem: Wound  Goal: Absence of Infection Signs and Symptoms  Outcome: Progressing     Problem: Wound  Goal: Optimal Pain Control and Function  Outcome: Progressing   Goal Outcome Evaluation:       Pt up in chair this shift.  Working with PT.  Denies any pain.  Tolerating regular diet.  Wound vac patent and intact to right stump.  Dressings intact on left foot.  Sliding scale insulin given for BG of 205 at lunch.    Lian Nix RN

## 2024-03-27 NOTE — PROGRESS NOTES
Vascular Surgery Progress Note  03/27/2024       Subjective:  Doing well today, reports he has no pain whatsoever. [facility] team placed vac, patient tolerated well without issue. Reports eating, drinking well. No fever, chills, complaints.      Objective:  Temp:  [98  F (36.7  C)-98.7  F (37.1  C)] 98.2  F (36.8  C)  Pulse:  [60-79] 79  Resp:  [18] 18  BP: (137-196)/(67-90) 196/90  SpO2:  [96 %-98 %] 96 %    I/O last 3 completed shifts:  In: 860 [P.O.:860]  Out: 1100 [Urine:1100]      Gen: Awake, alert, NAD  Resp: NLB on RA  Abd: not examined,   Incision: amputation site with vac in place, holding seal appropriately, minimal serosang drainage in cannister.  Ext: WWP, no edema in LLE       Labs:  Recent Labs   Lab 03/27/24  0638 03/26/24  0611 03/25/24  0639   WBC 8.1 7.0 8.1   HGB 9.4* 8.8* 9.3*   * 582* 496*       Recent Labs   Lab 03/27/24  0721 03/27/24  0638 03/27/24  0210 03/26/24  2041 03/26/24  0822 03/26/24  0611 03/25/24  0813 03/25/24  0639 03/24/24  1136 03/24/24  0932 03/22/24  1942 03/22/24  1445   NA  --   --   --   --   --  137  --  138  --  135   < > 118*   POTASSIUM  --   --   --   --   --  3.8  --  4.0  --  3.8   < > 4.9   CHLORIDE  --   --   --   --   --  105  --  108*  --  104   < > 82*   CO2  --   --   --   --   --  26  --  21*  --  22   < > 23   BUN  --   --   --   --   --  13.2  --  16.8  --  27.1*   < > 62.9*   CR  --  1.24*  --   --   --  1.31*  --  1.27*  --  1.52*   < > 2.27*   *  --  222* 302*   < > 107*   < > 139*   < > 124*   < > 668*   JOSHUA  --   --   --   --   --  8.6*  --  8.4*  --  8.4*   < > 9.2   MAG  --   --   --   --   --   --   --   --   --   --   --  2.4*   PHOS  --   --   --   --   --  2.6  --   --   --   --   --   --     < > = values in this interval not displayed.       Imaging:  No new imaging reviewed     Assessment/Plan:   68 year old male with PMH of poorly controlled T2DM (A1c 14%) with chronic  R heel wound with osteomyelitis,with patient choosing to proceed  with below the knee amputation, plan for staged operation with kendra BKA 3/24, with completion planned for several weeks from now, pending improvement in a1c. Doing well postoperatively, denies pain     - Patient has not taken any pain medications, tylenol sched, prn oxy available.   - Hgb stable at 9.4 today from 8.8 yesterday, will monitor.   - BG control still improved 115-288, medicine team adjusting regimen.   - Abx stopped per ID   - WOC consult for vac placement on guillotine amputation site tolerated vac change without issue.   - Patient will need to be able to tolerate vac placement as well as a change at bedside prior to discharge, next change will be Friday per WOC notes, okay for discharge from vascular perspective as long as tolerates that without problem.   - Vascular surgery will sign off at this time. Please do not hesitate to reach out with any questions or concerns if issues arise or problems with vac change.     - Will plan for completion BKA in coming weeks, patient will need to have follow up arranged with our team in 4 weeks, with A1c check at that time, with goal A1c <10 to proceed with amputation for improved wound healing potential. Given current A1c, wound would have high risk of infection or poor healing if we were to proceed with formalization at this time, discussed this with patient - Scheduling team has been contacted to arrange this.      Discussed with vascular surgery staff, who is in agreement with the above.  - - - - - - - - - - - - - - - - - -  Rudi Ochoa, PGY-3  Vascular Surgery Resident

## 2024-03-27 NOTE — PROGRESS NOTES
[facility] [place]    PROGRESS NOTE - Hospitalist Service    Assessment and Plan    Principal Problem:    Ulcer of right foot, unspecified ulcer stage (H)  Active Problems:    Hypertension    Hyponatremia    Non compliance with medical treatment    Stage 3b chronic kidney disease (H)    Type 2 diabetes mellitus with right diabetic foot infection (H)    Hyperglycemia    Sepsis (H)    Anemia in other chronic diseases classified elsewhere    Dario Benavides is a 68 year old male with h/o right foot osteomyelitis. He has past medical history of poorly controlled diabetes with Charcot foot status post transmetatarsal amputation of the right foot, history of osteomyelitis, diabetic foot ulcer, CKD stage III presented with worsening right heel ulcer.      Sepsis 2/2 R foot osteomyelitis s/p BKA 3/24  - MRI right foot showed osteomyelitis of the anterior calcaneus.   - s/p right BKA 3/24 with vascular surgery and plan for final stage in a few weeks   - Has been on Zosyn and vancomycin. Per previous [facility] note  Per ID curbside, antibiotics could be discontinued postop. Stopped on 3/24 after surgery  -Pain controlled   - For pain, tylenol and oxycodone 5-10 mg prn and 0.3mg dilaudid for breakthrough   - PT/OT seen patient declines TCU   -on subcutaneous heparin per Vascular  - wound vac ordered per vascular      Diabetes mellitus with hyperglycemia   A1c on admission 14.2 and glucose was over 600. Sister and daughter report patient nonadherence to diabetes diet, not checking glucose or use of insulin over a year.  I discussed with patient and family extensively diabetes management which is likely the root cause of his ongoing foot osteomyelitis.   -Lantus reduced to 16 units   - Novolog sliding scale willsend with patient   - Will need outpatient diabetes education and management     Hyponatremia - likely pseudohyponatremia in setting of hyperglycemia  - resolved      SHANITA on CKD stage 3, resolved    "  Chronic anemia  Hb around 10.5, likely due to ACD with ongoing infection and DM. Previous HMS held off on getting iron studies given ferritin will likely elevated in the setting of infection.   - trend     Clinically Significant Risk Factors      # Overweight: Estimated body mass index is 28.31 kg/m  as calculated from the following:  Height as of this encounter: 1.626 m (5' 4\").  Weight as of this encounter: 74.8 kg (164 lb 14.4 oz)., PRESENT ON ADMISSION    COVID-19 PCR Results           No data to display              COVID-19 Antibody Results, Testing for Immunity           No data to display               Code Status: No CPR- Do NOT Intubate  VTE prophylaxis:  subcutaneous heparin   DIET: Orders Placed This Encounter      Combination Diet Moderate Consistent Carb (60 g CHO per Meal) Diet    Drains/Lines: Patient has Wound vac    Odell Catheter: Not present      Weight bearing status: NWB RLE     Expected Discharge Date: 03/27/2024      Destination: home with family;home with help/services      # DMII: A1C = 14.2 % (Ref range: <5.7 %) within past 6 months    Subjective:  Pain controlled plan for discharge tomorrow with HC,declines TCU     PHYSICAL EXAM  Temp:  [98  F (36.7  C)-98.7  F (37.1  C)] 98.2  F (36.8  C)  Pulse:  [60-79] 79  Resp:  [18-19] 18  BP: (137-196)/(67-90) 196/90  SpO2:  [95 %-98 %] 96 %  Wt Readings from Last 1 Encounters:   03/23/24 72.4 kg (159 lb 11.2 oz)       Intake/Output Summary (Last 24 hours) at 3/25/2024 0811  Last data filed at 3/25/2024 0600  Gross per 24 hour   Intake 1073 ml   Output 1450 ml   Net -377 ml      Body mass index is 22.27 kg/m .    Physical Exam  Vitals and nursing note reviewed.   Constitutional:       Appearance: Normal appearance.   HENT:      Head: Normocephalic and atraumatic.   Cardiovascular:      Rate and Rhythm: Normal rate and regular rhythm.      Pulses: Normal pulses.   Pulmonary:      Effort: Pulmonary effort is normal. No respiratory distress.      " Breath sounds: Normal breath sounds. No wheezing or rales.   Abdominal:      General: Bowel sounds are normal. There is no distension.      Palpations: Abdomen is soft.      Tenderness: There is no abdominal tenderness. There is no guarding.   Musculoskeletal:      Comments: RLE BKA,  wound vac in place    Neurological:      General: No focal deficit present.      Mental Status: He is alert and oriented to person, place, and time. Mental status is at baseline.       PERTINENT LABS/IMAGING:  Results for orders placed or performed during the hospital encounter of 03/22/24   Foot  XR, G/E 3 views, right    Impression    IMPRESSION: Status post amputation of the mid and forefoot. No fracture or osteomyelitis.   US Lower Extremity Arterial Duplex Bilateral    Impression    IMPRESSION:  1.  RIGHT LOWER EXTREMITY: Mild resting ischemia. Moderate proximal SFA stenosis.  2.  LEFT LOWER EXTREMITY: Severe resting ischemia. Digit pressure favorable for wound healing. Given markedly diminished velocities and monophasic waveforms at the distal thigh, suspect mid - distal SFA disease. Stenotic digit waveforms.  3.  Enlarged right inguinal lymph nodes, largest measuring 2.2 x 1.6 x 1.1 cm.   MR Ankle Right w/o Contrast    Impression    IMPRESSION:  1.  Plantar hindfoot ulceration with surrounding cellulitis.  2.  Associated osteomyelitis of the adjacent anterior calcaneus.   US ANGELO with PPG wo Exercise    Impression    IMPRESSION:  1.  RIGHT LOWER EXTREMITY: Mild resting ischemia. Moderate proximal SFA stenosis.  2.  LEFT LOWER EXTREMITY: Severe resting ischemia. Digit pressure favorable for wound healing. Given markedly diminished velocities and monophasic waveforms at the distal thigh, suspect mid - distal SFA disease. Stenotic digit waveforms.  3.  Enlarged right inguinal lymph nodes, largest measuring 2.2 x 1.6 x 1.1 cm.   MR Foot Left w/o & w Contrast    Impression    IMPRESSION:  1.  Shallow ulceration plantar to the first  MTP joint with associated cellulitis - phlegmon and/or fibrosis.  2.  No abscess.  3.  No evidence of osteomyelitis.       Imaging results reviewed over the past 24 hrs:   No results found for this or any previous visit (from the past 24 hour(s)).    Recent Labs   Lab 03/27/24  0721 03/27/24  0638 03/27/24  0210 03/26/24  2041 03/26/24  0822 03/26/24  0611 03/25/24  0813 03/25/24  0639 03/24/24  1136 03/24/24  0932 03/22/24  1942 03/22/24  1445   WBC  --  8.1  --   --   --  7.0  --  8.1  --  9.6   < > 16.8*   HGB  --  9.4*  --   --   --  8.8*  --  9.3*  --  8.6*   < > 10.5*   MCV  --  88  --   --   --  87  --  87  --  86   < > 84   PLT  --  620*  --   --   --  582*  --  496*  --  472*   < > 516*   NA  --   --   --   --   --  137  --  138  --  135   < > 118*   POTASSIUM  --   --   --   --   --  3.8  --  4.0  --  3.8   < > 4.9   CHLORIDE  --   --   --   --   --  105  --  108*  --  104   < > 82*   CO2  --   --   --   --   --  26  --  21*  --  22   < > 23   BUN  --   --   --   --   --  13.2  --  16.8  --  27.1*   < > 62.9*   CR  --  1.24*  --   --   --  1.31*  --  1.27*  --  1.52*   < > 2.27*   ANIONGAP  --   --   --   --   --  6*  --  9  --  9   < > 13   JOSHUA  --   --   --   --   --  8.6*  --  8.4*  --  8.4*   < > 9.2   *  --  222* 302*   < > 107*   < > 139*   < > 124*   < > 668*   ALBUMIN  --   --   --   --   --  2.6*  --   --   --   --   --  3.1*   PROTTOTAL  --   --   --   --   --   --   --   --   --   --   --  8.3   BILITOTAL  --   --   --   --   --   --   --   --   --   --   --  0.3   ALKPHOS  --   --   --   --   --   --   --   --   --   --   --  270*   ALT  --   --   --   --   --   --   --   --   --   --   --  21   AST  --   --   --   --   --   --   --   --   --   --   --  25    < > = values in this interval not displayed.     No results fo    40 MINUTES SPENT BY ME on the date of service doing chart review, history, exam, documentation, discussion with nursing staff and specialist, & further activities per  the note.  Naty Kaye MD  [facility] Medicine Service  [phone]

## 2024-03-27 NOTE — PROGRESS NOTES
Care Management Follow Up    Length of Stay (days): 5    Expected Discharge Date: 03/27/2024     Concerns to be Addressed: discharge planning     Patient plan of care discussed at interdisciplinary rounds: Yes    Anticipated Discharge Disposition:  home with home care     Anticipated Discharge Services:  home care PT, OT, SN  Anticipated Discharge DME:  has DME at home    Patient/family educated on Medicare website which has current facility and service quality ratings:  yes  Education Provided on the Discharge Plan:  yes  Patient/Family in Agreement with the Plan:  yes    Referrals Placed by CM/SW:  home care  Private pay costs discussed: Not applicable    Additional Information:  TALAT 3/24.     PT and OT recommending TCU.    Patient adamant that he does not want to go to a TCU.  Patient requesting home care.   Patient reports he has support at home 24/7, one level living, a few steps to enter home that family can assist him with.   Patient reports he has a knee scooter, wheelchair, walker, and cane at home.    [facility] accepted for RN (wound VAC), PT, and OT.    3/26 Patient also reports he has a wound VAC at home from about a year ago he never returned.  Wound VAC is in patient's bedroom and patient reports his bedroom is locked and he is the only one with the key.  Patient does not trust anyone else to have the key to retreive wound VAC.   CM will order a new wound VAC from [facility] so patient can have home VAC attached prior to discharge.     3/27: DB ordered [facility] wound VAC, clinical information faxed.  No [facility] unit available, though hopeful to have unit by 1330 today.    10:16 AM  [facility] emailed CM.  Patient does not have Medicare part B to pay for wound VAC.  This would be self pay or apply for [facility].  Ashley has a I wound VAC at home with supplies (from a year ago that was never returned).  DB discussed with WOC RN the possibility of patient going home with wet-to-dry dressing and having  "home care nurse come out to the home and apply the wound VAC right away.  WOC RN said that if patient has a KCI vac with dressings and canister, patient could go home with current dressing and a glove covering tubing as long as home VAC is applied within 2 hours.   CM reached out to home care liaison to see if it would be possible for home care to come out and see patient in the home today.  Home care liaison is looking into this.  If this is not possible, CM will discuss dov care application with patient.     [facility] can come out tomorrow to apply wound VAC.    Recommend wet to dry dressing for longer time frame to get VAC attached incase any issues arise.    We should send a couple foam dressing with incase patient does not have the correct dressing.   HC liaison is going to check and see what time home care nurse can come out to see patient.  Ok to discharge today with wet to dry dressing on if home today and see tomorrow for wound vac   Home Care is going to call patient's son to check in.     Now the concern is, who is going to supply additional supplies if needed.   CM will have patient fill out [facility] application to be be to receive additional supplies if needed.    CM met with patient and informed him of the current situation and plan.  Patient will fill out [facility] application and CM will fax to [employer].    CM met with patient, helped patient fill out [facility] application.  CM faxed application to [employer] and emailed [facility] back informing them of incoming fax.   [facility] application approved.  Wound VAC to be delivered.     CM [facility] who will be providing wound [facility] and ordering additional supplies:  [facility]  Phone: [phone] Fax: [phone]      Plan: Discharge 3/28  home with home care RN, PT, and OT.   [facility] wound VAC. Family transport.      Social Hx:  \"Patient lives in his home, son Tarun " "recently moved in \"was out of the picture for awhile because he was incarcinated\". Tarun's girlfriend also lives in the home. Patient is independent with all his I/ADLs. He states that he uses a walker or cane prn. He states that he is still  but he and his spouse has been  for \"about a decade\". Daughter Timothy is main contact and lives with her mom.\"        Madhavi Perez RN      "

## 2024-03-27 NOTE — PLAN OF CARE
Problem: Adult Inpatient Plan of Care  Goal: Optimal Comfort and Wellbeing  Outcome: Progressing     Problem: Wound  Goal: Optimal Coping  Outcome: Progressing  Goal: Absence of Infection Signs and Symptoms  Outcome: Progressing  Goal: Optimal Pain Control and Function  Outcome: Progressing     Problem: Surgery Nonspecified  Goal: Absence of Bleeding  Outcome: Progressing   Goal Outcome Evaluation:    [id number]       Pt is A/O x 4 denies pain or discomfort. VSS, ; wound vac patent and dressing clean, dry, intact. Continue to monitor.Tiffany Astorga RN

## 2024-03-28 ENCOUNTER — APPOINTMENT (OUTPATIENT)
Dept: PHYSICAL THERAPY | Facility: HOSPITAL | Age: 69
DRG: 854 | End: 2024-03-28
Payer: MEDICARE

## 2024-03-28 VITALS
SYSTOLIC BLOOD PRESSURE: 161 MMHG | DIASTOLIC BLOOD PRESSURE: 75 MMHG | HEART RATE: 74 BPM | TEMPERATURE: 98.3 F | OXYGEN SATURATION: 98 % | HEIGHT: 71 IN | WEIGHT: 159.7 LBS | RESPIRATION RATE: 18 BRPM | BODY MASS INDEX: 22.36 KG/M2

## 2024-03-28 DIAGNOSIS — Z89.431 STATUS POST TRANSMETATARSAL AMPUTATION OF RIGHT FOOT (H): Primary | ICD-10-CM

## 2024-03-28 LAB
ANION GAP SERPL CALCULATED.3IONS-SCNC: 6 MMOL/L (ref 7–15)
BACTERIA BLD CULT: NO GROWTH
BACTERIA BLD CULT: NO GROWTH
BUN SERPL-MCNC: 12.6 MG/DL (ref 8–23)
CALCIUM SERPL-MCNC: 8.7 MG/DL (ref 8.8–10.2)
CHLORIDE SERPL-SCNC: 102 MMOL/L (ref 98–107)
CREAT SERPL-MCNC: 1.31 MG/DL (ref 0.67–1.17)
DEPRECATED HCO3 PLAS-SCNC: 29 MMOL/L (ref 22–29)
EGFRCR SERPLBLD CKD-EPI 2021: 60 ML/MIN/1.73M2
ERYTHROCYTE [DISTWIDTH] IN BLOOD BY AUTOMATED COUNT: 13.3 % (ref 10–15)
GLUCOSE BLDC GLUCOMTR-MCNC: 205 MG/DL (ref 70–99)
GLUCOSE BLDC GLUCOMTR-MCNC: 86 MG/DL (ref 70–99)
GLUCOSE SERPL-MCNC: 103 MG/DL (ref 70–99)
HCT VFR BLD AUTO: 28.2 % (ref 40–53)
HGB BLD-MCNC: 9.1 G/DL (ref 13.3–17.7)
MCH RBC QN AUTO: 28 PG (ref 26.5–33)
MCHC RBC AUTO-ENTMCNC: 32.3 G/DL (ref 31.5–36.5)
MCV RBC AUTO: 87 FL (ref 78–100)
PLATELET # BLD AUTO: 607 10E3/UL (ref 150–450)
POTASSIUM SERPL-SCNC: 3.8 MMOL/L (ref 3.4–5.3)
RBC # BLD AUTO: 3.25 10E6/UL (ref 4.4–5.9)
SODIUM SERPL-SCNC: 137 MMOL/L (ref 135–145)
WBC # BLD AUTO: 9.1 10E3/UL (ref 4–11)

## 2024-03-28 PROCEDURE — 85027 COMPLETE CBC AUTOMATED: CPT | Performed by: INTERNAL MEDICINE

## 2024-03-28 PROCEDURE — 36415 COLL VENOUS BLD VENIPUNCTURE: CPT | Performed by: INTERNAL MEDICINE

## 2024-03-28 PROCEDURE — 250N000013 HC RX MED GY IP 250 OP 250 PS 637: Performed by: HOSPITALIST

## 2024-03-28 PROCEDURE — 80048 BASIC METABOLIC PNL TOTAL CA: CPT | Performed by: INTERNAL MEDICINE

## 2024-03-28 PROCEDURE — 250N000011 HC RX IP 250 OP 636

## 2024-03-28 PROCEDURE — 99239 HOSP IP/OBS DSCHRG MGMT >30: CPT | Performed by: INTERNAL MEDICINE

## 2024-03-28 PROCEDURE — 250N000013 HC RX MED GY IP 250 OP 250 PS 637: Performed by: STUDENT IN AN ORGANIZED HEALTH CARE EDUCATION/TRAINING PROGRAM

## 2024-03-28 PROCEDURE — 97110 THERAPEUTIC EXERCISES: CPT | Mod: GP

## 2024-03-28 PROCEDURE — 97116 GAIT TRAINING THERAPY: CPT | Mod: GP

## 2024-03-28 PROCEDURE — 250N000013 HC RX MED GY IP 250 OP 250 PS 637: Performed by: INTERNAL MEDICINE

## 2024-03-28 RX ORDER — FLURBIPROFEN SODIUM 0.3 MG/ML
SOLUTION/ DROPS OPHTHALMIC
Qty: 100 EACH | Refills: 3 | Status: SHIPPED | OUTPATIENT
Start: 2024-03-28 | End: 2024-04-26

## 2024-03-28 RX ORDER — GLUCOSAMINE HCL/CHONDROITIN SU 500-400 MG
CAPSULE ORAL
Qty: 100 EACH | Refills: 3 | Status: SHIPPED | OUTPATIENT
Start: 2024-03-28

## 2024-03-28 RX ORDER — INSULIN GLARGINE 100 [IU]/ML
16 INJECTION, SOLUTION SUBCUTANEOUS AT BEDTIME
Qty: 4.8 ML | Refills: 0 | Status: SHIPPED | OUTPATIENT
Start: 2024-03-28 | End: 2024-04-26

## 2024-03-28 RX ORDER — OXYCODONE HYDROCHLORIDE 5 MG/1
5 TABLET ORAL EVERY 4 HOURS PRN
Qty: 20 TABLET | Refills: 0 | Status: SHIPPED | OUTPATIENT
Start: 2024-03-28 | End: 2024-04-26

## 2024-03-28 RX ADMIN — OXYCODONE HYDROCHLORIDE 10 MG: 5 TABLET ORAL at 14:47

## 2024-03-28 RX ADMIN — HEPARIN SODIUM 5000 UNITS: 5000 INJECTION, SOLUTION INTRAVENOUS; SUBCUTANEOUS at 07:00

## 2024-03-28 RX ADMIN — HEPARIN SODIUM 5000 UNITS: 5000 INJECTION, SOLUTION INTRAVENOUS; SUBCUTANEOUS at 14:47

## 2024-03-28 RX ADMIN — AMLODIPINE BESYLATE 2.5 MG: 2.5 TABLET ORAL at 08:06

## 2024-03-28 RX ADMIN — ASPIRIN 81 MG: 81 TABLET, COATED ORAL at 08:06

## 2024-03-28 ASSESSMENT — ACTIVITIES OF DAILY LIVING (ADL)
ADLS_ACUITY_SCORE: 30

## 2024-03-28 NOTE — PLAN OF CARE
[id number]    Goal Outcome Evaluation: Vss. Wound vac remains in place with BKA. Wound on bottom on left foot covered by mepilex which is intact. Flat affect. Continue to monitor.       Problem: Wound  Goal: Optimal Coping  Outcome: Progressing     Problem: Wound  Goal: Optimal Pain Control and Function  Outcome: Progressing

## 2024-03-28 NOTE — PROGRESS NOTES
"Care Management Follow Up    Length of Stay (days): 6    Expected Discharge Date: 03/28/2024     Concerns to be Addressed: discharge planning     Patient plan of care discussed at interdisciplinary rounds: Yes    Anticipated Discharge Disposition:  home with home care     Anticipated Discharge Services:  home care PT, OT, SN  Anticipated Discharge DME:  has DME at home    Patient/family educated on Medicare website which has current facility and service quality ratings:  yes  Education Provided on the Discharge Plan:  yes  Patient/Family in Agreement with the Plan:  yes    Referrals Placed by CM/SW:  home care  Private pay costs discussed: Not applicable    Additional Information:  TALAT 3/24.  Wound VAC.    PT and OT recommending TCU.    Patient adamant that he does not want to go to a TCU.  Patient requesting home care.   Patient reports he has support at home 24/7, one level living, a few steps to enter home that family can assist him with.   Patient reports he has a knee scooter, wheelchair, walker, and cane at home.    [facility] accepted for home care RN, PT, OT.  Discharge orders placed and sent to [facility] wound VAC approved [facility] application.    CM called [facility] to check on status of delivery of wound VAC.  [facility] will send out page to  to call CM with an ETA.    [facility] delivery called CM.    Wound VAC will be delivered between [id number].      Plan: Discharge 3/28  home with home care RN, PT, and OT.   [facility] wound VAC. Family transport.      Social Hx:  \"Patient lives in his home, son Tarun recently moved in \"was out of the picture for awhile because he was incarcinated\". Tarun's girlfriend also lives in the home. Patient is independent with all his I/ADLs. He states that he uses a walker or cane prn. He states that he is still  but he and his spouse has been  for \"about a decade\". Daughter Timothy is main contact and lives with her mom.\"    Madhavi Perez " RN

## 2024-03-28 NOTE — PLAN OF CARE
Problem: Adult Inpatient Plan of Care  Goal: Plan of Care Review  Description: The Plan of Care Review/Shift note should be completed every shift.  The Outcome Evaluation is a brief statement about your assessment that the patient is improving, declining, or no change.  This information will be displayed automatically on your shift  note.  3/28/2024 1015 by Luis Campoverde RN  Outcome: Adequate for Care Transition  Flowsheets (Taken 3/28/2024 1015)  Plan of Care Reviewed With: patient  Overall Patient Progress: improving     Problem: Adult Inpatient Plan of Care  Goal: Absence of Hospital-Acquired Illness or Injury  3/28/2024 1015 by Luis Campoverde RN  Outcome: Adequate for Care Transition     Problem: Adult Inpatient Plan of Care  Goal: Optimal Comfort and Wellbeing  3/28/2024 1015 by Luis Campoverde RN  Outcome: Adequate for Care Transition    SUBJECTIVE:  Ready to discharge and/or transiton care. Pt A/O with VSS. Transportation provided by [employer] wheelchair. Discharge expected today.      Goal Outcome Evaluation:      Plan of Care Reviewed With: patient    Overall Patient Progress: improving    Luis Appiah RN

## 2024-03-28 NOTE — PLAN OF CARE
"  Problem: Adult Inpatient Plan of Care  Goal: Plan of Care Review  Description: The Plan of Care Review/Shift note should be completed every shift.  The Outcome Evaluation is a brief statement about your assessment that the patient is improving, declining, or no change.  This information will be displayed automatically on your shift  note.  Outcome: Progressing  Flowsheets (Taken 3/27/2024 1926)  Outcome Evaluation: Patient denies pain but complains of chronic numbness in bilat lower etremities. Mepiplex dressing applied to bottom of foot as current dressing was falling off. AO1 to bathroom.   and ate 100% (58 carbs).  Plan of Care Reviewed With: patient  Overall Patient Progress: improving  Goal: Patient-Specific Goal (Individualized)  Description: You can add care plan individualizations to a care plan. Examples of Individualization might be:  \"Parent requests to be called daily at 9am for status\", \"I have a hard time hearing out of my right ear\", or \"Do not touch me to wake me up as it startles  me\".  Outcome: Progressing  Goal: Absence of Hospital-Acquired Illness or Injury  Outcome: Progressing  Goal: Optimal Comfort and Wellbeing  Outcome: Progressing  Goal: Readiness for Transition of Care  Outcome: Progressing   Goal Outcome Evaluation:      Plan of Care Reviewed With: patient    Overall Patient Progress: improvingOverall Patient Progress: improving    Outcome Evaluation: Patient denies pain but complains of chronic numbness in bilat lower etremities. Mepiplex dressing applied to bottom of foot as current dressing was falling off. AO1 to bathroom.   and ate 100% (58 carbs).      "

## 2024-03-28 NOTE — PROGRESS NOTES
Care Management Discharge Note    Discharge Date: 03/28/2024       Discharge Disposition: Home Care    Discharge Services: None    Discharge DME: Wound Vac    Discharge Transportation: family or friend will provide    Private pay costs discussed: Not applicable    Does the patient's insurance plan have a 3 day qualifying hospital stay waiver?  No    PAS Confirmation Code: NA  Patient/family educated on Medicare website which has current facility and service quality ratings: yes    Education Provided on the Discharge Plan: Yes  Persons Notified of Discharge Plans: patient, home care, nurse, MD  Patient/Family in Agreement with the Plan: yes    Handoff Referral Completed: Yes    Additional Information:  PT and OT recommending TCU.    Patient adamant that he does not want to go to a TCU.  Patient requesting home care.   Patient reports he has support at home 24/7, one level living, a few steps to enter home that family can assist him with.   Patient reports he has a knee scooter, wheelchair, walker, and cane at home.     [facility] accepted for home care RN, PT, OT.  Discharge orders sent to [facility]     Wound VAC delivered from [facility] and RN placed home VAC on patient.     RN tells CM that patient's sister called with concerns about patient going home.  [facility] home care liaison notified  that home care scheduler called sister as well and sister voiced concerns about patient's living environment.  Patient's son lives with patient, son is recently out of detention and has friends doing drugs in the home.  Sister has been trying to get patient out of the home and does not feel it is safe to return home.    Patient is adamant he wants to return home.  It is patient's right to return home if he chooses to do so.  CM discussed with home care liaison and plan is to have SW and potentially  go to the home with home care nurse and assess the situation and file a Vulnerable Adult report if needed.     Per  nurse, patient has someone coming to pick him up.  Per nurse, patient's ride cancelled on him.    CM informed patient of transportation costs and patient is unable to pay for transport.  Patient is calling someone else to pick him up.    Madhavi Perez RN

## 2024-03-28 NOTE — PROGRESS NOTES
Occupational Therapy Discharge Summary    Reason for therapy discharge:    Discharged to home with home therapy.    Progress towards therapy goal(s). See goals on Care Plan in [facility] electronic health record for goal details.  Goals partially met.  Barriers to achieving goals:   limited tolerance for therapy.    Therapy recommendation(s):    Continued therapy is recommended.  Rationale/Recommendations:   .    Pt would benefit from home care for further progression toward functional mobility goals

## 2024-03-28 NOTE — PLAN OF CARE
Physical Therapy Discharge Summary    Reason for therapy discharge:    Discharged to home with home therapy.    Progress towards therapy goal(s). See goals on Care Plan in [facility] electronic health record for goal details.  Goals partially met.  Barriers to achieving goals:   discharge from facility.    Therapy recommendation(s):    Continued therapy is recommended.  Rationale/Recommendations:  to improve functional strength and mobility.

## 2024-03-28 NOTE — DISCHARGE SUMMARY
[facility]  Hospitalist Discharge Summary      Date of Admission:  3/22/2024  Date of Discharge:  3/28/2024  Discharging Provider: Naty Kaye MD  Discharge Service: Hospitalist Service    Discharge Diagnoses   Principal Problem:    Ulcer of right foot, unspecified ulcer stage (H)  Active Problems:    Hypertension    Hyponatremia    Non compliance with medical treatment    Stage 3b chronic kidney disease (H)    Type 2 diabetes mellitus with right diabetic foot infection (H)    Hyperglycemia    Sepsis (H)    Anemia in other chronic diseases classified elsewhere      Clinically Significant Risk Factors     # DMII: A1C = 14.2 % (Ref range: <5.7 %) within past 6 months       Follow-ups Needed After Discharge   Follow-up Appointments     Follow-up and recommended labs and tests       Follow up with primary care provider, Physician No Ref-Primary, within   3-5days, to evaluate medication change, to evaluate after surgery, and for   hospital follow- up. The following labs/tests are recommended: Bmp and   CBC. Monitor BG and make further corrections .   Vascular to arrange outpatient follow up            Unresulted Labs Ordered in the Past 30 Days of this Admission       Date and Time Order Name Status Description    3/23/2024  1:35 PM Blood Culture Hand, Left Preliminary     3/23/2024  1:35 PM Blood Culture Arm, Left Preliminary         These results will be followed up by Vascular    Discharge Disposition   Admited to home care: RN/PT/OT  Discharged to home  Condition at discharge: Stable    Hospital Course   Dario Benavides is a 68 year old male with h/o right foot osteomyelitis. He has past medical history of poorly controlled diabetes with Charcot foot status post transmetatarsal amputation of the right foot, history of osteomyelitis, diabetic foot ulcer, CKD stage III presented with worsening right heel ulcer.      Sepsis 2/2 R foot osteomyelitis s/p BKA 3/24  - MRI right foot  showed osteomyelitis of the anterior calcaneus.   - s/p right BKA 3/24 with vascular surgery and plan for final stage in a few weeks   - was been on Zosyn and vancomycin. Per previous [facility] note  Per ID mekhi, antibiotics could be discontinued postop. Stopped on 3/24 after surgery  -Pain controlled   - For pain, tylenol and oxycodone 5m  - wound vac ordered per vascular      Diabetes mellitus with hyperglycemia   A1c on admission 14.2 and glucose was over 600. Sister and daughter report patient nonadherence to diabetes diet, not checking glucose or use of insulin over a year.  I discussed with patient and family extensively diabetes management which is likely the root cause of his ongoing foot osteomyelitis.   -Lantus  16 units   - follow up with PCP     Hyponatremia - likely pseudohyponatremia in setting of hyperglycemia  - resolved      SHANITA on CKD stage 3, resolved     Chronic anemia  Hb around 10.5, likely due to ACD with ongoing infection and DM. Previous HMS held off on getting iron studies given ferritin will likely elevated in the setting of infection.   - recheck at TCU        Consultations This Hospital Stay   PHARMACY TO DOSE VANCO  VASCULAR SURGERY IP CONSULT  PODIATRY IP CONSULT  INFECTIOUS DISEASES IP CONSULT  PHARMACY TO DOSE VANCO  OCCUPATIONAL THERAPY ADULT IP CONSULT  PHYSICAL THERAPY ADULT IP CONSULT  WOUND OSTOMY CONTINENCE NURSE  IP CONSULT  CARE MANAGEMENT / SOCIAL WORK IP CONSULT  PROSTHETICS IP CONSULT  PODIATRY IP CONSULT  PHYSICAL THERAPY ADULT IP CONSULT  OCCUPATIONAL THERAPY ADULT IP CONSULT  WOUND OSTOMY CONTINENCE NURSE  IP CONSULT  WOUND OSTOMY CONTINENCE NURSE  IP CONSULT    Code Status   No CPR- Do NOT Intubate    Time Spent on this Encounter   I, Naty Kaye MD, personally saw the patient today and spent greater than 30 minutes discharging this patient.       Naty aKye MD  [address] [address]  Phone:  [phone]  Fax: [phone]  ______________________________________________________________________    Physical Exam   Vital Signs: Temp: 98.3  F (36.8  C) Temp src: Oral BP: (!) 161/75 Pulse: 74   Resp: 18 SpO2: 98 % O2 Device: None (Room air)    Weight: 159 lbs 11.2 oz  Physical Exam  Vitals and nursing note reviewed.   Constitutional:       Comments: Chronically ill-appearing    Cardiovascular:      Rate and Rhythm: Normal rate and regular rhythm.      Pulses: Normal pulses.   Pulmonary:      Effort: Pulmonary effort is normal. No respiratory distress.      Breath sounds: Normal breath sounds. No wheezing or rales.   Abdominal:      General: Bowel sounds are normal. There is no distension.      Palpations: Abdomen is soft.      Tenderness: There is no abdominal tenderness. There is no guarding.   Musculoskeletal:      Comments: Right BKA with wound vac   Neurological:      Mental Status: He is alert and oriented to person, place, and time. Mental status is at baseline.             Primary Care Physician   Physician No Ref-Primary    Discharge Orders      Home Care Referral      Reason for your hospital stay    Principal Problem:    Ulcer of right foot, unspecified ulcer stage (H)  Active Problems:    Hypertension    Hyponatremia    Non compliance with medical treatment    Stage 3b chronic kidney disease (H)    Type 2 diabetes mellitus with right diabetic foot infection (H)    Hyperglycemia    Sepsis (H)    Anemia in other chronic diseases classified elsewhere     Follow-up and recommended labs and tests     Follow up with primary care provider, Physician No Ref-Primary, within 3-5days, to evaluate medication change, to evaluate after surgery, and for hospital follow- up. The following labs/tests are recommended: Bmp and CBC. Monitor BG and make further corrections .   Vascular to arrange outpatient follow up     Activity    Your activity upon discharge: activity as tolerated and no driving until cleared by  Surgery     Monitor and record    blood glucose 4 times a day, before meals and at bedtime and notify md for blood glucose less than 80 or greater than 180 on two consecutive checks     Tubes and drains    You are going home with the following tubes or drains: Wound Vac.  Tube cares per hospital or home care instructions     Discharge Instructions    Bring your blood sugar checks with you to your PCP follow up     Diet    Follow this diet upon discharge: Orders Placed This Encounter      Combination Diet Moderate Consistent Carb (60 g CHO per Meal) Diet       Significant Results and Procedures   Most Recent 3 CBC's:  Recent Labs   Lab Test 03/28/24  0710 03/27/24  0638 03/26/24  0611   WBC 9.1 8.1 7.0   HGB 9.1* 9.4* 8.8*   MCV 87 88 87   * 620* 582*     Most Recent 3 BMP's:  Recent Labs   Lab Test 03/28/24  0736 03/28/24  0710 03/27/24  2114 03/27/24  0721 03/27/24  0638 03/26/24  0822 03/26/24  0611 03/25/24  0813 03/25/24  0639   NA  --  137  --   --   --   --  137  --  138   POTASSIUM  --  3.8  --   --   --   --  3.8  --  4.0   CHLORIDE  --  102  --   --   --   --  105  --  108*   CO2  --  29  --   --   --   --  26  --  21*   BUN  --  12.6  --   --   --   --  13.2  --  16.8   CR  --  1.31*  --   --  1.24*  --  1.31*  --  1.27*   ANIONGAP  --  6*  --   --   --   --  6*  --  9   JOSHUA  --  8.7*  --   --   --   --  8.6*  --  8.4*   GLC 86 103* 284*   < >  --    < > 107*   < > 139*    < > = values in this interval not displayed.     Most Recent 2 LFT's:  Recent Labs   Lab Test 03/22/24  1445 08/15/23  0534   AST 25 33   ALT 21 20   ALKPHOS 270* 285*   BILITOTAL 0.3 1.0     Most Recent 3 INR's:No lab results found.  7-Day Micro Results       Collected Updated Procedure Result Status      03/23/2024 1433 03/27/2024 1805 Blood Culture Hand, Left [[id number]]   Blood from Hand, Left    Preliminary result Component Value   Culture No growth after 4 days  [P]                03/23/2024 1431 03/27/2024 1805 Blood  Culture Arm, Left [[id number]]   Blood from Arm, Left    Preliminary result Component Value   Culture No growth after 4 days  [P]                03/22/2024 2000 03/27/2024 2113 Skin Aerobic Bacterial Culture Routine With Gram Stain [[id number]]    (Abnormal)   Skin from Foot, Right    Final result Component Value   Culture 4+ Escherichia coli    1+ Enterobacter cloacae complex    4+ Staphylococcus aureus    4+ Corynebacterium striatum    Identification obtained by MALDI-TOF mass spectrometry research use only database. Test characteristics determined and verified by the Infectious Diseases Diagnostic Laboratory.  Susceptibilities not routinely done, refer to antibiogram to view typical susceptibility profiles    4+ Streptococcus pyogenes (Group A Streptococcus)    This organism is susceptible to ampicillin, penicillin, vancomycin and the cephalosporins. If treatment is required and your patient is allergic to penicillin, contact the microbiology lab within 5 days to request susceptibility testing.    1+ Proteus mirabilis   Gram Stain Result 4+ Gram positive cocci    3+ Gram negative bacilli    1+ Gram positive bacilli    1+ WBC seen        Susceptibility        Escherichia coli      CARLOS      Ampicillin 8 ug/mL Susceptible      Ampicillin/ Sulbactam 4 ug/mL Susceptible      Cefepime <=1 ug/mL Susceptible      Ceftazidime <=1 ug/mL Susceptible      Ceftriaxone <=1 ug/mL Susceptible      Ciprofloxacin <=0.25 ug/mL Susceptible      Gentamicin <=1 ug/mL Susceptible      Levofloxacin <=0.12 ug/mL Susceptible      Meropenem <=0.25 ug/mL Susceptible      Piperacillin/Tazobactam <=4 ug/mL Susceptible      Tobramycin <=1 ug/mL Susceptible      Trimethoprim/Sulfamethoxazole <=1/19 ug/mL Susceptible                      Susceptibility        Enterobacter cloacae complex      CARLOS      Ampicillin  Resistant  [1]       Ampicillin/ Sulbactam  Resistant  [1]       Cefepime <=1 ug/mL Susceptible      Ceftazidime  Resistant       Ceftriaxone  Resistant      Ciprofloxacin <=0.25 ug/mL Susceptible      Gentamicin <=1 ug/mL Susceptible      Levofloxacin <=0.12 ug/mL Susceptible      Meropenem <=0.25 ug/mL Susceptible      Piperacillin/Tazobactam  Resistant      Tobramycin <=1 ug/mL Susceptible      Trimethoprim/Sulfamethoxazole <=1/19 ug/mL Susceptible                   [1]  Intrinsically Resistant               Susceptibility        Staphylococcus aureus      CARLOS      Clindamycin 0.25 ug/mL Susceptible      Daptomycin 0.5 ug/mL Susceptible      Doxycycline <=0.5 ug/mL Susceptible      Erythromycin <=0.25 ug/mL Susceptible      Gentamicin <=0.5 ug/mL Susceptible      Oxacillin 0.5 ug/mL Susceptible  [1]       Tetracycline <=1 ug/mL Susceptible      Trimethoprim/Sulfamethoxazole <=0.5/9.5 ug/mL Susceptible      Vancomycin 1 ug/mL Susceptible                   [1]  Oxacillin susceptible isolates are susceptible to cephalosporins (example: cefazolin and cephalexin) and beta lactam combination agents. Oxacillin resistant isolates are resistant to these agents.               Susceptibility        Proteus mirabilis      CARLOS      Ampicillin <=2 ug/mL Susceptible      Ampicillin/ Sulbactam <=2 ug/mL Susceptible      Cefepime <=1 ug/mL Susceptible      Ceftazidime <=1 ug/mL Susceptible      Ceftriaxone <=1 ug/mL Susceptible      Ciprofloxacin <=0.25 ug/mL Susceptible      Gentamicin <=1 ug/mL Susceptible      Levofloxacin <=0.12 ug/mL Susceptible      Meropenem <=0.25 ug/mL Susceptible      Piperacillin/Tazobactam <=4 ug/mL Susceptible      Tobramycin <=1 ug/mL Susceptible      Trimethoprim/Sulfamethoxazole <=1/19 ug/mL Susceptible                      Susceptibility Comments       Enterobacter cloacae complex    Enterobacter cloacae, Klebsiella aerogenes, and Citrobacter freundii have moderate to high levels of inducible AmpC ß-lactamase expression. The use of 3rd generation cephalosporins including ceftriaxone and ceftazidime, as well as    piperacillin-tazobactam, should be avoided for invasive infections, regardless of susceptibility results.               03/22/2024 1654 03/27/2024 2146 Blood Culture Peripheral Blood [[id number]]   Peripheral Blood    Final result Component Value   Culture No Growth               03/22/2024 1654 03/27/2024 2146 Blood Culture Peripheral Blood [[id number]]   Peripheral Blood    Final result Component Value   Culture No Growth                     Most Recent Hemoglobin A1c:  Recent Labs   Lab Test 03/22/24  1445   A1C 14.2*     Most Recent 6 glucoses:  Recent Labs   Lab Test 03/28/24  0736 03/28/24  0710 03/27/24  2114 03/27/24  1659 03/27/24  1153 03/27/24  0721   GLC 86 103* 284* 229* 205* 102*   ,   Results for orders placed or performed during the hospital encounter of 03/22/24   Foot  XR, G/E 3 views, right    Narrative    EXAM: XR FOOT RIGHT G/E 3 VIEWS  LOCATION: [facility]  DATE: 3/22/2024    INDICATION: wound, right foot pain.  COMPARISON: None.      Impression    IMPRESSION: Status post amputation of the mid and forefoot. No fracture or osteomyelitis.   US Lower Extremity Arterial Duplex Bilateral    Narrative    [facility]  LOCATION: [facility]  DATE: 3/22/2024    EXAM: RESTING ANKLE-BRACHIAL INDICES (ABIs) AND DUPLEX ARTERIAL ULTRASOUND OF THE LOWER EXTREMITIES BILATERALLY    INDICATION: Right foot amputation now with nonhealing open wound. Right foot pain. History of stroke.    TECHNIQUE: Duplex imaging is performed utilizing gray-scale, two-dimensional images and color-flow imaging. Doppler waveform analysis and spectral Doppler imaging is also performed.    COMPARISON: None.    FINDINGS:   ANGELO FINDINGS:   RIGHT:  Brachial: Not obtained  Ankle (PT): 127 - - 0.83  Ankle (DP): Not obtainable  Digit: Not obtainable    LEFT:  Brachial: 153  Ankle (PT): 81 - - 0.53  Ankle (DP): 90 - - 0.59  Digit: 83    WAVEFORMS:   Right leg: Multiphasic through  the anterior tibial artery.  Left leg:  Multiphasic through the mid SFA transitioning to monophasic. Stenotic digit waveforms.    DUPLEX ARTERIAL ULTRASOUND FINDINGS: (velocities in cm/s)  RIGHT  CFA: 140  PFA: 179  SFA prox: 259  SFA mid: 123  SFA dist: 147  Pop:132   PT: 59  AT: 29  DP: Not obtained    LEFT  CFA: 156  PFA: 189  SFA prox: 90  SFA mid: 32  SFA dist: 9  Pop:62   PT: 11  AT: 12  DP: Not obtained      Impression    IMPRESSION:  1.  RIGHT LOWER EXTREMITY: Mild resting ischemia. Moderate proximal SFA stenosis.  2.  LEFT LOWER EXTREMITY: Severe resting ischemia. Digit pressure favorable for wound healing. Given markedly diminished velocities and monophasic waveforms at the distal thigh, suspect mid - distal SFA disease. Stenotic digit waveforms.  3.  Enlarged right inguinal lymph nodes, largest measuring 2.2 x 1.6 x 1.1 cm.   MR Ankle Right w/o Contrast    Narrative    EXAM: MR ANKLE RIGHT W/O CONTRAST  LOCATION: [facility]  DATE: 3/22/2024    INDICATION: History diabetes, Charcot foot, status post amputation, with worsening ulcer. Concern for osteomyelitis.  COMPARISON: X-rays 03/22/2024  TECHNIQUE: Unenhanced.    FINDINGS:   Previous amputation of the foot at the level of the naviculocuneiform and calcaneocuboid joints. There is an ulceration along the plantar hindfoot superficial to the anterior calcaneus with surrounding cellulitis. There is marrow edema with abnormal T1   marrow signal involving the anterior calcaneus compatible with osteomyelitis. No abscess.    Generalized muscle atrophy.      Impression    IMPRESSION:  1.  Plantar hindfoot ulceration with surrounding cellulitis.  2.  Associated osteomyelitis of the adjacent anterior calcaneus.   US ANGELO with PPG wo Exercise    Narrative    [facility]  LOCATION: [facility]  DATE: 3/22/2024    EXAM: RESTING ANKLE-BRACHIAL INDICES (ABIs) AND DUPLEX ARTERIAL ULTRASOUND OF THE LOWER EXTREMITIES  "BILATERALLY    INDICATION: Right foot amputation now with nonhealing open wound. Right foot pain. History of stroke.    TECHNIQUE: Duplex imaging is performed utilizing gray-scale, two-dimensional images and color-flow imaging. Doppler waveform analysis and spectral Doppler imaging is also performed.    COMPARISON: None.    FINDINGS:   ANGELO FINDINGS:   RIGHT:  Brachial: Not obtained  Ankle (PT): 127 - - 0.83  Ankle (DP): Not obtainable  Digit: Not obtainable    LEFT:  Brachial: 153  Ankle (PT): 81 - - 0.53  Ankle (DP): 90 - - 0.59  Digit: 83    WAVEFORMS:   Right leg: Multiphasic through the anterior tibial artery.  Left leg:  Multiphasic through the mid SFA transitioning to monophasic. Stenotic digit waveforms.    DUPLEX ARTERIAL ULTRASOUND FINDINGS: (velocities in cm/s)  RIGHT  CFA: 140  PFA: 179  SFA prox: 259  SFA mid: 123  SFA dist: 147  Pop:132   PT: 59  AT: 29  DP: Not obtained    LEFT  CFA: 156  PFA: 189  SFA prox: 90  SFA mid: 32  SFA dist: 9  Pop:62   PT: 11  AT: 12  DP: Not obtained      Impression    IMPRESSION:  1.  RIGHT LOWER EXTREMITY: Mild resting ischemia. Moderate proximal SFA stenosis.  2.  LEFT LOWER EXTREMITY: Severe resting ischemia. Digit pressure favorable for wound healing. Given markedly diminished velocities and monophasic waveforms at the distal thigh, suspect mid - distal SFA disease. Stenotic digit waveforms.  3.  Enlarged right inguinal lymph nodes, largest measuring 2.2 x 1.6 x 1.1 cm.   POC US Guidance Needle Placement    Narrative    Ultrasound was performed as guidance to an anesthesia procedure.  Click   \"PACS images\" hyperlink below to view any stored images.  For specific   procedure details, view procedure note authored by anesthesia.   MR Foot Left w/o & w Contrast    Narrative    EXAM: MR FOOT LEFT W/O and W CONTRAST  LOCATION: [facility]  DATE: 3/24/2024    INDICATION: Plantar wound, eval osteomyelitis  COMPARISON: None.  TECHNIQUE: Routine. " Additional postgadolinium T1 sequences were obtained.  IV CONTRAST: 7ml gadavist    FINDINGS:   Image quality is degraded by motion.    There is a shallow ulceration plantar to the first MTP joint with surrounding cellulitis - phlegmon and/or fibrosis. No fluid collection to suggest abscess. No marrow abnormality to suggest osteomyelitis.    There may be an additional shallow ulceration versus scar plantar to the fifth MTP joint.    No joint effusion or synovitis. Atrophy of most of the foot muscles. Old healed fifth metatarsal shaft fracture.      Impression    IMPRESSION:  1.  Shallow ulceration plantar to the first MTP joint with associated cellulitis - phlegmon and/or fibrosis.  2.  No abscess.  3.  No evidence of osteomyelitis.       Discharge Medications   Current Discharge Medication List        START taking these medications    Details   acetaminophen (TYLENOL) 325 MG tablet Take 2 tablets (650 mg) by mouth every 4 hours as needed for mild pain or other (and adjunct with moderate or severe pain or per patient request)  Qty: 30 tablet, Refills: 0    Associated Diagnoses: Ulcer of right foot with necrosis of bone (H)      alcohol swab prep pads Use to swab area of injection/helio as directed.  Qty: 100 each, Refills: 3    Associated Diagnoses: Type 2 diabetes mellitus with right diabetic foot infection (H)      amLODIPine (NORVASC) 2.5 MG tablet Take 1 tablet (2.5 mg) by mouth daily for 30 days  Qty: 30 tablet, Refills: 0    Associated Diagnoses: Primary hypertension      aspirin 81 MG EC tablet Take 1 tablet (81 mg) by mouth daily for 30 days  Qty: 30 tablet, Refills: 0    Associated Diagnoses: Ulcer of right foot with necrosis of bone (H)      atorvastatin (LIPITOR) 80 MG tablet Take 1 tablet (80 mg) by mouth every evening for 30 days  Qty: 30 tablet, Refills: 0    Associated Diagnoses: Ulcer of right foot with necrosis of bone (H)      insulin pen needle (ULTICARE MINI) 31G X 6 MM miscellaneous Use 1 pen  needles daily or as directed.  Qty: 100 each, Refills: 3    Associated Diagnoses: Type 2 diabetes mellitus with right diabetic foot infection (H)      oxyCODONE (ROXICODONE) 5 MG tablet Take 1 tablet (5 mg) by mouth every 4 hours as needed for moderate to severe pain  Qty: 20 tablet, Refills: 0    Associated Diagnoses: 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)           CONTINUE these medications which have CHANGED    Details   insulin glargine (LANTUS SOLOSTAR) 100 UNIT/ML pen Inject 16 Units Subcutaneous at bedtime for 30 days  Qty: 4.8 mL, Refills: 0    Comments: If Lantus is not covered by insurance, may substitute Basaglar or Semglee or other insulin glargine product per insurance preference at same dose and frequency.    Associated Diagnoses: Type 2 diabetes mellitus with right diabetic foot infection (H)           Allergies   No Known Allergies

## 2024-03-29 ENCOUNTER — TELEPHONE (OUTPATIENT)
Dept: VASCULAR SURGERY | Facility: CLINIC | Age: 69
End: 2024-03-29

## 2024-03-29 NOTE — TELEPHONE ENCOUNTER
Caller: IDRIS RN, [facility]    Provider: LOUANN Vance    Detailed reason for call: Need wound VAC orders. Reviewed chart and orders discussed as written below:      Comments    Wound location: R BKA  Change Days: T/F  Supplies: Small black foam  Cleanse with: Saline, pat dry.  Suction: Continuous at -125 mmHg pressure.    Back up plan: If VAC malfunctions or unable to maintain seal: VAC dressing must be removed and reapplied within 2 hours of the incident. If floor staff is not able to reapply, place NS moistened gauze in wound bed and cover with appropriate dressing to keep wound bed moist.  Change wet-to-dry dressing BID and notify [facility] staff for reimplementation of VAC therapy when available.  Date canister.        Best phone number to contact: [phone]    Best time to contact: anytime          (Noted to patient if reason is related to wound or incision, to please send a photo via email or MyChart.)

## 2024-03-31 ENCOUNTER — NURSE TRIAGE (OUTPATIENT)
Dept: NURSING | Facility: CLINIC | Age: 69
End: 2024-03-31

## 2024-04-01 ENCOUNTER — TELEPHONE (OUTPATIENT)
Dept: VASCULAR SURGERY | Facility: CLINIC | Age: 69
End: 2024-04-01

## 2024-04-01 NOTE — TELEPHONE ENCOUNTER
----- Message from Tatiana Velasquez RN sent at 3/28/2024 10:05 PM CDT -----  Regarding: FW: Follow up post kendra Trevino please follow up with follow up and his lab.     ----- Message -----  From: Roselia Ochoa MD  Sent: 3/28/2024   9:08 PM CDT  To: [facility] Support Pool  Subject: Follow up post guillotine                        Hello,     Can we please set up follow up for this patient with Bianka in 4 weeks.  Needs to have A1c checked prior to appointment, I believe this is ordered but please make sure this is timed correctly once scheduled.     Thank you.   Looks

## 2024-04-01 NOTE — TELEPHONE ENCOUNTER
Called Dario and left a message to make sure he was aware of his lab appointment for an A1C draw on 4/26 with vascular follow up on 4/29. Left message on home voicemail as mobile number would not accept messages. Requested he call back if he has any questions regarding this.

## 2024-04-01 NOTE — TELEPHONE ENCOUNTER
LVMTCB to schedule hospital follow up for his left foot wound with Dr. Sage. Patient seeing Bianka for Right BKA.   [phone]

## 2024-04-01 NOTE — TELEPHONE ENCOUNTER
Nurse Triage SBAR    Situation: Equipment question. Verbal consent received from patient to speak with caller.      Background:     -Patient's son's girlfriend calling  -It is okay to call back and leave a detailed message at this number:    Assessment: Pt had right below knee amputation three days ago. Pt has machine which sucks up blood. It is no longer working.  Pt also does not have any strips to check his blood sugar. Son stated pt has a homecare nurse and a phone number for her and he is going to  call her at this time.  He will call triage back if he is not able to get assistance from her.            Reason for Disposition    Health Information question, no triage required and triager able to answer question    Protocols used: Information Only Call - No Triage-A-

## 2024-04-01 NOTE — TELEPHONE ENCOUNTER
Nurse Triage SBAR    Situation: Verbal consent from pt to speak with caller.     Background:   Patient's son's girlfriend calling  -It is okay to call back and leave a detailed message at this number:    Assessment: Attempted to contact home health nurse, but unable, regarding post op equipment. During this call, pt's son found the needed equipment.  Declined triage.       Recommendation:     -Call back with and questions, concerns, or any change in symptoms           Reason for Disposition    [1] Follow-up call to recent contact AND [2] information only call, no triage required    Protocols used: Information Only Call - No Triage-A-

## 2024-04-03 LAB
ATRIAL RATE - MUSE: 81 BPM
DIASTOLIC BLOOD PRESSURE - MUSE: 66 MMHG
INTERPRETATION ECG - MUSE: NORMAL
P AXIS - MUSE: 87 DEGREES
PR INTERVAL - MUSE: 128 MS
QRS DURATION - MUSE: 88 MS
QT - MUSE: 374 MS
QTC - MUSE: 434 MS
R AXIS - MUSE: 75 DEGREES
SYSTOLIC BLOOD PRESSURE - MUSE: 141 MMHG
T AXIS - MUSE: 81 DEGREES
VENTRICULAR RATE- MUSE: 81 BPM

## 2024-04-05 ENCOUNTER — TELEPHONE (OUTPATIENT)
Dept: VASCULAR SURGERY | Facility: CLINIC | Age: 69
End: 2024-04-05

## 2024-04-05 NOTE — TELEPHONE ENCOUNTER
Caller: ARINA Fuentes [facility]     Provider: PARK Sage    Detailed reason for call: Requesing verbal orders for occupational therapy: 1 visit the week of 4/29 for reassessment.     Best phone number to contact: [phone]    Best time to contact: Any time    Ok to leave a detailed message: Yes    Ok to speak to authorized person if needed: No      (Noted to patient if reason is related to wound or incision, to please send a photo via email or [employer]

## 2024-04-05 NOTE — TELEPHONE ENCOUNTER
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 Order will be signed by Bianka LEW since he is following up with her.

## 2024-04-10 ENCOUNTER — TELEPHONE (OUTPATIENT)
Dept: VASCULAR SURGERY | Facility: CLINIC | Age: 69
End: 2024-04-10

## 2024-04-10 NOTE — TELEPHONE ENCOUNTER
Caller: [facility]?)    Provider: PARK Sage    Detailed reason for call: Nurse with [facility] saw patient yesterday and states his wound vac was off; patient said it had come off 2 days prior while he was in the shower. Nurse would like to clarify orders for wound vac as well as confirm whether or not they are to use adaptic on exposed bone.     Best phone number to contact: [phone]    Best time to contact: After 2:30PM today    Ok to leave a detailed message: Yes    Ok to speak to authorized person if needed: No      (Noted to patient if reason is related to wound or incision, to please send a photo via email or [employer].)

## 2024-04-10 NOTE — TELEPHONE ENCOUNTER
Spoke with Bel at length.     The wound VAC is for the RIGHT STUMP managed by DR. ABDALLA not for the left foot, which is being managed by Dr. Sage. 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 Advised that ok to wait until Friday as it appears that the bone is already dark based on photos, but to apply at next visit on Friday.     She also states that pt had the VAC off for 2 days and did not have a dressing over the wound at all, so the wound was filthy and had dog hair in the wound. He had not called home care when the VAC came off. Will route this message to PA as INDRA.

## 2024-04-12 ENCOUNTER — DOCUMENTATION ONLY (OUTPATIENT)
Dept: ORTHOPEDICS | Facility: CLINIC | Age: 69
End: 2024-04-12

## 2024-04-22 PROBLEM — F41.8 DEPRESSION WITH ANXIETY: Status: ACTIVE | Noted: 2021-06-11

## 2024-04-22 PROBLEM — E11.59 TYPE 2 DIABETES MELLITUS WITH CIRCULATORY DISORDER, WITHOUT LONG-TERM CURRENT USE OF INSULIN (H): Status: ACTIVE | Noted: 2021-05-04

## 2024-04-22 PROBLEM — E11.65 UNCONTROLLED TYPE 2 DIABETES MELLITUS WITH HYPERGLYCEMIA (H): Status: ACTIVE | Noted: 2021-06-11

## 2024-04-22 PROBLEM — R65.20 SEVERE SEPSIS (H): Status: RESOLVED | Noted: 2023-06-14 | Resolved: 2024-04-22

## 2024-04-22 PROBLEM — A41.9 SEVERE SEPSIS (H): Status: RESOLVED | Noted: 2023-06-14 | Resolved: 2024-04-22

## 2024-04-22 NOTE — PATIENT INSTRUCTIONS
Rafat Bishop,    Thank you for entrusting your care with us today. After your visit today with LOUANN Vance this is the plan that was discussed at your appointment.    Follow up in 3-4 weeks with a repeat Hemoglobin A1c check        I am including additional information on these things and our contact information if you have any questions or concerns.   Please do not hesitate to reach out to us if you felt we did not answer your questions or you are unsure of the treatment plan after your visit today. Our number is [phone].Thank you for trusting us with your care.         Again thank you for your time.           If the negative pressure wound therapy malfunctions or unable to maintain seal: dressing must be removed and reapplied within 2 hours of the incident. If unable to reapply negative pressure wound dressing, place Normal Saline moistened gauze in wound bed and cover with appropriate dressing to keep wound bed moist.  Change wet-to-dry dressing two times a day until healthcare staff can re-implement negative pressure therapy. Change canister at least weekly.  [facility] Contact Center can be reached at [phone], 24 hours a day 7 days a week

## 2024-04-22 NOTE — PROGRESS NOTES
[facility]        Patient is here for a 4wk inpt f/u. A1C 4/26. 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 Seeing Dr. Sage prior (for left foot wound).    Pt is currently taking Aspirin.    There were no vitals taken for this visit.    The provider has been notified that the patient has no concerns.     Questions patient would like addressed today are: N/A.    Refills are needed: N/A    Has homecare services and agency name:  Yes: Aid for bathing, and nurse for wound vac - nurse coming out today to place vac

## 2024-04-24 NOTE — TELEPHONE ENCOUNTER
Called Dario and spoke with Dario's daughter regarding his lab appointment on Friday. Reviewed location and time for A1C lab draw prior to appointment on 4/29.

## 2024-04-26 ENCOUNTER — VIRTUAL VISIT (OUTPATIENT)
Dept: PHARMACY | Facility: CLINIC | Age: 69
End: 2024-04-26
Attending: INTERNAL MEDICINE

## 2024-04-26 DIAGNOSIS — L97.514 ULCER OF RIGHT FOOT WITH NECROSIS OF BONE (H): ICD-10-CM

## 2024-04-26 DIAGNOSIS — I10 PRIMARY HYPERTENSION: ICD-10-CM

## 2024-04-26 DIAGNOSIS — E11.628 TYPE 2 DIABETES MELLITUS WITH RIGHT DIABETIC FOOT INFECTION (H): ICD-10-CM

## 2024-04-26 DIAGNOSIS — E11.65 UNCONTROLLED TYPE 2 DIABETES MELLITUS WITH HYPERGLYCEMIA (H): Primary | ICD-10-CM

## 2024-04-26 DIAGNOSIS — L08.9 TYPE 2 DIABETES MELLITUS WITH RIGHT DIABETIC FOOT INFECTION (H): ICD-10-CM

## 2024-04-26 PROCEDURE — 99207 PR NO CHARGE LOS: CPT | Mod: 93 | Performed by: PHARMACIST

## 2024-04-26 RX ORDER — VALSARTAN 80 MG/1
160 TABLET ORAL DAILY
Status: ON HOLD | COMMUNITY
Start: 2024-04-04 | End: 2024-09-06

## 2024-04-26 RX ORDER — ASPIRIN 81 MG/1
81 TABLET ORAL DAILY
Qty: 30 TABLET | Refills: 0 | Status: SHIPPED | OUTPATIENT
Start: 2024-04-26

## 2024-04-26 RX ORDER — INSULIN GLARGINE 100 [IU]/ML
20 INJECTION, SOLUTION SUBCUTANEOUS EVERY MORNING
COMMUNITY
Start: 2024-04-26 | End: 2024-04-26

## 2024-04-26 RX ORDER — FLURBIPROFEN SODIUM 0.3 MG/ML
SOLUTION/ DROPS OPHTHALMIC
Qty: 100 EACH | Refills: 0 | Status: SHIPPED | OUTPATIENT
Start: 2024-04-26

## 2024-04-26 RX ORDER — AMLODIPINE BESYLATE 2.5 MG/1
2.5 TABLET ORAL DAILY
Qty: 30 TABLET | Refills: 0 | Status: ON HOLD | OUTPATIENT
Start: 2024-04-26 | End: 2024-09-06

## 2024-04-26 RX ORDER — INSULIN GLARGINE 100 [IU]/ML
20 INJECTION, SOLUTION SUBCUTANEOUS EVERY MORNING
Qty: 15 ML | Refills: 0 | Status: ON HOLD | OUTPATIENT
Start: 2024-04-26 | End: 2024-09-09

## 2024-04-26 RX ORDER — BLOOD SUGAR DIAGNOSTIC
STRIP MISCELLANEOUS
Qty: 100 STRIP | Refills: 0 | Status: SHIPPED | OUTPATIENT
Start: 2024-04-26

## 2024-04-26 RX ORDER — ATORVASTATIN CALCIUM 80 MG/1
80 TABLET, FILM COATED ORAL EVERY EVENING
Qty: 30 TABLET | Refills: 0 | Status: SHIPPED | OUTPATIENT
Start: 2024-04-26

## 2024-04-26 NOTE — PATIENT INSTRUCTIONS
For your blood sugars:   Today we talked about using Lantus 20 units once per day in the morning around 8 am  Stop the Humulin N  Start checking your blood sugar once per day in the morning before eating. Bring this meter to your next appointment with Dr. Drake  Your goal fasting blood sugar is 150 or lower    For your oral medicines:   You can take all of your medicines in the morning together.   You should have four different medicines: Amlodipine, Atorvastatin, Aspirin, and Valsartan    For your blood pressures:  Your goal blood pressure is lower than 140/90 mmHg. If you wish to purchase a cuff, I recommend an arm cuff, not a wrist cuff.     To prepare to take your blood pressure:    Do not consume any caffeinated beverages (tea, coffee, soda), do not smoke, do not exercise for 30 minutes before measuring your blood pressure.  Remove bulky clothing like longsleeve shirts, sweaters, or sweatshirts.   Sit quietly for at least 5 minutes before starting to measure your blood pressure.    To measure your blood pressure:    Sit in chair with your back supported. Do not stand, do not lie down.  Place both feet flat on the floor. Do not cross your legs or ankles.   Place your arm at the level of your heart with your palm facing up. For example on the armrest of our chair or a nearby table.   Place the cuff on your arm above your elbow so that your elbow can bend comfortably.  Pull the cuff tight enough to stay in place and allow for your fingers to fit between your arm and the cuff.  Position the cuff so that the cord lies down the middle of the inside of your arm.  Do not talk while you are using the machine.  Press the START button. It will squeeze your arm and then release slowly.   When you see the numbers on the screen, you are done.   Write the numbers down and the time of day so that you can track what they are.    For your insurance questions:   If you have questions or concerns about your Medicare, I recommend  calling the [facility] Linkage Line: [phone]    Medicine disposal:  Take the prescription bottles to a Medicine Collection Site.  These sites accept unused prescription and over-the-counter medicines. This is a free and confidential service, no ID is required.    Rules for disposal:  Bring medicines in their original containers.  If medication is no longer in original container, place medicine in clear plastic bag.  No needles, sharps, or syringes are accepted at these sites.  Remove your name and any other personal information (like address and prescription number) from the prescription label, but keep the drug name on the bottle.     Medicine drop off locations by [facility]: [facility]:  [facility]: [address]  Monday - Friday 8am to 4pm  [facility]: [address]  Monday through Wednesday 7am-6pm; Thursday and Friday 7am-5pm; Saturday 8am-12pm  [facility]: [address]  Monday through Friday 8am-1:30pm, 2-10pm; Saturday 9am-1:30pm, 2-6pm; Sunday 10am-1:30pm, 2-6pm  [facility]: [address]  Monday through Friday 8am-1:30pm, 2-8pm; Saturday 9am-1:30pm, 2-6pm; Sunday 10am-1:30pm, 2-6pm  [facility]: [address]  Monday through Thursday 7am-6pm; Friday 7am-5pm  [place] [facility]: [address] [facility]  Monday - Friday 8:30am to 4:30pm  [place]  [facility]: [address] [facility]  Monday - Friday 8am to 4pm  [facility]: [address]  7am - 11pm, 7 days a week  [facility]: [address]  Monday through Friday 7am-10pm; Saturday and Sunday 9am-7pm  [place]  [facility]: [address] [facility] [address]  Monday through Friday 8am-1:30pm, 2-8pm; Saturday 9am-1:30pm, 2-6pm; [place] 10am-1:30pm, 2-6pm  [facility]: [address]  Monday and Tuesday 7am-6pm; Wednesday through Friday 7am-5pm

## 2024-04-26 NOTE — PROGRESS NOTES
Medication Therapy Management (MTM) Encounter    ASSESSMENT:                            Medication Adherence/Access: Cost of test strips.     Uncontrolled type 2 diabetes mellitus with hyperglycemia (H)  A1c not at goal of at least <8%, however given young age could consider stricter goal <7%. Immediate concern for duplicative insulin use Humulin 70/30 and Lantus both considered basal insulins. In addition, concern for us of 70/30 as patient doesn't eat consistently. Because of regular insulin component, to prevent hypoglycemia and to best prevent hyperglycemia, 70/30 insulin requires administration timed 30-45 minutes before meals. For safety, for now, recommend continuing Lantus insulin. For ease and to help optimize adherence can change to once daily injection. On statin/aspirin. Given age, if blood glucose control improves, could consider discontinuing aspirin based on USPSTF recommendation for primary prevention.     Primary hypertension  Blood pressure not at goal of at least <140/90 , however again given CKD/age could consider lower goal <130/80. Need additional therapy to help lower blood pressure. Given stage 2 blood pressure, very likely to require two agents. Continues on Amlodipine. Pharmacy reports Valsartan picked up, need education of family to help find medicine.     PLAN:                            Patient Instructions   For your blood sugars:   Today we talked about using Lantus 20 units once per day in the morning around 8 am  Stop the Humulin N  Start checking your blood sugar once per day in the morning before eating. Bring this meter to your next appointment with Dr. Drake  Your goal fasting blood sugar is 150 or lower    For your oral medicines:   You can take all of your medicines in the morning together.   You should have four different medicines: Amlodipine, Atorvastatin, Aspirin, and Valsartan    For your blood pressures:  Your goal blood pressure is lower than 140/90 mmHg. If you wish  to purchase a cuff, I recommend an arm cuff, not a wrist cuff.     To prepare to take your blood pressure:    Do not consume any caffeinated beverages (tea, coffee, soda), do not smoke, do not exercise for 30 minutes before measuring your blood pressure.  Remove bulky clothing like longsleeve shirts, sweaters, or sweatshirts.   Sit quietly for at least 5 minutes before starting to measure your blood pressure.    To measure your blood pressure:    Sit in chair with your back supported. Do not stand, do not lie down.  Place both feet flat on the floor. Do not cross your legs or ankles.   Place your arm at the level of your heart with your palm facing up. For example on the armrest of our chair or a nearby table.   Place the cuff on your arm above your elbow so that your elbow can bend comfortably.  Pull the cuff tight enough to stay in place and allow for your fingers to fit between your arm and the cuff.  Position the cuff so that the cord lies down the middle of the inside of your arm.  Do not talk while you are using the machine.  Press the START button. It will squeeze your arm and then release slowly.   When you see the numbers on the screen, you are done.   Write the numbers down and the time of day so that you can track what they are.    For your insurance questions:   If you have questions or concerns about your Medicare, I recommend calling the Senior Linkage Line: [phone]    Medicine disposal:  Take the prescription bottles to a Medicine Collection Site.  These sites accept unused prescription and over-the-counter medicines. This is a free and confidential service, no ID is required.    Rules for disposal:  Bring medicines in their original containers.  If medication is no longer in original container, place medicine in clear plastic bag.  No needles, sharps, or syringes are accepted at these sites.  Remove your name and any other personal information (like address and prescription number) from the  prescription label, but keep the drug name on the bottle.     Medicine drop off locations by [facility]: [facility]:  [facility]: [address]  Monday - Friday 8am to 4pm  [facility]: [address]  Monday through Wednesday 7am-6pm; Thursday and Friday 7am-5pm; Saturday 8am-12pm  [place]  [facility]: [address]  Monday through Friday 8am-1:30pm, 2-10pm; Saturday 9am-1:30pm, 2-6pm; Sunday 10am-1:30pm, 2-6pm  [facility]: [address]  Monday through Friday 8am-1:30pm, 2-8pm; Saturday 9am-1:30pm, 2-6pm; Sunday 10am-1:30pm, 2-6pm  [facility]: [address]  Monday through Thursday 7am-6pm; Friday 7am-5pm  [place] [facility]: [address] [facility]  Monday - Friday 8:30am to 4:30pm  [place]  [facility]: [address] [facility]  Monday - Friday 8am to 4pm  [facility]: [address]  7am - 11pm, 7 days a week  [facility]: [address]  Monday through Friday 7am-10pm; Saturday and Sunday 9am-7pm  [place]  [facility]: [address] [facility] [address]  Monday through Friday 8am-1:30pm, 2-8pm; Saturday 9am-1:30pm, 2-6pm; Sunday 10am-1:30pm, 2-6pm  [facility]: [address]  Monday and Tuesday 7am-6pm; Wednesday through Friday 7am-5pm      Follow-up: Return for as needed per patient or provider request.    Medication issues to be addressed at a future visit     MTM / diabetes education [facility]    SUBJECTIVE/OBJECTIVE:                          Dario Benavides is a 68 year old male called for pharmacist visit.  used for visit (Y/N): No; English. Spoke today with family member, Carlee who provides additional history.     Reason for visit: Medication management.     Allergies/ADRs: Reviewed in chart  Past Medical History: Reviewed in chart  Social History     Tobacco Use    Smoking status: Former     Current packs/day: 0.00     Average packs/day: 3.0 packs/day for 45.0 years  "(135.0 ttl pk-yrs)     Types: Cigarettes     Start date: 1978     Quit date: 2023     Years since quittin.6    Smokeless tobacco: Never    Tobacco comments:     Seen IP by [facility] on 8/15/23 - down to 1 pack/week, declined cessation services and materials   Substance Use Topics    Alcohol use: Not Currently    Drug use: Yes     Types: Marijuana     Comment: couple of times per month     ^Reviewed today    Medication Adherence/Access: No concerns for missed doses. Concern for cost of test strips.     Uncontrolled type 2 diabetes mellitus with hyperglycemia (H)  Current medicines:  Lantus using 10 units twice daily and Humulin 70/30 10 units once in the morning.   Aspirin 81 mg/day  Atorvastatin 80 mg/day    Patient history on efficacy/safety:   Not able to test his blood sugar because has no test strips and concern for cost. Meter = Using [employer]  Reports doesn't eat consistently, maybe 1-2 times per day. Family just signed up for Meals on Wheels (15 meals/week). Family also brings food.   No concern for cost of insulins.   Needs refill on pen needles and Lantus    Primary hypertension  Current medicines:  Amlodipine 2.5 mg/day  Recent note from [facility] with addition of Valsartan 80 mg/day    Patient history on efficacy/safety:   He doesn't have a blood pressure cuff at home, so he is unable to check his blood pressure. He is interested in getting a blood pressure cuff.       Today's Vitals:   BP Readings from Last 1 Encounters:   24 (!) 161/75     Pulse Readings from Last 1 Encounters:   24 74     Wt Readings from Last 1 Encounters:   24 159 lb 11.2 oz (72.4 kg)     Ht Readings from Last 1 Encounters:   24 5' 11\" (1.803 m)     Estimated body mass index is 22.27 kg/m  as calculated from the following:    Height as of 3/22/24: 5' 11\" (1.803 m).    Weight as of 3/23/24: 159 lb 11.2 oz (72.4 kg).    Temp Readings from Last 1 Encounters:   24 98.3  F (36.8  C) "       ----------------  MED REC REQUIRED  Post Medication Reconciliation Status: discharge medications reconciled and changed, per note/orders      I spent 31 minutes with this patient today. All changes were made via verbal approval with Dr. Wesley. A copy of the visit note was provided to the patient's provider(s).    The patient was mailed to the patient a summary of these recommendations.     Rula Londono, Pharm.D., CDCES [facility]  Phone: [phone]    Telemedicine Visit Details  Type of service:  Telephone visit  Start Time: 1:04 PM  End Time: 1:34 PM  Originating Location (patient location): [place]  Distant Location (provider location):  [facility]    For insurance/tracking purposes, [facility] Team Documentation:   Medication Therapy Recommendations  Type 2 diabetes mellitus with circulatory disorder, without long-term current use of insulin (H)    Rationale: Duplicate Therapy - Unnecessary medication therapy - Indication   Recommendation: Discontinue Medication   Status: Accepted per Provider   Note: Stop Humulin N 70/30, start Lantus

## 2024-04-26 NOTE — TELEPHONE ENCOUNTER
Message to physician:     Date of last visit: Visit date not found    Date of next visit if scheduled:     Potassium   Date Value Ref Range Status   03/28/2024 3.8 3.4 - 5.3 mmol/L Final   05/26/2021 4.1 3.5 - 5.0 mmol/L Final     Creatinine   Date Value Ref Range Status   03/28/2024 1.31 (H) 0.67 - 1.17 mg/dL Final     GFR Estimate   Date Value Ref Range Status   03/28/2024 60 (L) >60 mL/min/1.73m2 Final   05/26/2021 >60 >60 mL/min/1.73m2 Final       BP Readings from Last 3 Encounters:   03/28/24 (!) 161/75   09/22/23 (!) 140/65   08/28/23 (!) 142/76       Hemoglobin A1C   Date Value Ref Range Status   03/22/2024 14.2 (H) <5.7 % Final     Comment:     Normal <5.7%   Prediabetes 5.7-6.4%    Diabetes 6.5% or higher     Note: Adopted from ADA consensus guidelines.       Please complete refill and CLOSE ENCOUNTER.  Closing the encounter signifies the refill is complete.

## 2024-04-29 ENCOUNTER — OFFICE VISIT (OUTPATIENT)
Dept: VASCULAR SURGERY | Facility: CLINIC | Age: 69
End: 2024-04-29
Attending: PHYSICIAN ASSISTANT

## 2024-04-29 ENCOUNTER — LAB (OUTPATIENT)
Dept: LAB | Facility: CLINIC | Age: 69
End: 2024-04-29

## 2024-04-29 ENCOUNTER — TELEPHONE (OUTPATIENT)
Dept: VASCULAR SURGERY | Facility: CLINIC | Age: 69
End: 2024-04-29

## 2024-04-29 VITALS — RESPIRATION RATE: 16 BRPM | SYSTOLIC BLOOD PRESSURE: 164 MMHG | DIASTOLIC BLOOD PRESSURE: 80 MMHG | HEART RATE: 112 BPM

## 2024-04-29 DIAGNOSIS — Z89.431 STATUS POST TRANSMETATARSAL AMPUTATION OF RIGHT FOOT (H): ICD-10-CM

## 2024-04-29 DIAGNOSIS — Z89.431 STATUS POST TRANSMETATARSAL AMPUTATION OF RIGHT FOOT (H): Primary | ICD-10-CM

## 2024-04-29 LAB — HBA1C MFR BLD: 10.8 % (ref 0–5.6)

## 2024-04-29 PROCEDURE — 83036 HEMOGLOBIN GLYCOSYLATED A1C: CPT

## 2024-04-29 PROCEDURE — 99024 POSTOP FOLLOW-UP VISIT: CPT | Performed by: PHYSICIAN ASSISTANT

## 2024-04-29 PROCEDURE — 36415 COLL VENOUS BLD VENIPUNCTURE: CPT

## 2024-04-29 PROCEDURE — 99213 OFFICE O/P EST LOW 20 MIN: CPT | Performed by: PHYSICIAN ASSISTANT

## 2024-04-29 RX ORDER — AMLODIPINE BESYLATE 2.5 MG/1
2.5 TABLET ORAL DAILY
Qty: 90 TABLET | OUTPATIENT
Start: 2024-04-29

## 2024-04-29 RX ORDER — ATORVASTATIN CALCIUM 80 MG/1
TABLET, FILM COATED ORAL
Qty: 90 TABLET | OUTPATIENT
Start: 2024-04-29

## 2024-04-29 ASSESSMENT — PAIN SCALES - GENERAL: PAINLEVEL: NO PAIN (0)

## 2024-04-29 NOTE — TELEPHONE ENCOUNTER
Call from Irvin with [facility] asking if VAC will be reapplied in clinic today after pt sees Dr. Sage. Appointment was canceled. Instructed Irvin to perform VAC change per orders since it won't be addressed in clinic today and that VACs aren't reapplied at clinic appointments. She voiced understanding.    Talita Valentino RN, CWOCN  [phone]

## 2024-04-29 NOTE — PROGRESS NOTES
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 Patient was noted to have a significantly elevated hemoglobin A1c at this time and plan was for follow-up in clinic in 4 weeks to reevaluate.  Hemoglobin A1c is 10.8 today. Pain is controlled and amputation site is without signs of infection today.    RECOMMENDATION/RISKS: Continue with wound VAC to residual limb, changing 3 times weekly with home care.  Provided instructions on applying a wet-to-dry dressing in the event that his VAC comes off at home.  Follow-up in 3 to 4 weeks with repeat hemoglobin A1c check.  Will want this to be less than 9 to proceed with BKA completion.      HPI:  Dario Benavides is a 68 year old male with past medical history significant for hypertension, tobacco abuse, noncompliance, and type 2 diabetes mellitus with a right diabetic foot infection.  Patient underwent right guillotine amputation approximately 4 weeks ago with Dr. Steel.    Today, Mr. Benavides presents for follow-up.  He is accompanied by his son.  Patient is doing well and denies any significant pain at this time.  He has a wound VAC in place to his residual limb that is changed 3 times weekly with home care.  He does note that sometimes the back falls off between home care visits.  He denies any fevers, chills, or sweats.  Hemoglobin A1c has improved but is still elevated at 10.8.  Discussed continuing with good blood glucose control to ensure maximizing his chances of wound healing.  No other concerns.    REVIEW OF SYSTEMS:    A 12 point ROS was reviewed and is negative except for what is listed above  in HPI.    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) 05/08/2021    Tobacco abuse 07/23/2019    Type 2 diabetes mellitus with right diabetic foot infection (H) 05/04/2021      Past Surgical History:   Procedure Laterality Date    AMPUTATE LEG BELOW KNEE Right 3/24/2024    Procedure: GUILLOTINE RIGHT BELOW KNEE AMPUTATION;  Surgeon: Marissa Steel MD;  Location: [facility] OR    BACK SURGERY  1992    CLOSE SECONDARY WOUND LOWER EXTREMITY Right 8/17/2023    Procedure: Delayed primary closure of wound right foot;  Surgeon: Brandon Nieto DPM;  Location: [facility] OR    IRRIGATION AND DEBRIDEMENT FOOT, COMBINED Right 8/14/2023    Procedure: IRRIGATION AND DEBRIDEMENT right foot with removing all infected bones and soft tissue;  Surgeon: Jun Goyal DPM;  Location: [facility] OR    NECK SURGERY  2011     ALLERGIES:  Patient has no known allergies.    MEDS:    Current Outpatient Medications:     acetaminophen (TYLENOL) 325 MG tablet, Take 2 tablets (650 mg) by mouth every 4 hours as needed for mild pain or other (and adjunct with moderate or severe pain or per patient request), Disp: 30 tablet, Rfl: 0    alcohol swab prep pads, Use to swab area of injection/helio as directed., Disp: 100 each, Rfl: 3    amLODIPine (NORVASC) 2.5 MG tablet, Take 1 tablet (2.5 mg) by mouth daily, Disp: 30 tablet, Rfl: 0    aspirin 81 MG EC tablet, Take 1 tablet (81 mg) by mouth daily, Disp: 30 tablet, Rfl: 0    atorvastatin (LIPITOR) 80 MG tablet, Take 1 tablet (80 mg) by mouth every evening, Disp: 30 tablet, Rfl: 0    blood glucose (ACCU-CHEK JANICE PLUS) test strip, Use to test blood sugar 1 times daily, Disp: 100 strip, Rfl: 0    insulin glargine (LANTUS SOLOSTAR) 100 UNIT/ML pen, Inject 20 Units  Subcutaneous every morning, Disp: 15 mL, Rfl: 0    insulin pen needle (ULTICARE MINI) 31G X 6 MM miscellaneous, Use 1 pen needles daily or as directed., Disp: 100 each, Rfl: 0    valsartan (DIOVAN) 80 MG tablet, Take 80 mg by mouth daily, Disp: , Rfl:     SOCIAL HABITS:    History   Smoking Status    Former    Packs/day: 3.00    Years: 45.00    Types: Cigarettes    Quit date: 9/8/2023   Smokeless Tobacco    Never     Social History    Substance and Sexual Activity      Alcohol use: Not Currently      History   Drug Use    Types: Marijuana     Comment: couple of times per month     FAMILY HISTORY:  No family history on file.    PE:  There were no vitals taken for this visit.  Wt Readings from Last 1 Encounters:   03/23/24 72.4 kg (159 lb 11.2 oz)     There is no height or weight on file to calculate BMI.    EXAM:  GENERAL: well-developed 68 year old male who appears his stated age  CARDIAC: normal   CHEST/LUNG: normal respiratory effort   MUSCULOSKELETAL: grossly normal and both lower extremities are intact, no lower extremity edema  NEUROLOGIC: focally intact, alert and oriented x 3  PSYCH: appropriate affect  VASCULAR: right guillotine amputation site with healthy appearing wound base, no yonathan purulence/pus, malodor is present    DIAGNOSTIC STUDIES:     Images:  Pertinent imaging reviewed.     LABS:      Sodium   Date Value Ref Range Status   03/28/2024 137 135 - 145 mmol/L Final     Comment:     Reference intervals for this test were updated on 09/26/2023 to more accurately reflect our healthy population. There may be differences in the flagging of prior results with similar values performed with this method. Interpretation of those prior results can be made in the context of the updated reference intervals.    03/26/2024 137 135 - 145 mmol/L Final     Comment:     Reference intervals for this test were updated on 09/26/2023 to more accurately reflect our healthy population. There may be differences in the  flagging of prior results with similar values performed with this method. Interpretation of those prior results can be made in the context of the updated reference intervals.    03/25/2024 138 135 - 145 mmol/L Final     Comment:     Reference intervals for this test were updated on 09/26/2023 to more accurately reflect our healthy population. There may be differences in the flagging of prior results with similar values performed with this method. Interpretation of those prior results can be made in the context of the updated reference intervals.      Urea Nitrogen   Date Value Ref Range Status   03/28/2024 12.6 8.0 - 23.0 mg/dL Final   03/26/2024 13.2 8.0 - 23.0 mg/dL Final   03/25/2024 16.8 8.0 - 23.0 mg/dL Final   05/26/2021 16 8 - 22 mg/dL Final   05/19/2021 14 8 - 22 mg/dL Final     Hemoglobin   Date Value Ref Range Status   03/28/2024 9.1 (L) 13.3 - 17.7 g/dL Final   03/27/2024 9.4 (L) 13.3 - 17.7 g/dL Final   03/26/2024 8.8 (L) 13.3 - 17.7 g/dL Final     Platelet Count   Date Value Ref Range Status   03/28/2024 607 (H) 150 - 450 10e3/uL Final   03/27/2024 620 (H) 150 - 450 10e3/uL Final   03/26/2024 582 (H) 150 - 450 10e3/uL Final     30 minutes spent on the day of encounter doing chart review, history and exam, documentation, and further activities as noted.     Bianka Vance PA-C  VASCULAR SURGERY

## 2024-04-30 NOTE — TELEPHONE ENCOUNTER
Call to pharmacy. Refill requests rerouted to Dr. Drake with [facility].    Rula Londono [facility]

## 2024-05-13 ENCOUNTER — DOCUMENTATION ONLY (OUTPATIENT)
Dept: ORTHOPEDICS | Facility: CLINIC | Age: 69
End: 2024-05-13

## 2024-05-15 NOTE — PROGRESS NOTES
[facility]        Patient is here for a 3 week F/U-Re check A1C. 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 Needs BKA completion (A1C under 9 for completion)    Pt is currently taking Aspirin and Statin.    BP (!) 153/80   Pulse 59   Temp 97.4  F (36.3  C)   Resp 12   SpO2 99%     The provider has been notified that the patient has no concerns.     Questions patient would like addressed today are: N/A.    Refills are needed: No    Has homecare services and agency name:  No

## 2024-05-15 NOTE — PATIENT INSTRUCTIONS
Rafat Bishop,    Thank you for entrusting your care with us today. After your visit today with LOUANN Vance this is the plan that was discussed at your appointment.    Follow up in 3 weeks for repeat Hemoglobin A1C and clinic visit. We would like this to be below 9 prior to scheduling surgery.        I am including additional information on these things and our contact information if you have any questions or concerns.   Please do not hesitate to reach out to us if you felt we did not answer your questions or you are unsure of the treatment plan after your visit today. Our number is [phone].Thank you for trusting us with your care.         Again thank you for your time.

## 2024-05-20 ENCOUNTER — LAB (OUTPATIENT)
Dept: LAB | Facility: CLINIC | Age: 69
End: 2024-05-20

## 2024-05-20 ENCOUNTER — OFFICE VISIT (OUTPATIENT)
Dept: VASCULAR SURGERY | Facility: CLINIC | Age: 69
End: 2024-05-20
Attending: PHYSICIAN ASSISTANT
Payer: COMMERCIAL

## 2024-05-20 VITALS
TEMPERATURE: 97.4 F | HEART RATE: 59 BPM | SYSTOLIC BLOOD PRESSURE: 153 MMHG | RESPIRATION RATE: 12 BRPM | OXYGEN SATURATION: 99 % | DIASTOLIC BLOOD PRESSURE: 80 MMHG

## 2024-05-20 DIAGNOSIS — Z89.431 STATUS POST TRANSMETATARSAL AMPUTATION OF RIGHT FOOT (H): Primary | ICD-10-CM

## 2024-05-20 DIAGNOSIS — Z89.431 STATUS POST TRANSMETATARSAL AMPUTATION OF RIGHT FOOT (H): ICD-10-CM

## 2024-05-20 LAB — HBA1C MFR BLD: 9.4 % (ref 0–5.6)

## 2024-05-20 PROCEDURE — 99024 POSTOP FOLLOW-UP VISIT: CPT | Performed by: PHYSICIAN ASSISTANT

## 2024-05-20 PROCEDURE — G0463 HOSPITAL OUTPT CLINIC VISIT: HCPCS | Performed by: PHYSICIAN ASSISTANT

## 2024-05-20 PROCEDURE — 36415 COLL VENOUS BLD VENIPUNCTURE: CPT

## 2024-05-20 PROCEDURE — 83036 HEMOGLOBIN GLYCOSYLATED A1C: CPT

## 2024-05-20 RX ORDER — POLYETHYLENE GLYCOL 3350 17 G/17G
17 POWDER, FOR SOLUTION ORAL DAILY
Status: ON HOLD | COMMUNITY
End: 2024-09-06

## 2024-05-20 ASSESSMENT — PAIN SCALES - GENERAL: PAINLEVEL: NO PAIN (0)

## 2024-05-20 NOTE — PROGRESS NOTES
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 Patient was noted to have a significantly elevated hemoglobin A1c at this time and plan was for follow-up in clinic  to reevaluate. He was last seen 4 weeks ago and  hemoglobin A1c was 10.8. Today, hemoglobin A1c continues to trend down at 9.4.  Pain is controlled.    RECOMMENDATION/RISKS:  Continue with wound VAC to residual limb, changing 3 times weekly with home care. Wet-to-dry dressing in the event that his VAC comes off at home.  Follow-up in 3 to 4 weeks with repeat hemoglobin A1c check.  Will want this to be less than 9 to proceed with BKA completion.      HPI:  Dario Benavides is a 68 year old male with past medical history significant for r hypertension, tobacco abuse, noncompliance, and type 2 diabetes mellitus with a right diabetic foot infection.  Patient underwent right guillotine amputation on 3/24 with Dr. Steel.     Today, Mr. Benavides presents for follow-up.  He is accompanied by his son.  Patient is doing well and denies any significant pain at this time.  He has a wound VAC in place to his residual limb that is changed 3 times weekly with home care.  He does note that sometimes the back falls off between home care visits.  He denies any fevers, chills, or sweats.  Hemoglobin A1c has improved but remains elevated at 9.4. Discussed continuing with good blood glucose control to ensure maximizing his chances of wound healing.  No other concerns.     REVIEW OF SYSTEMS:    A 12 point ROS was reviewed and is negative except for what is listed above in HPI.    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) 05/08/2021    Tobacco abuse 07/23/2019    Type 2 diabetes mellitus with right diabetic foot infection (H) 05/04/2021      Past Surgical History:   Procedure Laterality Date    AMPUTATE LEG BELOW KNEE Right 3/24/2024    Procedure: GUILLOTINE RIGHT BELOW KNEE AMPUTATION;  Surgeon: Marissa Steel MD;  Location: [facility] OR    BACK SURGERY  1992    CLOSE SECONDARY WOUND LOWER EXTREMITY Right 8/17/2023    Procedure: Delayed primary closure of wound right foot;  Surgeon: Brandon Nieto DPM;  Location: [facility] OR    IRRIGATION AND DEBRIDEMENT FOOT, COMBINED Right 8/14/2023    Procedure: IRRIGATION AND DEBRIDEMENT right foot with removing all infected bones and soft tissue;  Surgeon: Jun Goyal DPM;  Location: [facility] OR    NECK SURGERY  2011     ALLERGIES:  Patient has no known allergies.    MEDS:    Current Outpatient Medications:     acetaminophen (TYLENOL) 325 MG tablet, Take 2 tablets (650 mg) by mouth every 4 hours as needed for mild pain or other (and adjunct with moderate or severe pain or per patient request), Disp: 30 tablet, Rfl: 0    amLODIPine (NORVASC) 2.5 MG tablet, Take 1 tablet (2.5 mg) by mouth daily, Disp: 30 tablet, Rfl: 0    aspirin 81 MG EC tablet, Take 1 tablet (81 mg) by mouth daily, Disp: 30 tablet, Rfl: 0    atorvastatin (LIPITOR) 80 MG tablet, Take 1 tablet (80 mg) by mouth every evening, Disp: 30 tablet, Rfl: 0    insulin glargine (LANTUS SOLOSTAR) 100 UNIT/ML pen, Inject 20 Units Subcutaneous every morning, Disp: 15 mL, Rfl: 0    insulin pen needle (ULTICARE MINI) 31G X 6 MM miscellaneous, Use 1 pen needles daily or as directed., Disp: 100 each, Rfl: 0    polyethylene glycol (MIRALAX) 17 g packet, Take 17 g by mouth daily,  Disp: , Rfl:     valsartan (DIOVAN) 80 MG tablet, Take 160 mg by mouth daily, Disp: , Rfl:     alcohol swab prep pads, Use to swab area of injection/helio as directed. (Patient not taking: Reported on 4/29/2024), Disp: 100 each, Rfl: 3    blood glucose (ACCU-CHEK JANICE PLUS) test strip, Use to test blood sugar 1 times daily (Patient not taking: Reported on 4/29/2024), Disp: 100 strip, Rfl: 0    SOCIAL HABITS:    History   Smoking Status    Some Days    Types: Cigarettes   Smokeless Tobacco    Never     Social History    Substance and Sexual Activity      Alcohol use: Not Currently      History   Drug Use    Types: Marijuana     Comment: couple of times per month     FAMILY HISTORY:  No family history on file.    PE:  BP (!) 153/80   Pulse 59   Temp 97.4  F (36.3  C)   Resp 12   SpO2 99%   Wt Readings from Last 1 Encounters:   03/23/24 72.4 kg (159 lb 11.2 oz)     There is no height or weight on file to calculate BMI.    EXAM:  GENERAL: well-developed 68 year old male who appears his stated age  CARDIAC: normal   CHEST/LUNG: normal respiratory effort   MUSCULOSKELETAL: grossly normal and both lower extremities are intact, no lower extremity edema  NEUROLOGIC: focally intact, alert and oriented x 3  PSYCH: appropriate affect    DIAGNOSTIC STUDIES:     Images:  Pertinent imaging reviewed    LABS:      Sodium   Date Value Ref Range Status   03/28/2024 137 135 - 145 mmol/L Final     Comment:     Reference intervals for this test were updated on 09/26/2023 to more accurately reflect our healthy population. There may be differences in the flagging of prior results with similar values performed with this method. Interpretation of those prior results can be made in the context of the updated reference intervals.    03/26/2024 137 135 - 145 mmol/L Final     Comment:     Reference intervals for this test were updated on 09/26/2023 to more accurately reflect our healthy population. There may be differences in the flagging  of prior results with similar values performed with this method. Interpretation of those prior results can be made in the context of the updated reference intervals.    03/25/2024 138 135 - 145 mmol/L Final     Comment:     Reference intervals for this test were updated on 09/26/2023 to more accurately reflect our healthy population. There may be differences in the flagging of prior results with similar values performed with this method. Interpretation of those prior results can be made in the context of the updated reference intervals.      Urea Nitrogen   Date Value Ref Range Status   03/28/2024 12.6 8.0 - 23.0 mg/dL Final   03/26/2024 13.2 8.0 - 23.0 mg/dL Final   03/25/2024 16.8 8.0 - 23.0 mg/dL Final   05/26/2021 16 8 - 22 mg/dL Final   05/19/2021 14 8 - 22 mg/dL Final     Hemoglobin   Date Value Ref Range Status   03/28/2024 9.1 (L) 13.3 - 17.7 g/dL Final   03/27/2024 9.4 (L) 13.3 - 17.7 g/dL Final   03/26/2024 8.8 (L) 13.3 - 17.7 g/dL Final     Platelet Count   Date Value Ref Range Status   03/28/2024 607 (H) 150 - 450 10e3/uL Final   03/27/2024 620 (H) 150 - 450 10e3/uL Final   03/26/2024 582 (H) 150 - 450 10e3/uL Final     25 minutes spent on the day of encounter doing chart review, history and exam, documentation, and further activities as noted.     Bianka Vance PA-C  VASCULAR SURGERY

## 2024-05-24 ENCOUNTER — TELEPHONE (OUTPATIENT)
Dept: VASCULAR SURGERY | Facility: CLINIC | Age: 69
End: 2024-05-24

## 2024-05-24 NOTE — TELEPHONE ENCOUNTER
LMTCB to clarify frequency of wound vac changes? Are they seeing  pt 3x weekly?    Per Bianka note on 5/20-Continue with wound VAC to residual limb, changing 3 times weekly with home care. Wet-to-dry dressing in the event that his VAC comes off at home.

## 2024-05-24 NOTE — TELEPHONE ENCOUNTER
Caller: Bel RN w/ [facility]     Provider: LOUANN Vance    Detailed reason for call: Re-certification orders needed, verbally. For skilled nursing 2x a week for 9 weeks and continued wound care for BTK amputation.     Best phone number to contact: [phone]     Best time to contact: Anytime    Ok to leave a detailed message: Yes    Ok to speak to authorized person if needed: No      (Noted to patient if reason is related to wound or incision, to please send a photo via email or [employer].)

## 2024-05-24 NOTE — TELEPHONE ENCOUNTER
Left voicemail for Bel with home care.  Patient's vac changes should be 3 times per week per LOUANN Carlton's notes.  Verbal ok given for twice weekly for next week due to holiday and then 3 times weekly for 8 weeks.  Asked her to call back with any questions.

## 2024-05-28 NOTE — TELEPHONE ENCOUNTER
Writer spoke with Bel, informed her that pt orders were 2x weekly in the hospital. Orders changed to 3x weekly  once he was outpatient. She will change the orders to 3x weekly.

## 2024-05-28 NOTE — TELEPHONE ENCOUNTER
Caller: Bel    Provider: LOUANN Vance    Detailed reason for call: Bel is calling wondering when the order changed to 3 times a week? She states it been twice a week and was unaware that is changed to 3 times a week.    Best phone number to contact: [phone]    Best time to contact: any    Ok to leave a detailed message: Yes    Ok to speak to authorized person if needed: No      (Noted to patient if reason is related to wound or incision, to please send a photo via email or [employer].)

## 2024-06-03 ENCOUNTER — TELEPHONE (OUTPATIENT)
Dept: VASCULAR SURGERY | Facility: CLINIC | Age: 69
End: 2024-06-03

## 2024-06-03 DIAGNOSIS — T87.43: Primary | ICD-10-CM

## 2024-06-03 RX ORDER — SULFAMETHOXAZOLE/TRIMETHOPRIM 800-160 MG
1 TABLET ORAL 2 TIMES DAILY
Qty: 28 TABLET | Refills: 0 | Status: SHIPPED | OUTPATIENT
Start: 2024-06-03 | End: 2024-06-17

## 2024-06-03 NOTE — TELEPHONE ENCOUNTER
Left message for nurse Bel and son Tarun regarding antibiotics and asked for call back to schedule wound check this week.

## 2024-06-03 NOTE — TELEPHONE ENCOUNTER
"Caller: Bel [facility]    Provider: MD Amber Guillen and LOUANN Vance    Detailed reason for call: Patient has a temp of 100.0 today, and HC nurse noticed a \"pungent\" odor coming from the wound during vac change. Wondering if patient should be started on abx.     Best phone number to contact: [phone]    Best time to contact: Any time    Ok to leave a detailed message: Yes    Ok to speak to authorized person if needed: No      (Noted to patient if reason is related to wound or incision, to please send a photo via email or [employer].)     "

## 2024-06-04 NOTE — TELEPHONE ENCOUNTER
LM susanna Staples at [facility] to confirm if she received message regarding the start of antibiotics and if she saw patient today as patient did not show up for his nurse visit today. Can Bel send updated photo to [email or web address] email at her visit?    JULIO Mcneil, patient did not show up for nurse visit today. Requested a call back to discuss.

## 2024-06-05 NOTE — TELEPHONE ENCOUNTER
"Spoke w/Tarun. He didn't bring patient for appointment \"because he kicked me out\". States that he is on his way to patient's house now to \"get his card so I can get the medicine\". He will talk to patient about rescheduling nurse visit for this week and call us back to arrange.   "

## 2024-06-05 NOTE — TELEPHONE ENCOUNTER
Spoke w/Bel. She will send pictures today when she sees the patient. She will also have patient go to ED for evaluation of infection if his symptoms are worse today due to the fact that he has not yet started the antibiotics that were ordered on 6/3/2024.

## 2024-06-06 NOTE — TELEPHONE ENCOUNTER
Left message for Bel to discuss patients HC visit yesterday. Was there signs and symptoms of infection? Did the pt go to the ED? Does his follow-up need to be moved up from 6/17?

## 2024-06-10 NOTE — PROGRESS NOTES
[facility]        Patient is here for a 2 week follow up  to discuss Needs R BKA completion surgery. New wound to left plantar foot    Pt is currently taking Aspirin and Statin.    The provider has been notified that the patient has no concerns.     Questions patient would like addressed today are: N/A.    Refills are needed: No    Has homecare services and agency name: yes- unknown. Coming today to reapply wound vac.

## 2024-06-10 NOTE — PATIENT INSTRUCTIONS
"You will be contacted to schedule BKA completion   You will need to schedule pre-op physical once scheduled  You don't need to stop aspirin prior to surgery     Dressing Change instructions    Continue wound vac to right BKA      Left plantar callous  3x weekly and as needed, Cleanse your left foot wound(s) with Normal saline.     Pat Dry with non-sterile gauze     Primary Dressing: Apply Medihoney or Manuka honey into/onto the wounds     Secondary dressing: Cover with dry gauze     Secure with non-sterile roll gauze (4\" x 75\" roll) and tape (1\" roll tape) as needed; avoid adhesive directly on the skin            SEEK MEDICAL CARE IF:  You have an increase in swelling, pain, or redness around the wound.  You have an increase in the amount of pus coming from the wound.  There is a bad smell coming from the wound.  The wound appears to be worsening/enlarging  You have a fever greater than 101.5 F        It is ok to continue current wound care treatment/products for the next 2-3 days until new wound care supplies are ordered and arrive. If longer than this please contact our office at [phone].      We want to hear from you!   In the next few weeks, you should receive a call or email to complete a survey about your visit at [facility]. Please help us improve your appointment experience by letting us know how we did today. We strive to make your experience good and value any ways in which we could do better.       We value your input and suggestions.     Thank you for choosing the [facility]!        "

## 2024-06-13 ENCOUNTER — TELEPHONE (OUTPATIENT)
Dept: VASCULAR SURGERY | Facility: CLINIC | Age: 69
End: 2024-06-13

## 2024-06-13 NOTE — TELEPHONE ENCOUNTER
Spoke with Kathi from [employer].  Cover with dry gauze and secure with roll gauze until patient is seen to make a plan at visit with Fellow on Monday.  Patient will need to make an appointment to see Dr. Sage.  Left voicemail for patient's son to call back to schedule an appointment for patient to see Dr. Sage.

## 2024-06-13 NOTE — TELEPHONE ENCOUNTER
Caller: Nallely Armenta    Provider: PARK Sage    Detailed reason for call: Patient was seen by home care today to reapply wound vac dressing, and nurse noted a new wound on the bottom of the left foot measuring:  0.8 x 0.7 x 0.3 cm. Wondering if they need to be doing anything differently and/or if patient needs to make an appointment with Dr. Sage. Patient is scheduled to see fellow on Monday.    Best phone number to contact: [phone]    Best time to contact: Any time    Ok to leave a detailed message: Yes    Ok to speak to authorized person if needed: No      (Noted to patient if reason is related to wound or incision, to please send a photo via email or [employer].)

## 2024-06-17 ENCOUNTER — OFFICE VISIT (OUTPATIENT)
Dept: VASCULAR SURGERY | Facility: CLINIC | Age: 69
End: 2024-06-17
Attending: SURGERY

## 2024-06-17 ENCOUNTER — DOCUMENTATION ONLY (OUTPATIENT)
Dept: VASCULAR SURGERY | Facility: CLINIC | Age: 69
End: 2024-06-17

## 2024-06-17 ENCOUNTER — LAB (OUTPATIENT)
Dept: LAB | Facility: CLINIC | Age: 69
End: 2024-06-17
Payer: COMMERCIAL

## 2024-06-17 VITALS
SYSTOLIC BLOOD PRESSURE: 130 MMHG | RESPIRATION RATE: 16 BRPM | DIASTOLIC BLOOD PRESSURE: 80 MMHG | OXYGEN SATURATION: 95 % | HEART RATE: 70 BPM

## 2024-06-17 DIAGNOSIS — Z89.431 STATUS POST TRANSMETATARSAL AMPUTATION OF RIGHT FOOT (H): ICD-10-CM

## 2024-06-17 DIAGNOSIS — Z89.431 STATUS POST TRANSMETATARSAL AMPUTATION OF RIGHT FOOT (H): Primary | ICD-10-CM

## 2024-06-17 DIAGNOSIS — L97.514 ULCER OF RIGHT FOOT WITH NECROSIS OF BONE (H): ICD-10-CM

## 2024-06-17 LAB — HBA1C MFR BLD: 8.1 % (ref 0–5.6)

## 2024-06-17 PROCEDURE — 83036 HEMOGLOBIN GLYCOSYLATED A1C: CPT

## 2024-06-17 PROCEDURE — 36415 COLL VENOUS BLD VENIPUNCTURE: CPT

## 2024-06-17 PROCEDURE — 99215 OFFICE O/P EST HI 40 MIN: CPT

## 2024-06-17 PROCEDURE — 99213 OFFICE O/P EST LOW 20 MIN: CPT

## 2024-06-17 RX ORDER — CEFAZOLIN SODIUM/WATER 2 G/20 ML
2 SYRINGE (ML) INTRAVENOUS SEE ADMIN INSTRUCTIONS
Status: CANCELLED | OUTPATIENT
Start: 2024-08-30

## 2024-06-17 RX ORDER — HEPARIN SODIUM 5000 [USP'U]/.5ML
5000 INJECTION, SOLUTION INTRAVENOUS; SUBCUTANEOUS
Status: CANCELLED | OUTPATIENT
Start: 2024-08-30

## 2024-06-17 RX ORDER — ACETAMINOPHEN 325 MG/1
975 TABLET ORAL ONCE
Status: CANCELLED | OUTPATIENT
Start: 2024-08-30 | End: 2024-06-17

## 2024-06-17 RX ORDER — CEFAZOLIN SODIUM/WATER 2 G/20 ML
2 SYRINGE (ML) INTRAVENOUS
Status: CANCELLED | OUTPATIENT
Start: 2024-08-30

## 2024-06-17 ASSESSMENT — PAIN SCALES - GENERAL: PAINLEVEL: NO PAIN (0)

## 2024-06-17 NOTE — PROGRESS NOTES
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 Patient was noted to have a significantly elevated hemoglobin A1c during previous amputation to 14%, now 8% at most recent check from 9.4%. Also with knew plantar wound to right foot.     RECOMMENDATION/RISKS:  Will schedule Right completion BKA. Continue wound vac changes until OR. Will order ABIs and arterial duplex of LLE (last performed in March 2024) prior to podiatry evaluation to see if pt will need further revascularization LLE. Follow up post op BKA. Cont ASA, statin, blood glucose control.     HPI:  Dario Benavides is a 68 year old male with past medical history significant for r hypertension, tobacco abuse, noncompliance, and type 2 diabetes mellitus with a right diabetic foot infection.  Patient underwent right guillotine amputation on 3/24 with Dr. Steel.     Today, Mr. Benavides presents for follow-up.  He is accompanied by his son.  Patient is doing well and denies any significant pain at this time.  He has a wound VAC in place to his residual limb that is changed 3 times weekly with home care. He denies any fevers, chills, or sweats.  Hemoglobin A1c has improved but remains elevated at 8.1% which has improved from 9.4%. Also noted new wound to left plantar surface, no cellulitis or signs of infection. Able to use left leg for transfers.     REVIEW OF SYSTEMS:    A 12 point ROS was reviewed and is negative except for what is listed above in HPI.    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) 05/08/2021    Tobacco abuse 07/23/2019    Type 2 diabetes mellitus with right diabetic foot infection (H) 05/04/2021      Past Surgical History:   Procedure Laterality Date    AMPUTATE LEG BELOW KNEE Right 3/24/2024    Procedure: GUILLOTINE RIGHT BELOW KNEE AMPUTATION;  Surgeon: Marissa Steel MD;  Location: [facility] OR    BACK SURGERY  1992    CLOSE SECONDARY WOUND LOWER EXTREMITY Right 8/17/2023    Procedure: Delayed primary closure of wound right foot;  Surgeon: Brandon Nieto DPM;  Location: [facility] OR    IRRIGATION AND DEBRIDEMENT FOOT, COMBINED Right 8/14/2023    Procedure: IRRIGATION AND DEBRIDEMENT right foot with removing all infected bones and soft tissue;  Surgeon: Jun Goyal DPM;  Location: [facility] OR    NECK SURGERY  2011     ALLERGIES:  Patient has no known allergies.    MEDS:    Current Outpatient Medications:     acetaminophen (TYLENOL) 325 MG tablet, Take 2 tablets (650 mg) by mouth every 4 hours as needed for mild pain or other (and adjunct with moderate or severe pain or per patient request), Disp: 30 tablet, Rfl: 0    alcohol swab prep pads, Use to swab area of injection/helio as directed., Disp: 100 each, Rfl: 3    amLODIPine (NORVASC) 2.5 MG tablet, Take 1 tablet (2.5 mg) by mouth daily, Disp: 30 tablet, Rfl: 0    aspirin 81 MG EC tablet, Take 1 tablet (81 mg) by mouth daily, Disp: 30 tablet, Rfl: 0    atorvastatin (LIPITOR) 80 MG tablet, Take 1 tablet (80 mg) by mouth every evening, Disp: 30 tablet, Rfl: 0    blood glucose (ACCU-CHEK JANICE PLUS) test strip, Use to test blood sugar 1 times daily, Disp: 100 strip, Rfl: 0    insulin glargine (LANTUS SOLOSTAR) 100 UNIT/ML pen, Inject 20 Units Subcutaneous every morning, Disp: 15 mL, Rfl: 0    insulin pen needle (ULTICARE MINI) 31G X 6 MM miscellaneous, Use 1 pen needles daily or as  directed., Disp: 100 each, Rfl: 0    polyethylene glycol (MIRALAX) 17 g packet, Take 17 g by mouth daily, Disp: , Rfl:     sulfamethoxazole-trimethoprim (BACTRIM DS) 800-160 MG tablet, Take 1 tablet by mouth 2 times daily for 14 days, Disp: 28 tablet, Rfl: 0    valsartan (DIOVAN) 80 MG tablet, Take 160 mg by mouth daily, Disp: , Rfl:     SOCIAL HABITS:    History   Smoking Status    Some Days    Types: Cigarettes   Smokeless Tobacco    Never     Social History    Substance and Sexual Activity      Alcohol use: Not Currently      History   Drug Use    Types: Marijuana     Comment: couple of times per month     FAMILY HISTORY:  No family history on file.    PE:  /80   Pulse 70   Resp 16   SpO2 95%   Wt Readings from Last 1 Encounters:   03/23/24 72.4 kg (159 lb 11.2 oz)     There is no height or weight on file to calculate BMI.    EXAM:  GENERAL: well-developed 68 year old male who appears his stated age  CARDIAC: normal   CHEST/LUNG: normal respiratory effort   MUSCULOSKELETAL: grossly normal and both lower extremities are intact, no lower extremity edema. RLE amputation site healing well, punctate bleeding noted. No signs of ischemia or necrosis, moderate edema to right leg.   NEUROLOGIC: focally intact, alert and oriented x 3  PSYCH: appropriate affect  Vascular: 1+ DP on LLE, ulcer noted on plantar surface of left foot    DIAGNOSTIC STUDIES:     Images:  Pertinent imaging reviewed    LABS:      Sodium   Date Value Ref Range Status   03/28/2024 137 135 - 145 mmol/L Final     Comment:     Reference intervals for this test were updated on 09/26/2023 to more accurately reflect our healthy population. There may be differences in the flagging of prior results with similar values performed with this method. Interpretation of those prior results can be made in the context of the updated reference intervals.    03/26/2024 137 135 - 145 mmol/L Final     Comment:     Reference intervals for this test were updated on  09/26/2023 to more accurately reflect our healthy population. There may be differences in the flagging of prior results with similar values performed with this method. Interpretation of those prior results can be made in the context of the updated reference intervals.    03/25/2024 138 135 - 145 mmol/L Final     Comment:     Reference intervals for this test were updated on 09/26/2023 to more accurately reflect our healthy population. There may be differences in the flagging of prior results with similar values performed with this method. Interpretation of those prior results can be made in the context of the updated reference intervals.      Urea Nitrogen   Date Value Ref Range Status   03/28/2024 12.6 8.0 - 23.0 mg/dL Final   03/26/2024 13.2 8.0 - 23.0 mg/dL Final   03/25/2024 16.8 8.0 - 23.0 mg/dL Final   05/26/2021 16 8 - 22 mg/dL Final   05/19/2021 14 8 - 22 mg/dL Final     Hemoglobin   Date Value Ref Range Status   03/28/2024 9.1 (L) 13.3 - 17.7 g/dL Final   03/27/2024 9.4 (L) 13.3 - 17.7 g/dL Final   03/26/2024 8.8 (L) 13.3 - 17.7 g/dL Final     Platelet Count   Date Value Ref Range Status   03/28/2024 607 (H) 150 - 450 10e3/uL Final   03/27/2024 620 (H) 150 - 450 10e3/uL Final   03/26/2024 582 (H) 150 - 450 10e3/uL Final     25 minutes spent on the day of encounter doing chart review, history and exam, documentation, and further activities as noted.     Roselia Herrera DO  Fellow  VASCULAR SURGERY

## 2024-06-26 NOTE — TELEPHONE ENCOUNTER
[facility] to reschedule the ultrasounds and visit with Dr. Sage that were missed this morning.  Surgery schedulers have also been calling the patient to schedule as well. [phone]

## 2024-06-28 NOTE — TELEPHONE ENCOUNTER
[facility]MT[facility] #2 to reschedule the visits the patient missed on 6/26 for ultrasounds and to see Dr. Sage. [phone]

## 2024-07-02 NOTE — TELEPHONE ENCOUNTER
Sent patient letter asking to call back and reschedule missed appointments. Included no-show policy.

## 2024-07-02 NOTE — TELEPHONE ENCOUNTER
Need to continue to try to reschedule as patient is being followed by multiple providers in this clinic.

## 2024-07-02 NOTE — TELEPHONE ENCOUNTER
We have been trying reaching patient to reschedule his recently missed appointments, but it looks like he has no showed x3: 8/23/23, 6/4/24, 6/26/24. Should we continue to try and reschedule or need to discharge? For some reason it looks like we have not sent initial no-show letter though.

## 2024-07-03 NOTE — PROGRESS NOTES
LVMTCB and go over medication instructions from RN staff. Mailed surgery packet to requested address.

## 2024-07-03 NOTE — PROGRESS NOTES
[facility]MT[facility] #5 and schedule BKA. Possible dates for [facility] are in the week of 8/26-8/30.    Sent letter via mail to the patient due to failed attempts connecting.

## 2024-07-03 NOTE — PROGRESS NOTES
Reviewed Blood Thinners and plan for holding, continuing and/or bridging: Other- NO need to hold Aspirin    Reviewed Diabetic medications: NA  Please discuss with your primary doctor and follow the hold instructions:   Hold seven (7) days prior for once weekly injectable doses [semaglutide (Ozempic, Wegovy), dulaglutide (Trulicity), exenatide ER (Bydureon), tirzepatide (Mounjaro)]  Hold the day before and day of for once daily injectable GLP-1 agonists [exenatide (Byetta), liraglutide (Saxenda, Victoza)]  Hold seven (7) days for oral semaglutide (Rybelsus)

## 2024-07-03 NOTE — PROGRESS NOTES
Surgery Scheduled    Discussed with the patient's daughter surgery plan/instructions. Pt will need a pre-op exam. Will send letter via mail to Timothy. Address is: [address] [facility] [address].     Surgery/Procedure: AMPUTATION, BELOW KNEE (Right)     Special Equipment: Power Saw    Location: [facility]:  [address] [place] [address] (phone: [phone], Fax: [phone])    Date: 8/30/2024    Time: 9:10am    Admission Type: Inpatient    Surgeon: Dr. Guillen    OR Confirmed & :  Yes with Shayla on 7/3/2024    Entered on provider calendar:  Yes    Post Op: See appt desk.     Wound Vac Needed:  TBD    Home Care Needed:  TBD    Reps Contacted: Not needed    Blood Thinners Addressed:  Message sent to support pool to review medications.     Stress Test Clearance: NO

## 2024-07-17 NOTE — PROGRESS NOTES
Discussed medications reviewed for surgery. Daughter received surgery packet. Pre-op has been scheduled for 8/9/2024, external.

## 2024-07-19 ENCOUNTER — TELEPHONE (OUTPATIENT)
Dept: VASCULAR SURGERY | Facility: CLINIC | Age: 69
End: 2024-07-19

## 2024-07-19 NOTE — TELEPHONE ENCOUNTER
Bel, nurse with [facility] calling to ask if VAC can be stopped on right stump. Patient is removing the VAC before almost every visit. Bone is completely covered and would is significantly smaller than it was previously. Pt scheduled for BKA completion on 8/30.

## 2024-07-19 NOTE — TELEPHONE ENCOUNTER
Received message back from vascular team, they would prefer to continue with the VAC, however if pt refusing VAC ok so switch to dressings. VM left on confidential line for Bel updating that if pt refusing, ok to switch to aquacel/ABD changed 3 times weekly. Asked for call back to confirm what plan will be.

## 2024-07-22 ENCOUNTER — TELEPHONE (OUTPATIENT)
Dept: VASCULAR SURGERY | Facility: CLINIC | Age: 69
End: 2024-07-22

## 2024-07-22 NOTE — TELEPHONE ENCOUNTER
"This writer spoke with clinic staff familiar with the upcoming [employer] \"peel and press\" NPWT. She states it should be available later this month.    This writer returned Liza's call, notifying her of the above. Liza states the [employer] rep stated it is available to order today. She didn't realize the nurse had called this clinic last week so she will reach out to the nurse to return our call about the plan of care. Clarified with Liza that Dr. Guillen is the ordering provider NOT Dr. Sage as the pt had an amputation. She voiced understanding.    Talita Valentino, BAY, CWOCN    "

## 2024-07-22 NOTE — TELEPHONE ENCOUNTER
"Caller: Liza HARDY LPN, clinical     Provider: PARK Sage    Detailed reason for call: Pt's RN case manager and [employer] Rep recommending a \"peel and press\" VAC that would only need to be changed every 7 days, feel it would benefit pt with use of NPWT on stump. Requesting orders if appropriate per Dr. Sage.    Best phone number to contact: [phone]    Best time to contact: M-F    Ok to leave a detailed message: Yes    Ok to speak to authorized person if needed: N/A      (Noted to patient if reason is related to wound or incision, to please send a photo via email or [employer].)    "

## 2024-07-24 NOTE — TELEPHONE ENCOUNTER
LM with verbal orders. Acknowledged edema and educated to ensure pt isn't keeping leg in dependent position but is elevating.    Talita Valentino RN, CWOCN

## 2024-07-24 NOTE — TELEPHONE ENCOUNTER
Caller: Bel    Provider: MD Amber Guillen    Detailed reason for call: Bel is calling requestin orders to continue nursing 3 times a week for 9 weeks for the wound vac to the stump and and ongoing treatment for the left foot ulcer.  She is also wanting to report that the right leg above the stump is some pitting edema    Best phone number to contact: [phone]    Best time to contact: any    Ok to leave a detailed message: Yes    Ok to speak to authorized person if needed: No      (Noted to patient if reason is related to wound or incision, to please send a photo via email or [employer].)

## 2024-08-02 ENCOUNTER — TELEPHONE (OUTPATIENT)
Dept: VASCULAR SURGERY | Facility: CLINIC | Age: 69
End: 2024-08-02

## 2024-08-02 NOTE — TELEPHONE ENCOUNTER
"Caller: Irvin [facility]    Provider: PARK Sage    Detailed reason for call: Called to report that patient refused to have the wound vac put on today as he \"does not see the point\". HC put on a wet-to-dry dressing and patient said he will change daily. Patient's , Bel, is going to put in supply order for silver dressing.     Best phone number to contact: [phone] (no need to call back unless questions)    Best time to contact: Any time    Ok to leave a detailed message: Yes    Ok to speak to authorized person if needed: No      (Noted to patient if reason is related to wound or incision, to please send a photo via email or [employer].)     "

## 2024-08-02 NOTE — TELEPHONE ENCOUNTER
Message left for Irvin updating her on message below from Bianka.      Yes, I guess not much else we can do if he refuses. I would definitely stress the importance of keeping the wound clean and good hygiene with him as I think that could be an issue.

## 2024-08-06 NOTE — PROGRESS NOTES
Per Dr. Guillen, Dr. Redd to complete.     Writer called the pt's daughter to inform them of the provider switch. Pt and daughter okay with the provider change.

## 2024-08-29 ENCOUNTER — ANESTHESIA EVENT (OUTPATIENT)
Dept: SURGERY | Facility: HOSPITAL | Age: 69
DRG: 240 | End: 2024-08-29
Payer: MEDICARE

## 2024-08-30 ENCOUNTER — ANESTHESIA (OUTPATIENT)
Dept: SURGERY | Facility: HOSPITAL | Age: 69
DRG: 240 | End: 2024-08-30
Payer: MEDICARE

## 2024-08-30 NOTE — PROGRESS NOTES
Daughter, Timothy, called surgery scheduling regarding surgery. Daughter stated they were told the pt could not have surgery due to not having his insurance card at the time of check-in. Surgery was cancelled today with Dr. Redd. Per daughter, the pt does not have a physical copy of his card and is waiting for his cards via mail.     Writer rescheduled surgery for 9/6/2024 with Dr. Guillen on 9/6/2024 for 2:20pm at [facility].     Daughter aware pf surgery instructions, declined updated surgery packet.

## 2024-09-04 ENCOUNTER — TELEPHONE (OUTPATIENT)
Dept: VASCULAR SURGERY | Facility: CLINIC | Age: 69
End: 2024-09-04

## 2024-09-04 NOTE — TELEPHONE ENCOUNTER
Caller: [facility] Pre-OP Nurse Team    Provider: MD Amber Guillen    Detailed reason for call: Per pt's H&P from 8/29, the patient is to repeat labs due to abnormal lab levels. Pre-op nurse does not see any new lab results at this time. Pre-op nurse is wondering if the pt needs their labs repeated for surgery on 9/6/2024.    Best phone number to contact: [phone]    Best time to contact: Anytime    Ok to leave a detailed message: Yes    Ok to speak to authorized person if needed: Yes: Any RN staff      (Noted to patient if reason is related to wound or incision, to please send a photo via email or [employer].)

## 2024-09-04 NOTE — OR NURSING
[facility] needs [facility]/u labs per addendum 8/10/24 of pre-op note 8/9/24, f/u labs not done, states recommended, before surgery. [facility] team about this.    Labs ordered [facility] T[facility] RN's.

## 2024-09-04 NOTE — TELEPHONE ENCOUNTER
Spoke with Bee and updated that he should get the labs done before surgery. She will pass it along to the .

## 2024-09-06 ENCOUNTER — APPOINTMENT (OUTPATIENT)
Dept: ULTRASOUND IMAGING | Facility: HOSPITAL | Age: 69
DRG: 240 | End: 2024-09-06
Attending: STUDENT IN AN ORGANIZED HEALTH CARE EDUCATION/TRAINING PROGRAM
Payer: MEDICARE

## 2024-09-06 ENCOUNTER — HOSPITAL ENCOUNTER (INPATIENT)
Facility: HOSPITAL | Age: 69
LOS: 3 days | Discharge: HOME HEALTH | DRG: 240 | End: 2024-09-09
Attending: SURGERY | Admitting: FAMILY MEDICINE
Payer: MEDICARE

## 2024-09-06 DIAGNOSIS — Z89.431 STATUS POST AMPUTATION OF RIGHT FOOT THROUGH METATARSAL BONE (H): Primary | ICD-10-CM

## 2024-09-06 DIAGNOSIS — L08.9 TYPE 2 DIABETES MELLITUS WITH RIGHT DIABETIC FOOT INFECTION (H): ICD-10-CM

## 2024-09-06 DIAGNOSIS — E11.628 TYPE 2 DIABETES MELLITUS WITH RIGHT DIABETIC FOOT INFECTION (H): ICD-10-CM

## 2024-09-06 LAB
ABO/RH(D): NORMAL
ANION GAP SERPL CALCULATED.3IONS-SCNC: 12 MMOL/L (ref 7–15)
ANTIBODY SCREEN: NEGATIVE
BUN SERPL-MCNC: 24.9 MG/DL (ref 8–23)
CALCIUM SERPL-MCNC: 9.3 MG/DL (ref 8.8–10.4)
CHLORIDE SERPL-SCNC: 101 MMOL/L (ref 98–107)
CREAT SERPL-MCNC: 2.19 MG/DL (ref 0.67–1.17)
EGFRCR SERPLBLD CKD-EPI 2021: 32 ML/MIN/1.73M2
GLUCOSE BLDC GLUCOMTR-MCNC: 160 MG/DL (ref 70–99)
GLUCOSE BLDC GLUCOMTR-MCNC: 163 MG/DL (ref 70–99)
GLUCOSE BLDC GLUCOMTR-MCNC: 205 MG/DL (ref 70–99)
GLUCOSE SERPL-MCNC: 157 MG/DL (ref 70–99)
HCO3 SERPL-SCNC: 24 MMOL/L (ref 22–29)
HGB BLD-MCNC: 11.3 G/DL (ref 13.3–17.7)
PLATELET # BLD AUTO: 311 10E3/UL (ref 150–450)
POTASSIUM SERPL-SCNC: 4.3 MMOL/L (ref 3.4–5.3)
SODIUM SERPL-SCNC: 137 MMOL/L (ref 135–145)
SPECIMEN EXPIRATION DATE: NORMAL

## 2024-09-06 PROCEDURE — 370N000017 HC ANESTHESIA TECHNICAL FEE, PER MIN: Performed by: SURGERY

## 2024-09-06 PROCEDURE — 27886 AMPUTATION FOLLOW-UP SURGERY: CPT | Mod: RT | Performed by: SURGERY

## 2024-09-06 PROCEDURE — 250N000011 HC RX IP 250 OP 636: Performed by: ANESTHESIOLOGY

## 2024-09-06 PROCEDURE — 258N000003 HC RX IP 258 OP 636: Performed by: ANESTHESIOLOGY

## 2024-09-06 PROCEDURE — 85018 HEMOGLOBIN: CPT | Performed by: SURGERY

## 2024-09-06 PROCEDURE — 272N000001 HC OR GENERAL SUPPLY STERILE: Performed by: SURGERY

## 2024-09-06 PROCEDURE — 250N000013 HC RX MED GY IP 250 OP 250 PS 637: Performed by: STUDENT IN AN ORGANIZED HEALTH CARE EDUCATION/TRAINING PROGRAM

## 2024-09-06 PROCEDURE — 27886 AMPUTATION FOLLOW-UP SURGERY: CPT | Performed by: NURSE ANESTHETIST, CERTIFIED REGISTERED

## 2024-09-06 PROCEDURE — 86900 BLOOD TYPING SEROLOGIC ABO: CPT | Performed by: STUDENT IN AN ORGANIZED HEALTH CARE EDUCATION/TRAINING PROGRAM

## 2024-09-06 PROCEDURE — 88307 TISSUE EXAM BY PATHOLOGIST: CPT | Mod: 26 | Performed by: PATHOLOGY

## 2024-09-06 PROCEDURE — 80048 BASIC METABOLIC PNL TOTAL CA: CPT | Performed by: SURGERY

## 2024-09-06 PROCEDURE — 99253 IP/OBS CNSLTJ NEW/EST LOW 45: CPT | Performed by: PODIATRIST

## 2024-09-06 PROCEDURE — 88311 DECALCIFY TISSUE: CPT | Mod: 26 | Performed by: PATHOLOGY

## 2024-09-06 PROCEDURE — 85049 AUTOMATED PLATELET COUNT: CPT | Performed by: STUDENT IN AN ORGANIZED HEALTH CARE EDUCATION/TRAINING PROGRAM

## 2024-09-06 PROCEDURE — 88311 DECALCIFY TISSUE: CPT | Mod: TC | Performed by: SURGERY

## 2024-09-06 PROCEDURE — 710N000009 HC RECOVERY PHASE 1, LEVEL 1, PER MIN: Performed by: SURGERY

## 2024-09-06 PROCEDURE — 36415 COLL VENOUS BLD VENIPUNCTURE: CPT | Performed by: STUDENT IN AN ORGANIZED HEALTH CARE EDUCATION/TRAINING PROGRAM

## 2024-09-06 PROCEDURE — 36415 COLL VENOUS BLD VENIPUNCTURE: CPT | Performed by: SURGERY

## 2024-09-06 PROCEDURE — 999N000141 HC STATISTIC PRE-PROCEDURE NURSING ASSESSMENT: Performed by: SURGERY

## 2024-09-06 PROCEDURE — 258N000003 HC RX IP 258 OP 636: Performed by: STUDENT IN AN ORGANIZED HEALTH CARE EDUCATION/TRAINING PROGRAM

## 2024-09-06 PROCEDURE — 250N000009 HC RX 250: Performed by: ANESTHESIOLOGY

## 2024-09-06 PROCEDURE — 250N000011 HC RX IP 250 OP 636: Performed by: STUDENT IN AN ORGANIZED HEALTH CARE EDUCATION/TRAINING PROGRAM

## 2024-09-06 PROCEDURE — P9045 ALBUMIN (HUMAN), 5%, 250 ML: HCPCS | Performed by: ANESTHESIOLOGY

## 2024-09-06 PROCEDURE — 93922 UPR/L XTREMITY ART 2 LEVELS: CPT

## 2024-09-06 PROCEDURE — 27886 AMPUTATION FOLLOW-UP SURGERY: CPT | Performed by: ANESTHESIOLOGY

## 2024-09-06 PROCEDURE — 0Y6H0Z2 DETACHMENT AT RIGHT LOWER LEG, MID, OPEN APPROACH: ICD-10-PCS | Performed by: SURGERY

## 2024-09-06 PROCEDURE — 250N000013 HC RX MED GY IP 250 OP 250 PS 637

## 2024-09-06 PROCEDURE — 93926 LOWER EXTREMITY STUDY: CPT | Mod: LT

## 2024-09-06 PROCEDURE — 360N000076 HC SURGERY LEVEL 3, PER MIN: Performed by: SURGERY

## 2024-09-06 PROCEDURE — 120N000013 HC R&B IMCU

## 2024-09-06 RX ORDER — ACETAMINOPHEN 325 MG/1
975 TABLET ORAL ONCE
Status: DISCONTINUED | OUTPATIENT
Start: 2024-09-06 | End: 2024-09-06

## 2024-09-06 RX ORDER — DEXTROSE MONOHYDRATE 25 G/50ML
25-50 INJECTION, SOLUTION INTRAVENOUS
Status: DISCONTINUED | OUTPATIENT
Start: 2024-09-06 | End: 2024-09-09 | Stop reason: HOSPADM

## 2024-09-06 RX ORDER — ONDANSETRON 2 MG/ML
INJECTION INTRAMUSCULAR; INTRAVENOUS PRN
Status: DISCONTINUED | OUTPATIENT
Start: 2024-09-06 | End: 2024-09-06

## 2024-09-06 RX ORDER — POLYETHYLENE GLYCOL 3350 17 G/17G
17 POWDER, FOR SOLUTION ORAL DAILY
Status: DISCONTINUED | OUTPATIENT
Start: 2024-09-07 | End: 2024-09-09 | Stop reason: HOSPADM

## 2024-09-06 RX ORDER — CEFAZOLIN SODIUM/WATER 2 G/20 ML
2 SYRINGE (ML) INTRAVENOUS
Status: COMPLETED | OUTPATIENT
Start: 2024-09-06 | End: 2024-09-06

## 2024-09-06 RX ORDER — VALSARTAN 80 MG/1
160 TABLET ORAL DAILY
Status: DISCONTINUED | OUTPATIENT
Start: 2024-09-06 | End: 2024-09-09 | Stop reason: HOSPADM

## 2024-09-06 RX ORDER — ONDANSETRON 2 MG/ML
4 INJECTION INTRAMUSCULAR; INTRAVENOUS EVERY 6 HOURS PRN
Status: DISCONTINUED | OUTPATIENT
Start: 2024-09-06 | End: 2024-09-09 | Stop reason: HOSPADM

## 2024-09-06 RX ORDER — LIDOCAINE HYDROCHLORIDE 10 MG/ML
INJECTION, SOLUTION INFILTRATION; PERINEURAL PRN
Status: DISCONTINUED | OUTPATIENT
Start: 2024-09-06 | End: 2024-09-06

## 2024-09-06 RX ORDER — PROPOFOL 10 MG/ML
INJECTION, EMULSION INTRAVENOUS CONTINUOUS PRN
Status: DISCONTINUED | OUTPATIENT
Start: 2024-09-06 | End: 2024-09-06

## 2024-09-06 RX ORDER — PROPOFOL 10 MG/ML
INJECTION, EMULSION INTRAVENOUS PRN
Status: DISCONTINUED | OUTPATIENT
Start: 2024-09-06 | End: 2024-09-06

## 2024-09-06 RX ORDER — KETAMINE HYDROCHLORIDE 10 MG/ML
INJECTION INTRAMUSCULAR; INTRAVENOUS PRN
Status: DISCONTINUED | OUTPATIENT
Start: 2024-09-06 | End: 2024-09-06

## 2024-09-06 RX ORDER — NALOXONE HYDROCHLORIDE 0.4 MG/ML
0.4 INJECTION, SOLUTION INTRAMUSCULAR; INTRAVENOUS; SUBCUTANEOUS
Status: DISCONTINUED | OUTPATIENT
Start: 2024-09-06 | End: 2024-09-09 | Stop reason: HOSPADM

## 2024-09-06 RX ORDER — EPHEDRINE SULFATE 50 MG/ML
INJECTION, SOLUTION INTRAMUSCULAR; INTRAVENOUS; SUBCUTANEOUS PRN
Status: DISCONTINUED | OUTPATIENT
Start: 2024-09-06 | End: 2024-09-06

## 2024-09-06 RX ORDER — LIDOCAINE 40 MG/G
CREAM TOPICAL
Status: DISCONTINUED | OUTPATIENT
Start: 2024-09-06 | End: 2024-09-06 | Stop reason: HOSPADM

## 2024-09-06 RX ORDER — NALOXONE HYDROCHLORIDE 0.4 MG/ML
0.2 INJECTION, SOLUTION INTRAMUSCULAR; INTRAVENOUS; SUBCUTANEOUS
Status: DISCONTINUED | OUTPATIENT
Start: 2024-09-06 | End: 2024-09-09 | Stop reason: HOSPADM

## 2024-09-06 RX ORDER — AMLODIPINE BESYLATE 5 MG/1
5 TABLET ORAL DAILY
Status: DISCONTINUED | OUTPATIENT
Start: 2024-09-06 | End: 2024-09-09 | Stop reason: HOSPADM

## 2024-09-06 RX ORDER — VALSARTAN 160 MG/1
160 TABLET ORAL DAILY
COMMUNITY
Start: 2024-05-16

## 2024-09-06 RX ORDER — ACETAMINOPHEN 325 MG/1
975 TABLET ORAL ONCE
Status: COMPLETED | OUTPATIENT
Start: 2024-09-06 | End: 2024-09-06

## 2024-09-06 RX ORDER — FENTANYL CITRATE 50 UG/ML
25-100 INJECTION, SOLUTION INTRAMUSCULAR; INTRAVENOUS
Status: DISCONTINUED | OUTPATIENT
Start: 2024-09-06 | End: 2024-09-06 | Stop reason: HOSPADM

## 2024-09-06 RX ORDER — ACETAMINOPHEN 325 MG/1
650 TABLET ORAL EVERY 4 HOURS PRN
Status: DISCONTINUED | OUTPATIENT
Start: 2024-09-09 | End: 2024-09-09 | Stop reason: HOSPADM

## 2024-09-06 RX ORDER — HEPARIN SODIUM 5000 [USP'U]/.5ML
5000 INJECTION, SOLUTION INTRAVENOUS; SUBCUTANEOUS
Status: COMPLETED | OUTPATIENT
Start: 2024-09-06 | End: 2024-09-06

## 2024-09-06 RX ORDER — AMLODIPINE BESYLATE 5 MG/1
5 TABLET ORAL DAILY
Status: DISCONTINUED | OUTPATIENT
Start: 2024-09-06 | End: 2024-09-06

## 2024-09-06 RX ORDER — ONDANSETRON 4 MG/1
4 TABLET, ORALLY DISINTEGRATING ORAL EVERY 6 HOURS PRN
Status: DISCONTINUED | OUTPATIENT
Start: 2024-09-06 | End: 2024-09-09 | Stop reason: HOSPADM

## 2024-09-06 RX ORDER — SODIUM CHLORIDE, SODIUM LACTATE, POTASSIUM CHLORIDE, CALCIUM CHLORIDE 600; 310; 30; 20 MG/100ML; MG/100ML; MG/100ML; MG/100ML
INJECTION, SOLUTION INTRAVENOUS CONTINUOUS
Status: DISCONTINUED | OUTPATIENT
Start: 2024-09-06 | End: 2024-09-06 | Stop reason: HOSPADM

## 2024-09-06 RX ORDER — ACETAMINOPHEN 325 MG/1
975 TABLET ORAL EVERY 8 HOURS
Status: COMPLETED | OUTPATIENT
Start: 2024-09-06 | End: 2024-09-09

## 2024-09-06 RX ORDER — HEPARIN SODIUM 5000 [USP'U]/.5ML
5000 INJECTION, SOLUTION INTRAVENOUS; SUBCUTANEOUS EVERY 8 HOURS
Status: DISCONTINUED | OUTPATIENT
Start: 2024-09-07 | End: 2024-09-09 | Stop reason: HOSPADM

## 2024-09-06 RX ORDER — AMOXICILLIN 250 MG
1 CAPSULE ORAL 2 TIMES DAILY
Status: DISCONTINUED | OUTPATIENT
Start: 2024-09-06 | End: 2024-09-09 | Stop reason: HOSPADM

## 2024-09-06 RX ORDER — NICOTINE POLACRILEX 4 MG
15-30 LOZENGE BUCCAL
Status: DISCONTINUED | OUTPATIENT
Start: 2024-09-06 | End: 2024-09-09 | Stop reason: HOSPADM

## 2024-09-06 RX ORDER — ATORVASTATIN CALCIUM 40 MG/1
80 TABLET, FILM COATED ORAL EVERY EVENING
Status: DISCONTINUED | OUTPATIENT
Start: 2024-09-06 | End: 2024-09-09 | Stop reason: HOSPADM

## 2024-09-06 RX ORDER — OXYCODONE HYDROCHLORIDE 5 MG/1
5 TABLET ORAL EVERY 4 HOURS PRN
Status: DISCONTINUED | OUTPATIENT
Start: 2024-09-06 | End: 2024-09-09 | Stop reason: HOSPADM

## 2024-09-06 RX ORDER — LIDOCAINE 40 MG/G
CREAM TOPICAL
Status: DISCONTINUED | OUTPATIENT
Start: 2024-09-06 | End: 2024-09-09 | Stop reason: HOSPADM

## 2024-09-06 RX ORDER — OXYCODONE HYDROCHLORIDE 5 MG/1
5 TABLET ORAL EVERY 6 HOURS PRN
Qty: 15 TABLET | Refills: 0 | Status: SHIPPED | OUTPATIENT
Start: 2024-09-06 | End: 2024-09-09

## 2024-09-06 RX ORDER — CEFAZOLIN SODIUM/WATER 2 G/20 ML
2 SYRINGE (ML) INTRAVENOUS SEE ADMIN INSTRUCTIONS
Status: DISCONTINUED | OUTPATIENT
Start: 2024-09-06 | End: 2024-09-06 | Stop reason: HOSPADM

## 2024-09-06 RX ORDER — ASPIRIN 81 MG/1
81 TABLET ORAL DAILY
Status: DISCONTINUED | OUTPATIENT
Start: 2024-09-06 | End: 2024-09-09 | Stop reason: HOSPADM

## 2024-09-06 RX ORDER — BISACODYL 10 MG
10 SUPPOSITORY, RECTAL RECTAL DAILY PRN
Status: DISCONTINUED | OUTPATIENT
Start: 2024-09-09 | End: 2024-09-09 | Stop reason: HOSPADM

## 2024-09-06 RX ORDER — SODIUM CHLORIDE, SODIUM LACTATE, POTASSIUM CHLORIDE, CALCIUM CHLORIDE 600; 310; 30; 20 MG/100ML; MG/100ML; MG/100ML; MG/100ML
INJECTION, SOLUTION INTRAVENOUS CONTINUOUS
Status: DISCONTINUED | OUTPATIENT
Start: 2024-09-06 | End: 2024-09-09 | Stop reason: HOSPADM

## 2024-09-06 RX ORDER — AMLODIPINE BESYLATE 5 MG/1
5 TABLET ORAL DAILY
COMMUNITY
Start: 2024-08-12

## 2024-09-06 RX ADMIN — ALBUMIN HUMAN: 0.05 INJECTION, SOLUTION INTRAVENOUS at 15:01

## 2024-09-06 RX ADMIN — SODIUM CHLORIDE, POTASSIUM CHLORIDE, SODIUM LACTATE AND CALCIUM CHLORIDE: 600; 310; 30; 20 INJECTION, SOLUTION INTRAVENOUS at 13:15

## 2024-09-06 RX ADMIN — Medication 5 MG: at 15:39

## 2024-09-06 RX ADMIN — MIDAZOLAM HYDROCHLORIDE 1 MG: 1 INJECTION, SOLUTION INTRAMUSCULAR; INTRAVENOUS at 13:44

## 2024-09-06 RX ADMIN — Medication 2 G: at 14:02

## 2024-09-06 RX ADMIN — PHENYLEPHRINE HYDROCHLORIDE 200 MCG: 10 INJECTION INTRAVENOUS at 14:59

## 2024-09-06 RX ADMIN — LIDOCAINE HYDROCHLORIDE 5 ML: 10 INJECTION, SOLUTION INFILTRATION; PERINEURAL at 14:10

## 2024-09-06 RX ADMIN — KETAMINE HYDROCHLORIDE 30 MG: 10 INJECTION INTRAMUSCULAR; INTRAVENOUS at 14:37

## 2024-09-06 RX ADMIN — ATORVASTATIN CALCIUM 80 MG: 40 TABLET, FILM COATED ORAL at 21:05

## 2024-09-06 RX ADMIN — Medication 5 MG: at 15:36

## 2024-09-06 RX ADMIN — SENNOSIDES AND DOCUSATE SODIUM 1 TABLET: 8.6; 5 TABLET ORAL at 21:05

## 2024-09-06 RX ADMIN — Medication 8 MCG: at 14:40

## 2024-09-06 RX ADMIN — FENTANYL CITRATE 50 MCG: 50 INJECTION, SOLUTION INTRAMUSCULAR; INTRAVENOUS at 13:43

## 2024-09-06 RX ADMIN — Medication 10 MG: at 15:25

## 2024-09-06 RX ADMIN — PHENYLEPHRINE HYDROCHLORIDE 100 MCG: 10 INJECTION INTRAVENOUS at 15:28

## 2024-09-06 RX ADMIN — Medication 5 MG: at 15:29

## 2024-09-06 RX ADMIN — ACETAMINOPHEN 975 MG: 325 TABLET ORAL at 13:18

## 2024-09-06 RX ADMIN — HEPARIN SODIUM 5000 UNITS: 10000 INJECTION, SOLUTION INTRAVENOUS; SUBCUTANEOUS at 13:58

## 2024-09-06 RX ADMIN — PROPOFOL 50 MG: 10 INJECTION, EMULSION INTRAVENOUS at 14:10

## 2024-09-06 RX ADMIN — ONDANSETRON 4 MG: 2 INJECTION INTRAMUSCULAR; INTRAVENOUS at 15:23

## 2024-09-06 RX ADMIN — SODIUM CHLORIDE, POTASSIUM CHLORIDE, SODIUM LACTATE AND CALCIUM CHLORIDE: 600; 310; 30; 20 INJECTION, SOLUTION INTRAVENOUS at 17:02

## 2024-09-06 RX ADMIN — PHENYLEPHRINE HYDROCHLORIDE 100 MCG: 10 INJECTION INTRAVENOUS at 14:46

## 2024-09-06 RX ADMIN — ACETAMINOPHEN 975 MG: 325 TABLET ORAL at 17:07

## 2024-09-06 RX ADMIN — PROPOFOL 150 MCG/KG/MIN: 10 INJECTION, EMULSION INTRAVENOUS at 14:10

## 2024-09-06 RX ADMIN — AMLODIPINE BESYLATE 5 MG: 5 TABLET ORAL at 21:05

## 2024-09-06 RX ADMIN — ASPIRIN 81 MG: 81 TABLET, COATED ORAL at 17:08

## 2024-09-06 ASSESSMENT — ACTIVITIES OF DAILY LIVING (ADL)
ADLS_ACUITY_SCORE: 28
ADLS_ACUITY_SCORE: 28
ADLS_ACUITY_SCORE: 25
ADLS_ACUITY_SCORE: 29
ADLS_ACUITY_SCORE: 28
ADLS_ACUITY_SCORE: 38
ADLS_ACUITY_SCORE: 28
ADLS_ACUITY_SCORE: 29

## 2024-09-06 ASSESSMENT — LIFESTYLE VARIABLES: TOBACCO_USE: 1

## 2024-09-06 NOTE — ANESTHESIA POSTPROCEDURE EVALUATION
Patient: Dario Benavides    Procedure: Procedure(s):  AMPUTATION, BELOW KNEE       Anesthesia Type:  General    Note:  Disposition: Inpatient   Postop Pain Control: Uneventful            Sign Out: Well controlled pain   PONV: No   Neuro/Psych: Uneventful            Sign Out: Acceptable/Baseline neuro status   Airway/Respiratory: Uneventful            Sign Out: Acceptable/Baseline resp. status   CV/Hemodynamics: Uneventful            Sign Out: Acceptable CV status; No obvious hypovolemia; No obvious fluid overload   Other NRE: NONE   DID A NON-ROUTINE EVENT OCCUR? No           Last vitals:  Vitals Value Taken Time   /69 09/06/24 1630   Temp 36.2  C (97.16  F) 09/06/24 1631   Pulse 82 09/06/24 1631   Resp 15 09/06/24 1630   SpO2 98 % 09/06/24 1631   Vitals shown include unfiled device data.    Electronically Signed By: Luis Noel MD  September 6, 2024  5:57 PM

## 2024-09-06 NOTE — INTERVAL H&P NOTE
I have reviewed the surgical (or preoperative) H&P that is linked to this encounter, and examined the patient. There are no significant changes    Clinical Conditions Present on Arrival:  Clinically Significant Risk Factors Present on Admission                 # Drug Induced Platelet Defect: home medication list includes an antiplatelet medication      # DMII: A1C = 8.1 % (Ref range: 0.0 - 5.6 %) within past 6 months

## 2024-09-06 NOTE — PROGRESS NOTES
Post-op check    POD#0 s/p right completion below-knee amputation    Resting comfortably in PACU. VS reviewed  Nonlabored breathing on nasal cannula  Right BKA dressing c/d/I    A/P:  - admit to floor  -  advance diet as tolerated  - cont ASA 81, prophylactic subQ heparin tomorrow  - Nonweight bearing to RLE  - Dressing change on POD#2 by vascular team, Sun 9/8/24  - appreciate hospitalist consult for post-op diabetes and hypertension management  - diabetic foot wound on left plantar foot, discussed with Dr. Sage, MRI and repeat ANGELO/duplex pending      Konrad Vigil MD

## 2024-09-06 NOTE — H&P
[facility]    History and Physical - Hospitalist Service       Date of Admission:  9/6/2024    Assessment & Plan      Dario Benavides is a 69 year old male admitted on 9/6/2024. He has a history of HTN, HLD, T2DM and is admitted by vascular surgery for R BKA due to chronic limb ischemia.     Right below the knee amputation   aDrio is a 69 y.o. male admitted by vascular surgery for right BKA in the setting of uncontrolled T2DM and chronic limb ischemia. Family medicine was consulted for management of diabetes and hypertension.   - Pain medications being managed by primary team  - Wound checks by vascular surgery    Left foot diabetic ulcer  The wound is currently bandaged. Orthopedics consulted.   - Plan per ortho:    - MRI left foot    - Gauze dressing daily    - Medical management of diabetes    - Podiatry to contact the patient with MRI report as available.   - Podiatry to follow     Type 2 Diabetes Mellitus   The patient has a history of T2DM managed with 20 unit(s) Lantus QAM. His most recent A1c was 8.0 on 8/9/24. Blood glucose of 157 on admission labs.   - Glucose checks QID  - 15 unit(s) Lantus QAM  - Carb ratio of 1:15  - Medium sliding scale insulin  - Hypoglycemia protocol     Hypertension   History of hypertension treated with valsartan. Systolic blood pressure around 150 on evaluation. Valsartan and amlodipine held prior to surgery.   - Restart PTA valsartan  - Continue PTA amlodipine     Hyperlipidemia   - Continue PTA atorvastatin     Other PTA Meds   - Continue PTA aspirin 81 mg       Diet: Moderate Consistent Carb (60 g CHO per Meal) Diet    DVT Prophylaxis: Heparin SQ  Odell Catheter: Not present  Fluids: LR 75 ml/hr   Lines: None     Cardiac Monitoring: None  Code Status: Full Code      Clinically Significant Risk Factors Present on Admission                # Drug Induced Platelet Defect: home medication list includes an antiplatelet medication   # Hypertension: Noted  on problem list        # DMII: A1C = 8.1 % (Ref range: 0.0 - 5.6 %) within past 6 months              Disposition Plan      Expected Discharge Date: 09/08/2024                The patient's care was discussed with the Attending Physician, Dr. Oglesby  .      Alicia Velasquez DO  Hospitalist Service  [facility]  Securely message with [employer] (more info)  Text page via [facility] Paging/Directory   ______________________________________________________________________    Chief Complaint   Management of blood pressure and diabetes.     History is obtained from the patient    History of Present Illness   Dario Benavides is a 69 year old male who presents with right BKA. Family medicine service consulted for management of diabetes and hypertension.     Dario Benavides is a 69 y.o. male who was admitted by vascular surgery for right BKA in the setting of uncontrolled T2DM and chronic limb threatening ischemia. He has a history of prior transmetatarsal amputation of the right foot. He also has a diabetic foot ulcer of the left foot which is being monitored by podiatry and ortho.     The patient reports that he is feeling quite well following surgery and denies any pain at present. He endorses history of hypertension and diabetes. Unsure of which medications he specifically takes at home. He denies any CP, SOB, N/V/D/C, or pain.       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) 05/08/2021    TIA (transient ischemic attack)  04/01/2009    Tobacco abuse 07/23/2019    Type 2 diabetes mellitus with right diabetic foot infection (H) 05/04/2021       Past Surgical History   Past Surgical History:   Procedure Laterality Date    AMPUTATE LEG BELOW KNEE Right 3/24/2024    Procedure: GUILLOTINE RIGHT BELOW KNEE AMPUTATION;  Surgeon: Marissa Steel MD;  Location: [facility] [place] OR    BACK SURGERY  1992    CLOSE SECONDARY WOUND LOWER EXTREMITY Right 8/17/2023    Procedure: Delayed primary closure of wound right foot;  Surgeon: Brandon Nieto DPM;  Location: [facility] [place] OR    IRRIGATION AND DEBRIDEMENT FOOT, COMBINED Right 8/14/2023    Procedure: IRRIGATION AND DEBRIDEMENT right foot with removing all infected bones and soft tissue;  Surgeon: Jun Goyal DPM;  Location: [facility] [place] OR    NECK SURGERY  2011       Prior to Admission Medications   Prior to Admission Medications   Prescriptions Last Dose Informant Patient Reported? Taking?   alcohol swab prep pads   No No   Sig: Use to swab area of injection/helio as directed.   amLODIPine (NORVASC) 5 MG tablet Past Month  Yes Yes   Sig: Take 5 mg by mouth daily.   aspirin 81 MG EC tablet   No No   Sig: Take 1 tablet (81 mg) by mouth daily   atorvastatin (LIPITOR) 80 MG tablet Past Month  No Yes   Sig: Take 1 tablet (80 mg) by mouth every evening   blood glucose (ACCU-CHEK JANICE PLUS) test strip   No No   Sig: Use to test blood sugar 1 times daily   insulin glargine (LANTUS SOLOSTAR) 100 UNIT/ML pen Past Week  No Yes   Sig: Inject 20 Units Subcutaneous every morning   insulin pen needle (ULTICARE MINI) 31G X 6 MM miscellaneous   No No   Sig: Use 1 pen needles daily or as directed.   valsartan (DIOVAN) 160 MG tablet Past Month  Yes Yes   Sig: Take 160 mg by mouth daily.      Facility-Administered Medications: None           Physical Exam   Vital Signs: Temp: 97.4  F (36.3  C) Temp src: Oral BP: (!) 167/79 Pulse: 76   Resp: 16 SpO2: 98 % O2 Device: None (Room air) Oxygen Delivery: 7  "LPM  Weight: 151 lbs 4.8 oz    Physical Exam  Constitutional:       Appearance: Normal appearance. He is normal weight.   HENT:      Head: Normocephalic and atraumatic.   Cardiovascular:      Rate and Rhythm: Normal rate and regular rhythm.      Pulses: Normal pulses.      Heart sounds: Normal heart sounds. No murmur heard.     No friction rub. No gallop.   Pulmonary:      Effort: Pulmonary effort is normal. No respiratory distress.      Breath sounds: Normal breath sounds.   Abdominal:      General: Abdomen is flat. Bowel sounds are normal.      Palpations: Abdomen is soft.   Musculoskeletal:      Comments: Right BKA. Wound dressings and wraps in place. Left foot wrapped with gauze for diabetic ulcer. No edema of the RLE. Skin is warm to touch.    Skin:     General: Skin is warm and dry.   Neurological:      General: No focal deficit present.      Mental Status: He is alert.   Psychiatric:         Mood and Affect: Mood normal.         Medical Decision Making             Data     I have personally reviewed the following data over the past 24 hrs:    N/A  \   11.3 (L)   / 311     137 101 24.9 (H) /  163 (H)   4.3 24 2.19 (H) \       Imaging results reviewed over the past 24 hrs:   Recent Results (from the past 24 hour(s))   POC US Guidance Needle Placement    Narrative    Ultrasound was performed as guidance to an anesthesia procedure.  Click   \"PACS images\" hyperlink below to view any stored images.  For specific   procedure details, view procedure note authored by anesthesia.     "

## 2024-09-06 NOTE — PHARMACY-ADMISSION MEDICATION HISTORY
"Pharmacist Admission Medication History    Admission medication history is complete. The information provided in this note is only as accurate as the sources available at the time of the update.    Information Source(s): Patient, Clinic records, and CareEverywhere/SureScripts via in-person    Pertinent Information: Patient not likely compliant with medications. States he hasn't taken his medications \"in a week or so\" and that he isn't a pill slick. Aspirin 81 mg daily is also on his PTA list/clinic list and he states he is not taking this.    Allergies reviewed with patient and updates made in EHR: yes    Medication History Completed By: Jet Arango [facility] 9/6/2024 1:41 PM    PTA Med List   Medication Sig Last Dose    amLODIPine (NORVASC) 5 MG tablet Take 5 mg by mouth daily.     atorvastatin (LIPITOR) 80 MG tablet Take 1 tablet (80 mg) by mouth every evening Past Month    insulin glargine (LANTUS SOLOSTAR) 100 UNIT/ML pen Inject 20 Units Subcutaneous every morning Past Week    valsartan (DIOVAN) 160 MG tablet Take 160 mg by mouth daily. Past Month       "

## 2024-09-06 NOTE — BRIEF OP NOTE
[facility]    Brief Operative Note    Pre-operative diagnosis: Chronic limb threatening ischemia s/p right guillotine below-knee amputation.    Post-operative diagnosis As above.    Procedure: AMPUTATION, BELOW KNEE, Right - Knee    Surgeon: Surgeons and Role:     * Amber Guillen MD - Primary     * Konrad Vigil MD - Assisting     * Bianka Vance PA-C - Assisting  Anesthesia: MAC with Block   Estimated Blood Loss: 50ml    Drains: None    Specimens:   ID Type Source Tests Collected by Time Destination   1 : Right Leg Below Knee amputation Amputation, Non-Traumatic Leg, Below Knee, Right SURGICAL PATHOLOGY EXAM Amber Guillen MD 9/6/2024  2:53 PM      Findings:   Appropriate tibial bleeding before hemostasis. No signs of infection at the level of amputation.    Complications: None.  Implants: * No implants in log *

## 2024-09-06 NOTE — CONSULTS
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  WBC 9.1.  Afebrile.    -Agree with MRI left foot to investigate for underlying osteomyelitis.    -Recommend gauze dressing to be applied to left foot daily.    -Medical management per hospitalist/vascular surgery. Podiatry service to contact the patient with the MRI report when available and we will be guided by the results.     Thank you for the consultation. Podiatry service will follow.     Helio Sage DPM  [facility] Podiatry/Foot & Ankle Surgery  On-Call: [phone]  ______________________________________________________________________        Reason For Consult: Diabetic ulceration left foot       HPI    I have been requested by vascular surgery to evaluate Dario Benavides who is a 69 year old year old male for the above. The patient was seen in PACU following right BKA with vascular surgery.  I have been asked to evaluate him for a left foot ulceration.  Patient was recovering from anesthesia and was not able to communicate for my visit.  Patient is known to me having seen him in the past for right foot ulcerations.  Past medical history significant for DM 2.         Medical History  Past Medical History:   Diagnosis Date    Cerebral infarction (H)     Cervical spondylosis with myelopathy     Depression with anxiety      Diabetic peripheral neuropathy (H)     Dyslipidemia     Hypertension     Non compliance with medical treatment 05/04/2021    Normocytic anemia     Normocytic anemia     Osteomyelitis of right foot (H)     Severe sepsis (H) 06/14/2023    Stage 3b chronic kidney disease (H) 05/04/2021    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) 05/08/2021    TIA (transient ischemic attack) 04/01/2009    Tobacco abuse 07/23/2019    Type 2 diabetes mellitus with right diabetic foot infection (H) 05/04/2021     Surgical History  He  has a past surgical history that includes back surgery (1992); Neck surgery (2011); Irrigation and debridement foot, combined (Right, 8/14/2023); Close secondary wound lower extremity (Right, 8/17/2023); and Amputate leg below knee (Right, 3/24/2024).   Social History  Reviewed, and he  reports that he has been smoking cigarettes. He started smoking about 46 years ago. He has a 135 pack-year smoking history. He has never been exposed to tobacco smoke. He has quit using smokeless tobacco. He reports that he does not currently use alcohol. He reports current drug use. Drug: Marijuana.   Allergies  No Known Allergies Family History  family history is not on file.  family history not pertinent to presenting problem.    Psychosocial Needs  Social History     Social History Narrative    Not on file     Additional psychosocial needs reviewed per nursing assessment.       Prior to Admission Medications   Medications Prior to Admission   Medication Sig Dispense Refill Last Dose    amLODIPine (NORVASC) 5 MG tablet Take 5 mg by mouth daily.   Past Month    atorvastatin (LIPITOR) 80 MG tablet Take 1 tablet (80 mg) by mouth every evening 30 tablet 0 Past Month    insulin glargine (LANTUS SOLOSTAR) 100 UNIT/ML pen Inject 20 Units Subcutaneous every morning 15 mL 0 Past Week    valsartan (DIOVAN) 160 MG tablet Take 160 mg by mouth daily.   Past Month    alcohol swab prep pads Use to swab area of  injection/helio as directed. 100 each 3     aspirin 81 MG EC tablet Take 1 tablet (81 mg) by mouth daily 30 tablet 0     blood glucose (ACCU-CHEK JANICE PLUS) test strip Use to test blood sugar 1 times daily 100 strip 0     insulin pen needle (ULTICARE MINI) 31G X 6 MM miscellaneous Use 1 pen needles daily or as directed. 100 each 0             Review of Systems:  All other systems negative in detail except what is noted in HPI.  Physical Exam:  Temp:  [97.2  F (36.2  C)-98.1  F (36.7  C)] 97.4  F (36.3  C)  Pulse:  [69-87] 69  Resp:  [10-21] 16  BP: (122-181)/(58-94) 150/87  SpO2:  [95 %-100 %] 95 %    General appearance: Patient is alert and fully cooperative with history & exam.  No sign of distress is noted during the visit.  Psychiatric: Affect is pleasant & appropriate.  Patient appears motivated to improve health.  Respiratory: Breathing is regular & unlabored while sitting.  HEENT: Hearing is intact to spoken word.  Speech is clear.  No gross evidence of visual impairment that would impact ambulation.    Vascular: Dorsalis pedis nonpalpable left foot.  Dermatologic: Ulceration plantar medial aspect of the left forefoot has slight increased depth, no erythema.  Neurologic: Diminished light touch left foot.  Musculoskeletal: Contracted digits left foot.     BP (!) 150/87 (BP Location: Right arm)   Pulse 69   Temp 97.4  F (36.3  C) (Oral)   Resp 16   Wt 68.6 kg (151 lb 4.8 oz)   SpO2 95%   BMI 21.10 kg/m      BMI= Body mass index is 21.1 kg/m .    Pertinent Labs    [unfilled]       Recent Labs   Lab 09/06/24  1250      CO2 24   BUN 24.9*       Lab Results   Component Value Date    ALT 21 03/22/2024    AST 25 03/22/2024    ALKPHOS 270 (H) 03/22/2024          Lab Results   Component Value Date    CRP 0.6 06/04/2021      [unfilled]     Pertinent Radiology  Radiology Results: Reviewed  POC US Guidance Needle Placement    Result Date: 9/6/2024  Ultrasound was performed as guidance to an anesthesia procedure.  " Click \"PACS images\" hyperlink below to view any stored images.  For specific procedure details, view procedure note authored by anesthesia.          "

## 2024-09-06 NOTE — ANESTHESIA PREPROCEDURE EVALUATION
Anesthesia Pre-Procedure Evaluation    Patient: Dario Benavides   MRN: [id number] : 1955        Procedure : Procedure(s):   Amputation,  right foot (Right: Foot)           Past Medical History:   Diagnosis Date    Cerebral infarction (H)     Cervical spondylosis with myelopathy     Depression with anxiety     Diabetic peripheral neuropathy (H)     Dyslipidemia     Hypertension     Non compliance with medical treatment 2021    Normocytic anemia     Normocytic anemia     Osteomyelitis of right foot (H)     Severe sepsis (H) 2023    Stage 3b chronic kidney disease (H) 2021    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) 2021    TIA (transient ischemic attack) 2009    Tobacco abuse 2019    Type 2 diabetes mellitus with right diabetic foot infection (H) 2021      Past Surgical History:   Procedure Laterality Date    AMPUTATE LEG BELOW KNEE Right 3/24/2024    Procedure: GUILLOTINE RIGHT BELOW KNEE AMPUTATION;  Surgeon: Marissa Steel MD;  Location: [facility] OR    BACK SURGERY  1992    CLOSE SECONDARY WOUND LOWER EXTREMITY Right 2023    Procedure: Delayed primary closure of wound right foot;  Surgeon: Brandon Nieto DPM;  Location: [facility] OR    IRRIGATION AND DEBRIDEMENT FOOT, COMBINED Right 2023    Procedure: IRRIGATION AND DEBRIDEMENT right foot with removing all infected bones and soft tissue;  Surgeon: Jun Goyal DPM;  Location: [facility] OR    NECK SURGERY        No Known Allergies   Social History     Tobacco Use    Smoking status: Some Days     Current packs/day: 0.00     Average packs/day: 3.0 packs/day for 45.0 years (135.0 ttl pk-yrs)     Types: Cigarettes     Start date: 1978     Last attempt to quit: 2023     Years since quittin.9     Passive exposure: Never    Smokeless tobacco: Former    Tobacco comments:      down to 1 pack/week, declined cessation services and materials   Substance  Use Topics    Alcohol use: Not Currently     Comment: occas.      Wt Readings from Last 1 Encounters:   09/06/24 68.6 kg (151 lb 4.8 oz)        Anesthesia Evaluation   Pt has had prior anesthetic.         ROS/MED HX  ENT/Pulmonary:  - neg pulmonary ROS   (+)                tobacco use, Current use,                       Neurologic:  - neg neurologic ROS     Cardiovascular:  - neg cardiovascular ROS   (+)  hypertension- -   -  - -                                      METS/Exercise Tolerance: >4 METS    Hematologic:  - neg hematologic  ROS     Musculoskeletal:  - neg musculoskeletal ROS     GI/Hepatic:  - neg GI/hepatic ROS     Renal/Genitourinary:  - neg Renal ROS   (+) renal disease, type: CRI and ARF, Pt does not require dialysis,           Endo:  - neg endo ROS   (+)  type II DM, Last HgA1c: 11,  Using insulin,                 Psychiatric/Substance Use:  - neg psychiatric ROS     Infectious Disease: Comment: Osteomyelitis of foot - neg infectious disease ROS   (+) Recent Fever,           Malignancy:  - neg malignancy ROS     Other:  - neg other ROS          Physical Exam    Airway        Mallampati: II   TM distance: > 3 FB   Neck ROM: full   Mouth opening: > 3 cm    Respiratory Devices and Support         Dental       (+) Edentulous      Cardiovascular   cardiovascular exam normal          Pulmonary   pulmonary exam normal                OUTSIDE LABS:  CBC:   Lab Results   Component Value Date    WBC 9.1 03/28/2024    WBC 8.1 03/27/2024    HGB 11.3 (L) 09/06/2024    HGB 9.1 (L) 03/28/2024    HCT 28.2 (L) 03/28/2024    HCT 28.9 (L) 03/27/2024     (H) 03/28/2024     (H) 03/27/2024     BMP:   Lab Results   Component Value Date     09/06/2024     03/28/2024    POTASSIUM 4.3 09/06/2024    POTASSIUM 3.8 03/28/2024    CHLORIDE 101 09/06/2024    CHLORIDE 102 03/28/2024    CO2 24 09/06/2024    CO2 29 03/28/2024    BUN 24.9 (H) 09/06/2024    BUN 12.6 03/28/2024    CR 2.19 (H) 09/06/2024    CR 1.31  "(H) 03/28/2024     (H) 09/06/2024     (H) 09/06/2024     COAGS: No results found for: \"PTT\", \"INR\", \"FIBR\"  POC: No results found for: \"BGM\", \"HCG\", \"HCGS\"  HEPATIC:   Lab Results   Component Value Date    ALBUMIN 2.6 (L) 03/26/2024    PROTTOTAL 8.3 03/22/2024    ALT 21 03/22/2024    AST 25 03/22/2024    ALKPHOS 270 (H) 03/22/2024    BILITOTAL 0.3 03/22/2024     OTHER:   Lab Results   Component Value Date    LACT 1.2 03/22/2024    A1C 8.1 (H) 06/17/2024    JOSHUA 9.3 09/06/2024    PHOS 2.6 03/26/2024    MAG 2.4 (H) 03/22/2024    LIPASE 18 08/11/2023    CRP 0.6 06/04/2021    SED 84 (H) 08/11/2023       Anesthesia Plan    ASA Status:  4    NPO Status:  NPO Appropriate    Anesthesia Type: General.     - Airway: Mask Only   Induction: Intravenous.   Maintenance: TIVA.        Consents    Anesthesia Plan(s) and associated risks, benefits, and realistic alternatives discussed. Questions answered and patient/representative(s) expressed understanding.     - Discussed: Risks, Benefits and Alternatives for BOTH SEDATION and the PROCEDURE were discussed     - Discussed with:  Patient      - Extended Intubation/Ventilatory Support Discussed: No.      - Patient is DNR/DNI Status: Yes             Suspend during perioperative period? Yes (Patient is okay with intubation as part of anesthetic, but would not like defibrillation/chest compressions as part of any required resuscitation. He is okay with chemical resuscitation PRN.).    Use of blood products discussed: No .     Postoperative Care    Pain management: Peripheral nerve block (Single Shot), Oral pain medications, Multi-modal analgesia.   PONV prophylaxis: Ondansetron (or other 5HT-3), Background Propofol Infusion     Comments:    Other Comments: GA with mask/TIVA  Pop/saph blocks for postop pain control per surgeon request              Luis Noel MD  "

## 2024-09-06 NOTE — ANESTHESIA CARE TRANSFER NOTE
Patient: Dario Benavides    Procedure: Procedure(s):  AMPUTATION, BELOW KNEE       Diagnosis: Status post transmetatarsal amputation of right foot (H) [Z89.431]  Diagnosis Additional Information: No value filed.    Anesthesia Type:   General     Note:    Oropharynx: oropharynx clear of all foreign objects  Level of Consciousness: drowsy  Oxygen Supplementation: face mask  Level of Supplemental Oxygen (L/min / FiO2): 6  Independent Airway: airway patency satisfactory and stable  Dentition: dentition unchanged  Vital Signs Stable: post-procedure vital signs reviewed and stable  Report to RN Given: handoff report given  Patient transferred to: PACU    Handoff Report: Identifed the Patient, Identified the Reponsible Provider, Reviewed the pertinent medical history, Discussed the surgical course, Reviewed Intra-OP anesthesia mangement and issues during anesthesia, Set expectations for post-procedure period and Allowed opportunity for questions and acknowledgement of understanding      Vitals:  Vitals Value Taken Time   /58 09/06/24 1550   Temp 35.6  C (96.08  F) 09/06/24 1552   Pulse 69 09/06/24 1552   Resp 18 09/06/24 1552   SpO2 100 % 09/06/24 1552   Vitals shown include unfiled device data.    Electronically Signed By: KASEY Cruz CRNA  September 6, 2024  3:53 PM

## 2024-09-07 ENCOUNTER — APPOINTMENT (OUTPATIENT)
Dept: MRI IMAGING | Facility: HOSPITAL | Age: 69
DRG: 240 | End: 2024-09-07
Attending: STUDENT IN AN ORGANIZED HEALTH CARE EDUCATION/TRAINING PROGRAM
Payer: MEDICARE

## 2024-09-07 LAB
ANION GAP SERPL CALCULATED.3IONS-SCNC: 10 MMOL/L (ref 7–15)
BUN SERPL-MCNC: 23.7 MG/DL (ref 8–23)
CALCIUM SERPL-MCNC: 8.6 MG/DL (ref 8.8–10.4)
CHLORIDE SERPL-SCNC: 103 MMOL/L (ref 98–107)
CREAT SERPL-MCNC: 1.98 MG/DL (ref 0.67–1.17)
EGFRCR SERPLBLD CKD-EPI 2021: 36 ML/MIN/1.73M2
ERYTHROCYTE [DISTWIDTH] IN BLOOD BY AUTOMATED COUNT: 14.6 % (ref 10–15)
GLUCOSE BLDC GLUCOMTR-MCNC: 100 MG/DL (ref 70–99)
GLUCOSE BLDC GLUCOMTR-MCNC: 103 MG/DL (ref 70–99)
GLUCOSE BLDC GLUCOMTR-MCNC: 116 MG/DL (ref 70–99)
GLUCOSE BLDC GLUCOMTR-MCNC: 126 MG/DL (ref 70–99)
GLUCOSE BLDC GLUCOMTR-MCNC: 80 MG/DL (ref 70–99)
GLUCOSE BLDC GLUCOMTR-MCNC: 92 MG/DL (ref 70–99)
GLUCOSE SERPL-MCNC: 99 MG/DL (ref 70–99)
HCO3 SERPL-SCNC: 23 MMOL/L (ref 22–29)
HCT VFR BLD AUTO: 27.6 % (ref 40–53)
HGB BLD-MCNC: 8.9 G/DL (ref 13.3–17.7)
MCH RBC QN AUTO: 27.5 PG (ref 26.5–33)
MCHC RBC AUTO-ENTMCNC: 32.2 G/DL (ref 31.5–36.5)
MCV RBC AUTO: 85 FL (ref 78–100)
PLATELET # BLD AUTO: 356 10E3/UL (ref 150–450)
POTASSIUM SERPL-SCNC: 4 MMOL/L (ref 3.4–5.3)
RBC # BLD AUTO: 3.24 10E6/UL (ref 4.4–5.9)
SODIUM SERPL-SCNC: 136 MMOL/L (ref 135–145)
WBC # BLD AUTO: 6.7 10E3/UL (ref 4–11)

## 2024-09-07 PROCEDURE — 99222 1ST HOSP IP/OBS MODERATE 55: CPT | Mod: AI

## 2024-09-07 PROCEDURE — 80048 BASIC METABOLIC PNL TOTAL CA: CPT | Performed by: STUDENT IN AN ORGANIZED HEALTH CARE EDUCATION/TRAINING PROGRAM

## 2024-09-07 PROCEDURE — 85027 COMPLETE CBC AUTOMATED: CPT | Performed by: STUDENT IN AN ORGANIZED HEALTH CARE EDUCATION/TRAINING PROGRAM

## 2024-09-07 PROCEDURE — 250N000012 HC RX MED GY IP 250 OP 636 PS 637

## 2024-09-07 PROCEDURE — 73720 MRI LWR EXTREMITY W/O&W/DYE: CPT | Mod: LT,MG

## 2024-09-07 PROCEDURE — 120N000013 HC R&B IMCU

## 2024-09-07 PROCEDURE — A9585 GADOBUTROL INJECTION: HCPCS | Performed by: STUDENT IN AN ORGANIZED HEALTH CARE EDUCATION/TRAINING PROGRAM

## 2024-09-07 PROCEDURE — 36415 COLL VENOUS BLD VENIPUNCTURE: CPT | Performed by: STUDENT IN AN ORGANIZED HEALTH CARE EDUCATION/TRAINING PROGRAM

## 2024-09-07 PROCEDURE — 250N000011 HC RX IP 250 OP 636: Performed by: STUDENT IN AN ORGANIZED HEALTH CARE EDUCATION/TRAINING PROGRAM

## 2024-09-07 PROCEDURE — 250N000013 HC RX MED GY IP 250 OP 250 PS 637

## 2024-09-07 PROCEDURE — 250N000013 HC RX MED GY IP 250 OP 250 PS 637: Performed by: STUDENT IN AN ORGANIZED HEALTH CARE EDUCATION/TRAINING PROGRAM

## 2024-09-07 PROCEDURE — 255N000002 HC RX 255 OP 636: Performed by: STUDENT IN AN ORGANIZED HEALTH CARE EDUCATION/TRAINING PROGRAM

## 2024-09-07 RX ORDER — GADOBUTROL 604.72 MG/ML
7 INJECTION INTRAVENOUS ONCE
Status: COMPLETED | OUTPATIENT
Start: 2024-09-07 | End: 2024-09-07

## 2024-09-07 RX ADMIN — GADOBUTROL 7 ML: 604.72 INJECTION INTRAVENOUS at 14:38

## 2024-09-07 RX ADMIN — INSULIN GLARGINE 15 UNITS: 100 INJECTION, SOLUTION SUBCUTANEOUS at 09:47

## 2024-09-07 RX ADMIN — ASPIRIN 81 MG: 81 TABLET, COATED ORAL at 09:29

## 2024-09-07 RX ADMIN — ATORVASTATIN CALCIUM 80 MG: 40 TABLET, FILM COATED ORAL at 20:42

## 2024-09-07 RX ADMIN — ACETAMINOPHEN 975 MG: 325 TABLET ORAL at 16:16

## 2024-09-07 RX ADMIN — SENNOSIDES AND DOCUSATE SODIUM 1 TABLET: 8.6; 5 TABLET ORAL at 09:29

## 2024-09-07 RX ADMIN — ACETAMINOPHEN 975 MG: 325 TABLET ORAL at 09:29

## 2024-09-07 RX ADMIN — ACETAMINOPHEN 975 MG: 325 TABLET ORAL at 00:04

## 2024-09-07 RX ADMIN — POLYETHYLENE GLYCOL 3350 17 G: 17 POWDER, FOR SOLUTION ORAL at 09:29

## 2024-09-07 RX ADMIN — HEPARIN SODIUM 5000 UNITS: 10000 INJECTION, SOLUTION INTRAVENOUS; SUBCUTANEOUS at 22:53

## 2024-09-07 RX ADMIN — AMLODIPINE BESYLATE 5 MG: 5 TABLET ORAL at 09:29

## 2024-09-07 RX ADMIN — VALSARTAN 160 MG: 80 TABLET, FILM COATED ORAL at 09:28

## 2024-09-07 RX ADMIN — HEPARIN SODIUM 5000 UNITS: 10000 INJECTION, SOLUTION INTRAVENOUS; SUBCUTANEOUS at 16:17

## 2024-09-07 RX ADMIN — SENNOSIDES AND DOCUSATE SODIUM 1 TABLET: 8.6; 5 TABLET ORAL at 20:42

## 2024-09-07 ASSESSMENT — ACTIVITIES OF DAILY LIVING (ADL)
ADLS_ACUITY_SCORE: 29
ADLS_ACUITY_SCORE: 30
ADLS_ACUITY_SCORE: 30
ADLS_ACUITY_SCORE: 29
ADLS_ACUITY_SCORE: 30
ADLS_ACUITY_SCORE: 29
ADLS_ACUITY_SCORE: 30
ADLS_ACUITY_SCORE: 29
ADLS_ACUITY_SCORE: 30
ADLS_ACUITY_SCORE: 29

## 2024-09-07 NOTE — PLAN OF CARE
Problem: Adult Inpatient Plan of Care  Goal: Plan of Care Review  Description: The Plan of Care Review/Shift note should be completed every shift.  The Outcome Evaluation is a brief statement about your assessment that the patient is improving, declining, or no change.  This information will be displayed automatically on your shift  note.  Outcome: Progressing  Goal: Absence of Hospital-Acquired Illness or Injury  Intervention: Identify and Manage Fall Risk  Recent Flowsheet Documentation  Taken 9/7/2024 0100 by Miguel Valle RN  Safety Promotion/Fall Prevention:   clutter free environment maintained   increased rounding and observation   increase visualization of patient   safety round/check completed  Intervention: Prevent Skin Injury  Recent Flowsheet Documentation  Taken 9/7/2024 0100 by Miguel Valle RN  Body Position:   supine, head elevated   supine, legs elevated     Problem: Pain Acute  Goal: Optimal Pain Control and Function  Outcome: Progressing  Intervention: Prevent or Manage Pain  Recent Flowsheet Documentation  Taken 9/7/2024 0100 by Miguel Valle RN  Medication Review/Management: medications reviewed     Problem: Urinary Retention  Goal: Effective Urinary Elimination  Outcome: Not Progressing   Goal Outcome Evaluation:       Pt alert & oriented. Pain managed by scheduled tylenol. IVF LR running at 75ml/hr. Dressing clean, dry & intact. On room air, Vital Signs stable. Will continue to monitor.      Pt has not voided since last straight cath. Bladder scan at 0415 was 424. Refused to rescan at 0635, pt said he will try to void later. Pt noted no discomfort on his bladder.

## 2024-09-07 NOTE — PLAN OF CARE
Problem: Adult Inpatient Plan of Care  Goal: Absence of Hospital-Acquired Illness or Injury  Intervention: Prevent Infection  Recent Flowsheet Documentation  Taken 9/6/2024 1800 by Marie Smalls RN  Infection Prevention: hand hygiene promoted     Problem: Chronic Kidney Disease  Goal: Minimize Renal Failure Effects  Intervention: Protect and Monitor Dialysis Access Site  Recent Flowsheet Documentation  Taken 9/6/2024 1800 by Marie Smalls RN  Safety Precautions: limb precautions maintained     Problem: Chronic Kidney Disease  Goal: Optimal Coping with Chronic Illness  Outcome: Progressing  Goal: Electrolyte Balance  Outcome: Progressing  Goal: Fluid Balance  Outcome: Progressing  Goal: Optimal Functional Ability  Outcome: Progressing  Goal: Absence of Anemia Signs and Symptoms  Outcome: Progressing  Goal: Optimal Oral Intake  Outcome: Progressing  Goal: Acceptable Pain Control  Outcome: Progressing  Goal: Minimize Renal Failure Effects  Outcome: Progressing  Intervention: Protect and Monitor Dialysis Access Site  Recent Flowsheet Documentation  Taken 9/6/2024 1800 by Marie Smalls, RN  Safety Precautions: limb precautions maintained   Goal Outcome Evaluation:       Pt alert and oriented x4, sleep between cares easily awaken by voice.  Denies pain, dressing to surgical sit at CDI with protector in place. No urine output noted, bladder scan 624ml and S/cath 650ml

## 2024-09-07 NOTE — PROGRESS NOTES
Podiatry    MRI noted.  No abscess.  No osteomyelitis.  Will see patient tomorrow    Jun Goyal DPM, FACFAS

## 2024-09-07 NOTE — PLAN OF CARE
Problem: Adult Inpatient Plan of Care  Goal: Optimal Comfort and Wellbeing  Outcome: Progressing   Goal Outcome Evaluation:             Denies pain.      Problem: Extremity Amputation  Goal: Absence of Bleeding  Outcome: Progressing        Noted small amount of bleeding to medial side of surgical ace wrap. Writer updated Phalen attending as well as vascular whom came to bedside & assessed. Per vascular, site okay. Cont. To monitor & page if increased bleeding to area noted. Okay to admin. Sched. Subcutaneous heparin.    Pt continuing to refuse multiple things this shift. Eating, bladder scans etc. Writer updated Phalen attending. Pts blood glucose WNL, per Phalen attending okay to admin. Sched. Lantus. Refused bladder protocol, informed on-coming shift that pt did agree to LLE MRI & pt is off unit & to follow up on voiding protocol.

## 2024-09-07 NOTE — PROGRESS NOTES
Vascular Surgery Progress Note    09/07/24   Length of Stay: 1 days    Interval: Some serosanguinous drainage on dressings but no yonathan bleeding noted. Patient comfortable, denies CP, SOB, lightheadedness. Is flexing/extending at the knee. Pain w/c. VSS.     Physical Exam:  BP (!) 143/70 (BP Location: Right arm)   Pulse 82   Temp 97.9  F (36.6  C) (Oral)   Resp 18   Wt 68.6 kg (151 lb 4.8 oz)   SpO2 97%   BMI 21.10 kg/m     Gen: NAD, AAOx3  CV: RRR by peripheral pulse  Resp: NLB on RA  Ext: RLE stump with dressings in place with minimal serosanguinous drainage to outer dressings. No yonathan blood on dressings under ACE wrap. L foot with dressings in place, no surrounding cellulitis.     Pertinent Labs:    Recent Labs   Lab 09/07/24  0636 09/06/24  1727 09/06/24  1250   WBC 6.7  --   --    HGB 8.9*  --  11.3*   HCT 27.6*  --   --     311  --      Recent Labs   Lab 09/07/24  0636 09/06/24  1250    137   CO2 23 24   BUN 23.7* 24.9*     Assessment/Plan: Dario Benavides is a 69 year old male POD 1 s/p R BKA.     - D/w nursing to closely monitor RLE stump for worsening drainage or bleeding.   - Dressings down tomorrow POD 2.  - Podiatry following for left foot wound, appreciating recs. MRI pending.   - Continue pain control.    Staff: Dr. Mindy Lopez MD  PGY-6  Vascular Surgery  Pager: #[phone]

## 2024-09-07 NOTE — PROGRESS NOTES
[facility]    Progress Note - Hospitalist Service       Date of Admission:  9/6/2024    Assessment & Plan   Dario Benavides is a 69 year old male admitted on 9/6/2024. He has a history of HTN, HLD, T2DM and is admitted by vascular surgery for R BKA due to chronic limb ischemia. We are consulted for Diabetes and blood pressure management.     Right below the knee amputation   Dario is a 69 y.o. male admitted by vascular surgery for right BKA in the setting of uncontrolled T2DM and chronic limb ischemia. Family medicine was consulted for management of diabetes and hypertension.   - Pain medications being managed by primary team  - Wound checks by vascular surgery     Left foot diabetic ulcer  The wound is currently bandaged. Orthopedics consulted.   - Plan per ortho:               - MRI left foot               - Gauze dressing daily               - Medical management of diabetes               - Podiatry to contact the patient with MRI report as available.              - Podiatry to follow      Type 2 Diabetes Mellitus   The patient has a history of T2DM managed with 20 unit(s) Lantus QAM. His most recent A1c was 8.0 on 8/9/24. Blood glucose of 157 on admission labs.   - Glucose checks QID  - 15 unit(s) Lantus QAM  - Carb ratio of 1:15  - Medium sliding scale insulin  - Hypoglycemia protocol      Hypertension   History of hypertension treated with valsartan. Systolic blood pressure around 150 on evaluation. Valsartan and amlodipine held prior to surgery.   - Restart PTA valsartan  - Continue PTA amlodipine      Hyperlipidemia   - Continue PTA atorvastatin      Other PTA Meds   - Continue PTA aspirin 81 mg         Diet: Moderate Consistent Carb (60 g CHO per Meal) Diet    DVT Prophylaxis: Heparin SQ  Odell Catheter: Not present  Fluids: IVF  Lines: None     Cardiac Monitoring: None  Code Status: Full Code      Clinically Significant Risk Factors Present on Admission                #  "Drug Induced Platelet Defect: home medication list includes an antiplatelet medication   # Hypertension: Noted on problem list    # Anemia: based on hgb <11      # DMII: A1C = 8.1 % (Ref range: 0.0 - 5.6 %) within past 6 months              Disposition Plan     Expected Discharge Date: 09/08/2024                The patient's care was discussed with the Attending Physician, Dr. Oglesby .    Lawrence Richardson MD  Hospitalist Service  [facility]  Securely message with Vocera (more info)  Text page via [email or web address] Paging/Directory   ______________________________________________________________________    Interval History     No acute events  ROS negative  Diabetes and BP under good control on current regimen    Physical Exam   Vital Signs: Temp: 97.9  F (36.6  C) Temp src: Oral BP: (!) 143/70 Pulse: 82   Resp: 18 SpO2: 97 % O2 Device: None (Room air) Oxygen Delivery: 7 LPM  Weight: 151 lbs 4.8 oz    GENERAL: healthy, alert and no distress  RESP: speaking in full sentences, normal work of breathing   CV: extremities well perfused  MS: no gross musculoskeletal defects noted  PSYCH: mentation appears normal, affect normal/bright       Data     I have personally reviewed the following data over the past 24 hrs:    6.7  \   8.9 (L)   / 356     136 103 23.7 (H) /  92   4.0 23 1.98 (H) \       Imaging results reviewed over the past 24 hrs:   Recent Results (from the past 24 hour(s))   POC US Guidance Needle Placement    Narrative    Ultrasound was performed as guidance to an anesthesia procedure.  Click   \"PACS images\" hyperlink below to view any stored images.  For specific   procedure details, view procedure note authored by anesthesia.   US Lower Extremity Arterial Duplex Left    Narrative    EXAM: US LOWER EXTREMITY ARTERIAL DUPLEX LEFT  LOCATION: [facility]  DATE: 9/6/2024    INDICATION: left plantar diabetic foot wound  COMPARISON: None.  TECHNIQUE: Duplex utilizing 2D " gray-scale imaging, Doppler interrogation with color-flow and spectral waveform analysis.    FINDINGS: Likely prominent collaterals adjacent to the area of elevated SFA peak systolic velocities.    LEFT LOWER EXTREMITY ARTERIAL ASSESSMENT:  Common femoral artery: 135 cm/s, triphasic  Profunda femoris artery: 138 cm/s  SFA (proximal): 84 cm/s, triphasic  SFA (mid): 43 cm/s, monophasic  SFA (distal): 200 cm/s, monophasic  SFA (distal): 14 cm/s, monophasic  Popliteal artery: 50 cm/s, monophasic  Anterior tibial artery: 14 cm/s, monophasic  Posterior tibial artery: 23 cm/s, monophasic  Dorsalis pedis artery: 43 cm/s, monophasic      Impression    IMPRESSION:  1.  Sonographic findings consistent with distal SFA hemodynamically significant stenosis with likely collateralization, and diminished distal peak systolic velocities. Consider further evaluation with CTA.   US ANGELO with PPG wo Exercise    Narrative    EXAM: RESTING ANKLE-BRACHIAL INDICES (ABIs)  LOCATION: [facility]  DATE: 9/6/2024    INDICATION: left plantar foot wound  COMPARISON: None.    ANGELO FINDINGS:  RIGHT  Brachial: 158  Ankle (PT): Amputation  Ankle (DP): Amputation    LEFT  Brachial: 166  Ankle (PT): 123 Index: 0.74  Ankle (DP): 112 Index: 0.67  Digit: 90 Index: 0.54    The right ANGELO at rest is not performed. The left ANGELO at rest is 0.74.      WAVEFORMS: Abnormal diminished posterior tibial waveform.      Impression    IMPRESSION:  1.  LEFT LOWER EXTREMITY: ANGELO at rest is 0.74 compatible with mild PAD.

## 2024-09-08 LAB
GLUCOSE BLDC GLUCOMTR-MCNC: 100 MG/DL (ref 70–99)
GLUCOSE BLDC GLUCOMTR-MCNC: 110 MG/DL (ref 70–99)
GLUCOSE BLDC GLUCOMTR-MCNC: 122 MG/DL (ref 70–99)
GLUCOSE BLDC GLUCOMTR-MCNC: 135 MG/DL (ref 70–99)
GLUCOSE BLDC GLUCOMTR-MCNC: 138 MG/DL (ref 70–99)
GLUCOSE BLDC GLUCOMTR-MCNC: 59 MG/DL (ref 70–99)
GLUCOSE BLDC GLUCOMTR-MCNC: 88 MG/DL (ref 70–99)

## 2024-09-08 PROCEDURE — 99232 SBSQ HOSP IP/OBS MODERATE 35: CPT | Performed by: PODIATRIST

## 2024-09-08 PROCEDURE — 250N000013 HC RX MED GY IP 250 OP 250 PS 637: Performed by: STUDENT IN AN ORGANIZED HEALTH CARE EDUCATION/TRAINING PROGRAM

## 2024-09-08 PROCEDURE — 250N000013 HC RX MED GY IP 250 OP 250 PS 637

## 2024-09-08 PROCEDURE — 250N000011 HC RX IP 250 OP 636: Performed by: STUDENT IN AN ORGANIZED HEALTH CARE EDUCATION/TRAINING PROGRAM

## 2024-09-08 PROCEDURE — 120N000013 HC R&B IMCU

## 2024-09-08 PROCEDURE — 999N000157 HC STATISTIC RCP TIME EA 10 MIN

## 2024-09-08 RX ORDER — TAMSULOSIN HYDROCHLORIDE 0.4 MG/1
0.4 CAPSULE ORAL DAILY
Status: DISCONTINUED | OUTPATIENT
Start: 2024-09-08 | End: 2024-09-09 | Stop reason: HOSPADM

## 2024-09-08 RX ADMIN — TAMSULOSIN HYDROCHLORIDE 0.4 MG: 0.4 CAPSULE ORAL at 14:34

## 2024-09-08 RX ADMIN — ASPIRIN 81 MG: 81 TABLET, COATED ORAL at 08:34

## 2024-09-08 RX ADMIN — HEPARIN SODIUM 5000 UNITS: 10000 INJECTION, SOLUTION INTRAVENOUS; SUBCUTANEOUS at 06:00

## 2024-09-08 RX ADMIN — ATORVASTATIN CALCIUM 80 MG: 40 TABLET, FILM COATED ORAL at 21:11

## 2024-09-08 RX ADMIN — POLYETHYLENE GLYCOL 3350 17 G: 17 POWDER, FOR SOLUTION ORAL at 08:35

## 2024-09-08 RX ADMIN — VALSARTAN 160 MG: 80 TABLET, FILM COATED ORAL at 08:35

## 2024-09-08 RX ADMIN — AMLODIPINE BESYLATE 5 MG: 5 TABLET ORAL at 08:34

## 2024-09-08 RX ADMIN — SENNOSIDES AND DOCUSATE SODIUM 1 TABLET: 8.6; 5 TABLET ORAL at 08:34

## 2024-09-08 RX ADMIN — ACETAMINOPHEN 975 MG: 325 TABLET ORAL at 00:11

## 2024-09-08 RX ADMIN — SENNOSIDES AND DOCUSATE SODIUM 1 TABLET: 8.6; 5 TABLET ORAL at 21:11

## 2024-09-08 RX ADMIN — HEPARIN SODIUM 5000 UNITS: 10000 INJECTION, SOLUTION INTRAVENOUS; SUBCUTANEOUS at 21:11

## 2024-09-08 RX ADMIN — HEPARIN SODIUM 5000 UNITS: 10000 INJECTION, SOLUTION INTRAVENOUS; SUBCUTANEOUS at 14:34

## 2024-09-08 RX ADMIN — ACETAMINOPHEN 975 MG: 325 TABLET ORAL at 08:35

## 2024-09-08 ASSESSMENT — ACTIVITIES OF DAILY LIVING (ADL)
ADLS_ACUITY_SCORE: 29
ADLS_ACUITY_SCORE: 30
ADLS_ACUITY_SCORE: 29
ADLS_ACUITY_SCORE: 28
ADLS_ACUITY_SCORE: 29
ADLS_ACUITY_SCORE: 29
ADLS_ACUITY_SCORE: 28
ADLS_ACUITY_SCORE: 30
ADLS_ACUITY_SCORE: 27
ADLS_ACUITY_SCORE: 29
ADLS_ACUITY_SCORE: 29
ADLS_ACUITY_SCORE: 28
ADLS_ACUITY_SCORE: 29
ADLS_ACUITY_SCORE: 28
ADLS_ACUITY_SCORE: 28
ADLS_ACUITY_SCORE: 29
ADLS_ACUITY_SCORE: 29
ADLS_ACUITY_SCORE: 28
ADLS_ACUITY_SCORE: 28
ADLS_ACUITY_SCORE: 30

## 2024-09-08 NOTE — PROGRESS NOTES
Occupational Therapy: Orders received, chart reviewed. Skilled OT services not indicated at this time due to patient adamantly declining all therapies - states he has all DME needs and assist at home.  Will defer to care team for discharge recommendations. Will complete current OT orders. Thank you.    9/8/2024 by Nataliia Stallings, OTR, OTR/L

## 2024-09-08 NOTE — PROGRESS NOTES
[facility]    Progress Note - Hospitalist Service       Date of Admission:  9/6/2024    Assessment & Plan   Dario Benavides is a 69 year old male admitted on 9/6/2024. He has a history of HTN, HLD, T2DM and is admitted by vascular surgery for R BKA due to chronic limb ischemia. We are consulted for Diabetes and blood pressure management.     Right below the knee amputation   Dario is a 69 y.o. male admitted by vascular surgery for right BKA in the setting of uncontrolled T2DM and chronic limb ischemia. Family medicine was consulted for management of diabetes and hypertension.   - Wound checks by vascular surgery     Left foot diabetic ulcer  The wound is currently bandaged. Orthopedics consulted.   - Plan per ortho:               - MRI left foot               - Gauze dressing daily               - Medical management of diabetes               - Podiatry to contact the patient with MRI report as available.              - Podiatry to follow      Type 2 Diabetes Mellitus   The patient has a history of T2DM managed with 20 unit(s) Lantus QAM. His most recent A1c was 8.0 on 8/9/24. Blood glucose of 157 on admission labs.   - Glucose checks QID  - 15 unit(s) Lantus QAM decreased to 10U on 9/9 due to some low BS and decreased PO  - Carb ratio of 1:15  - Medium sliding scale insulin  - Hypoglycemia protocol      Hypertension   History of hypertension treated with valsartan. Systolic blood pressure around 150 on evaluation. Valsartan and amlodipine held prior to surgery.   - Restart PTA valsartan  - Continue PTA amlodipine      Hyperlipidemia   - Continue PTA atorvastatin      Other PTA Meds   - Continue PTA aspirin 81 mg        Diet: Moderate Consistent Carb (60 g CHO per Meal) Diet    DVT Prophylaxis: Heparin SQ  Odell Catheter: Not present  Fluids: PO  Lines: None     Cardiac Monitoring: None  Code Status: Full Code      Clinically Significant Risk Factors                  # Hypertension:  Noted on problem list          # DMII: A1C = 8.1 % (Ref range: 0.0 - 5.6 %) within past 6 months              Disposition Plan     Expected Discharge Date: 09/08/2024                The patient's care was discussed with the Attending Physician, Dr. Oglesby .    Lawrence Richardson MD  Hospitalist Service  [facility]  Securely message with [employer] (more info)  Text page via [employer] Paging/Directory   ______________________________________________________________________    Interval History     No acute events, had lower BS this AM, held todays lantus due to decreased PO and decreased tomorrows dose  ROS negative    Physical Exam   Vital Signs: Temp: 97.7  F (36.5  C) Temp src: Oral BP: (!) 158/76 Pulse: 85   Resp: 16 SpO2: 97 % O2 Device: None (Room air)    Weight: 151 lbs 4.8 oz    GENERAL: healthy, alert and no distress  RESP: speaking in full sentences, normal work of breathing   CV: extremities well perfused  PSYCH: mentation appears normal, affect normal/bright       Data         Imaging results reviewed over the past 24 hrs:   Recent Results (from the past 24 hour(s))   MR Foot Left w/o & w Contrast    Narrative    EXAM: MR FOOT LEFT W/O and W CONTRAST  LOCATION: [facility]  DATE: 9/7/2024    INDICATION: left foot diabetic wound, eval for osteomyelitis  COMPARISON: [MRI 03/24/2024  TECHNIQUE: Routine. Additional postgadolinium T1 sequences were obtained.  IV CONTRAST: 7ml gadavist    FINDINGS:   Soft tissue ulceration along the plantar aspect of the foot at the level of the first MTP joint with surrounding cellulitis. No fluid collection to suggest an abscess. No evidence of osteomyelitis in the underlying sesamoids, first metatarsal head or   first phalangeal base. Trace effusion and synovitis at the first MTP joint.     No evidence for acute fracture or osteonecrosis. Old healed fracture deformity of the distal fifth metatarsal shaft. No high-grade or  full-thickness cartilage loss in the forefoot are visualized midfoot.    Diffuse atrophy and denervation edema within the intrinsic musculature of the foot. No significant tendinopathy, tear or tenosynovitis of the visualized flexor or extensor tendons.      Impression    IMPRESSION:  1.  Soft tissue ulceration plantar to the first MTP joint with surrounding cellulitis. No abscess.  2.  No evidence of osteomyelitis in the underlying sesamoids, first metatarsal head or first phalangeal base. Trace effusion and synovitis at the first MTP joint.

## 2024-09-08 NOTE — PLAN OF CARE
Problem: Extremity Amputation  Goal: Optimal Coping with Amputation  Intervention: Support Psychsocial Response  Recent Flowsheet Documentation  Taken 9/8/2024 0815 by Miya Murillo RN  Supportive Measures:   active listening utilized   self-care encouraged  Goal: Absence of Bleeding  Outcome: Progressing  Goal: Optimal Functional Ability  Outcome: Not Progressing  Intervention: Optimize Functional Ability  Recent Flowsheet Documentation  Taken 9/8/2024 0835 by Miya Murillo RN  Activity Management:   activity adjusted per tolerance   activity encouraged  Taken 9/8/2024 0815 by Miya Murillo RN  Activity Management:   activity adjusted per tolerance   activity encouraged   patient refuses activity  Goal: Anesthesia/Sedation Recovery  Intervention: Optimize Anesthesia Recovery  Recent Flowsheet Documentation  Taken 9/8/2024 0815 by Miya Murillo RN  Safety Promotion/Fall Prevention:   safety round/check completed   clutter free environment maintained   patient and family education   supervised activity   lighting adjusted  Goal: Acceptable Pain Control  Outcome: Progressing  Goal: Effective Urinary Elimination  Outcome: Progressing  Goal: Optimal Residual Limb Healing  Intervention: Support Residual Limb Healing  Recent Flowsheet Documentation  Taken 9/8/2024 0815 by Miya Murillo RN  Residual Limb Management: (rt stump protector on and ace wrap) other (see comments)     Problem: Extremity Amputation  Goal: Absence of Bleeding  Outcome: Progressing     Problem: Comorbidity Management  Goal: Blood Pressure in Desired Range  Intervention: Maintain Blood Pressure Management  Recent Flowsheet Documentation  Taken 9/8/2024 0815 by Miya Murillo RN  Medication Review/Management: medications reviewed     Problem: Mobility Impairment  Goal: Optimal Mobility  Intervention: Optimize Mobility  Recent Flowsheet Documentation  Taken 9/8/2024 0835 by Miya Murillo, RN  Activity  Management:   activity adjusted per tolerance   activity encouraged  Taken 9/8/2024 0815 by Miya Murillo RN  Activity Management:   activity adjusted per tolerance   activity encouraged   patient refuses activity  Positioning/Transfer Devices:   pillows   in use     Problem: Mobility Impairment  Goal: Optimal Mobility  Intervention: Optimize Mobility  Recent Flowsheet Documentation  Taken 9/8/2024 0835 by Miya Murillo RN  Activity Management:   activity adjusted per tolerance   activity encouraged  Taken 9/8/2024 0815 by Miya Murillo RN  Activity Management:   activity adjusted per tolerance   activity encouraged   patient refuses activity  Positioning/Transfer Devices:   pillows   in use     Problem: Mobility Impairment  Goal: Optimal Mobility  Intervention: Optimize Mobility  Recent Flowsheet Documentation  Taken 9/8/2024 0835 by Miya Murillo RN  Activity Management:   activity adjusted per tolerance   activity encouraged  Taken 9/8/2024 0815 by Miya Murillo RN  Activity Management:   activity adjusted per tolerance   activity encouraged   patient refuses activity  Positioning/Transfer Devices:   pillows   in use   Goal Outcome Evaluation:       Patient is alert and orientated times 4. Voided 225. Denies pain. Ace wrap intact to right BKA, some old drainage noted on ace wrap. Elevated with pillow. Blood sugar low this am 59. Provided orange juice and juan carlos crackers and pt ate 100% of crackers and drank the juice. Blood sugar re-check was 110. Dr Louie notified and am lantus held this am. Patient refused breakfast. Educated patient about his low blood sugar and that he should try to eat. Patient has been refusing to sit on edge of bed and physical therapy.  is aware. Refused straight cath on night shift. Plan is to bladder scan patient.

## 2024-09-08 NOTE — OP NOTE
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  Circumstances required the skills of Bianka Vance PA-C to assist with all parts of this operation including incision, dissection, hemostasis, and wound closure.     Findings:  Pink, healthy muscle with no signs of infection at the level of amputation.  Appropriate bleeding from tibial vessel prior to hemostasis.      Estimated blood loss: 50 mL    Specimens: right leg    Brief history: Mr. Benavides is a 69 year-old male with a history of poorly controlled type-2 diabetes, chronic kidney disease, hypertension, hyperlipidemia, previous right transmetatarsal, who was admitted in March 2024 with a chronic right heel wound and calcaneal osteomyelitis. After discussing the options of attempted limb salvage vs. primary amputation, the patient chose amputation. He underwent guillotine below-knee amputation on March 24, 2024. Since his initial operation he has had difficulty with glycemic control, delaying his completion amputation until now. Hgb A1c is now 8.1%, improved from 14.2% last March, and he returns now for completion below-knee amputation.    Operative details:     The procedure was performed under monitored anesthesia care and peripheral nerve block.  The  patient was placed supine. A non-sterile tourniquet was placed on the right thigh. The right lower extremity was prepped and draped in usual sterile fashion. After surgical timeout,  anterior and posterior skin incisions were outlined with a marking pen. The tourniquet cuff pressure was raised to 250 mmHg. The anterior skin incision was made approximately 12 cm below the tibial tuberosity and extended medially and laterally towards the edges of the gastrocnemius muscle.  The skin incision was then extended distally on either side parallel to the tibia for approximately 15 cm creating a posterior flap.  The skin and subcutaneous tissue were incised using electrocautery, down to the fascia.  The greater and short saphenous vein were ligated using 3-0 silk.  The fascia and the muscles were then divided with the electrocautery at the level of the anterior skin incision.  The muscles in the anterior and lateral compartment were divided, exposing the anterior tibial vessels which were ligated and divided as well.  The interosseous membrane was then divided bluntly.  The tibial periosteum was incised circumferentially with electrocautery at the same level of the skin and muscle division.  Using a periosteal elevator, the tibial periosteum was stripped proximally for about 2 cm.  The tibia was then transected with electric saw approximate 2 cm proximally to the skin incision.  The fibula was then exposed and transected using electric saw.  The amputation was then completed with the amputation knife, transecting the soleus muscle and the gastrocnemius muscle at the same level as the posterior flap.  Hemostasis was achieved using several stick ties 3-0 silk.  Sharp bony edges were then filed, eliminating any bone prominences over the anterior aspect of the tibia the fascia of the posterior and anterior muscle flaps were approximated using interrupted 0 Vicryl sutures.  The skin was approximated using skin staples.  Dressing  was applied using xeroform, 4 x 4 gauze, Kerlix, and Ace bandage. Patient tolerated procedure well and transferred to postoperative care unit in stable condition.

## 2024-09-08 NOTE — CONSULTS
Care Management Initial Consult    General Information  Assessment completed with: Patient,         Primary Care Provider verified and updated as needed: Yes   Readmission within the last 30 days:           Advance Care Planning: Advance Care Planning Reviewed: no concerns identified          Communication Assessment  Patient's communication style: spoken language (English or Bilingual)    Hearing Difficulty or Deaf: no   Wear Glasses or Blind: yes    Cognitive  Cognitive/Neuro/Behavioral: WDL                      Living Environment:   People in home: child(radha), adult     Current living Arrangements: house      Able to return to prior arrangements:         Family/Social Support:  Care provided by: self  Provides care for: no one  Marital Status: Single  Support system: Children          Description of Support System: Involved    Support Assessment: Adequate family and caregiver support    Current Resources:   Patient receiving home care services: No        Community Resources: None  Equipment currently used at home: grab bar, tub/shower, shower chair, wheelchair, manual, grab bar, toilet  Supplies currently used at home:      Employment/Financial:  Employment Status: disabled        Financial Concerns:             Does the patient's insurance plan have a 3 day qualifying hospital stay waiver?  No    Lifestyle & Psychosocial Needs:  Social Determinants of Health     Food Insecurity: Not on file   Depression: Not on file   Housing Stability: Not on file   Tobacco Use: High Risk (9/6/2024)    Patient History     Smoking Tobacco Use: Some Days     Smokeless Tobacco Use: Former     Passive Exposure: Never   Financial Resource Strain: High Risk (1/1/2022)    [facility] [employer] [facility] [employer] [facility] [facility]    Financial Resource Strain     Difficulty of Paying Living Expenses: Not on file     Difficulty of Paying Living Expenses: Not on file   Alcohol Use: Not on  file   Transportation Needs: Not on file   Physical Activity: Not on file   Interpersonal Safety: Low Risk  (9/6/2024)    Interpersonal Safety     Do you feel physically and emotionally safe where you currently live?: Yes     Within the past 12 months, have you been hit, slapped, kicked or otherwise physically hurt by someone?: No     Within the past 12 months, have you been humiliated or emotionally abused in other ways by your partner or ex-partner?: No   Stress: Not on file   Social Connections: Unknown (1/1/2022)    Received from [employer] [facility] [facility], [employer] [facility] [employer] [facility]    Social Connections     Frequency of Communication with Friends and Family: Not on file   Health Literacy: Not on file       Functional Status:  Prior to admission patient needed assistance:   Dependent ADLs:: Wheelchair-with assist          Mental Health Status:  Mental Health Status: No Current Concerns       Chemical Dependency Status:  Chemical Dependency Status: No Current Concerns             Values/Beliefs:  Spiritual, Cultural Beliefs, Episcopal Practices, Values that affect care:                 Discussed  Partnership in Safe Discharge Planning  document with patient/family: No    Additional Information:  SW completed initial assessment with patient at bedside. He was lying down with eyes closed and provided short answers to management questions.   Patient lives in a house with his son and daughter. He has a wheelchair, cane and shower chair. He does not drive. He reports his children help out if needed. He confirms his PCP his Dr Jorgito Drake with [facility]. He denies financial concerns.   Per chart review[facility] , he reports he is not receiving anymore visits from them.  Patient is declining therapy services here, may need just home RN at discharge.  Next Steps:  awaiting medical readiness.    Vikki Mcwilliams, SW

## 2024-09-08 NOTE — CARE PLAN
patient voided 300ml and was bladder scanned post void for 405. flomax started as ordered. pt contiues to refuse straight cath. patient refused to sit on edge of bed. continue to monitor output and encourage activity. blood sugar 135 at noon. right stump incision is clean and dry. abd pad and stump protector on. ace wrap and dry gauze clean and dry to left foot.

## 2024-09-08 NOTE — PROGRESS NOTES
Vascular Surgery Progress Note    09/07/24   Length of Stay: 2 days    Interval: AFVSS. Patient with 650 ml PVR on bladder scan, refused straight cath but voided 225 ml afterwards. This AM still refusing straight cath, reports being tired from getting no sleep. Denies CP, SOB, lightheadedness. Is flexing/extending well at the knee. Pain w/c. MRI yesterday negative for abscess or osteomyelitis.    Physical Exam:  BP (!) 158/76 (BP Location: Right arm)   Pulse 85   Temp 97.7  F (36.5  C) (Oral)   Resp 16   Wt 68.6 kg (151 lb 4.8 oz)   SpO2 97%   BMI 21.10 kg/m     Gen: NAD, AAOx3  CV: RRR by peripheral pulse  Resp: NLB on RA  Ext: RLE stump dressings taken down, incision healing well with staples in place, CDI, no bleeding, drainage, erythema, fluctuance or other SOI.    Pertinent Labs:  Recent Labs   Lab 09/07/24  0636 09/06/24  1727 09/06/24  1250   WBC 6.7  --   --    HGB 8.9*  --  11.3*   HCT 27.6*  --   --     311  --      Assessment/Plan: Dario Benavides is a 69 year old male POD 2 s/p R BKA. Incision healing well.     - Leave RLE incision open without dressings; if stump protector is applied, may place ABD pads over incision to protect it and for comfort.   - Podiatry following for left foot wound, appreciating recs. MRI pending.   - Continue pain control, pulmonary toilet, PT/OT.  - Started Flomax for urinary retention. Will encourage patient to void and monitor PVR.     Staff: Dr. Mindy Lopez MD  PGY-6  Vascular Surgery  Pager: #[phone]

## 2024-09-08 NOTE — PROGRESS NOTES
Subjective:    Pt is seen today for left subfirst metatarsal head ulcer.  Denies any new pain here.  Denies calf pain on this leg.  Status post contralateral BKA.    ROS: See above       No Known Allergies    Current Outpatient Medications   Medication Sig Dispense Refill    oxyCODONE (ROXICODONE) 5 MG tablet Take 1 tablet (5 mg) by mouth every 6 hours as needed for pain. 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)    Hyperglycemia    Diabetic ulcer of right foot associated with type 2 diabetes mellitus, with other ulcer severity, unspecified part of foot (H)    Ulcer of right foot with necrosis of bone (H)    Ulcer of right foot, unspecified ulcer stage (H)    Sepsis (H)    Anemia in other chronic diseases classified elsewhere    Uncontrolled type 2 diabetes mellitus with hyperglycemia (H)    Depression with anxiety       Past Medical History:   Diagnosis Date    Cerebral infarction (H)     Cervical spondylosis with myelopathy     Depression with anxiety     Diabetic peripheral neuropathy (H)     Dyslipidemia     Hypertension     Non compliance with medical treatment 05/04/2021    Normocytic anemia     Normocytic anemia     Osteomyelitis of right foot (H)     Severe sepsis (H) 06/14/2023    Stage 3b chronic kidney disease (H) 05/04/2021    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) 05/08/2021    TIA (transient ischemic attack) 04/01/2009    Tobacco abuse 07/23/2019    Type 2 diabetes mellitus with right diabetic foot infection (H) 05/04/2021       Past Surgical History:   Procedure Laterality Date    AMPUTATE LEG BELOW  KNEE Right 3/24/2024    Procedure: GUILLOTINE RIGHT BELOW KNEE AMPUTATION;  Surgeon: Marissa Steel MD;  Location: [facility] OR    BACK SURGERY  1992    CLOSE SECONDARY WOUND LOWER EXTREMITY Right 2023    Procedure: Delayed primary closure of wound right foot;  Surgeon: Brandon Nieto DPM;  Location: [facility] OR    IRRIGATION AND DEBRIDEMENT FOOT, COMBINED Right 2023    Procedure: IRRIGATION AND DEBRIDEMENT right foot with removing all infected bones and soft tissue;  Surgeon: Jun Goyal DPM;  Location: [facility] OR    NECK SURGERY         History reviewed. No pertinent family history.    Social History     Tobacco Use    Smoking status: Some Days     Current packs/day: 0.00     Average packs/day: 3.0 packs/day for 45.0 years (135.0 ttl pk-yrs)     Types: Cigarettes     Start date: 1978     Last attempt to quit: 2023     Years since quittin.0     Passive exposure: Never    Smokeless tobacco: Former    Tobacco comments:      down to 1 pack/week, declined cessation services and materials   Substance Use Topics    Alcohol use: Not Currently     Comment: occas.         Exam:    Vitals: BP (!) 158/76 (BP Location: Right arm)   Pulse 85   Temp 97.7  F (36.5  C) (Oral)   Resp 16   Wt 68.6 kg (151 lb 4.8 oz)   SpO2 97%   BMI 21.10 kg/m    BMI: Body mass index is 21.1 kg/m .  Height: Data Unavailable    Constitutional/ general:  Pt is in no apparent distress, appears well-nourished.  Cooperative with history and physical exam. Sleepy.    Musculoskeletal:    Lower extremity left dressing is off his foot.  Wound noted under the left first metatarsal head.  There is no purulence or odor.  No surrounding erythema or edema.  No crepitus with palpation.  Base of wound mixture of granulation tissue and fibrotic tissue.     Latest Reference Range & Units Most Recent   WBC 4.0 - 11.0 10e3/uL 6.7  24 06:36     Narrative & Impression   EXAM: MR FOOT LEFT W/O and W  CONTRAST  LOCATION: [facility]  DATE: 9/7/2024     INDICATION: left foot diabetic wound, eval for osteomyelitis  COMPARISON: [MRI 03/24/2024  TECHNIQUE: Routine. Additional postgadolinium T1 sequences were obtained.  IV CONTRAST: 7ml gadavist     FINDINGS:   Soft tissue ulceration along the plantar aspect of the foot at the level of the first MTP joint with surrounding cellulitis. No fluid collection to suggest an abscess. No evidence of osteomyelitis in the underlying sesamoids, first metatarsal head or   first phalangeal base. Trace effusion and synovitis at the first MTP joint.      No evidence for acute fracture or osteonecrosis. Old healed fracture deformity of the distal fifth metatarsal shaft. No high-grade or full-thickness cartilage loss in the forefoot are visualized midfoot.     Diffuse atrophy and denervation edema within the intrinsic musculature of the foot. No significant tendinopathy, tear or tenosynovitis of the visualized flexor or extensor tendons.                                                                      IMPRESSION:  1.  Soft tissue ulceration plantar to the first MTP joint with surrounding cellulitis. No abscess.  2.  No evidence of osteomyelitis in the underlying sesamoids, first metatarsal head or first phalangeal base. Trace effusion and synovitis at the first MTP joint.         A:  Diabetes mellitus   Status post right BKA   left foot ulcer      P:  Discussed wound looks clean today with no signs of infection.  We redressed this.  Please clean daily with wound vashe, medihoney, gauze kerlix, and Ace bandage to help hold on gauze.  Please have patient follow-up with Dr. Sage upon discharge.    Jun Goyal DPM, FACFAS

## 2024-09-08 NOTE — PLAN OF CARE
Problem: Adult Inpatient Plan of Care  Goal: Plan of Care Review  Description: The Plan of Care Review/Shift note should be completed every shift.  The Outcome Evaluation is a brief statement about your assessment that the patient is improving, declining, or no change.  This information will be displayed automatically on your shift  note.  Outcome: Progressing  Goal: Absence of Hospital-Acquired Illness or Injury  Intervention: Identify and Manage Fall Risk  Recent Flowsheet Documentation  Taken 9/8/2024 0030 by Miguel Valle RN  Safety Promotion/Fall Prevention:   safety round/check completed   clutter free environment maintained  Intervention: Prevent Skin Injury  Recent Flowsheet Documentation  Taken 9/8/2024 0030 by Miguel Valle RN  Body Position:   right   position changed independently     Problem: Comorbidity Management  Goal: Blood Glucose Levels Within Targeted Range  Outcome: Progressing  Intervention: Monitor and Manage Glycemia  Recent Flowsheet Documentation  Taken 9/8/2024 0030 by Miguel Valle RN  Medication Review/Management: medications reviewed  Goal: Blood Pressure in Desired Range  Outcome: Progressing  Intervention: Maintain Blood Pressure Management  Recent Flowsheet Documentation  Taken 9/8/2024 0030 by Miguel Valle RN  Medication Review/Management: medications reviewed     Problem: Pain Acute  Goal: Optimal Pain Control and Function  Outcome: Progressing  Intervention: Prevent or Manage Pain  Recent Flowsheet Documentation  Taken 9/8/2024 0030 by Miguel Valle RN  Medication Review/Management: medications reviewed   Goal Outcome Evaluation:       Pt alert & oriented. Denies any pain. Scheduled tylenol given as ordered. Dressing with old drainage. On room air, Vital Signs stable. Will continue to monitor.

## 2024-09-08 NOTE — PROGRESS NOTES
Physical Therapy: Orders received. Chart reviewed and discussed with care team.? Physical Therapy not indicated due to patient adamantly declining.? Defer discharge recommendations to medical team.? Will complete orders.     Nneka Ho, PT  9/8/2024

## 2024-09-08 NOTE — PLAN OF CARE
"  Problem: Adult Inpatient Plan of Care  Goal: Plan of Care Review  Description: The Plan of Care Review/Shift note should be completed every shift.  The Outcome Evaluation is a brief statement about your assessment that the patient is improving, declining, or no change.  This information will be displayed automatically on your shift  note.  9/7/2024 2226 by Marie Smalls RN  Outcome: Progressing  9/7/2024 2226 by Marie Smalls RN  Outcome: Progressing  Goal: Patient-Specific Goal (Individualized)  Description: You can add care plan individualizations to a care plan. Examples of Individualization might be:  \"Parent requests to be called daily at 9am for status\", \"I have a hard time hearing out of my right ear\", or \"Do not touch me to wake me up as it startles  me\".  9/7/2024 2226 by Marie Smalls RN  Outcome: Progressing  9/7/2024 2226 by Marie Smalls RN  Outcome: Progressing  Goal: Absence of Hospital-Acquired Illness or Injury  9/7/2024 2226 by Marie Smalls RN  Outcome: Progressing  9/7/2024 2226 by Marie Smalls RN  Outcome: Progressing  Intervention: Prevent Skin Injury  Recent Flowsheet Documentation  Taken 9/7/2024 1819 by Marie Smalls RN  Body Position: heels elevated  Taken 9/7/2024 1618 by Marie Smalls RN  Body Position: heels elevated  Intervention: Prevent Infection  Recent Flowsheet Documentation  Taken 9/7/2024 1645 by Marie Smalls RN  Infection Prevention: hand hygiene promoted  Goal: Optimal Comfort and Wellbeing  9/7/2024 2226 by Marie Smalls RN  Outcome: Progressing  9/7/2024 2226 by Marie Smalls RN  Outcome: Progressing  Goal: Readiness for Transition of Care  9/7/2024 2226 by Marie Smalls RN  Outcome: Progressing  9/7/2024 2226 by Marie Smalls RN  Outcome: Progressing     Problem: Extremity Amputation  Goal: Optimal Coping with Amputation  9/7/2024 2226 by Marie Smalls RN  Outcome: Progressing  9/7/2024 2226 by Marie Smalls RN  Outcome: " Progressing  Goal: Absence of Bleeding  9/7/2024 2226 by Marie Smalls RN  Outcome: Progressing  9/7/2024 2226 by Marie Smalls RN  Outcome: Progressing  Goal: Effective Bowel Elimination  9/7/2024 2226 by Marie Smalls RN  Outcome: Progressing  9/7/2024 2226 by Marie Smalls, RN  Outcome: Progressing  Goal: Fluid and Electrolyte Balance  9/7/2024 2226 by Marie Smalls, RN  Outcome: Progressing  9/7/2024 2226 by Marie Smalls RN  Outcome: Progressing  Goal: Optimal Functional Ability  9/7/2024 2226 by Marie Smalls RN  Outcome: Progressing  9/7/2024 2226 by Marie Smalls RN  Outcome: Progressing  Goal: Absence of Infection Signs and Symptoms  9/7/2024 2226 by Marie Smalls, RN  Outcome: Progressing  9/7/2024 2226 by Marie Smalls RN  Outcome: Progressing  Goal: Anesthesia/Sedation Recovery  9/7/2024 2226 by Marie Smalls RN  Outcome: Progressing  9/7/2024 2226 by Marie Smalls RN  Outcome: Progressing  Goal: Acceptable Pain Control  9/7/2024 2226 by Marie Smalls RN  Outcome: Progressing  9/7/2024 2226 by Marie Smalls RN  Outcome: Progressing  Goal: Nausea and Vomiting Relief  9/7/2024 2226 by Marie Smalls RN  Outcome: Progressing  9/7/2024 2226 by Marie Smalls RN  Outcome: Progressing  Goal: Effective Urinary Elimination  9/7/2024 2226 by Marie Smalls, RN  Outcome: Progressing  9/7/2024 2226 by Marie Smalls RN  Outcome: Progressing  Goal: Optimal Residual Limb Healing  9/7/2024 2226 by Marie Smalls RN  Outcome: Progressing  9/7/2024 2226 by Marie Smalls RN  Outcome: Progressing   Goal Outcome Evaluation:       Pt denies pain to surgical site, dressing intact with small old  drainage noted unchanged from previous assessment. Pt retaining urine, bladder scan 610 ml voided 200 ml, re scan 710 and S/cath 800 ml.

## 2024-09-08 NOTE — PROGRESS NOTES
Pt unable to void. Bladder scanned for 653 mL. Refusing to straight cath. Encouraged patient to void. Urinal at bedside.

## 2024-09-09 VITALS
RESPIRATION RATE: 23 BRPM | WEIGHT: 151.3 LBS | DIASTOLIC BLOOD PRESSURE: 84 MMHG | BODY MASS INDEX: 21.1 KG/M2 | HEART RATE: 102 BPM | TEMPERATURE: 99 F | SYSTOLIC BLOOD PRESSURE: 137 MMHG | OXYGEN SATURATION: 98 %

## 2024-09-09 LAB
CREAT SERPL-MCNC: 1.86 MG/DL (ref 0.67–1.17)
EGFRCR SERPLBLD CKD-EPI 2021: 39 ML/MIN/1.73M2
ERYTHROCYTE [DISTWIDTH] IN BLOOD BY AUTOMATED COUNT: 14.5 % (ref 10–15)
GLUCOSE BLDC GLUCOMTR-MCNC: 112 MG/DL (ref 70–99)
GLUCOSE BLDC GLUCOMTR-MCNC: 98 MG/DL (ref 70–99)
HCT VFR BLD AUTO: 29.6 % (ref 40–53)
HGB BLD-MCNC: 9.6 G/DL (ref 13.3–17.7)
MCH RBC QN AUTO: 27.4 PG (ref 26.5–33)
MCHC RBC AUTO-ENTMCNC: 32.4 G/DL (ref 31.5–36.5)
MCV RBC AUTO: 85 FL (ref 78–100)
PLATELET # BLD AUTO: 346 10E3/UL (ref 150–450)
PLATELET # BLD AUTO: 346 10E3/UL (ref 150–450)
RBC # BLD AUTO: 3.5 10E6/UL (ref 4.4–5.9)
WBC # BLD AUTO: 8.2 10E3/UL (ref 4–11)

## 2024-09-09 PROCEDURE — 82565 ASSAY OF CREATININE: CPT | Performed by: SURGERY

## 2024-09-09 PROCEDURE — 250N000013 HC RX MED GY IP 250 OP 250 PS 637: Performed by: STUDENT IN AN ORGANIZED HEALTH CARE EDUCATION/TRAINING PROGRAM

## 2024-09-09 PROCEDURE — 36415 COLL VENOUS BLD VENIPUNCTURE: CPT | Performed by: STUDENT IN AN ORGANIZED HEALTH CARE EDUCATION/TRAINING PROGRAM

## 2024-09-09 PROCEDURE — 85049 AUTOMATED PLATELET COUNT: CPT | Performed by: STUDENT IN AN ORGANIZED HEALTH CARE EDUCATION/TRAINING PROGRAM

## 2024-09-09 PROCEDURE — 85014 HEMATOCRIT: CPT | Performed by: STUDENT IN AN ORGANIZED HEALTH CARE EDUCATION/TRAINING PROGRAM

## 2024-09-09 PROCEDURE — 250N000011 HC RX IP 250 OP 636: Performed by: STUDENT IN AN ORGANIZED HEALTH CARE EDUCATION/TRAINING PROGRAM

## 2024-09-09 PROCEDURE — 250N000013 HC RX MED GY IP 250 OP 250 PS 637

## 2024-09-09 PROCEDURE — 99207 PR NO CHARGE LOS: CPT | Performed by: FAMILY MEDICINE

## 2024-09-09 RX ORDER — TAMSULOSIN HYDROCHLORIDE 0.4 MG/1
0.4 CAPSULE ORAL DAILY
Qty: 90 CAPSULE | Refills: 0 | Status: SHIPPED | OUTPATIENT
Start: 2024-09-10 | End: 2024-12-09

## 2024-09-09 RX ORDER — INSULIN GLARGINE 100 [IU]/ML
10 INJECTION, SOLUTION SUBCUTANEOUS EVERY MORNING
Qty: 15 ML | Refills: 0 | Status: SHIPPED | OUTPATIENT
Start: 2024-09-09

## 2024-09-09 RX ADMIN — AMLODIPINE BESYLATE 5 MG: 5 TABLET ORAL at 09:24

## 2024-09-09 RX ADMIN — SENNOSIDES AND DOCUSATE SODIUM 1 TABLET: 8.6; 5 TABLET ORAL at 09:24

## 2024-09-09 RX ADMIN — ASPIRIN 81 MG: 81 TABLET, COATED ORAL at 09:24

## 2024-09-09 RX ADMIN — POLYETHYLENE GLYCOL 3350 17 G: 17 POWDER, FOR SOLUTION ORAL at 09:22

## 2024-09-09 RX ADMIN — TAMSULOSIN HYDROCHLORIDE 0.4 MG: 0.4 CAPSULE ORAL at 09:24

## 2024-09-09 RX ADMIN — VALSARTAN 160 MG: 80 TABLET, FILM COATED ORAL at 09:23

## 2024-09-09 RX ADMIN — HEPARIN SODIUM 5000 UNITS: 10000 INJECTION, SOLUTION INTRAVENOUS; SUBCUTANEOUS at 05:59

## 2024-09-09 ASSESSMENT — ACTIVITIES OF DAILY LIVING (ADL)
ADLS_ACUITY_SCORE: 29

## 2024-09-09 NOTE — PROGRESS NOTES
Aox4, withdrawn and somnolent. Uncooperative with care. Refused scheduled tylenol, denies pain. Refused dinner, encouraged po intake. Voided x1 this shift, bladder scan >464, refused to be straight cathed, reinforced importance and possible risk/effect. Dressings clean/dry/intact, CMS intact. Report given to BAY Barger at 7pm who took over pt's care. Upon checking up on pt on shift change, pt was trying to get out of bed unattended. Up to bedside commode, standby assist standpivot.

## 2024-09-09 NOTE — PLAN OF CARE
Problem: Adult Inpatient Plan of Care  Goal: Plan of Care Review  Description: The Plan of Care Review/Shift note should be completed every shift.  The Outcome Evaluation is a brief statement about your assessment that the patient is improving, declining, or no change.  This information will be displayed automatically on your shift  note.  Outcome: Progressing   Goal Outcome Evaluation:   Pt right leg stump protector in place. Incision ASHLYN. Left foot ace intact. Pt refuses tylenol. Cooperative with other medications. Awaiting discharge orders.

## 2024-09-09 NOTE — PROGRESS NOTES
Care Management Discharge Note    Discharge Date: 09/09/2024       Discharge Disposition: Home with Home Care    Discharge Services: Home Care    Discharge DME:      Discharge Transportation: family or friend will provide    Private pay costs discussed: Not applicable    Does the patient's insurance plan have a 3 day qualifying hospital stay waiver?  No    PAS Confirmation Code: N/A  Patient/family educated on Medicare website which has current facility and service quality ratings: yes    Education Provided on the Discharge Plan: Yes  Persons Notified of Discharge Plans: Patient  Patient/Family in Agreement with the Plan: yes    Handoff Referral Completed: No, handoff not indicated or clinically appropriate    Additional Information:  10:00 AM  JUAN met and spoke with Pt regarding setting up a home RN for discharge. Pt reported that he is already open to home care services at this time but could not recall the name. JUAN reported that CM will review his chart to see if there is any home care agency in his medical record.    JUAN reached out to [facility] who confirmed he is a current client with their [place] [facility]. JUAN added a reminder to the Treatment Team Sticky Note for home care orders at discharge.     JUAN reached out to [facility] for home care orders at discharge. [facility] should be able to access the updated discharge orders in Epic. Family to transport.    CM will sign off. Please contact CM if any additional needs arise prior to discharge.      FAZAL Cadet

## 2024-09-09 NOTE — PROGRESS NOTES
Vascular surgery note    No major issues over the weekend.  Resting comfortably.  VS reviewed. Temp 100.1 F today, mild tachcyardia, 102 HR, Normotensive.    On exam the R BKA wound is healthy, incision c/d/I with staples. No drainage, small amt old drainage on lateral aspect. No erythema, minimally tender. No hematoma.    New dressing applied- ABD, kerlix, ACE wrap.    Wbc 8.2  Hgb 9.6  Plts 346    A/P: 70 yo male POD#3 right completion BKA. Recovering well. Refusing to work with physical therapy here.  -Slightly elevated temp with mild tachycardia but no leukocytosis and no signs of early wound infection.  -No osteomyelitis on L foot MRI over the weekend. Follow-up with podiatry outpatient will be arranged.  -Plan for discharge home today. Discharge instructions reviewed with patient. He has home wound nurse already arranged for dressing changes every other day.  -Follow-up in vascular surgery clinic as arranged on 9/26/24.    Discussed with staff, Dr. Sykes.    Konrad Vigil MD

## 2024-09-09 NOTE — DISCHARGE SUMMARY
[facility]  VASCULAR SURGERY  DISCHARGE SUMMARY    Dario Benavides MRN# [id number]   YOB: 1955 Age: 69 year old     Date of Admission:  9/6/2024  Date of Discharge::  09/09/24  Admitting Physician:  Amber Guillen MD  Discharge Physician:  Amber Guillen MD  Primary Care Physician:        Jorgito Drake          Admission Diagnoses:   Status post right guillotine below-knee amputation          Discharge Diagnosis:   Status post right guillotine below-knee amputation  Type 2 diabetes  Chronic left plantar foot wound           Procedures:   Right completion below-knee amputation           Consultations:   OCCUPATIONAL THERAPY ADULT IP CONSULT  PHYSICAL THERAPY ADULT IP CONSULT  HOSPITALIST IP CONSULT  PODIATRY IP CONSULT  PHYSICAL THERAPY ADULT IP CONSULT  CARE MANAGEMENT / SOCIAL WORK IP CONSULT          Brief History of Illness:   From my operative note dated 9/6/2024:     Mr. Benavides is a 69 year-old male with a history of poorly controlled type-2 diabetes, chronic kidney disease, hypertension, hyperlipidemia, previous right transmetatarsal, who was admitted in March 2024 with a chronic right heel wound and calcaneal osteomyelitis. After discussing the options of attempted limb salvage vs. primary amputation, the patient chose amputation. He underwent guillotine below-knee amputation on March 24, 2024. Since his initial operation he has had difficulty with glycemic control, delaying his completion amputation until now. Hgb A1c is now 8.1%, improved from 14.2% last March, and he returns now for completion below-knee amputation.            Hospital Course:   The patient underwent right completion below-knee amputation on 9/6/2024, which was he tolerated well without immediate complications. He was transferred to the PACU and then floor for routine postoperative care. Podiatry was consulted to evaluate the left plantar foot wound. The wound did not have signs of  "infection and MRI showed no signs of osteomyelitis or abscess. He will follow up outpatient with podiatry. The right BKA wound dressing was changed twice during this admission, both times the wound appeared to be healthy.  The remainder of his postoperative course was unremarkable. He refused to work with physical therapy and declined any offer to arrange for transitional care unit. He discharged to home in stable condition on POD#3 where he will continue receiving wound care by the existing home health care service. He will follow-up in vascular surgery clinic as scheduled on 9/26/2024.         Imaging Studies:     Results for orders placed or performed during the hospital encounter of 09/06/24   POC US Guidance Needle Placement    Narrative    Ultrasound was performed as guidance to an anesthesia procedure.  Click   \"PACS images\" hyperlink below to view any stored images.  For specific   procedure details, view procedure note authored by anesthesia.   MR Foot Left w/o & w Contrast    Narrative    EXAM: MR FOOT LEFT W/O and W CONTRAST  LOCATION: [facility]  DATE: 9/7/2024    INDICATION: left foot diabetic wound, eval for osteomyelitis  COMPARISON: [MRI 03/24/2024  TECHNIQUE: Routine. Additional postgadolinium T1 sequences were obtained.  IV CONTRAST: 7ml gadavist    FINDINGS:   Soft tissue ulceration along the plantar aspect of the foot at the level of the first MTP joint with surrounding cellulitis. No fluid collection to suggest an abscess. No evidence of osteomyelitis in the underlying sesamoids, first metatarsal head or   first phalangeal base. Trace effusion and synovitis at the first MTP joint.     No evidence for acute fracture or osteonecrosis. Old healed fracture deformity of the distal fifth metatarsal shaft. No high-grade or full-thickness cartilage loss in the forefoot are visualized midfoot.    Diffuse atrophy and denervation edema within the intrinsic musculature of the foot. " No significant tendinopathy, tear or tenosynovitis of the visualized flexor or extensor tendons.      Impression    IMPRESSION:  1.  Soft tissue ulceration plantar to the first MTP joint with surrounding cellulitis. No abscess.  2.  No evidence of osteomyelitis in the underlying sesamoids, first metatarsal head or first phalangeal base. Trace effusion and synovitis at the first MTP joint.     US ANGELO with PPG wo Exercise    Narrative    EXAM: RESTING ANKLE-BRACHIAL INDICES (ABIs)  LOCATION: [facility]  DATE: 9/6/2024    INDICATION: left plantar foot wound  COMPARISON: None.    ANGELO FINDINGS:  RIGHT  Brachial: 158  Ankle (PT): Amputation  Ankle (DP): Amputation    LEFT  Brachial: 166  Ankle (PT): 123 Index: 0.74  Ankle (DP): 112 Index: 0.67  Digit: 90 Index: 0.54    The right ANGELO at rest is not performed. The left ANGELO at rest is 0.74.      WAVEFORMS: Abnormal diminished posterior tibial waveform.      Impression    IMPRESSION:  1.  LEFT LOWER EXTREMITY: ANGELO at rest is 0.74 compatible with mild PAD.   US Lower Extremity Arterial Duplex Left    Narrative    EXAM: US LOWER EXTREMITY ARTERIAL DUPLEX LEFT  LOCATION: [facility]  DATE: 9/6/2024    INDICATION: left plantar diabetic foot wound  COMPARISON: None.  TECHNIQUE: Duplex utilizing 2D gray-scale imaging, Doppler interrogation with color-flow and spectral waveform analysis.    FINDINGS: Likely prominent collaterals adjacent to the area of elevated SFA peak systolic velocities.    LEFT LOWER EXTREMITY ARTERIAL ASSESSMENT:  Common femoral artery: 135 cm/s, triphasic  Profunda femoris artery: 138 cm/s  SFA (proximal): 84 cm/s, triphasic  SFA (mid): 43 cm/s, monophasic  SFA (distal): 200 cm/s, monophasic  SFA (distal): 14 cm/s, monophasic  Popliteal artery: 50 cm/s, monophasic  Anterior tibial artery: 14 cm/s, monophasic  Posterior tibial artery: 23 cm/s, monophasic  Dorsalis pedis artery: 43 cm/s, monophasic      Impression     IMPRESSION:  1.  Sonographic findings consistent with distal SFA hemodynamically significant stenosis with likely collateralization, and diminished distal peak systolic velocities. Consider further evaluation with CTA.              Medications Prior to Admission:     Medications Prior to Admission   Medication Sig Dispense Refill Last Dose    amLODIPine (NORVASC) 5 MG tablet Take 5 mg by mouth daily.   Past Month    atorvastatin (LIPITOR) 80 MG tablet Take 1 tablet (80 mg) by mouth every evening 30 tablet 0 Past Month    insulin glargine (LANTUS SOLOSTAR) 100 UNIT/ML pen Inject 20 Units Subcutaneous every morning 15 mL 0 Past Week    valsartan (DIOVAN) 160 MG tablet Take 160 mg by mouth daily.   Past Month    alcohol swab prep pads Use to swab area of injection/helio as directed. 100 each 3     aspirin 81 MG EC tablet Take 1 tablet (81 mg) by mouth daily 30 tablet 0     blood glucose (ACCU-CHEK JANICE PLUS) test strip Use to test blood sugar 1 times daily 100 strip 0     insulin pen needle (ULTICARE MINI) 31G X 6 MM miscellaneous Use 1 pen needles daily or as directed. 100 each 0               Discharge Medications:     Current Discharge Medication List        START taking these medications    Details   tamsulosin (FLOMAX) 0.4 MG capsule Take 1 capsule (0.4 mg) by mouth daily.  Qty: 90 capsule, Refills: 0    Associated Diagnoses: 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)           CONTINUE these medications which have NOT CHANGED    Details   amLODIPine (NORVASC) 5 MG tablet Take 5 mg by mouth daily.      atorvastatin (LIPITOR) 80 MG tablet Take 1 tablet (80 mg) by mouth every evening  Qty: 30 tablet, Refills: 0    Associated Diagnoses: Ulcer of right foot with necrosis of bone (H)      insulin glargine (LANTUS SOLOSTAR) 100 UNIT/ML pen Inject 20 Units Subcutaneous every morning  Qty: 15 mL, Refills: 0    Comments: If Lantus is not covered by insurance, may substitute Basaglar or Semglee or  other insulin glargine product per insurance preference at same dose and frequency.    Associated Diagnoses: Type 2 diabetes mellitus with right diabetic foot infection (H)      valsartan (DIOVAN) 160 MG tablet Take 160 mg by mouth daily.      alcohol swab prep pads Use to swab area of injection/helio as directed.  Qty: 100 each, Refills: 3    Associated Diagnoses: Type 2 diabetes mellitus with right diabetic foot infection (H)      aspirin 81 MG EC tablet Take 1 tablet (81 mg) by mouth daily  Qty: 30 tablet, Refills: 0    Associated Diagnoses: Ulcer of right foot with necrosis of bone (H)      blood glucose (ACCU-CHEK JANICE PLUS) test strip Use to test blood sugar 1 times daily  Qty: 100 strip, Refills: 0    Associated Diagnoses: Uncontrolled type 2 diabetes mellitus with hyperglycemia (H)      insulin pen needle (ULTICARE MINI) 31G X 6 MM miscellaneous Use 1 pen needles daily or as directed.  Qty: 100 each, Refills: 0    Associated Diagnoses: Type 2 diabetes mellitus with right diabetic foot infection (H)                     Antibiotics Prescribed at Discharge:   None prescribed           Day of Discharge Physical Exam:   Temp:  [98.5  F (36.9  C)-100.1  F (37.8  C)] 99  F (37.2  C)  Pulse:  [] 102  Resp:  [18-23] 23  BP: (136-150)/(63-84) 137/84  SpO2:  [95 %-98 %] 98 %    Gen: NAD, AAOx3  CV: RRR by peripheral pulse  Resp: NLB on RA  Ext: RLE stump dressings taken down, incision healing well with staples in place, CDI, no bleeding, drainage, erythema, fluctuance or other SOI.         Discharge Instructions and Follow-Up:     Discharge Procedure Orders   Reason for your hospital stay   Order Comments: S/P below knee amputation completion     Follow-up and recommended labs and tests    Order Comments: Follow up as previously scheduled with vascular team     Activity   Order Comments: Your activity upon discharge: .No strenuous activity. Walking (within reasonable limits) is encouraged to prevent muscle  deconditioning and blood clots. Non weight bearing to residual limb     Order Specific Question Answer Comments   Is discharge order? Yes      Wound care and dressings   Order Comments: Instructions to care for your wound at home:     Daily dressing change to BKA with xeroform or petroleum gauze over staple line, dry 4x4s, ABD pad, kerlix, and gentle ACE wrap    Ok to shower and let soap/water run over incision. Do not soak or submerge in bathtub, hot tub, pool, lake, etc.     Monitor area for any signs of infection.     Diet   Order Comments: Follow this diet upon discharge: Moderate Consistent Carb (60 g CHO per Meal) Diet     Order Specific Question Answer Comments   Is discharge order? Yes              Discharge Disposition:     Discharged to home      Condition at discharge: Stable    Patient was discussed with staff, Dr. Guillen, on the day of discharge.    Konrad Vigil MD

## 2024-09-09 NOTE — PROGRESS NOTES
Note to support coding query:    The patient has chronic kidney disease. He also had urinary retention which I suspect is chronic and was treated by initiating Flomax, which was continued at discharge. He had no treatment for the chronic kidney disease.    Konrad Vigil MD

## 2024-09-09 NOTE — PLAN OF CARE
Problem: Extremity Amputation  Goal: Optimal Coping with Amputation  Outcome: Progressing     Problem: Adult Inpatient Plan of Care  Goal: Optimal Comfort and Wellbeing  Outcome: Progressing     Problem: Extremity Amputation  Goal: Effective Urinary Elimination  Outcome: Progressing   Goal Outcome Evaluation:       Pt alert and oriented x4, sleep between cares easily awaken by voice. Denies pain, refused Tylenol. Left foot dressing wrapped in ace wrap and is  CDI. Left stump covered with protector in place. Pt bladder scanned around [place], voided after. Refused further bladder scanning, encouraged patient to void. Urinal at bedside and pt able to let needs be known./84 (BP Location: Left arm)   Pulse 102   Temp 99  F (37.2  C) (Oral)   Resp 23   Wt 68.6 kg (151 lb 4.8 oz)   SpO2 98%   BMI 21.10 kg/m              \

## 2024-09-09 NOTE — PROGRESS NOTES
Pt's son picked patient up. Discharge instruction's reviewed with patient. Right leg amputation dressing's sent with patient.  Pt wheelchair to car with 1 assist. [facility]

## 2024-09-09 NOTE — PROGRESS NOTES
[facility]    Progress Note - Hospitalist Service       Date of Admission:  9/6/2024    Assessment & Plan   Dario Benavides is a 69 year old male admitted on 9/6/2024. He has a history of HTN, HLD, T2DM and is admitted by vascular surgery for R BKA due to chronic limb ischemia. We are consulted for Diabetes and blood pressure management.        Type 2 Diabetes Mellitus   The patient has a history of T2DM managed with 20 unit(s) Lantus QAM. His most recent A1c was 8.0 on 8/9/24. Blood glucose of 157 on admission labs. He might be OK with continuing 10U lantus on discharge to avoid lows.  He did not get any lantus yesterday and his sugars have been pretty stable.  - Glucose checks QID  - 15 unit(s) Lantus QAM decreased to 10U on 9/9 due to some low BS and decreased PO  - Carb ratio of 1:15  - Medium sliding scale insulin  - Hypoglycemia protocol      Hypertension   History of hypertension treated with valsartan. Systolic blood pressure around 150 on evaluation. Valsartan and amlodipine held prior to surgery.   - Restart PTA valsartan  - Continue PTA amlodipine

## 2024-09-10 ENCOUNTER — TELEPHONE (OUTPATIENT)
Dept: VASCULAR SURGERY | Facility: CLINIC | Age: 69
End: 2024-09-10

## 2024-09-10 LAB
PATH REPORT.COMMENTS IMP SPEC: NORMAL
PATH REPORT.COMMENTS IMP SPEC: NORMAL
PATH REPORT.FINAL DX SPEC: NORMAL
PATH REPORT.GROSS SPEC: NORMAL
PATH REPORT.MICROSCOPIC SPEC OTHER STN: NORMAL
PATH REPORT.RELEVANT HX SPEC: NORMAL
PHOTO IMAGE: NORMAL

## 2024-09-10 NOTE — TELEPHONE ENCOUNTER
OPERATIVE REPORT    Elijah Epps  [id number]  1/11/2019    PREOPERATIVE DIAGNOSIS:  Left knee osteoarthritis, Primary    POSTOPERATIVE DIAGNOSIS:  Left knee osteoarthritis, Primary    PROCEDURE PERFORMED:  Quadriceps-sparing left total knee arthroplasty, cemented,  polyethylene tibial/patella liner on metal femur/tibia    SURGEON:  Ramon Guadalupe MD    ASSISTANT:  NANCY Benavidez  The role of the assistant was in limb positioning, retractor placement, trial and final component insertion, and wound closure.    ANESTHESIA:  CRNA: Lg Cochran CRNA Spinal     IV FLUID:  1000 mL Crystalloid    ESTIMATED BLOOD LOSS:  100 mL    COMPLICATIONS:  None    SPECIMENS:  None    OPERATIVE FINDINGS:  Medial compartment chondromalacia Grade 4, left  Lateral compartment chondromalacia Grade 2-3, left  Patellofemoral compartment chondromalacia Grade 3-4, left    IMPLANTS:  Implant Name Type Inv. Item Serial No.  Lot No. LRB No. Used Action   CEMENT BN BIOMET 40GM - [id number] Filler CEMENT BN BIOMET 40GM  [employer] [id number] Left 2 Implanted   COMPONENT PTLR 38MM NEXGEN PRLNG KN - [id number] Component COMPONENT PTLR 38MM NEXGEN PRLNG KN  [employer] [id number] Left 1 Implanted   SURFACE ARTC 12MM PERSONA 10-11 EF KN LT VIVACIT-E - [id number] Stem / [place]oke SURFACE ARTC 12MM PERSONA 10-11 EF KN LT VIVACIT-E  [employer] [id number] Left 1 Implanted   COMPONENT FEM 11 STD KN LT POSTERIOR STABILIZE - [id number] Component COMPONENT FEM 11 STD KN LT POSTERIOR STABILIZE  [employer] [id number] Left 1 Implanted   STEM XTN 30+ MM 14MM TPR PERSONA KN TIB - [id number] Stem / Yoke STEM XTN 30+ MM 14MM TPR PERSONA KN TIB  [employer] [id number] Left 1 Implanted   PERSONA, the personalized knee system, natural tibia, cemented, 5 degree, stemmed, left, size F    [employer] [id number] Left 1 Implanted        OPERATIVE INDICATION:  The patient is a 70 year old male with progressive pain, swelling,  OK given to Bel for daily dressing changes- Xeroform, ABD, kerlix, ACE wrap.     HC will go 3x weekly and patients girlfriend was taught how to do the dressing on other days.   stiffness and deformity of the left knee.  Evaluation including history, physical exam and plain radiographic imaging reveal end stage osteoarthritis of the left knee.  The patient's left knee symptomatology has failed a combination of non surgical interventions such as oral analgesic and antiinflammatory use, activity modification, therapeutic exercise, and intraarticular injection.  Due to the progressive and refractory nature of the left knee symptoms, the patient now has elected to proceed with left total knee arthroplasty.    OPERATIVE TECHNIQUE:  After the patient Elijah Epps was identified, the left knee was marked as the appropriate surgical site.  Preoperative indications, risks and treatment alternatives had been reviewed with the patient.  The patients questions were answered and a surgical consent signed.  2 grams of Ancef was administered within sixty minutes of surgical start time for antibiotic prophylaxis.  The patient was transferred back to the operating room at which point a spinal anesthetic was administered.  The patient was placed in a supine position on the operating room table.  All bony prominences were padded.  A surgical time out was performed in the operating room preoperatively to confirm patient identity and the left knee as the appropriate surgical site.  2 g of intravenous tranexamic acid was infused at this time for perioperative hemostasis. The left lower extremity was prepped and draped in a sterile fashion.      An anterior longitudinal incision was made to the left knee.  The incision was carried down to identify the patellar retinaculum.  A medial midvastus arthrotomy was performed and soft tissue was taken down from the medial and lateral tibial plateaus.  The patella was subluxed into the lateral gutter, remnants of the cruciate ligaments and meniscal cartilages were removed, and the tibial plateau was exposed.  Using an intramedullary cutting guide from the John Persona  knee system, a tibial bone cut was performed based off the most severe cartilage wear pattern site on the tibial plateau with a 5-degree posterior slope.  A intramedullary distal femoral cutting guide set at 4 degrees valgus to the left knee was inserted and a distal femoral cut performed.  The distal femur was prepared to accept a size 11 left posterior stabilized femoral component.  The flexion and extension gaps were balanced.  The proximal tibia was prepared to accept a size F left standard tibial baseplate with a 14 x 30 mm stem.  With the trial femoral and tibial components in place, a trial reduction was performed with a 12 mm trial tibial CPS poly insert.  With trials in place knee range of motion was from full extension to 120 degrees of knee flexion.  There was good stability to anterior/posterior and varus/valgus stress of the knee through range.  The patella was resurfaced to accept a size 38 mm patellar component.  The patella trial tracked centrally in the knee with range of motion.  All trial components were removed and Exparel was injected to the knee capsular/retinacular tissues.  All bone surfaces were irrigated with antibiotic saline and dried.  Palacos bone cement with 1 gm of Vancomycin was prepared under vacuum centrifuge technique.  The final components were cemented in place and the final tibial poly liner inserted.  Excess cement was removed and the remainder allowed to harden. 500 mL of 0.35% sterile Betadine solution was lavaged through the knee for 3 minutes. Tranexamic acid 1 gm was infiltrated through the knee and allowed to set for 5 minutes for hemostasis.    The medial midvastus arthrotomy was reapposed with a combination of #2 Ethibon suture and #2 Quill suture.  The skin was reapposed with layered Vicryl suture and 3-0 Quill suture subcuticularly.  Skin glue sealant and a sterile dressing were applied to the incision site.  A BRIAN stocking and SCD were applied to the surgical  limb.    The patient was transferred to recovery in stable condition.  All sponge, needle and instrument counts were correct.  The patient will receive 2 grams of Ancef to be completed over 24 hours for prophylactic antibiotic coverage.  The patient will be placed on coated Aspirin 81 mg oral twice daily with food for DVT prophylaxis for 5 weeks time.           INTRAOPERATIVE RADIOGRAPHIC REPORT        DIAGNOSIS:  Left knee osteoarthritis.    RADIOGRAPH:  2-view AP and lateral, left knee.    INDICATION:  Assess left total knee arthroplasty.    DESCRIPTION OF FINDINGS:  AP and lateral radiographs of the left knee were ordered and interpreted by myself in the operating room upon completion of left total knee arthroplasty.  These images show good alignment of the femoral, tibial and patellar components.  No evidence of periprosthetic fracture noted.    IMPRESSION:  Satisfactory appearance of left total knee arthroplasty.      Dictating Provider    Ramon Guadalupe MD  1/11/2021  9:27 AM

## 2024-09-10 NOTE — TELEPHONE ENCOUNTER
Caller: [facility]    Provider: MD Amber Guillen    Detailed reason for call: Bel is asking for a call back BEFORE 4:30 today to get verbal orders to resume home care for incision site care since the patient is now home from the hospital.     Best phone number to contact: [phone]    Best time to contact: ASAP- before 4:30 today    Ok to leave a detailed message: Yes    Ok to speak to authorized person if needed: No      (Noted to patient if reason is related to wound or incision, to please send a photo via email or [employer].)

## 2024-09-11 NOTE — OR NURSING
Post Operative Phone Call     Surgery: Right below the knee amputation completion     Date of Surgery: 9/6/24    Surgeon: Dr. Amber Guillen and Konrad Vigil MD    Patient Discharged from Hospital: 9/9/24    Spoke with: Patient    Discussed patients status since discharging home after surgery.     Pt reports their pain is  Currently 0/10 and they are using None to control their pain.     The incision is Healing well and without signs of infection. Pt reports they are eating and drinking normal.     Pt is voiding normal and had a bowel movement on 9/10/24.    Pt is not having any stroke like symptoms or new onset of headaches.     VQI measures: Pt has been prescribed Lipitor and ASA and reports they are taking it as prescribed.    Confirmed follow up appointments: YES    Callback number provided for further questions/concerns.    HENRI CASTELLON RN

## 2024-09-12 ENCOUNTER — TELEPHONE (OUTPATIENT)
Dept: VASCULAR SURGERY | Facility: CLINIC | Age: 69
End: 2024-09-12

## 2024-09-12 NOTE — TELEPHONE ENCOUNTER
Patient had Right BKA on 9/6 with Dr. Guillen. Also was seen inpatient by Dr. Sage for left foot wound. No follow up with Dr. Sage was on schedule.    Today received fax from [facility] indicating that necrotic tissue in left foot wound is increasing. Updated Dr. Sage's team.    Called patient's daughter Timothy, who lives out of state. Scheduled patient to see Dr. Sgae next Wednesday, she will ask her brother to bring patient to the appointment however she states they have been unreliable in the past in getting to appointments.    Patient has also lost his insurance cards. He has Medicare and [employer], he is awaiting new cards to be sent.  Timothy helped him call [employer], his ID number is [id number] and Group: [place] [facility]

## 2024-09-12 NOTE — TELEPHONE ENCOUNTER
Notes added to upcoming appt; will need to verify/update patient's insurance upon check in. [facility] inactive, Medicare Part A only.

## 2024-09-18 ENCOUNTER — OFFICE VISIT (OUTPATIENT)
Dept: VASCULAR SURGERY | Facility: CLINIC | Age: 69
End: 2024-09-18
Attending: PHYSICIAN ASSISTANT

## 2024-09-18 VITALS — SYSTOLIC BLOOD PRESSURE: 145 MMHG | OXYGEN SATURATION: 99 % | HEART RATE: 99 BPM | DIASTOLIC BLOOD PRESSURE: 80 MMHG

## 2024-09-18 DIAGNOSIS — E11.621 DIABETIC ULCER OF TOE OF LEFT FOOT ASSOCIATED WITH TYPE 2 DIABETES MELLITUS, LIMITED TO BREAKDOWN OF SKIN (H): Primary | ICD-10-CM

## 2024-09-18 DIAGNOSIS — L97.521 DIABETIC ULCER OF TOE OF LEFT FOOT ASSOCIATED WITH TYPE 2 DIABETES MELLITUS, LIMITED TO BREAKDOWN OF SKIN (H): Primary | ICD-10-CM

## 2024-09-18 PROCEDURE — 11042 DBRDMT SUBQ TIS 1ST 20SQCM/<: CPT | Performed by: PODIATRIST

## 2024-09-18 ASSESSMENT — PAIN SCALES - GENERAL: PAINLEVEL: NO PAIN (0)

## 2024-09-18 NOTE — PATIENT INSTRUCTIONS
"Dressing Change lnstructions    EVERY OTHER DAY and as needed, Cleanse your left foot wound(s) with Normal saline.    Pat Dry with non-sterile gauze    Primary Dressing: Apply Medihoney or Manuka honey into/onto the wounds    Secondary dressing: Cover with dry gauze    Secure with non-sterile roll gauze (4\" x 75\" roll) and tape (1\" roll tape) as needed; avoid adhesive directly on the skin         SEEK MEDICAL CARE IF:  You have an increase in swelling, pain, or redness around the wound.  You have an increase in the amount of pus coming from the wound.  There is a bad smell coming from the wound.  The wound appears to be worsening/enlarging  You have a fever greater than 101.5 F      It is ok to continue current wound care treatment/products for the next 2-3 days until new wound care supplies are ordered and arrive. If longer than this please contact our office at [phone].      We want to hear from you!   In the next few weeks, you should receive a call or email to complete a survey about your visit at [facility]. Please help us improve your appointment experience by letting us know how we did today. We strive to make your experience good and value any ways in which we could do better.      We value your input and suggestions.    Thank you for choosing the [facility]!    "

## 2024-09-18 NOTE — PROGRESS NOTES
FOOT AND ANKLE SURGERY/PODIATRY Progress Note      ASSESSMENT: Diabetic ulceration left foot into subcutaneous tissue        TREATMENT:  -Discussed the patient that the left foot ulceration is measuring slightly smaller than the previous visit, no signs of infection on exam today.    -Recommend we continue with Medihoney and nonweightbearing on the left foot.    -After discussion of risk factors, nursing staff removed dressing, cleansed wound and consent obtained 2% Lidocaine HCL jelly was applied, under clean conditions, the left foot ulceration(s) were debrided using #15 blade scalpel.  Devitalized and nonviable tissue, along with any fibrin and slough, was removed to improve granulation tissue formation, stimulate wound healing, decrease overall bacteria load, disrupt biofilm formation and decrease edge senescence. Wound drainage was scant No. Total excisional debridement was 0.35 sq cm into the subcutaneous tissue with a depth of 0.2 cm.   Ulcers were improved afterwards and .  Measures were as noted on the flow sheet.  Medihoney with gauze dressing applied today which he will reapply every other day.    -He will follow-up in 3 weeks    Helio Sage DPM  [facility] [place]      HPI: Dario Benavides was seen again today for a left foot ulceration.  Patient has returned home after his recent hospitalization.    Past Medical History:   Diagnosis Date    Cerebral infarction (H)     Cervical spondylosis with myelopathy     Depression with anxiety     Diabetic peripheral neuropathy (H)     Dyslipidemia     Hypertension     Non compliance with medical treatment 05/04/2021    Normocytic anemia     Normocytic anemia     Osteomyelitis of right foot (H)     Severe sepsis (H) 06/14/2023    Stage 3b chronic kidney disease (H) 05/04/2021    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) 05/08/2021    TIA (transient ischemic attack) 04/01/2009    Tobacco abuse 07/23/2019    Type 2  diabetes mellitus with right diabetic foot infection (H) 05/04/2021       Past Surgical History:   Procedure Laterality Date    AMPUTATE LEG BELOW KNEE Right 3/24/2024    Procedure: GUILLOTINE RIGHT BELOW KNEE AMPUTATION;  Surgeon: Marissa Steel MD;  [place] [facility] [place]    AMPUTATE LEG BELOW KNEE Right 9/6/2024    Procedure: AMPUTATION, BELOW KNEE;  Surgeon: Amber Guillen MD;  [place] [facility] [place] SURGERY  1992    CLOSE SECONDARY WOUND LOWER EXTREMITY Right 8/17/2023    Procedure: Delayed primary closure of wound right foot;  Surgeon: Brandon Nieto DPM;  [place] [facility] [place]    IRRIGATION AND DEBRIDEMENT FOOT, COMBINED Right 8/14/2023    Procedure: IRRIGATION AND DEBRIDEMENT right foot with removing all infected bones and soft tissue;  Surgeon: Jun Goyal DPM;  [place] [facility] [place]    NECK SURGERY  2011       No Known Allergies      Current Outpatient Medications:     alcohol swab prep pads, Use to swab area of injection/helio as directed., Disp: 100 each, Rfl: 3    amLODIPine (NORVASC) 5 MG tablet, Take 5 mg by mouth daily., Disp: , Rfl:     aspirin 81 MG EC tablet, Take 1 tablet (81 mg) by mouth daily, Disp: 30 tablet, Rfl: 0    atorvastatin (LIPITOR) 80 MG tablet, Take 1 tablet (80 mg) by mouth every evening, Disp: 30 tablet, Rfl: 0    blood glucose (ACCU-CHEK JANICE PLUS) test strip, Use to test blood sugar 1 times daily, Disp: 100 strip, Rfl: 0    insulin glargine (LANTUS SOLOSTAR) 100 UNIT/ML pen, Inject 10 Units subcutaneously every morning., Disp: 15 mL, Rfl: 0    insulin pen needle (ULTICARE MINI) 31G X 6 MM miscellaneous, Use 1 pen needles daily or as directed., Disp: 100 each, Rfl: 0    tamsulosin (FLOMAX) 0.4 MG capsule, Take 1 capsule (0.4 mg) by mouth daily., Disp: 90 capsule, Rfl: 0    valsartan (DIOVAN) 160 MG tablet, Take 160 mg by mouth daily., Disp: , Rfl:     Review of Systems - 10 point Review of Systems is negative except for left foot  ulcer which is noted in HPI.      OBJECTIVE:  BP (!) 145/80   Pulse 99   SpO2 99%   General appearance: Patient is alert and fully cooperative with history & exam.  No sign of distress is noted during the visit.    Vascular: Dorsalis pedis non-palpableLeft.  Dermatologic:    Negative Pressure Wound Therapy Leg Anterior;Lower;Right (Active)       Wound Foot Ulceration (Active)       Wound Toe (Comment  which one) Ulceration (Active)       Wound Toe (Comment  which one) (Active)   Wound Bed Other (Comment) 09/06/24 1800   Dressing Dry gauze 09/09/24 0900   Dressing Status Clean, dry, intact 09/09/24 0900       Wound Leg (Active)   Wound Bed Other (Comment) 09/06/24 1300       Wound Heel Ulceration (Active)   Wound Bed Other (Comment) 09/09/24 0900   Marie-wound Assessment Other (Comment) 09/09/24 0900   Drainage Amount None 09/09/24 0900   Wound Care/Cleansing Other (Comment) 09/06/24 1600   Dressing Other (Comment) 09/09/24 0019   Dressing Status Clean, dry, intact 09/09/24 0900       VASC Wound L plantar (Active)   Pre Size Length 0.5 09/18/24 1500   Pre Size Width 0.5 09/18/24 1500   Pre Size Depth 0.4 09/18/24 1500   Pre Total Sq cm 0.25 09/18/24 1500   Post Size Length 0.7 09/18/24 1500   Post Size Width 0.5 09/18/24 1500   Post Size Depth 0.2 09/18/24 1500   Post Total Sq cm 0.35 09/18/24 1500       Incision/Surgical Site 03/24/24 Anterior;Right Ankle (Active)       Incision/Surgical Site 09/06/24 Anterior;Proximal;Right Tibial (Active)   Incision Assessment WDL 09/09/24 0900   Dressing Dry gauze 09/09/24 0900   Marie-Incision Assessment Pale 09/09/24 0900   Closure Approximated;Staples 09/09/24 0900   Incision Drainage Amount None 09/09/24 0900   Drainage Description Sanguinous 09/08/24 0815   Dressing Intervention Clean, dry, intact 09/09/24 0900   Mixed granular/fibrotic tissue ulceration plantar first MPJ left foot, no erythema.  Neurologic: Diminished to light touch Left.  Musculoskeletal: Contracted digits  noted Left.      Picture:

## 2024-09-19 ENCOUNTER — DOCUMENTATION ONLY (OUTPATIENT)
Dept: VASCULAR SURGERY | Facility: CLINIC | Age: 69
End: 2024-09-19

## 2024-09-19 DIAGNOSIS — Z89.431 STATUS POST AMPUTATION OF RIGHT FOOT THROUGH METATARSAL BONE (H): Primary | ICD-10-CM

## 2024-09-19 DIAGNOSIS — T87.43: ICD-10-CM

## 2024-09-19 DIAGNOSIS — Z89.431 STATUS POST TRANSMETATARSAL AMPUTATION OF RIGHT FOOT (H): ICD-10-CM

## 2024-09-23 ENCOUNTER — TELEPHONE (OUTPATIENT)
Dept: VASCULAR SURGERY | Facility: CLINIC | Age: 69
End: 2024-09-23

## 2024-09-23 NOTE — TELEPHONE ENCOUNTER
Caller: Nallely Staples     Provider: PARK Sage and MD Amber Guillen    Detailed reason for call: Requesting verbal orders for HC recertification: skilled nursing 2x/week for 9 weeks.     Best phone number to contact: [phone]    Best time to contact: Any time    Ok to leave a detailed message: Yes    Ok to speak to authorized person if needed: No      (Noted to patient if reason is related to wound or incision, to please send a photo via email or [employer].)

## 2024-09-23 NOTE — TELEPHONE ENCOUNTER
LM on confidential VM. Ok for orders below as long as someone else is doing the dressing change the 3rd time during the week as the orders are for every other day/3 times weekly.

## 2024-10-10 ENCOUNTER — TELEPHONE (OUTPATIENT)
Dept: VASCULAR SURGERY | Facility: CLINIC | Age: 69
End: 2024-10-10

## 2024-10-10 NOTE — TELEPHONE ENCOUNTER
"Caller: Timothy, daughter    Provider: PARK Sage and LOUANN Vance    Detailed reason for call: Sending as an FYI.  I called the daughter to reschedule this patient's appointment.  She was extremely frustrated with her dad.  She states that she schedules the appointments for him, but she does not take him to the appointments, he insists on taking himself.  She was not aware that he missed both appointments on 10/7 and 10/9.  He is rescheduled to next week 10/16 but she says she doubts he will show up, stating, \"He doesn't care anymore.  He doesn't want to do anything.\"  She did say she will let her dad know about the appointment and try to make him come, but again, reiterated that he likely will not show again.  No call back needed, but contact number listed below if we did want to follow up with her again.     Best phone number to contact: [phone]    Best time to contact: any    Ok to leave a detailed message: Yes    Ok to speak to authorized person if needed: No      (Noted to patient if reason is related to wound or incision, to please send a photo via email or [employer].)    "

## 2024-10-10 NOTE — TELEPHONE ENCOUNTER
Nola with [facility] calling to follow up on the faxed orders.  She states the faxed it to us on 10/2/24 but have not received it back yet.  She is going to re-send and asks that we take care of it as soon as possible.

## 2024-10-11 NOTE — TELEPHONE ENCOUNTER
Left detailed message that patient still has staples in from his below the knee amputation. He had missed his appointments with Bian[facility] where it was supposed to be removed. Reiterated that he needs to come in for the appointment on 10/16/24 with Dr. Sage. Even though Dr. Sage is only providing care to the left foot, the nurses can take the staples out on the right BKA.

## 2024-10-15 NOTE — TELEPHONE ENCOUNTER
LM with daughter to confirm appt 10/16 MPW 12:45PM arrival. Patient needs to be seen for staple removal. [phone]

## 2024-10-16 ENCOUNTER — OFFICE VISIT (OUTPATIENT)
Dept: VASCULAR SURGERY | Facility: CLINIC | Age: 69
End: 2024-10-16
Attending: PODIATRIST

## 2024-10-16 VITALS — HEART RATE: 105 BPM | DIASTOLIC BLOOD PRESSURE: 84 MMHG | OXYGEN SATURATION: 100 % | SYSTOLIC BLOOD PRESSURE: 170 MMHG

## 2024-10-16 DIAGNOSIS — E11.621 DIABETIC ULCER OF OTHER PART OF LEFT FOOT ASSOCIATED WITH TYPE 2 DIABETES MELLITUS, LIMITED TO BREAKDOWN OF SKIN (H): Primary | ICD-10-CM

## 2024-10-16 DIAGNOSIS — L97.521 DIABETIC ULCER OF OTHER PART OF LEFT FOOT ASSOCIATED WITH TYPE 2 DIABETES MELLITUS, LIMITED TO BREAKDOWN OF SKIN (H): Primary | ICD-10-CM

## 2024-10-16 PROCEDURE — 11042 DBRDMT SUBQ TIS 1ST 20SQCM/<: CPT | Performed by: PODIATRIST

## 2024-10-16 ASSESSMENT — PAIN SCALES - GENERAL: PAINLEVEL: NO PAIN (0)

## 2024-10-16 NOTE — PROGRESS NOTES
FOOT AND ANKLE SURGERY/PODIATRY Progress Note      ASSESSMENT: Diabetic ulceration left foot into subcutaneous tissue         TREATMENT:  -I discussed the patient that the ulceration along the plantar medial aspect of the left foot has considerable amount of undermining and is measuring considerably larger than the previous visit.  We discussed this is likely due to excessive weightbearing on the left foot.    -Recommend nonweightbearing on the left foot with wheelchair which he has in his possession.    -Abrasion distal second agent left foot is stable.  We will continue to monitor.    -Patient to follow-up with vascular surgery for right BKA.  Staples removed today.  Steri-Strips applied.    -After discussion of risk factors, nursing staff removed dressing, cleansed wound and consent obtained 2% Lidocaine HCL jelly was applied, under clean conditions, the left foot ulceration(s) were debrided using #15 blade scalpel.  Devitalized and nonviable tissue, along with any fibrin and slough, was removed to improve granulation tissue formation, stimulate wound healing, decrease overall bacteria load, disrupt biofilm formation and decrease edge senescence. Wound drainage was scant No. Total excisional debridement was 1.8 sq cm into the subcutaneous tissue with a depth of 0.2 cm.   Ulcers were improved afterwards and .  Measures were as noted on the flow sheet.  Medihoney with gauze dressing applied today which he will reapply every other day.    -He will follow-up in 3 weeks    Helio Sage DPM  [facility] [place]      HPI: Dario Benavides was seen again today for a left foot ulceration.  Patient reports he has been using a wheelchair at home to remain limited walking on his left foot.  Patient's sister is present for today's visit.    Past Medical History:   Diagnosis Date    Cerebral infarction (H)     Cervical spondylosis with myelopathy     Depression with anxiety     Diabetic peripheral  neuropathy (H)     Dyslipidemia     Hypertension     Non compliance with medical treatment 05/04/2021    Normocytic anemia     Normocytic anemia     Osteomyelitis of right foot (H)     Severe sepsis (H) 06/14/2023    Stage 3b chronic kidney disease (H) 05/04/2021    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) 05/08/2021    TIA (transient ischemic attack) 04/01/2009    Tobacco abuse 07/23/2019    Type 2 diabetes mellitus with right diabetic foot infection (H) 05/04/2021       Past Surgical History:   Procedure Laterality Date    AMPUTATE LEG BELOW KNEE Right 3/24/2024    Procedure: GUILLOTINE RIGHT BELOW KNEE AMPUTATION;  Surgeon: Marissa Steel MD;  [place] [facility] [place]    AMPUTATE LEG BELOW KNEE Right 9/6/2024    Procedure: AMPUTATION, BELOW KNEE;  Surgeon: Amber Guillen MD;  [place]: [facility] [place]    BACK SURGERY  1992    CLOSE SECONDARY WOUND LOWER EXTREMITY Right 8/17/2023    Procedure: Delayed primary closure of wound right foot;  Surgeon: Brandon Nieto DPM;  [place] [facility] [place]    IRRIGATION AND DEBRIDEMENT FOOT, COMBINED Right 8/14/2023    Procedure: IRRIGATION AND DEBRIDEMENT right foot with removing all infected bones and soft tissue;  Surgeon: Jun Goyal DPM;  [place]: [facility] [place]    NECK SURGERY  2011       No Known Allergies      Current Outpatient Medications:     alcohol swab prep pads, Use to swab area of injection/helio as directed., Disp: 100 each, Rfl: 3    amLODIPine (NORVASC) 5 MG tablet, Take 5 mg by mouth daily., Disp: , Rfl:     aspirin 81 MG EC tablet, Take 1 tablet (81 mg) by mouth daily, Disp: 30 tablet, Rfl: 0    atorvastatin (LIPITOR) 80 MG tablet, Take 1 tablet (80 mg) by mouth every evening, Disp: 30 tablet, Rfl: 0    blood glucose (ACCU-CHEK JANICE PLUS) test strip, Use to test blood sugar 1 times daily, Disp: 100 strip, Rfl: 0    insulin glargine (LANTUS SOLOSTAR) 100 UNIT/ML pen, Inject 10 Units subcutaneously every  morning., Disp: 15 mL, Rfl: 0    insulin pen needle (ULTICARE MINI) 31G X 6 MM miscellaneous, Use 1 pen needles daily or as directed., Disp: 100 each, Rfl: 0    tamsulosin (FLOMAX) 0.4 MG capsule, Take 1 capsule (0.4 mg) by mouth daily., Disp: 90 capsule, Rfl: 0    valsartan (DIOVAN) 160 MG tablet, Take 160 mg by mouth daily., Disp: , Rfl:     Review of Systems - 10 point Review of Systems is negative except for left foot ulcer which is noted in HPI.      OBJECTIVE:  BP (!) 170/84   Pulse 105   SpO2 100%   General appearance: Patient is alert and fully cooperative with history & exam.  No sign of distress is noted during the visit.    Vascular: Dorsalis pedis non-palpableLeft.  Dermatologic:    Negative Pressure Wound Therapy Leg Anterior;Lower;Right (Active)       Wound Foot Ulceration (Active)       Wound Toe (Comment  which one) Ulceration (Active)       Wound Toe (Comment  which one) (Active)       Wound Leg (Active)       Wound Heel Ulceration (Active)       VASC Wound L plantar (Active)   Pre Size Length 0.6 10/16/24 1300   Pre Size Width 0.4 10/16/24 1300   Pre Size Depth 0.4 10/16/24 1300   Pre Total Sq cm 0.24 10/16/24 1300   Post Size Length 1.5 10/16/24 1300   Post Size Width 1.2 10/16/24 1300   Post Size Depth 0.2 10/16/24 1300   Post Total Sq cm 1.8 10/16/24 1300       VASC Wound left 2nd toe (Active)   Description scab 10/16/24 1300       VASC Wound right lateral BKA (Active)   Pre Size Length 3.5 10/16/24 1300   Pre Size Width 1 10/16/24 1300   Pre Size Depth 0.1 10/16/24 1300   Pre Total Sq cm 3.5 10/16/24 1300       Incision/Surgical Site 03/24/24 Anterior;Right Ankle (Active)       Incision/Surgical Site 09/06/24 Anterior;Proximal;Right Tibial (Active)   Ulceration plantar medial left forefoot has a considerable amount of undermining with mixed granular/fibrotic tissue, no erythema.  Abrasion distal second digit left foot.  Neurologic: Diminished to light touch Left.  Musculoskeletal: Contracted  digits noted Left.      Picture:

## 2024-10-16 NOTE — PATIENT INSTRUCTIONS
"*Wheelchairs are never guaranteed at the front door, if you have your own wheel chair or knee walker, please bring that with you to your appointment. Thank you for your understanding!*    Wound care supplies were not ordered or needed today.    Important lnstructions      WEIGHT BEARING STATUS: You are to remain NON WEIGHT BEARING on your right foot. NON WEIGHT BEARING MEANS NO PRESSURE ON YOUR FOOT OR HEEL AT ANY TIME FOR ANY REASON!    2. OFFLOADING DEVICE: Must use a A WHEELCHAIR at all times! (do not use affected foot to push wheelchair)    3. STABILIZATION DEVICE: Use a CAM BOOT . You will need to WEAR THIS ANYTIME YOU ARE UP AND OUT OF BED, IT IS OKAY TO REMOVE WHEN YOU ARE SLEEPING..     4. ELEVATE: Elevating your leg means laying with your head on a pillow and your foot ABOVE YOUR HEART.     5. DO NOT MOVE YOUR FOOT.  There is a risk of worsening the wound or incision. To give yourself a higher chance of healing, please DO NOT swing foot back and forth and wiggle foot/toes especially when inside a stabilization device. Limited movement is allowable with therapy as recommended by the doctor.     6. TAKE A PROTEIN SHAKE TWICE A DAY.  (For ex: Boost, Ensure, Glucerna)    7. KEEP YOUR WOUND DRY AT ALL TIMES    Use a shower bag or a cast cover to keep your foot/leg dry during showers. These can be purchased on [employer] or any pharmacy.         Dressing Change lnstructions    EVERY OTHER DAY and as needed, Cleanse your  left foot wound(s) with Normal saline.    Pat Dry with non-sterile gauze    Primary Dressing: Apply Medihoney or Manuka honey into/onto the wounds    Secondary dressing: Cover with dry gauze    Secure with non-sterile roll gauze (4\" x 75\" roll) and tape (1\" roll tape) as needed; avoid adhesive directly on the skin         SEEK MEDICAL CARE IF:  You have an increase in swelling, pain, or redness around the wound.  You have an increase in the amount of pus coming from the wound.  There is a bad smell " coming from the wound.  The wound appears to be worsening/enlarging  You have a fever greater than 101.5 F      It is ok to continue current wound care treatment/products for the next 2-3 days until new wound care supplies are ordered and arrive. If longer than this please contact our office at [phone].      We want to hear from you!   In the next few weeks, you should receive a call or email to complete a survey about your visit at [facility]. Please help us improve your appointment experience by letting us know how we did today. We strive to make your experience good and value any ways in which we could do better.      We value your input and suggestions.    Thank you for choosing the [facility]!

## 2024-10-22 ENCOUNTER — TELEPHONE (OUTPATIENT)
Dept: VASCULAR SURGERY | Facility: CLINIC | Age: 69
End: 2024-10-22

## 2024-10-22 NOTE — TELEPHONE ENCOUNTER
Bel was updated per Dr. Sage below. She had no further questions.       If concern for infection, recommend urgent care/ED. If a sore on the 2nd digit has opened up, recommend dry gauze dressing daily. Thanks

## 2024-10-22 NOTE — TELEPHONE ENCOUNTER
Bel with [facility] called [phone] with 2 updates.     She stated that pts stump incision has a little amount of serous fluid from the incision. She stated that it is not open. She applied an ace bandage. Pt is wearing his stump protector. Informed her to have pt elevate his stump when possible. Told her to continue to monitor and call us if the incision opens.    Pt was seen by Dr. Sage on 10/16. She stated that pt was clipping his nails and now a wound developed on L 2nd digit, it was scabbed when pt saw Dr. Sage. She stated that she is concerned of infection due to redness, warmth and weeping, no fever. She stated that she applied neosporin. Writer will send to Dr. Sage if he will send an antibiotic or if pt needs to go to urgent care or ED.

## 2024-11-07 ENCOUNTER — TELEPHONE (OUTPATIENT)
Dept: VASCULAR SURGERY | Facility: CLINIC | Age: 69
End: 2024-11-07

## 2024-11-07 NOTE — TELEPHONE ENCOUNTER
LMTCB to r/s missed appt with Dr. Sage. Please ask if there is a reason patient is missing appointments and/or what clinic can do to make sure patient is aware of/shows up to appts. [phone]  _________________________________  ----- Message from Desiree RAINES sent at 11/7/2024  9:33 AM CST -----  Dr. Sage does not typically discharge his patients when they no show.  When you call to schedule can you ask why they keep not coming to appointments to see if there is something we can do to help with this?  Also express that is important for patient to come to his appointments. Thanks!  ----- Message -----  From: Kay Dugan  Sent: 11/7/2024   8:38 AM CST  To: [facility] Support Pool    Should we keep r/s this pt or need to d/c.. ? No shows 6/4/24, 6/26/24, 9/26/24, 10/7/24. 10/9/24, 11/6/24

## 2024-11-13 NOTE — TELEPHONE ENCOUNTER
Spoke with the daughter, she would like a call back on Friday on her day off to get this rescheduled.

## 2024-11-15 ENCOUNTER — TELEPHONE (OUTPATIENT)
Dept: VASCULAR SURGERY | Facility: CLINIC | Age: 69
End: 2024-11-15

## 2024-11-15 NOTE — TELEPHONE ENCOUNTER
Caller: Bel    Provider: PARK Sage    Detailed reason for call: Bel from  is calling regarding the re-cert. They are only going to see him once a week; every other week for 30 days due to non compliance and then they will discharge him.    Best phone number to contact: [phone]    Best time to contact: any    Ok to leave a detailed message: Yes    Ok to speak to authorized person if needed: No      (Noted to patient if reason is related to wound or incision, to please send a photo via email or [employer].)

## 2024-12-19 ENCOUNTER — TELEPHONE (OUTPATIENT)
Dept: PHYSICAL MEDICINE AND REHAB | Facility: CLINIC | Age: 69
End: 2024-12-19

## 2024-12-19 NOTE — CONFIDENTIAL NOTE
LPN called and spoke with Pt's daughter Timothy.   Pt was assisted to reschedule appointment on 1/30/25 to 1/28/25 at 2:00 pm.     Timothy stated that they are working to try and get pt VA insurance.     Kathi Ridley LPN

## 2025-01-21 ENCOUNTER — TELEPHONE (OUTPATIENT)
Dept: VASCULAR SURGERY | Facility: CLINIC | Age: 70
End: 2025-01-21

## 2025-01-21 NOTE — TELEPHONE ENCOUNTER
Spoke to Pilar at [facility], patient's . Received fax from [facility] reporting worsening wound. Patient has not been seen in clinic since October and did not respond to calls or letter sent to him to schedule new appointment. If he needs to be seen, he will need to call to make new appointment.

## 2025-01-27 ENCOUNTER — TELEPHONE (OUTPATIENT)
Dept: PHYSICAL MEDICINE AND REHAB | Facility: CLINIC | Age: 70
End: 2025-01-27

## 2025-01-27 NOTE — CONFIDENTIAL NOTE
Pre-visit phone call-       LPN attempted to call pt to remind them of their appointment on Tuesday 1/28/25 with Dr. Gomes at [facility] in [place], [place]. LPN was unable to reach pt or family members listed. VM box was full- LPN was unable to leave a message.     Kathi Ridley LPN

## (undated) DEVICE — GOWN IMPERVIOUS BREATHABLE 2XL/XLONG

## (undated) DEVICE — DRSG GAUZE 4X4" TRAY 6939

## (undated) DEVICE — BLADE KNIFE SURG 15 371115

## (undated) DEVICE — SU SILK 2-0 SH CR 8X18" C012D

## (undated) DEVICE — LIGACLIP MEDIUM AESCULAP B2180-1

## (undated) DEVICE — SOL NACL 0.9% IRRIG 1000ML BOTTLE 2F7124

## (undated) DEVICE — NDL 25GA 1.5" 305127

## (undated) DEVICE — PREP POVIDONE-IODINE 10% SOLUTION 4OZ BOTTLE MDS093944

## (undated) DEVICE — BLADE SAW SAGITTAL STRK WIDE 25.4X85X1.2MM 2108-151-000

## (undated) DEVICE — HOLDER LIMB VELCRO OR 0814-1533

## (undated) DEVICE — STPL SKIN 35W 6.9MM  PXW35

## (undated) DEVICE — SUTURE VICRYL+ 3-0 18 SH/CR UND VCP864

## (undated) DEVICE — SU VICRYL+ 0 8-18 CT1/CR UND VCP840D

## (undated) DEVICE — DRSG ABD TNDRSRB WET PRUF 8IN X 10IN STRL  9194A

## (undated) DEVICE — SUTURE SILK 3-0 TIES 30IN SA84H

## (undated) DEVICE — DRESSING XEROFORM PETROLATUM 5X9 33605

## (undated) DEVICE — CUFF TOURN 30IN STRL DISP 5921030235

## (undated) DEVICE — SUCTION MANIFOLD NEPTUNE 2 SYS 1 PORT 702-025-000

## (undated) DEVICE — CUSTOM PACK TOTAL KNEE SOP5BTKHEC

## (undated) DEVICE — DRSG KERLIX FLUFFS X5

## (undated) DEVICE — SOL WATER IRRIG 1000ML BOTTLE 2F7114

## (undated) DEVICE — TRAY PREP DRY SKIN SCRUB 067

## (undated) DEVICE — DRSG KERLIX 4 1/2"X4YDS ROLL 6715

## (undated) DEVICE — ESU PENCIL W/HOLSTER E2350H

## (undated) DEVICE — Device

## (undated) DEVICE — PLATE GROUNDING ADULT W/CORD 9165L

## (undated) DEVICE — SPONGE RAY-TEC 4X8" 7318

## (undated) DEVICE — GLOVE SURG GAMMEX PI POLYISOPRENE WHITE SZ 8 LF 20685780

## (undated) DEVICE — BONE CLEANING TIP INTERPULSE  0210-010-000

## (undated) DEVICE — SYR 10ML LL W/O NDL 302995

## (undated) DEVICE — DRSG ADAPTIC 3X8" 6113

## (undated) DEVICE — ESU GROUND PAD ADULT REM W/15' CORD E7507DB

## (undated) DEVICE — BANDAGE STRETCH GAUZE 4IN 2247

## (undated) DEVICE — SU ETHILON 4-0 PS-2 18" BLACK 1667H

## (undated) DEVICE — SU SILK 3-0 SH CR 8X18" C013D

## (undated) DEVICE — PREP POVIDONE-IODINE 7.5% SCRUB 4OZ BOTTLE MDS093945

## (undated) DEVICE — GLOVE BIOGEL PI INDICATOR 8.0 LF 41680

## (undated) DEVICE — SUTURE MONOCRYL+ 2-0 27IN CT2 MCP333H

## (undated) DEVICE — GOWN SURGICAL SMARTGOWN 2XL 89075

## (undated) DEVICE — CUSTOM PACK LOWER EXTREMITY SOP5BLEHEA

## (undated) DEVICE — BNDG KLING 3" 2232

## (undated) DEVICE — BLADE GIGLI 12" 00-2808-001-00

## (undated) DEVICE — SU VICRYL+ 3-0 27IN SH UND VCP416H

## (undated) DEVICE — SU SILK 0 24" TIE SA76G

## (undated) DEVICE — GLOVE BIOGEL PI ULTRATOUCH G SZ 8.5 42185

## (undated) DEVICE — GOWN XXL 9575

## (undated) DEVICE — ESU PENCIL SMOKE EVAC W/ROCKER SWITCH 0703-047-000

## (undated) DEVICE — SUCTION IRR SYSTEM W/O TIP INTERPULSE HANDPIECE 0210-100-000

## (undated) DEVICE — DRAPE STERI TOWEL LG 1010

## (undated) DEVICE — CUFF TOURN 18IN STRL DISP

## (undated) DEVICE — DRAPE ISOLATION BAG 1003

## (undated) DEVICE — DRSG GAUZE 4X4" TRAY

## (undated) DEVICE — GLOVE SURG PI ULTRA TOUCH M SZ 8-1/2 LF

## (undated) DEVICE — GLOVE SURG PI ULTRA TOUCH M SZ 7 LF 42670

## (undated) DEVICE — SUTURE VICRYL+ 2-0 27IN CT-1 UND VCP259H

## (undated) DEVICE — SU MONOCRYL 3-0 PS-2 18" UND MCP497G

## (undated) DEVICE — DRAPE STERI NON-STERILE 17X11 1000 NSD

## (undated) RX ORDER — LIDOCAINE HYDROCHLORIDE 10 MG/ML
INJECTION, SOLUTION EPIDURAL; INFILTRATION; INTRACAUDAL; PERINEURAL
Status: DISPENSED
Start: 2024-09-06

## (undated) RX ORDER — DEXAMETHASONE SODIUM PHOSPHATE 10 MG/ML
INJECTION, SOLUTION INTRAMUSCULAR; INTRAVENOUS
Status: DISPENSED
Start: 2024-09-06

## (undated) RX ORDER — FENTANYL CITRATE-0.9 % NACL/PF 10 MCG/ML
PLASTIC BAG, INJECTION (ML) INTRAVENOUS
Status: DISPENSED
Start: 2023-08-14

## (undated) RX ORDER — PROPOFOL 10 MG/ML
INJECTION, EMULSION INTRAVENOUS
Status: DISPENSED
Start: 2024-03-24

## (undated) RX ORDER — PROPOFOL 10 MG/ML
INJECTION, EMULSION INTRAVENOUS
Status: DISPENSED
Start: 2024-09-06

## (undated) RX ORDER — PROPOFOL 10 MG/ML
INJECTION, EMULSION INTRAVENOUS
Status: DISPENSED
Start: 2023-08-14

## (undated) RX ORDER — LIDOCAINE HYDROCHLORIDE 10 MG/ML
INJECTION, SOLUTION EPIDURAL; INFILTRATION; INTRACAUDAL; PERINEURAL
Status: DISPENSED
Start: 2024-03-24

## (undated) RX ORDER — ONDANSETRON 2 MG/ML
INJECTION INTRAMUSCULAR; INTRAVENOUS
Status: DISPENSED
Start: 2024-09-06

## (undated) RX ORDER — EPHEDRINE SULFATE 50 MG/ML
INJECTION, SOLUTION INTRAMUSCULAR; INTRAVENOUS; SUBCUTANEOUS
Status: DISPENSED
Start: 2024-09-06

## (undated) RX ORDER — CEFAZOLIN SODIUM 1 G/3ML
INJECTION, POWDER, FOR SOLUTION INTRAMUSCULAR; INTRAVENOUS
Status: DISPENSED
Start: 2024-03-24

## (undated) RX ORDER — GINSENG 100 MG
CAPSULE ORAL
Status: DISPENSED
Start: 2023-08-17

## (undated) RX ORDER — LIDOCAINE HYDROCHLORIDE 10 MG/ML
INJECTION, SOLUTION EPIDURAL; INFILTRATION; INTRACAUDAL; PERINEURAL
Status: DISPENSED
Start: 2023-08-14

## (undated) RX ORDER — BUPIVACAINE HYDROCHLORIDE 5 MG/ML
INJECTION, SOLUTION EPIDURAL; INTRACAUDAL
Status: DISPENSED
Start: 2023-08-17

## (undated) RX ORDER — LIDOCAINE HYDROCHLORIDE 20 MG/ML
INJECTION, SOLUTION INFILTRATION; PERINEURAL
Status: DISPENSED
Start: 2023-08-17

## (undated) RX ORDER — FENTANYL CITRATE-0.9 % NACL/PF 10 MCG/ML
PLASTIC BAG, INJECTION (ML) INTRAVENOUS
Status: DISPENSED
Start: 2024-09-06

## (undated) RX ORDER — BUPIVACAINE HYDROCHLORIDE 5 MG/ML
INJECTION, SOLUTION EPIDURAL; INTRACAUDAL
Status: DISPENSED
Start: 2023-08-14